# Patient Record
Sex: FEMALE | Race: WHITE | Employment: FULL TIME | URBAN - NONMETROPOLITAN AREA
[De-identification: names, ages, dates, MRNs, and addresses within clinical notes are randomized per-mention and may not be internally consistent; named-entity substitution may affect disease eponyms.]

---

## 2017-01-08 ENCOUNTER — OFFICE VISIT (OUTPATIENT)
Dept: PRIMARY CARE CLINIC | Age: 52
End: 2017-01-08

## 2017-01-08 VITALS
RESPIRATION RATE: 16 BRPM | HEIGHT: 67 IN | DIASTOLIC BLOOD PRESSURE: 88 MMHG | TEMPERATURE: 98.7 F | OXYGEN SATURATION: 95 % | BODY MASS INDEX: 28.41 KG/M2 | SYSTOLIC BLOOD PRESSURE: 120 MMHG | WEIGHT: 181 LBS | HEART RATE: 86 BPM

## 2017-01-08 DIAGNOSIS — J01.40 ACUTE NON-RECURRENT PANSINUSITIS: Primary | ICD-10-CM

## 2017-01-08 PROCEDURE — 99202 OFFICE O/P NEW SF 15 MIN: CPT | Performed by: FAMILY MEDICINE

## 2017-01-08 RX ORDER — DOXYCYCLINE HYCLATE 100 MG/1
100 CAPSULE ORAL 2 TIMES DAILY
Qty: 20 CAPSULE | Refills: 0 | Status: SHIPPED | OUTPATIENT
Start: 2017-01-08 | End: 2017-01-18

## 2017-01-08 ASSESSMENT — ENCOUNTER SYMPTOMS
RHINORRHEA: 0
SINUS COMPLAINT: 1
DIARRHEA: 0
VOMITING: 0
SORE THROAT: 1
SHORTNESS OF BREATH: 0
NAUSEA: 0
SINUS PRESSURE: 1
WHEEZING: 0
COUGH: 1

## 2017-08-27 ENCOUNTER — OFFICE VISIT (OUTPATIENT)
Dept: PRIMARY CARE CLINIC | Age: 52
End: 2017-08-27
Payer: COMMERCIAL

## 2017-08-27 VITALS
TEMPERATURE: 98 F | OXYGEN SATURATION: 97 % | BODY MASS INDEX: 29.19 KG/M2 | HEIGHT: 67 IN | HEART RATE: 95 BPM | DIASTOLIC BLOOD PRESSURE: 80 MMHG | SYSTOLIC BLOOD PRESSURE: 130 MMHG | WEIGHT: 186 LBS

## 2017-08-27 DIAGNOSIS — J32.9 SINUSITIS, UNSPECIFIED CHRONICITY, UNSPECIFIED LOCATION: Primary | ICD-10-CM

## 2017-08-27 PROCEDURE — 99202 OFFICE O/P NEW SF 15 MIN: CPT | Performed by: NURSE PRACTITIONER

## 2017-08-27 RX ORDER — METFORMIN HYDROCHLORIDE 500 MG/1
TABLET, EXTENDED RELEASE ORAL
COMMUNITY
Start: 2017-08-12 | End: 2017-08-27 | Stop reason: DRUGHIGH

## 2017-08-27 RX ORDER — ESCITALOPRAM OXALATE 20 MG/1
TABLET ORAL
COMMUNITY
Start: 2017-07-31 | End: 2017-08-27 | Stop reason: SDUPTHER

## 2017-08-27 RX ORDER — SAXAGLIPTIN 5 MG/1
TABLET, FILM COATED ORAL
COMMUNITY
Start: 2017-08-23 | End: 2022-01-11

## 2017-08-27 RX ORDER — ZOLPIDEM TARTRATE 10 MG/1
TABLET ORAL
COMMUNITY
Start: 2017-08-12 | End: 2020-10-13

## 2017-08-27 RX ORDER — DOXYCYCLINE HYCLATE 100 MG
100 TABLET ORAL 2 TIMES DAILY
Qty: 20 TABLET | Refills: 0 | Status: SHIPPED | OUTPATIENT
Start: 2017-08-27 | End: 2017-09-06

## 2017-08-27 RX ORDER — TRAMADOL HYDROCHLORIDE 50 MG/1
TABLET ORAL
COMMUNITY
Start: 2017-08-08 | End: 2020-10-13

## 2017-08-27 ASSESSMENT — ENCOUNTER SYMPTOMS
SORE THROAT: 1
COUGH: 1
SINUS PRESSURE: 1

## 2019-07-15 ENCOUNTER — OFFICE VISIT (OUTPATIENT)
Dept: NEUROLOGY | Age: 54
End: 2019-07-15
Payer: COMMERCIAL

## 2019-07-15 VITALS
BODY MASS INDEX: 31.24 KG/M2 | HEIGHT: 66 IN | HEART RATE: 101 BPM | SYSTOLIC BLOOD PRESSURE: 125 MMHG | WEIGHT: 194.4 LBS | DIASTOLIC BLOOD PRESSURE: 88 MMHG

## 2019-07-15 DIAGNOSIS — R25.1 TREMOR: Primary | ICD-10-CM

## 2019-07-15 PROCEDURE — G8427 DOCREV CUR MEDS BY ELIG CLIN: HCPCS | Performed by: PSYCHIATRY & NEUROLOGY

## 2019-07-15 PROCEDURE — 3017F COLORECTAL CA SCREEN DOC REV: CPT | Performed by: PSYCHIATRY & NEUROLOGY

## 2019-07-15 PROCEDURE — 1036F TOBACCO NON-USER: CPT | Performed by: PSYCHIATRY & NEUROLOGY

## 2019-07-15 PROCEDURE — 99204 OFFICE O/P NEW MOD 45 MIN: CPT | Performed by: PSYCHIATRY & NEUROLOGY

## 2019-07-15 PROCEDURE — G8417 CALC BMI ABV UP PARAM F/U: HCPCS | Performed by: PSYCHIATRY & NEUROLOGY

## 2019-07-15 RX ORDER — ALENDRONATE SODIUM 70 MG/1
70 TABLET ORAL
COMMUNITY
End: 2019-09-03 | Stop reason: ALTCHOICE

## 2019-07-15 RX ORDER — EZETIMIBE 10 MG/1
10 TABLET ORAL DAILY
COMMUNITY
End: 2019-09-03 | Stop reason: ALTCHOICE

## 2019-07-15 RX ORDER — LISINOPRIL 10 MG/1
10 TABLET ORAL DAILY
COMMUNITY
End: 2019-09-03 | Stop reason: ALTCHOICE

## 2019-07-15 RX ORDER — PROPRANOLOL HYDROCHLORIDE 20 MG/1
20 TABLET ORAL 2 TIMES DAILY
Qty: 90 TABLET | Refills: 3 | Status: SHIPPED | OUTPATIENT
Start: 2019-07-15 | End: 2020-01-29

## 2019-07-15 RX ORDER — FEXOFENADINE HCL 180 MG/1
180 TABLET ORAL DAILY
COMMUNITY

## 2019-07-22 ENCOUNTER — OFFICE VISIT (OUTPATIENT)
Dept: PRIMARY CARE CLINIC | Age: 54
End: 2019-07-22
Payer: COMMERCIAL

## 2019-07-22 VITALS
OXYGEN SATURATION: 97 % | TEMPERATURE: 96.6 F | HEART RATE: 91 BPM | SYSTOLIC BLOOD PRESSURE: 132 MMHG | WEIGHT: 186.4 LBS | BODY MASS INDEX: 29.96 KG/M2 | HEIGHT: 66 IN | DIASTOLIC BLOOD PRESSURE: 84 MMHG | RESPIRATION RATE: 18 BRPM

## 2019-07-22 DIAGNOSIS — J01.40 ACUTE NON-RECURRENT PANSINUSITIS: Primary | ICD-10-CM

## 2019-07-22 PROCEDURE — 3017F COLORECTAL CA SCREEN DOC REV: CPT | Performed by: NURSE PRACTITIONER

## 2019-07-22 PROCEDURE — 1036F TOBACCO NON-USER: CPT | Performed by: NURSE PRACTITIONER

## 2019-07-22 PROCEDURE — G8427 DOCREV CUR MEDS BY ELIG CLIN: HCPCS | Performed by: NURSE PRACTITIONER

## 2019-07-22 PROCEDURE — G8417 CALC BMI ABV UP PARAM F/U: HCPCS | Performed by: NURSE PRACTITIONER

## 2019-07-22 PROCEDURE — 99213 OFFICE O/P EST LOW 20 MIN: CPT | Performed by: NURSE PRACTITIONER

## 2019-07-22 RX ORDER — CLARITHROMYCIN 500 MG/1
500 TABLET, COATED ORAL 2 TIMES DAILY
Qty: 28 TABLET | Refills: 0 | Status: SHIPPED | OUTPATIENT
Start: 2019-07-22 | End: 2019-08-05

## 2019-07-22 ASSESSMENT — ENCOUNTER SYMPTOMS
SHORTNESS OF BREATH: 0
SORE THROAT: 1
WHEEZING: 0
RHINORRHEA: 0
SINUS PRESSURE: 1
COUGH: 1

## 2019-07-22 NOTE — PROGRESS NOTES
is alert and oriented to person, place, and time. Skin: Skin is warm and dry. Psychiatric: She has a normal mood and affect. Her behavior is normal. Thought content normal.   Nursing note and vitals reviewed. Assessment and Plan:    No results found for this visit on 07/22/19. Diagnosis Orders   1. Acute non-recurrent pansinusitis  clarithromycin (BIAXIN) 500 MG tablet     Patient verbalized that doxycycline is not effective for her. I reviewed her chart and Dr. Jimmie Fuller previously ordered Biaxin. Complete full course of antibiotic. I also recommended Flonase and an antihistamine for sinus symptoms. she was also encouraged to use tylenol for pain/fever. Increase water intake. Use cool mist humidifier at bedtime. Use nasal saline flush as needed. Good hand hygiene. she was instructed to return if there is no improvement or symptoms worsen. Answered all questions. No orders of the defined types were placed in this encounter.         Electronically signed by AQUILES Stroud CNP on 7/22/19 at 4:22 PM

## 2019-08-07 ENCOUNTER — TELEPHONE (OUTPATIENT)
Dept: NEUROLOGY | Age: 54
End: 2019-08-07

## 2019-09-03 ENCOUNTER — OFFICE VISIT (OUTPATIENT)
Dept: NEUROLOGY | Age: 54
End: 2019-09-03
Payer: COMMERCIAL

## 2019-09-03 VITALS
DIASTOLIC BLOOD PRESSURE: 78 MMHG | WEIGHT: 194.6 LBS | HEART RATE: 72 BPM | BODY MASS INDEX: 31.27 KG/M2 | SYSTOLIC BLOOD PRESSURE: 120 MMHG | HEIGHT: 66 IN

## 2019-09-03 DIAGNOSIS — G25.0 ESSENTIAL TREMOR: Primary | ICD-10-CM

## 2019-09-03 PROCEDURE — 1036F TOBACCO NON-USER: CPT | Performed by: PSYCHIATRY & NEUROLOGY

## 2019-09-03 PROCEDURE — G8427 DOCREV CUR MEDS BY ELIG CLIN: HCPCS | Performed by: PSYCHIATRY & NEUROLOGY

## 2019-09-03 PROCEDURE — 99214 OFFICE O/P EST MOD 30 MIN: CPT | Performed by: PSYCHIATRY & NEUROLOGY

## 2019-09-03 PROCEDURE — G8417 CALC BMI ABV UP PARAM F/U: HCPCS | Performed by: PSYCHIATRY & NEUROLOGY

## 2019-09-03 PROCEDURE — 3017F COLORECTAL CA SCREEN DOC REV: CPT | Performed by: PSYCHIATRY & NEUROLOGY

## 2019-09-03 NOTE — PROGRESS NOTES
[address]  [address], [address] [place] [address]  Ph: [phone] or [phone]  FAX: [phone]            The patient comes in today as a follow-up visit. She has a past medical history of benign essential tremors. On the last visit, the patient was started on Inderal 20 mg twice daily. The patient reports that there is a significant improvement in her symptoms that she is 95% improved. She reports being compliant with medications and does not report of any side effect. She does not report any dizziness, no tightness, no fatigue. Previously she has been seen by an outside facility neurologist.  There was a question of patient having Parkinson's disease and she was given a trial of carbidopa levodopa without improvement of the symptoms. MRI of the brain in the past has been normal.  Reports are reviewed with the patient.                             REVIEW OF SYSTEMS    Constitutional Weight: absent, Appetite: absent, Fatigue: absent   HEENT Visual disturbance: absent, Ears: normal   Reespiratory Shortness of breath: absent, Cough: absent   Cardivascular Chest pain: absent, Leg swelling :absent   GI Constipation: absent, Diarrhea: absent, Swallowing change: absent    Urinary frequency: absent, Urinary urgency: absent,    Musculoskeletal Neck pain: absent, Back pain: present, Stiffness: present, Muscle pain: present, Joint pain: present   Dermatological Hair loss: absent, Skin changes: absent   Neurological Memory loss: absent, Confusion: absent, Seizures: absent, Trouble walking or imbalance: absent, Dizziness: absent, Weakness: absent, Numbness: absent Tremor: present, Spasm: present, Speech difficulty: absent, Headache: absent, Light sensitivity: absent   Psychiatric Anxiety: absent, Hallucination: absent, Depression, absent   Hematologic Abnormal bleeding/Bruising: absent, Anemia: absent                                  Past Medical History:   Diagnosis Date

## 2020-01-29 RX ORDER — PROPRANOLOL HYDROCHLORIDE 20 MG/1
TABLET ORAL
Qty: 60 TABLET | Refills: 3 | Status: SHIPPED | OUTPATIENT
Start: 2020-01-29 | End: 2020-05-26

## 2020-05-26 RX ORDER — PROPRANOLOL HYDROCHLORIDE 20 MG/1
TABLET ORAL
Qty: 60 TABLET | Refills: 3 | Status: SHIPPED | OUTPATIENT
Start: 2020-05-26 | End: 2020-09-23 | Stop reason: SDUPTHER

## 2020-09-23 RX ORDER — PROPRANOLOL HYDROCHLORIDE 20 MG/1
TABLET ORAL
Qty: 60 TABLET | Refills: 0 | Status: SHIPPED | OUTPATIENT
Start: 2020-09-23 | End: 2020-11-03

## 2020-09-23 RX ORDER — PROPRANOLOL HYDROCHLORIDE 20 MG/1
TABLET ORAL
Qty: 60 TABLET | Refills: 3 | OUTPATIENT
Start: 2020-09-23

## 2020-10-13 ENCOUNTER — OFFICE VISIT (OUTPATIENT)
Dept: NEUROLOGY | Age: 55
End: 2020-10-13
Payer: COMMERCIAL

## 2020-10-13 VITALS
HEIGHT: 66 IN | HEART RATE: 101 BPM | BODY MASS INDEX: 28.93 KG/M2 | WEIGHT: 180 LBS | SYSTOLIC BLOOD PRESSURE: 121 MMHG | TEMPERATURE: 97.5 F | DIASTOLIC BLOOD PRESSURE: 83 MMHG

## 2020-10-13 PROBLEM — R25.1 TREMOR: Status: ACTIVE | Noted: 2020-10-13

## 2020-10-13 PROCEDURE — G8427 DOCREV CUR MEDS BY ELIG CLIN: HCPCS | Performed by: NURSE PRACTITIONER

## 2020-10-13 PROCEDURE — 99214 OFFICE O/P EST MOD 30 MIN: CPT | Performed by: NURSE PRACTITIONER

## 2020-10-13 PROCEDURE — 3017F COLORECTAL CA SCREEN DOC REV: CPT | Performed by: NURSE PRACTITIONER

## 2020-10-13 PROCEDURE — G8419 CALC BMI OUT NRM PARAM NOF/U: HCPCS | Performed by: NURSE PRACTITIONER

## 2020-10-13 PROCEDURE — G8484 FLU IMMUNIZE NO ADMIN: HCPCS | Performed by: NURSE PRACTITIONER

## 2020-10-13 PROCEDURE — 1036F TOBACCO NON-USER: CPT | Performed by: NURSE PRACTITIONER

## 2020-10-13 RX ORDER — PRIMIDONE 50 MG/1
50 TABLET ORAL 2 TIMES DAILY
Qty: 60 TABLET | Refills: 5 | Status: SHIPPED | OUTPATIENT
Start: 2020-10-13 | End: 2021-04-05 | Stop reason: SINTOL

## 2020-10-13 RX ORDER — TIZANIDINE 4 MG/1
4 TABLET ORAL EVERY 6 HOURS PRN
COMMUNITY
End: 2022-02-15 | Stop reason: SDUPTHER

## 2020-10-13 RX ORDER — AMITRIPTYLINE HYDROCHLORIDE 25 MG/1
25 TABLET, FILM COATED ORAL NIGHTLY
COMMUNITY
End: 2022-05-18 | Stop reason: SDUPTHER

## 2020-10-13 NOTE — PROGRESS NOTES
[facility]            [address]          [place], [address] [facility]          Dept: [phone]          Dept Fax: [phone]        MD Jesús Russo MD Ahmed B. Armandina Pears, MD Lon Roes, MD Elda Point, MD Mancil Sorrel, KAREN            10/13/2020      HISTORY OF PRESENT ILLNESS:       I had the pleasure of seeing Eduard Alcazar, who returns for continuing neurologic care. Patient is a 48year old female who was last seen on September 3, 2019 by Dr. Eileen Chan. She has a past medical history of benign essential tremors. Dr. Mayte Low had previously started the patient on Inderal 20 mg twice daily. The patient reported that there is a significant improvement in her symptoms that she is 95% improved. She reported being compliant with medications and did not report any side effects. She does not report any dizziness, no tightness, no fatigue. Previously she has been seen by an outside facility neurologist.  There was a question of patient having Parkinson's disease and she was given a trial of carbidopa levodopa without improvement of the symptoms. MRI of the brain in the past has been normal.  Reports are reviewed with the patient. At her last visit, Dr. Eileen Chan increased Inderal to 20 mg in the morning and 40 mg at night. She was unable to tolerate that dosage, so she was lowered again to 20 mg twice daily due to hypotension. Today, she reports she still has a tremor. She reported that she had a lumbar ablation by pain management. Patient reports that her tremor is just in legs. Patient reports that tremor has been worsening, and the burning of the nerves has not been working. She reports trauma to back, followed by the tremor in leg. She reports that as the day goes on the tremor worsens. Patient reports that the Inderal makes her blood pressure drop.     Prior testing on file       CURRENT MEDICATIONS:     Current Outpatient Medications   Medication Sig Dispense Refill    tiZANidine (ZANAFLEX) 4 MG tablet Take 4 mg by mouth every 6 hours as needed      amitriptyline (ELAVIL) 25 MG tablet Take 25 mg by mouth nightly      primidone (MYSOLINE) 50 MG tablet Take 1 tablet by mouth 2 times daily 60 tablet 5    propranolol (INDERAL) 20 MG tablet take 1 tablet by mouth twice a day 60 tablet 0    fexofenadine (ALLEGRA) 180 MG tablet Take 180 mg by mouth daily      ONGLYZA 5 MG TABS tablet       metFORMIN (GLUCOPHAGE) 500 MG tablet Take 1,000 mg by mouth daily       escitalopram (LEXAPRO) 10 MG tablet Take 10 mg by mouth daily.  furosemide (LASIX) 20 MG tablet Take 20 mg by mouth daily.  montelukast (SINGULAIR) 10 MG tablet Take 10 mg by mouth nightly. No current facility-administered medications for this visit. ALLERGIES:     Allergies   Allergen Reactions    Codeine     E-Mycin [Erythromycin]     Gabapentin     Iodine     Nsaids     Pcn [Penicillins]     Prednisone Other (See Comments)     Trouble swallowing    Sulfa Antibiotics     Zithromax [Azithromycin]                                  REVIEW OF SYSTEMS        All items selected indicate a positive finding. Those items not selected are negative.   Constitutional [] Weight loss/gain   [] Fatigue  [] Fever/Chills   HEENT [] Hearing Loss  [] Visual Disturbance  [] Tinnitus  [] Eye pain   Respiratory [] Shortness of Breath  [] Cough  [] Snoring   Cardiovascular [] Chest Pain  [] Palpitations  [] Lightheaded   GI [] Constipation  [] Diarrhea  [] Swallowing change  [] Nausea/vomiting    [] Urinary Frequency  [] Urinary Urgency   Musculoskeletal [] Neck pain  [x] Back pain  [] Muscle pain  [] Restless legs   Dermatologic [] Skin changes   Neurologic [] Memory loss/confusion  [] Seizures  [] Trouble walking or imbalance  [] Dizziness  [] Sleep disturbance  [] Weakness  [] Numbness  [x] Tremors  [] Speech Difficulty  [] Headaches  [] Light Sensitivity  [] Sound Sensitivity   Endocrinology []Excessive thirst  []Excessive hunger   Psychiatric [] Anxiety/Depression  [] Hallucination   Allergy/immunology []Hives/environmental allergies   Hematologic/lymph [] Abnormal bleeding  [] Abnormal bruising         PHYSICAL EXAMINATION:       Vitals:    10/13/20 1517   BP: 121/83   Pulse: 101   Temp: 97.5 °F (36.4 °C)                                              .                                                                                                     General Appearance:  Alert, cooperative, no signs of distress, appears stated age   Head:  Normocephalic, no signs of trauma   Eyes:  Conjunctiva/corneas clear;  eyelids intact   Ears:  Normal external ear and canals   Nose: Nares normal, mucosa normal, no drainage    Throat: Lips and tongue normal; teeth normal;  gums normal   Neck: Supple, intact flexion, extension and rotation;   trachea midline;  no adenopathy;   thyroid: not enlarged;   no carotid pulse abnormality   Back:   Symmetric, no curvature, ROM adequate   Lungs:   Respirations unlabored   Heart:  Regular rate and rhythm           Extremities: Extremities normal, no cyanosis, no edema   Pulses: Symmetric over head and neck   Skin: Skin color, texture normal, no rashes, no lesions                                     NEUROLOGIC EXAMINATION    Neurologic Exam  Mental status    Alert and oriented x 3; intact memory with no confusion, speech or language problems; no hallucinations or delusions  Fund of information appropriate for level of education    Cranial nerves    II - visual fields intact to confrontation bilaterally  III, IV, VI - extra-ocular muscles full: no pupillary defect; no KOBY, no nystagmus, no ptosis   V - normal facial sensation                                                               VII - normal facial symmetry                                                             VIII - intact presence of Kanchan Gonzalez CNP.

## 2020-11-03 RX ORDER — PROPRANOLOL HYDROCHLORIDE 20 MG/1
TABLET ORAL
Qty: 60 TABLET | Refills: 0 | Status: SHIPPED | OUTPATIENT
Start: 2020-11-03 | End: 2020-12-07

## 2020-12-07 RX ORDER — PROPRANOLOL HYDROCHLORIDE 20 MG/1
TABLET ORAL
Qty: 60 TABLET | Refills: 0 | Status: SHIPPED | OUTPATIENT
Start: 2020-12-07 | End: 2021-01-07

## 2020-12-07 NOTE — TELEPHONE ENCOUNTER
Pharmacy requesting refill of propranolol 20 mg BID.       Medication active on med list yes      Date of last Rx: 11/3/2020  with 0 refills verified on 12/7/2020   verified by GODFREY PEREZ      Date of last appointment 10/13/2020    Next Visit Date:  12/15/2020

## 2020-12-15 ENCOUNTER — TELEMEDICINE (OUTPATIENT)
Dept: NEUROLOGY | Age: 55
End: 2020-12-15
Payer: COMMERCIAL

## 2020-12-15 PROCEDURE — 3017F COLORECTAL CA SCREEN DOC REV: CPT | Performed by: NURSE PRACTITIONER

## 2020-12-15 PROCEDURE — G8427 DOCREV CUR MEDS BY ELIG CLIN: HCPCS | Performed by: NURSE PRACTITIONER

## 2020-12-15 PROCEDURE — 99214 OFFICE O/P EST MOD 30 MIN: CPT | Performed by: NURSE PRACTITIONER

## 2020-12-15 NOTE — PROGRESS NOTES
[facility]  Offices: [address], [place], [address] [facility]  [address] [facility], [address], [place], [address] [facility]  [address] [facility] [address], [address]  Phone: [phone]  Fax: [phone]    MD Zoe Candelario, MD Amanda Cha, MD Tata Roberts, MD Maya Holguin, MD Ros Denis, CNP    TELEHEALTH VISIT        12/15/2020      HISTORY OF PRESENT ILLNESS:       I had the pleasure of seeing Emma Gregorio, who is here for evaluation of benign essential tremors. The patient is a 48year old female who was last seen on October 13, 2020. She was previously seen by Dr. Robin Cha who started her on Inderal 20 mg twice daily.  The patient reported that there is a significant improvement in her symptoms that she is 95% improved.  She reported being compliant with medications and did not report any side effects. She does not report any dizziness, no tightness, no fatigue. Previously she has been seen by an outside facility neurologist. Whitley Patel was a question of patient having Parkinson's disease and she was given a trial of carbidopa levodopa without improvement of the symptoms. MRI of the brain in the past has been normal.  Reports are reviewed with the patient. At her last visit, Dr. Robin Cha increased Inderal to 20 mg in the morning and 40 mg at night. She was unable to tolerate that dosage, so she was lowered again to 20 mg twice daily due to hypotension.     At last visit she reported that she was still having a tremor. She reported that she had a lumbar ablation by pain management but she does not believe it was effective. Her tremor now is only in her legs and was worsening prior to last visit. She reports trauma to back, followed by the tremor in leg. She reports that as the day goes on the tremor worsens. Patient reports that the Inderal makes her blood pressure drop.  At this visit she was started on primidone and is here today reporting that the primidone has been causing side effects. She reports that she has had problems with anti epileptic medications in the past. She tried taking it for two weeks but she reported that it left her in a sort of brain fog. The primidone was also not very effective in relieving her tremor. She has been compliant with inderal and reports that it has been helping manage her tremor. She denies any problems with her blood pressure.            Prior testing reviewed:  MRI Brain WO Contrast 2019 Unremarkable      PAST MEDICAL HISTORY:         Diagnosis Date    Allergic rhinitis     Arthritis     Depression     Fibromyalgia     Hyperlipidemia     Neuropathy     Peripheral vascular disease (HCC)     Type II or unspecified type diabetes mellitus without mention of complication, not stated as uncontrolled         PAST SURGICAL HISTORY:         Procedure Laterality Date     SECTION      CHOLECYSTECTOMY      OTHER SURGICAL HISTORY      darrach procedure    OTHER SURGICAL HISTORY      darrach procedure revision        SOCIAL HISTORY:     Social History     Socioeconomic History    Marital status:      Spouse name: Not on file    Number of children: Not on file    Years of education: Not on file    Highest education level: Not on file   Occupational History    Not on file   Social Needs    Financial resource strain: Not on file    Food insecurity     Worry: Not on file     Inability: Not on file    Transportation needs     Medical: Not on file     Non-medical: Not on file   Tobacco Use    Smoking status: Former Smoker    Smokeless tobacco: Never Used   Substance and Sexual Activity    Alcohol use: No    Drug use: No    Sexual activity: Not on file   Lifestyle    Physical activity     Days per week: Not on file     Minutes per session: Not on file    Stress: Not on file   Relationships    Social connections     Talks on phone: Not on file     Gets together: Not on file Attends Sikh service: Not on file     Active member of club or organization: Not on file     Attends meetings of clubs or organizations: Not on file     Relationship status: Not on file    Intimate partner violence     Fear of current or ex partner: Not on file     Emotionally abused: Not on file     Physically abused: Not on file     Forced sexual activity: Not on file   Other Topics Concern    Not on file   Social History Narrative    Not on file       CURRENT MEDICATIONS:     Current Outpatient Medications   Medication Sig Dispense Refill    propranolol (INDERAL) 20 MG tablet take 1 tablet by mouth twice a day 60 tablet 0    tiZANidine (ZANAFLEX) 4 MG tablet Take 4 mg by mouth every 6 hours as needed      amitriptyline (ELAVIL) 25 MG tablet Take 25 mg by mouth nightly      fexofenadine (ALLEGRA) 180 MG tablet Take 180 mg by mouth daily      metFORMIN (GLUCOPHAGE) 500 MG tablet Take 1,000 mg by mouth daily       escitalopram (LEXAPRO) 10 MG tablet Take 10 mg by mouth daily.  furosemide (LASIX) 20 MG tablet Take 20 mg by mouth daily.  montelukast (SINGULAIR) 10 MG tablet Take 10 mg by mouth nightly.  primidone (MYSOLINE) 50 MG tablet Take 1 tablet by mouth 2 times daily (Patient not taking: Reported on 12/11/2020) 60 tablet 5    ONGLYZA 5 MG TABS tablet        No current facility-administered medications for this visit. ALLERGIES:     Allergies   Allergen Reactions    Codeine     E-Mycin [Erythromycin]     Gabapentin     Iodine     Nsaids     Pcn [Penicillins]     Prednisone Other (See Comments)     Trouble swallowing    Sulfa Antibiotics     Zithromax [Azithromycin]                              REVIEW OF SYSTEMS                   All items selected indicate a positive finding. Those items not selected are negative.   Constitutional [] Weight loss/gain   [] Fatigue  [] Fever/Chills   HEENT [] Hearing Loss  [] Visual Disturbance  [] Tinnitus  [] Eye pain   Respiratory [] Shortness of Breath  [] Cough  [] Snoring   Cardiovascular [] Chest Pain  [] Palpitations  [] Lightheaded   GI [] Constipation  [] Diarrhea  [] Swallowing change  [] Nausea/vomiting    [] Urinary Frequency  [] Urinary Urgency   Musculoskeletal [] Neck pain  [x] Back pain  [] Muscle pain  [] Restless legs   Dermatologic [] Skin changes   Neurologic [] Memory loss/confusion  [] Seizures  [] Trouble walking or imbalance  [] Dizziness  [] Sleep disturbance  [] Weakness  [] Numbness  [x] Tremors  [] Speech Difficulty  [] Headaches  [] Light Sensitivity  [] Sound Sensitivity   Endocrinology []Excessive thirst  []Excessive hunger   Psychiatric [] Anxiety/Depression  [] Hallucination   Allergy/immunology []Hives/environmental allergies   Hematologic/lymph [] Abnormal bleeding  [] Abnormal bruising          PHYSICAL EXAMINATION:                                         .                                                                                                    General Appearance:  Alert, cooperative, no signs of distress, appears stated age   Head:  Normocephalic, no signs of trauma   Eyes:  Conjunctiva/corneas clear;  eyelids intact   Ears:  Normal external ear and canals   Nose: Nares normal, no drainage    Throat: Lips and tongue normal; teeth normal;  gums normal   Extremities: Extremities normal, no cyanosis, no edema   Skin: Skin color, texture normal, no rashes, no lesions                                     NEUROLOGIC EXAMINATION      Mental status    Alert and oriented x 3; able to follow commands, speech and language intact; no hallucinations or delusions  Fund of information appropriate for level of education    Cranial nerves    II - grossly intact  III, IV, VI  extra-ocular muscles full: no nystagmus, no ptosis   V - normal facial sensation                                                               VII - normal facial symmetry                                                             VIII - intact hearing                                                                             IX, X - symmetrical palate                                                                  XI - symmetrical shoulder shrug                                                       XII - tongue midline without atrophy      Motor function  Normal muscle bulk. Strength at least 5/5 on all 4 extremities, no pronator drift      Sensory function Unable to test      Cerebellar Intact fine motor movement. No involuntary movements or tremors. No ataxia or dysmetria on finger to nose or heel to shin testing      Reflex function Unable to test      Gait                   normal base and arm swing              ASSESSMENT AND PLAN:     This is a telehealth visit that was performed with the originating site at Patient Location: home and Provider Location of [place], [place]. Verbal consent to participate in video visit was obtained. Pursuant to the emergency declaration under the [facility] [address] [facility], [facility] waiver authority and the [employer] Act, this Virtual Visit was conducted, with patient's consent, to reduce the patient's risk of exposure to COVID-19 and provide continuity of care for an established/new patient. Services were provided through a video synchronous discussion virtually to substitute for in-person clinic visit. I discussed with the patient the nature of our telehealth visits via interactive/real-time audio/video that:  - I would evaluate the patient and recommend diagnostics and treatments based on my assessment  - Our sessions are not being recorded and that personal health information is protected  - Our team would provide follow up care in person if/when the patient needs it. In summary, your patient, Gm Keene exhibits the following, with associated plan:    1. Benign tremor  1.  Continue Inderal 20 mg every morning, 20 mg every night, 2. Patient to continue following with pain management for management of back pain, she will see neurosurgery after COVID pandemic  5. Patient to return for follow up visit in 3-4 months      Signed: STEPHON Escalera    [facility]    Please note that this chart was generated using voice recognition Dragon dictation software. Although every effort was made to ensure the accuracy of this automated transcription, some errors in transcription may have occurred. Provider Attestation:    *The documentation recorded by the scribe accurately reflects the service I personally performed and the decisions made by myself. Portions of this exam were transcribed by a scribe. I personally performed the history, physical exam, and the medical decision-making and confirm the accuracy of the information in the transcribed note. *      Scribe Attestation:     By signing my name below, I, Jade Cohen, attest that this documentation has been prepared under the direction and in the presence of Raman Shaver CNP.

## 2021-01-07 NOTE — TELEPHONE ENCOUNTER
Pharmacy requesting refill of propranolol 20 mg BID.       Medication active on med list yes      Date of last Rx: 12/7/2020  with 0 refills verified on 1/7/21   verified by GODFREY PEREZ      Date of last appointment 12/15/2020    Next Visit Date:  4/12/2021

## 2021-01-08 RX ORDER — PROPRANOLOL HYDROCHLORIDE 20 MG/1
TABLET ORAL
Qty: 60 TABLET | Refills: 0 | Status: SHIPPED | OUTPATIENT
Start: 2021-01-08 | End: 2021-02-18

## 2021-02-18 NOTE — TELEPHONE ENCOUNTER
Pharmacy requesting refill of propranolol 20 mg.      Medication active on med list yes      Date of last Rx: 1/8/21  with 0 refills verified on 2/18/21   verified by GODFREY PEREZ      Date of last appointment 12/15/2020    Next Visit Date:  4/5/2021

## 2021-02-19 RX ORDER — PROPRANOLOL HYDROCHLORIDE 20 MG/1
TABLET ORAL
Qty: 60 TABLET | Refills: 1 | Status: SHIPPED | OUTPATIENT
Start: 2021-02-19 | End: 2021-04-05 | Stop reason: SDUPTHER

## 2021-04-05 ENCOUNTER — TELEMEDICINE (OUTPATIENT)
Dept: NEUROLOGY | Age: 56
End: 2021-04-05
Payer: COMMERCIAL

## 2021-04-05 DIAGNOSIS — M51.9 LUMBAR DISC DISEASE: ICD-10-CM

## 2021-04-05 PROCEDURE — 3017F COLORECTAL CA SCREEN DOC REV: CPT | Performed by: NURSE PRACTITIONER

## 2021-04-05 PROCEDURE — G8427 DOCREV CUR MEDS BY ELIG CLIN: HCPCS | Performed by: NURSE PRACTITIONER

## 2021-04-05 PROCEDURE — 99214 OFFICE O/P EST MOD 30 MIN: CPT | Performed by: NURSE PRACTITIONER

## 2021-04-05 RX ORDER — PROPRANOLOL HYDROCHLORIDE 20 MG/1
TABLET ORAL
Qty: 60 TABLET | Refills: 11 | Status: SHIPPED | OUTPATIENT
Start: 2021-04-05 | End: 2022-07-01 | Stop reason: SDUPTHER

## 2021-04-05 NOTE — PROGRESS NOTES
Occupational History    Not on file   Social Needs    Financial resource strain: Not on file    Food insecurity     Worry: Not on file     Inability: Not on file    Transportation needs     Medical: Not on file     Non-medical: Not on file   Tobacco Use    Smoking status: Former Smoker    Smokeless tobacco: Never Used   Substance and Sexual Activity    Alcohol use: No    Drug use: No    Sexual activity: Not on file   Lifestyle    Physical activity     Days per week: Not on file     Minutes per session: Not on file    Stress: Not on file   Relationships    Social connections     Talks on phone: Not on file     Gets together: Not on file     Attends Gnosticism service: Not on file     Active member of club or organization: Not on file     Attends meetings of clubs or organizations: Not on file     Relationship status: Not on file    Intimate partner violence     Fear of current or ex partner: Not on file     Emotionally abused: Not on file     Physically abused: Not on file     Forced sexual activity: Not on file   Other Topics Concern    Not on file   Social History Narrative    Not on file       CURRENT MEDICATIONS:     Current Outpatient Medications   Medication Sig Dispense Refill    propranolol (INDERAL) 20 MG tablet take 1 tablet by mouth twice a day 60 tablet 1    tiZANidine (ZANAFLEX) 4 MG tablet Take 4 mg by mouth every 6 hours as needed      amitriptyline (ELAVIL) 25 MG tablet Take 25 mg by mouth nightly      fexofenadine (ALLEGRA) 180 MG tablet Take 180 mg by mouth daily      ONGLYZA 5 MG TABS tablet       metFORMIN (GLUCOPHAGE) 500 MG tablet Take 1,000 mg by mouth daily       escitalopram (LEXAPRO) 10 MG tablet Take 10 mg by mouth daily.  furosemide (LASIX) 20 MG tablet Take 20 mg by mouth daily.  montelukast (SINGULAIR) 10 MG tablet Take 10 mg by mouth nightly. No current facility-administered medications for this visit.          ALLERGIES:     Allergies   Allergen Reactions    Codeine     E-Mycin [Erythromycin]     Gabapentin     Iodine     Nsaids     Pcn [Penicillins]     Prednisone Other (See Comments)     Trouble swallowing    Sulfa Antibiotics     Zithromax [Azithromycin]                              REVIEW OF SYSTEMS                   All items selected indicate a positive finding. Those items not selected are negative.   Constitutional [] Weight loss/gain   [] Fatigue  [] Fever/Chills   HEENT [] Hearing Loss  [] Visual Disturbance  [] Tinnitus  [] Eye pain   Respiratory [] Shortness of Breath  [] Cough  [] Snoring   Cardiovascular [] Chest Pain  [] Palpitations  [] Lightheaded   GI [] Constipation  [] Diarrhea  [] Swallowing change  [] Nausea/vomiting    [] Urinary Frequency  [] Urinary Urgency   Musculoskeletal [] Neck pain  [] Back pain  [] Muscle pain  [] Restless legs   Dermatologic [] Skin changes   Neurologic [] Memory loss/confusion  [] Seizures  [] Trouble walking or imbalance  [] Dizziness  [] Sleep disturbance  [] Weakness  [] Numbness  [x] Tremors  [] Speech Difficulty  [] Headaches  [] Light Sensitivity  [] Sound Sensitivity   Endocrinology []Excessive thirst  []Excessive hunger   Psychiatric [] Anxiety/Depression  [] Hallucination   Allergy/immunology []Hives/environmental allergies   Hematologic/lymph [] Abnormal bleeding  [] Abnormal bruising          PHYSICAL EXAMINATION:                                         .                                                                                                    General Appearance:  Alert, cooperative, no signs of distress, appears stated age   Head:  Normocephalic, no signs of trauma   Eyes:  Conjunctiva/corneas clear;  eyelids intact   Ears:  Normal external ear and canals   Nose: Nares normal, no drainage    Throat: Lips and tongue normal; teeth normal;  gums normal   Extremities: Extremities normal, no cyanosis, no edema   Skin: Skin color, texture normal, no rashes, no lesions NEUROLOGIC EXAMINATION      Mental status    Alert and oriented x 3; able to follow commands, speech and language intact; no hallucinations or delusions  Fund of information appropriate for level of education    Cranial nerves    II - grossly intact  III, IV, VI - extra-ocular muscles full: no nystagmus, no ptosis   V - normal facial sensation                                                               VII - normal facial symmetry                                                             VIII - intact hearing                                                                             IX, X - symmetrical palate                                                                  XI - symmetrical shoulder shrug                                                       XII - tongue midline without atrophy      Motor function  Normal muscle bulk. Strength at least 5/5 on all 4 extremities, no pronator drift      Sensory function Unable to test      Cerebellar Intact fine motor movement. No involuntary movements or tremors. No ataxia or dysmetria on finger to nose or heel to shin testing      Reflex function Unable to test      Gait                   normal base and arm swing              ASSESSMENT AND PLAN:     This is a telehealth visit that was performed with the originating site at Patient Location: home and Provider Location of [place], [place]. Verbal consent to participate in video visit was obtained. Pursuant to the emergency declaration under the [facility], [facility] waiver authority and the [employer] Act, this Virtual Visit was conducted, with patient's consent, to reduce the patient's risk of exposure to COVID-19 and provide continuity of care for an established/new patient. Services were provided through a video synchronous discussion virtually to substitute for in-person clinic visit.  I discussed

## 2021-04-20 ENCOUNTER — HOSPITAL ENCOUNTER (OUTPATIENT)
Dept: CT IMAGING | Age: 56
Discharge: HOME | End: 2021-04-22
Payer: COMMERCIAL

## 2021-04-20 DIAGNOSIS — M54.17 LUMBOSACRAL RADICULOPATHY: ICD-10-CM

## 2021-04-20 PROCEDURE — 72131 CT LUMBAR SPINE W/O DYE: CPT

## 2021-05-05 ENCOUNTER — OFFICE VISIT (OUTPATIENT)
Dept: NEUROLOGY | Age: 56
End: 2021-05-05
Payer: COMMERCIAL

## 2021-05-05 VITALS
TEMPERATURE: 97.2 F | DIASTOLIC BLOOD PRESSURE: 79 MMHG | HEIGHT: 66 IN | BODY MASS INDEX: 29.09 KG/M2 | HEART RATE: 72 BPM | SYSTOLIC BLOOD PRESSURE: 132 MMHG | WEIGHT: 181 LBS

## 2021-05-05 DIAGNOSIS — G20 PARKINSON'S DISEASE (HCC): Primary | ICD-10-CM

## 2021-05-05 DIAGNOSIS — R27.0 ATAXIA: ICD-10-CM

## 2021-05-05 PROCEDURE — G8427 DOCREV CUR MEDS BY ELIG CLIN: HCPCS | Performed by: PSYCHIATRY & NEUROLOGY

## 2021-05-05 PROCEDURE — 3017F COLORECTAL CA SCREEN DOC REV: CPT | Performed by: PSYCHIATRY & NEUROLOGY

## 2021-05-05 PROCEDURE — G8419 CALC BMI OUT NRM PARAM NOF/U: HCPCS | Performed by: PSYCHIATRY & NEUROLOGY

## 2021-05-05 PROCEDURE — 1036F TOBACCO NON-USER: CPT | Performed by: PSYCHIATRY & NEUROLOGY

## 2021-05-05 PROCEDURE — 99214 OFFICE O/P EST MOD 30 MIN: CPT | Performed by: PSYCHIATRY & NEUROLOGY

## 2021-05-05 RX ORDER — PRIMIDONE 50 MG/1
25 TABLET ORAL 2 TIMES DAILY
Qty: 30 TABLET | Refills: 2 | Status: SHIPPED | OUTPATIENT
Start: 2021-05-05 | End: 2022-02-08 | Stop reason: ALTCHOICE

## 2021-05-05 NOTE — PROGRESS NOTES
[facility], [address], [address] [place] [address]  Ph: [phone] or [phone]  FAX: [phone]            The patient comes in today as a follow-up visit. She has a past medical history of benign essential tremors. Patient was started on Inderal 20 mg twice daily. The patient reports that there is a significant improvement in her symptoms that she is 95% improved. She reports being compliant with medications and does not report of any side effect. She does not report any dizziness, no tightness, no fatigue. Today, the patient admits to medication compliance. Tremor in the lower extremities is intermittent. Patient has a family history of Parkinson's with two aunts being diagnosed. Tremor has progressed to mild in the hands. She admits that tremors make walking more difficult. Patient has mild neck pain that is not bothersome. She denies bowel or bladder incontinence. Previously she has been seen by an outside facility neurologist.  There was a question of patient having Parkinson's disease and she was given a trial of carbidopa levodopa without improvement of the symptoms. MRI of the brain in the past has been normal.  Reports are reviewed with the patient.                             REVIEW OF SYSTEMS    Constitutional Weight: absent, Appetite: absent, Fatigue: absent   HEENT Visual disturbance: absent, Ears: normal   Reespiratory Shortness of breath: absent, Cough: absent   Cardivascular Chest pain: absent, Leg swelling :absent   GI Constipation: absent, Diarrhea: absent, Swallowing change: absent    Urinary frequency: absent, Urinary urgency: absent,    Musculoskeletal Neck pain: absent, Back pain: present, Stiffness: present, Muscle pain: present, Joint pain: present   Dermatological Hair loss: absent, Skin changes: absent   Neurological Memory loss: absent, Confusion: absent, Seizures: absent, Trouble walking or imbalance: absent, Dizziness: absent, Weakness: absent, Numbness: absent Tremor: present, Spasm: present, Speech difficulty: absent, Headache: absent, Light sensitivity: absent   Psychiatric Anxiety: absent, Hallucination: absent, Depression, absent   Hematologic Abnormal bleeding/Bruising: absent, Anemia: absent                                  Past Medical History:   Diagnosis Date    Allergic rhinitis     Arthritis     Depression     Fibromyalgia     Hyperlipidemia     Neuropathy     Peripheral vascular disease (HCC)     Type II or unspecified type diabetes mellitus without mention of complication, not stated as uncontrolled      Past Surgical History:   Procedure Laterality Date     SECTION      CHOLECYSTECTOMY      OTHER SURGICAL HISTORY      darrach procedure    OTHER SURGICAL HISTORY      darrach procedure revision     Social History     Socioeconomic History    Marital status:      Spouse name: Not on file    Number of children: Not on file    Years of education: Not on file    Highest education level: Not on file   Occupational History    Not on file   Social Needs    Financial resource strain: Not on file    Food insecurity     Worry: Not on file     Inability: Not on file    Transportation needs     Medical: Not on file     Non-medical: Not on file   Tobacco Use    Smoking status: Former Smoker    Smokeless tobacco: Never Used   Substance and Sexual Activity    Alcohol use: No    Drug use: No    Sexual activity: Not on file   Lifestyle    Physical activity     Days per week: Not on file     Minutes per session: Not on file    Stress: Not on file   Relationships    Social connections     Talks on phone: Not on file     Gets together: Not on file     Attends Anabaptism service: Not on file     Active member of club or organization: Not on file     Attends meetings of clubs or organizations: Not on file     Relationship status: Not on file    Intimate partner violence     Fear of current or ex partner: Not on file     Emotionally abused: Not on file     Physically abused: Not on file     Forced sexual activity: Not on file   Other Topics Concern    Not on file   Social History Narrative    Not on file     Family History   Problem Relation Age of Onset    Diabetes Mother     Hypertension Mother    [facility] Allergies Mother     Hypertension Father     Arthritis Father     Heart Disease Neg Hx      Allergies   Allergen Reactions    Codeine     E-Mycin [Erythromycin]     Gabapentin     Iodine     Nsaids     Pcn [Penicillins]     Prednisone Other (See Comments)     Trouble swallowing    Sulfa Antibiotics     Zithromax [Azithromycin]      /79   Pulse 72   Temp 97.2 °F (36.2 °C) (Temporal)   Ht 5' 6\" (1.676 m)   Wt 181 lb (82.1 kg)   BMI 29.21 kg/m²     General examination:    Head: Normocephalic, atraumatic  Eyes: Extraocular movements intact  Lungs: Respirations unlabored, chest wall no deformity  ENT: Normal external ear canals, no sinus tenderness  Heart: Regular rate rhythm  Abdomen: No masses, tenderness  Extremities: No cyanosis or edema, 2+ pulses  Skin: Intact, normal skin color    Neurological examination:    Mental status   Alert and oriented; intact memory with no confusion, speech or language problems; no hallucinations or delusions     Cranial nerves   II - visual fields intact to confrontation                                                III, IV, VI - extra-ocular muscles full: no pupillary defect; no KOBY, no nystagmus, no ptosis   V - normal facial sensation                                                               VII - normal facial symmetry                                                             VIII - intact hearing                                                                             IX, X - symmetrical palate                                                                  XI - symmetrical shoulder shrug XII - midline tongue without atrophy or fasciculation     Motor function  Normal muscle bulk and tone; normal power 5/5, including fine motor movements     Sensory function Intact to touch, pin, vibration, proprioception     Cerebellar  subtle fast frequency positional tremors in both lower extremities, improved from last visit     Reflex function Intact 2+ DTR and symmetric. Negative Babinski     Gait                   antalgic gait with negative retropulsion test       Assessment Recommendations:  Benign essential tremors    The patient has seen improvement in symptoms with Inderal. The 20 + 40 mg dosage lowered the patient's blood pressure, so she returned to 20 mg twice daily. I initiated the patient on primidone 25 mg twice daily for further control of tremor. Primidone can be further increased based on patient response. Medication side effects were discussed and questions were answered. I will also obtain DaTscan. I recommend the patient obtain MRI of the Cervical Spine W WO Contrast for surveillance of neck pain. Follow up in 6 weeks or sooner if symptoms worsen. Scribe Attestation:   By signing my name below, Alfredo Serna, attest that this documentation has been prepared under the direction and in the presence of Brian Velásquez MD.    Electronically Signed: Sli Schrader. 5/5/2021 8:45 AM    Physician Attestation:  Arcelia Buchanan MD, personally performed the services described in this documentation. All medical record entries made by the scribe were at my direction and in my presence. I have reviewed the chart and discharge instructions (if applicable) and agree that the record reflects my personal performance and is accurate and complete.     Electronically Signed: Jet Barrera 5/5/2021 8:45 AM    Diplomate, American Board of Psychiatry and Neurology  Diplomate, American Board of Clinical Neurophysiology  Diplomate, American Board of Epilepsy

## 2021-05-19 ENCOUNTER — HOSPITAL ENCOUNTER (OUTPATIENT)
Dept: NUCLEAR MEDICINE | Age: 56
Discharge: HOME | End: 2021-05-21
Payer: COMMERCIAL

## 2021-05-19 DIAGNOSIS — G20 PARKINSON'S DISEASE (HCC): ICD-10-CM

## 2021-05-19 PROCEDURE — A9584 IODINE I-123 IOFLUPANE: HCPCS | Performed by: PSYCHIATRY & NEUROLOGY

## 2021-05-19 PROCEDURE — 3430000000 HC RX DIAGNOSTIC RADIOPHARMACEUTICAL: Performed by: PSYCHIATRY & NEUROLOGY

## 2021-05-19 PROCEDURE — 78803 RP LOCLZJ TUM SPECT 1 AREA: CPT

## 2021-05-19 RX ADMIN — IOFLUPANE I-123 5.8 MILLICURIE: 2 INJECTION, SOLUTION INTRAVENOUS at 08:37

## 2021-06-17 ENCOUNTER — OFFICE VISIT (OUTPATIENT)
Dept: NEUROLOGY | Age: 56
End: 2021-06-17
Payer: COMMERCIAL

## 2021-06-17 VITALS
WEIGHT: 180 LBS | HEART RATE: 84 BPM | DIASTOLIC BLOOD PRESSURE: 80 MMHG | SYSTOLIC BLOOD PRESSURE: 113 MMHG | BODY MASS INDEX: 28.93 KG/M2 | HEIGHT: 66 IN | TEMPERATURE: 97.2 F

## 2021-06-17 DIAGNOSIS — R25.1 TREMOR: Primary | ICD-10-CM

## 2021-06-17 PROCEDURE — 99214 OFFICE O/P EST MOD 30 MIN: CPT | Performed by: PSYCHIATRY & NEUROLOGY

## 2021-06-17 PROCEDURE — G8427 DOCREV CUR MEDS BY ELIG CLIN: HCPCS | Performed by: PSYCHIATRY & NEUROLOGY

## 2021-06-17 PROCEDURE — G8419 CALC BMI OUT NRM PARAM NOF/U: HCPCS | Performed by: PSYCHIATRY & NEUROLOGY

## 2021-06-17 PROCEDURE — 1036F TOBACCO NON-USER: CPT | Performed by: PSYCHIATRY & NEUROLOGY

## 2021-06-17 PROCEDURE — 3017F COLORECTAL CA SCREEN DOC REV: CPT | Performed by: PSYCHIATRY & NEUROLOGY

## 2021-06-17 RX ORDER — PROPRANOLOL HCL 60 MG
60 CAPSULE, EXTENDED RELEASE 24HR ORAL DAILY
Qty: 30 CAPSULE | Refills: 3 | Status: SHIPPED | OUTPATIENT
Start: 2021-06-17 | End: 2022-02-08 | Stop reason: ALTCHOICE

## 2021-06-17 NOTE — PROGRESS NOTES
[facility], [address], [address] [place] [address]  Ph: [phone] or [phone]  FAX: [phone]    The patient comes in today as a follow-up visit. She has a past medical history of benign essential tremors. Patient was started on Inderal 20 mg twice daily. The patient reports that there is a significant improvement in her symptoms that she is 95% improved. She reports being compliant with medications and does not report of any side effect. She does not report any dizziness, no tightness, no fatigue. Tremor in the lower extremities is intermittent. Patient has a family history of Parkinson's with two aunts being diagnosed. She admits that tremors make walking more difficult. Patient has mild neck pain that is not bothersome. Today, the patient reports being unable to complete MRI Cervical Spine since last visit. She reports being unable to tolerate primidone due to nausea and inability to function. Patient reports that the tremors have remained the same. She struggles to type and use the mouse of the computer due to numbness. She reports it is difficult to open doors and cans due to the tremors. Patient plans to see a nephrology for kidney function. Previously she has been seen by an outside facility neurologist.  There was a question of patient having Parkinson's disease and she was given a trial of carbidopa levodopa without improvement of the symptoms. MRI of the brain in the past has been normal.  Reports are reviewed with the patient.                             REVIEW OF SYSTEMS    Constitutional Weight: absent, Appetite: absent, Fatigue: absent   HEENT Visual disturbance: absent, Ears: normal   Reespiratory Shortness of breath: absent, Cough: absent   Cardivascular Chest pain: absent, Leg swelling :absent   GI Constipation: absent, Diarrhea: absent, Swallowing change: absent    Urinary frequency: absent, Urinary urgency: absent,    Musculoskeletal Neck pain: absent, Back pain: present, Stiffness: present, Muscle pain: present, Joint pain: present   Dermatological Hair loss: absent, Skin changes: absent   Neurological Memory loss: absent, Confusion: absent, Seizures: absent, Trouble walking or imbalance: absent, Dizziness: absent, Weakness: absent, Numbness: absent Tremor: present, Spasm: present, Speech difficulty: absent, Headache: absent, Light sensitivity: absent   Psychiatric Anxiety: absent, Hallucination: absent, Depression, absent   Hematologic Abnormal bleeding/Bruising: absent, Anemia: absent                                  Past Medical History:   Diagnosis Date    Allergic rhinitis     Arthritis     Depression     Fibromyalgia     Hyperlipidemia     Neuropathy     Peripheral vascular disease (HCC)     Type II or unspecified type diabetes mellitus without mention of complication, not stated as uncontrolled      Past Surgical History:   Procedure Laterality Date     SECTION      CHOLECYSTECTOMY      OTHER SURGICAL HISTORY      darrach procedure    OTHER SURGICAL HISTORY      darrach procedure revision     Social History     Socioeconomic History    Marital status:      Spouse name: Not on file    Number of children: Not on file    Years of education: Not on file    Highest education level: Not on file   Occupational History    Not on file   Tobacco Use    Smoking status: Former Smoker    Smokeless tobacco: Never Used   Vaping Use    Vaping Use: Never used   Substance and Sexual Activity    Alcohol use: No    Drug use: No    Sexual activity: Not on file   Other Topics Concern    Not on file   Social History Narrative    Not on file     Social Determinants of Health     Financial Resource Strain:     Difficulty of Paying Living Expenses:    Food Insecurity:     Worried About Running Out of Food in the Last Year:     [address] in the Last Year:    Transportation Needs:     Lack of Transportation

## 2021-10-16 ENCOUNTER — OFFICE VISIT (OUTPATIENT)
Dept: PRIMARY CARE CLINIC | Age: 56
End: 2021-10-16
Payer: COMMERCIAL

## 2021-10-16 VITALS
OXYGEN SATURATION: 98 % | BODY MASS INDEX: 29.73 KG/M2 | TEMPERATURE: 98.4 F | HEIGHT: 66 IN | SYSTOLIC BLOOD PRESSURE: 126 MMHG | DIASTOLIC BLOOD PRESSURE: 84 MMHG | WEIGHT: 185 LBS | HEART RATE: 83 BPM

## 2021-10-16 DIAGNOSIS — J01.40 ACUTE NON-RECURRENT PANSINUSITIS: Primary | ICD-10-CM

## 2021-10-16 PROCEDURE — G8427 DOCREV CUR MEDS BY ELIG CLIN: HCPCS | Performed by: NURSE PRACTITIONER

## 2021-10-16 PROCEDURE — G8484 FLU IMMUNIZE NO ADMIN: HCPCS | Performed by: NURSE PRACTITIONER

## 2021-10-16 PROCEDURE — 1036F TOBACCO NON-USER: CPT | Performed by: NURSE PRACTITIONER

## 2021-10-16 PROCEDURE — 99213 OFFICE O/P EST LOW 20 MIN: CPT | Performed by: NURSE PRACTITIONER

## 2021-10-16 PROCEDURE — 3017F COLORECTAL CA SCREEN DOC REV: CPT | Performed by: NURSE PRACTITIONER

## 2021-10-16 PROCEDURE — G8419 CALC BMI OUT NRM PARAM NOF/U: HCPCS | Performed by: NURSE PRACTITIONER

## 2021-10-16 RX ORDER — DOXYCYCLINE HYCLATE 100 MG
100 TABLET ORAL 2 TIMES DAILY
Qty: 20 TABLET | Refills: 0 | Status: SHIPPED | OUTPATIENT
Start: 2021-10-16 | End: 2022-01-14 | Stop reason: SDUPTHER

## 2021-10-16 ASSESSMENT — ENCOUNTER SYMPTOMS
WHEEZING: 0
CHEST TIGHTNESS: 0
SWOLLEN GLANDS: 0
RHINORRHEA: 0
SHORTNESS OF BREATH: 0
GASTROINTESTINAL NEGATIVE: 1
SINUS COMPLAINT: 1
COUGH: 1
SORE THROAT: 1
SINUS PRESSURE: 1

## 2021-10-16 NOTE — PATIENT INSTRUCTIONS
Patient Education        Sinusitis: Care Instructions  Your Care Instructions     Sinusitis is an infection of the lining of the sinus cavities in your head. Sinusitis often follows a cold. It causes pain and pressure in your head and face. In most cases, sinusitis gets better on its own in 1 to 2 weeks. But some mild symptoms may last for several weeks. Sometimes antibiotics are needed. Follow-up care is a key part of your treatment and safety. Be sure to make and go to all appointments, and call your doctor if you are having problems. It's also a good idea to know your test results and keep a list of the medicines you take. How can you care for yourself at home? · Take an over-the-counter pain medicine, such as acetaminophen (Tylenol), ibuprofen (Advil, Motrin), or naproxen (Aleve). Read and follow all instructions on the label. · If the doctor prescribed antibiotics, take them as directed. Do not stop taking them just because you feel better. You need to take the full course of antibiotics. · Be careful when taking over-the-counter cold or flu medicines and Tylenol at the same time. Many of these medicines have acetaminophen, which is Tylenol. Read the labels to make sure that you are not taking more than the recommended dose. Too much acetaminophen (Tylenol) can be harmful. · Breathe warm, moist air from a steamy shower, a hot bath, or a sink filled with hot water. Avoid cold, dry air. Using a humidifier in your home may help. Follow the directions for cleaning the machine. · Use saline (saltwater) nasal washes. This can help keep your nasal passages open and wash out mucus and bacteria. You can buy saline nose drops at a grocery store or drugstore. Or you can make your own at home by adding 1 teaspoon of salt and 1 teaspoon of baking soda to 2 cups of distilled water. If you make your own, fill a bulb syringe with the solution, insert the tip into your nostril, and squeeze gently.  Christa tolentino nose.  · Put a hot, wet towel or a warm gel pack on your face 3 or 4 times a day for 5 to 10 minutes each time. · Try a decongestant nasal spray like oxymetazoline (Afrin). Do not use it for more than 3 days in a row. Using it for more than 3 days can make your congestion worse. When should you call for help? Call your doctor now or seek immediate medical care if:    · You have new or worse swelling or redness in your face or around your eyes.     · You have a new or higher fever. Watch closely for changes in your health, and be sure to contact your doctor if:    · You have new or worse facial pain.     · The mucus from your nose becomes thicker (like pus) or has new blood in it.     · You are not getting better as expected. Where can you learn more? Go to https://[employer]. org and sign in to your [employer] account. Enter C199 in the [employer] box to learn more about \"Sinusitis: Care Instructions. \"     If you do not have an account, please click on the \"Sign Up Now\" link. Current as of: December 2, 2020               Content Version: 13.0  © 3738-3937 Healthwise, [facility]. Care instructions adapted under license by [facility] ([facility]). If you have questions about a medical condition or this instruction, always ask your healthcare professional. [address] any warranty or liability for your use of this information.

## 2021-10-16 NOTE — PROGRESS NOTES
[facility]             [address] [facility], [address]                        Telephone [phone]             Fax [phone]     Jus Stoner  1965  KOP:[id number]   Date of visit:  10/16/2021    Subjective:    Jus Stoner is a 55 y.o.  female who presents to [facility] today (10/16/2021) for evaluation of:    Chief Complaint   Patient presents with    Cough     and congestion, headache, sinus pressure. Cough onset 2 wees ago. covid negative. sx        Sinus Problem  This is a new problem. The current episode started 1 to 4 weeks ago (X 2 weeks). The problem has been gradually worsening since onset. Maximum temperature: low grade yesterday. The fever has been present for less than 1 day. Her pain is at a severity of 9/10. Associated symptoms include congestion, coughing (moist), headaches (frontal, mild, intermittent), sinus pressure and a sore throat. Pertinent negatives include no chills, ear pain, shortness of breath or swollen glands. Past treatments include acetaminophen (flonase). The treatment provided mild relief. History of asthma. She denies need to use albuterol inhaler. Negative home Covid-19 test on 10/14/21.     She has the following problem list:  Patient Active Problem List   Diagnosis    Tremor    Lumbar disc disease        Current medications are:  Current Outpatient Medications   Medication Sig Dispense Refill    propranolol (INDERAL LA) 60 MG extended release capsule Take 1 capsule by mouth daily 30 capsule 3    propranolol (INDERAL) 20 MG tablet take 1 tablet by mouth twice a day 60 tablet 11    tiZANidine (ZANAFLEX) 4 MG tablet Take 4 mg by mouth every 6 hours as needed      amitriptyline (ELAVIL) 25 MG tablet Take 25 mg by mouth nightly      fexofenadine (ALLEGRA) 180 MG tablet Take 180 mg by mouth daily      metFORMIN (GLUCOPHAGE) 500 MG tablet Take 1,000 mg by mouth daily       escitalopram (LEXAPRO) 10 MG tablet Take 10 mg by mouth daily.  furosemide (LASIX) 20 MG tablet Take 20 mg by mouth daily.  montelukast (SINGULAIR) 10 MG tablet Take 10 mg by mouth nightly.  primidone (MYSOLINE) 50 MG tablet Take 0.5 tablets by mouth 2 times daily (Patient not taking: Reported on 6/17/2021) 30 tablet 2    ONGLYZA 5 MG TABS tablet        No current facility-administered medications for this visit. She is allergic to codeine, e-mycin [erythromycin], gabapentin, iodine, nsaids, pcn [penicillins], prednisone, sulfa antibiotics, and zithromax [azithromycin]. .    She  reports that she has quit smoking. She has never used smokeless tobacco.      Objective:    Vitals:    10/16/21 0943   BP: 126/84   Site: Right Upper Arm   Position: Sitting   Pulse: 83   Temp: 98.4 °F (36.9 °C)   TempSrc: Tympanic   SpO2: 98%   Weight: 185 lb (83.9 kg)   Height: 5' 6\" (1.676 m)     Body mass index is 29.86 kg/m². Review of Systems   Constitutional: Negative. Negative for appetite change, chills, fatigue and fever. HENT: Positive for congestion, postnasal drip, sinus pressure and sore throat. Negative for ear pain and rhinorrhea. Respiratory: Positive for cough (moist). Negative for chest tightness, shortness of breath and wheezing. Cardiovascular: Negative. Gastrointestinal: Negative. Neurological: Positive for headaches (frontal, mild, intermittent). Physical Exam  Vitals and nursing note reviewed. Constitutional:       Appearance: She is well-developed. HENT:      Head: Normocephalic. Jaw: There is normal jaw occlusion. Right Ear: Ear canal and external ear normal. A middle ear effusion is present. Left Ear: Ear canal and external ear normal. A middle ear effusion is present. Nose: Congestion present. Right Turbinates: Swollen. Left Turbinates: Swollen.       Right Sinus: Maxillary sinus tenderness and frontal sinus tenderness present. Left Sinus: Maxillary sinus tenderness and frontal sinus tenderness present. Mouth/Throat:      Lips: Pink. Mouth: Mucous membranes are moist.      Pharynx: Oropharynx is clear. Uvula midline. Eyes:      Pupils: Pupils are equal, round, and reactive to light. Cardiovascular:      Rate and Rhythm: Normal rate and regular rhythm. Heart sounds: Normal heart sounds. Pulmonary:      Effort: Pulmonary effort is normal.      Breath sounds: Normal breath sounds and air entry. Comments: Moist cough noted  Musculoskeletal:      Cervical back: Normal range of motion and neck supple. Lymphadenopathy:      Cervical: No cervical adenopathy. Skin:     General: Skin is warm and dry. Neurological:      General: No focal deficit present. Mental Status: She is alert and oriented to person, place, and time. Psychiatric:         Behavior: Behavior normal.         Thought Content: Thought content normal.       Assessment and Plan:    No results found for this visit on 10/16/21. Diagnosis Orders   1. Acute non-recurrent pansinusitis       Please complete full course of antibiotic. Take Coricidin HBP as needed for congestion and cough. I also recommended Flonase and an antihistamine for sinus symptoms. she was also encouraged to use tylenol or ibuprofen for pain/fever. Increase water intake. Use cool mist humidifier at bedtime. Use nasal saline flush as needed. Good hand hygiene. she was instructed to return if there is no improvement or symptoms worsen. The use, risks, benefits, and side effects of prescribed or recommended medications were discussed. All questions were answered and the patient/caregiver voiced understanding. No orders of the defined types were placed in this encounter.         Electronically signed by AQUILES Shaikh CNP on 10/16/21 at 9:49 AM EDT

## 2022-01-11 ENCOUNTER — OFFICE VISIT (OUTPATIENT)
Dept: PRIMARY CARE CLINIC | Age: 57
End: 2022-01-11
Payer: COMMERCIAL

## 2022-01-11 ENCOUNTER — HOSPITAL ENCOUNTER (OUTPATIENT)
Age: 57
Setting detail: SPECIMEN
Discharge: HOME | End: 2022-01-11
Payer: COMMERCIAL

## 2022-01-11 VITALS
SYSTOLIC BLOOD PRESSURE: 124 MMHG | TEMPERATURE: 98.2 F | BODY MASS INDEX: 30.37 KG/M2 | HEIGHT: 66 IN | DIASTOLIC BLOOD PRESSURE: 68 MMHG | HEART RATE: 72 BPM | RESPIRATION RATE: 18 BRPM | OXYGEN SATURATION: 96 % | WEIGHT: 189 LBS

## 2022-01-11 DIAGNOSIS — R05.9 COUGH: ICD-10-CM

## 2022-01-11 DIAGNOSIS — J06.9 VIRAL URI: ICD-10-CM

## 2022-01-11 DIAGNOSIS — J06.9 VIRAL URI: Primary | ICD-10-CM

## 2022-01-11 LAB
INFLUENZA A ANTIBODY: NORMAL
INFLUENZA B ANTIBODY: NORMAL

## 2022-01-11 PROCEDURE — 87804 INFLUENZA ASSAY W/OPTIC: CPT | Performed by: FAMILY MEDICINE

## 2022-01-11 PROCEDURE — U0003 INFECTIOUS AGENT DETECTION BY NUCLEIC ACID (DNA OR RNA); SEVERE ACUTE RESPIRATORY SYNDROME CORONAVIRUS 2 (SARS-COV-2) (CORONAVIRUS DISEASE [COVID-19]), AMPLIFIED PROBE TECHNIQUE, MAKING USE OF HIGH THROUGHPUT TECHNOLOGIES AS DESCRIBED BY CMS-2020-01-R: HCPCS

## 2022-01-11 PROCEDURE — 99204 OFFICE O/P NEW MOD 45 MIN: CPT | Performed by: FAMILY MEDICINE

## 2022-01-11 PROCEDURE — U0005 INFEC AGEN DETEC AMPLI PROBE: HCPCS

## 2022-01-11 PROCEDURE — G8427 DOCREV CUR MEDS BY ELIG CLIN: HCPCS | Performed by: FAMILY MEDICINE

## 2022-01-11 PROCEDURE — 1036F TOBACCO NON-USER: CPT | Performed by: FAMILY MEDICINE

## 2022-01-11 PROCEDURE — 99203 OFFICE O/P NEW LOW 30 MIN: CPT | Performed by: FAMILY MEDICINE

## 2022-01-11 PROCEDURE — G8417 CALC BMI ABV UP PARAM F/U: HCPCS | Performed by: FAMILY MEDICINE

## 2022-01-11 PROCEDURE — G8484 FLU IMMUNIZE NO ADMIN: HCPCS | Performed by: FAMILY MEDICINE

## 2022-01-11 PROCEDURE — 3017F COLORECTAL CA SCREEN DOC REV: CPT | Performed by: FAMILY MEDICINE

## 2022-01-11 RX ORDER — BUTALBITAL, ACETAMINOPHEN AND CAFFEINE 50; 325; 40 MG/1; MG/1; MG/1
1 TABLET ORAL EVERY 4 HOURS PRN
Qty: 15 TABLET | Refills: 0 | Status: SHIPPED | OUTPATIENT
Start: 2022-01-11 | End: 2022-02-08

## 2022-01-11 RX ORDER — ONDANSETRON 4 MG/1
4 TABLET, ORALLY DISINTEGRATING ORAL EVERY 8 HOURS PRN
Qty: 30 TABLET | Refills: 0 | Status: SHIPPED | OUTPATIENT
Start: 2022-01-11 | End: 2022-02-08 | Stop reason: ALTCHOICE

## 2022-01-11 ASSESSMENT — ENCOUNTER SYMPTOMS
SINUS PAIN: 1
NAUSEA: 1
CONSTIPATION: 0
EYE DISCHARGE: 0
SINUS PRESSURE: 1
VOMITING: 1
SORE THROAT: 1
DIARRHEA: 0
ABDOMINAL PAIN: 0
SHORTNESS OF BREATH: 0
RHINORRHEA: 1
COUGH: 1
EYE REDNESS: 0
WHEEZING: 0
TROUBLE SWALLOWING: 0

## 2022-01-11 ASSESSMENT — PATIENT HEALTH QUESTIONNAIRE - PHQ9
SUM OF ALL RESPONSES TO PHQ QUESTIONS 1-9: 0
SUM OF ALL RESPONSES TO PHQ QUESTIONS 1-9: 0
2. FEELING DOWN, DEPRESSED OR HOPELESS: 0
SUM OF ALL RESPONSES TO PHQ9 QUESTIONS 1 & 2: 0
1. LITTLE INTEREST OR PLEASURE IN DOING THINGS: 0
SUM OF ALL RESPONSES TO PHQ QUESTIONS 1-9: 0
SUM OF ALL RESPONSES TO PHQ QUESTIONS 1-9: 0

## 2022-01-12 NOTE — PROGRESS NOTES
2022     Cele Camarena (:  1965) is a 55 y.o. female, here for evaluation of the following medical concerns:    Fever   This is a new problem. The current episode started in the past 7 days (started on  with symptoms). The problem occurs constantly. The problem has been gradually worsening. The maximum temperature noted was 103 to 103.9 F. Associated symptoms include congestion, coughing (non production), ear pain (itchy), headaches (migraine on the left side), muscle aches, nausea, sleepiness, a sore throat and vomiting. Pertinent negatives include no abdominal pain, chest pain, diarrhea, rash, urinary pain or wheezing. Associated symptoms comments: Has had exposure to covid. . She has tried acetaminophen for the symptoms. The treatment provided no relief. Did review patient's med list, allergies, social history,pmhx and pshx today as noted in the record. Review of Systems   Constitutional: Positive for chills, fatigue and fever. HENT: Positive for congestion, ear pain (itchy), postnasal drip, rhinorrhea, sinus pressure, sinus pain and sore throat. Negative for trouble swallowing. Eyes: Negative for discharge and redness. Respiratory: Positive for cough (non production). Negative for shortness of breath and wheezing. Cardiovascular: Negative for chest pain. Gastrointestinal: Positive for nausea and vomiting. Negative for abdominal pain, constipation and diarrhea. Genitourinary: Negative for dysuria, flank pain, frequency and urgency. Musculoskeletal: Negative for arthralgias, myalgias and neck pain. Skin: Negative for rash and wound. Allergic/Immunologic: Negative for environmental allergies. Neurological: Positive for headaches (migraine on the left side). Negative for dizziness, weakness and light-headedness. Hematological: Negative for adenopathy. Psychiatric/Behavioral: Negative. Prior to Visit Medications    Medication Sig Taking?  Authorizing Provider   propranolol (INDERAL LA) 60 MG extended release capsule Take 1 capsule by mouth daily Yes Nubia Hernandez MD   propranolol (INDERAL) 20 MG tablet take 1 tablet by mouth twice a day Yes AQUILES Haney CNP   tiZANidine (ZANAFLEX) 4 MG tablet Take 4 mg by mouth every 6 hours as needed Yes Historical Provider, MD   amitriptyline (ELAVIL) 25 MG tablet Take 25 mg by mouth nightly Yes Historical Provider, MD   fexofenadine (ALLEGRA) 180 MG tablet Take 180 mg by mouth daily Yes Historical Provider, MD   metFORMIN (GLUCOPHAGE) 500 MG tablet Take 1,000 mg by mouth daily  Yes Historical Provider, MD   escitalopram (LEXAPRO) 10 MG tablet Take 10 mg by mouth daily. Yes Historical Provider, MD   furosemide (LASIX) 20 MG tablet Take 20 mg by mouth daily. Yes Historical Provider, MD   montelukast (SINGULAIR) 10 MG tablet Take 10 mg by mouth nightly. Yes Historical Provider, MD   primidone (MYSOLINE) 50 MG tablet Take 0.5 tablets by mouth 2 times daily  Patient not taking: Reported on 6/17/2021  Nubia Hernandez MD   ONGLYZA 5 MG TABS tablet   Historical Provider, MD        Social History     Tobacco Use    Smoking status: Former Smoker    Smokeless tobacco: Never Used   Substance Use Topics    Alcohol use: No        Vitals:    01/11/22 1833   BP: 124/68   Pulse: 72   Resp: 18   Temp: 98.2 °F (36.8 °C)   TempSrc: Tympanic   SpO2: 96%   Weight: 189 lb (85.7 kg)   Height: 5' 6\" (1.676 m)     Estimated body mass index is 30.51 kg/m² as calculated from the following:    Height as of this encounter: 5' 6\" (1.676 m). Weight as of this encounter: 189 lb (85.7 kg). Physical Exam  Vitals and nursing note reviewed. Constitutional:       General: She is not in acute distress. Appearance: Normal appearance. She is well-developed. She is not diaphoretic. HENT:      Head: Normocephalic and atraumatic.       Right Ear: External ear normal.      Left Ear: External ear normal.      Ears:      Comments: TMs dull with fluid behind the TM     Nose: Congestion and rhinorrhea present. Mouth/Throat:      Pharynx: No posterior oropharyngeal erythema. Comments: Post nasal drainage noted  Eyes:      General: No scleral icterus. Right eye: No discharge. Left eye: No discharge. Conjunctiva/sclera: Conjunctivae normal.      Pupils: Pupils are equal, round, and reactive to light. Neck:      Thyroid: No thyromegaly. Cardiovascular:      Rate and Rhythm: Normal rate and regular rhythm. Heart sounds: Normal heart sounds. Pulmonary:      Effort: Pulmonary effort is normal. No respiratory distress. Breath sounds: Normal breath sounds. No wheezing. Musculoskeletal:      Cervical back: Normal range of motion and neck supple. Lymphadenopathy:      Cervical: Cervical adenopathy present. Skin:     General: Skin is warm and dry. Findings: No rash. Neurological:      Mental Status: She is alert and oriented to person, place, and time. Psychiatric:         Behavior: Behavior normal.         Thought Content: Thought content normal.         Judgment: Judgment normal.         ASSESSMENT/PLAN:  Encounter Diagnoses   Name Primary?  Viral URI Yes    Cough      Orders Placed This Encounter   Medications    ondansetron (ZOFRAN ODT) 4 MG disintegrating tablet     Sig: Take 1 tablet by mouth every 8 hours as needed for Nausea or Vomiting     Dispense:  30 tablet     Refill:  0    butalbital-acetaminophen-caffeine (FIORICET, ESGIC) -40 MG per tablet     Sig: Take 1 tablet by mouth every 4 hours as needed for Headaches     Dispense:  15 tablet     Refill:  0       Orders Placed This Encounter   Procedures    COVID-19     Standing Status:   Future     Standing Expiration Date:   1/11/2023     Scheduling Instructions:      1) Due to current limited availability of the COVID-19 test, tests will be prioritized based on responses to questions above. Testing may be delayed due to volume. 2) Print and instruct patient to adhere to CDC home isolation program. (Link Above)              3) Set up or refer patient for a monitoring program.              4) Have patient sign up for and leverage MyChart (if not previously done). Order Specific Question:   Is this test for diagnosis or screening? Answer:   Diagnosis of ill patient     Order Specific Question:   Symptomatic for COVID-19 as defined by CDC? Answer:   Yes     Order Specific Question:   Date of Symptom Onset     Answer:   1/9/2022     Order Specific Question:   Hospitalized for COVID-19? Answer:   No     Order Specific Question:   Admitted to ICU for COVID-19? Answer:   No     Order Specific Question:   Employed in healthcare setting? Answer:   No     Order Specific Question:   Resident in a congregate (group) care setting? Answer:   No     Order Specific Question:   Pregnant? Answer:   No     Order Specific Question:   Previously tested for COVID-19? Answer: Yes    POCT Influenza A/B   Influenza A/B negative/negative    Will order covid testing. In interim patient is to quarantine until testing reports are available    Increase fluids and rest    Tylenol 1-2 tabs po q4h prn    Can use mucinex or mucinex DM or comparable for congestion or cough. Return  if no improvement in symptoms or if any further symptoms arise. No follow-ups on file. An electronic signature was used to authenticate this note.     --Oleksandr Courtney DO on 1/11/2022 at 7:11 PM

## 2022-01-14 ENCOUNTER — PATIENT MESSAGE (OUTPATIENT)
Dept: PRIMARY CARE CLINIC | Age: 57
End: 2022-01-14

## 2022-01-14 DIAGNOSIS — J01.40 ACUTE NON-RECURRENT PANSINUSITIS: ICD-10-CM

## 2022-01-14 RX ORDER — DOXYCYCLINE HYCLATE 100 MG
100 TABLET ORAL 2 TIMES DAILY
Qty: 20 TABLET | Refills: 0 | Status: SHIPPED | OUTPATIENT
Start: 2022-01-14 | End: 2022-01-24

## 2022-01-14 NOTE — TELEPHONE ENCOUNTER
From: Ifeoma Fuentes  To: Dr. Mortimer Debar: 1/14/2022 10:15 AM EST  Subject: Antibiotic     Was seen on the 11th at urgent care. Nausea medicine is not working. Covid test still isnt back and still running up to 103 fever. Is there anyway to start a antibiotic as Im sure my sinuses are infected and its keeping the fever up. Please advise.      [employer]

## 2022-01-16 LAB
SARS-COV-2: NORMAL
SARS-COV-2: NOT DETECTED
SOURCE: NORMAL

## 2022-02-08 ENCOUNTER — OFFICE VISIT (OUTPATIENT)
Dept: PRIMARY CARE CLINIC | Age: 57
End: 2022-02-08
Payer: COMMERCIAL

## 2022-02-08 VITALS
TEMPERATURE: 99.2 F | OXYGEN SATURATION: 99 % | SYSTOLIC BLOOD PRESSURE: 130 MMHG | HEIGHT: 66 IN | DIASTOLIC BLOOD PRESSURE: 80 MMHG | RESPIRATION RATE: 20 BRPM | WEIGHT: 188 LBS | BODY MASS INDEX: 30.22 KG/M2 | HEART RATE: 106 BPM

## 2022-02-08 DIAGNOSIS — J01.40 ACUTE NON-RECURRENT PANSINUSITIS: Primary | ICD-10-CM

## 2022-02-08 PROCEDURE — 3017F COLORECTAL CA SCREEN DOC REV: CPT | Performed by: NURSE PRACTITIONER

## 2022-02-08 PROCEDURE — 99212 OFFICE O/P EST SF 10 MIN: CPT | Performed by: NURSE PRACTITIONER

## 2022-02-08 PROCEDURE — 1036F TOBACCO NON-USER: CPT | Performed by: NURSE PRACTITIONER

## 2022-02-08 PROCEDURE — G8484 FLU IMMUNIZE NO ADMIN: HCPCS | Performed by: NURSE PRACTITIONER

## 2022-02-08 PROCEDURE — G8427 DOCREV CUR MEDS BY ELIG CLIN: HCPCS | Performed by: NURSE PRACTITIONER

## 2022-02-08 PROCEDURE — G8417 CALC BMI ABV UP PARAM F/U: HCPCS | Performed by: NURSE PRACTITIONER

## 2022-02-08 PROCEDURE — 99213 OFFICE O/P EST LOW 20 MIN: CPT | Performed by: NURSE PRACTITIONER

## 2022-02-08 RX ORDER — CLARITHROMYCIN 500 MG/1
500 TABLET, COATED ORAL 2 TIMES DAILY
Qty: 20 TABLET | Refills: 0 | Status: SHIPPED | OUTPATIENT
Start: 2022-02-08 | End: 2022-02-18

## 2022-02-08 SDOH — ECONOMIC STABILITY: FOOD INSECURITY: WITHIN THE PAST 12 MONTHS, YOU WORRIED THAT YOUR FOOD WOULD RUN OUT BEFORE YOU GOT MONEY TO BUY MORE.: NEVER TRUE

## 2022-02-08 SDOH — ECONOMIC STABILITY: FOOD INSECURITY: WITHIN THE PAST 12 MONTHS, THE FOOD YOU BOUGHT JUST DIDN'T LAST AND YOU DIDN'T HAVE MONEY TO GET MORE.: NEVER TRUE

## 2022-02-08 ASSESSMENT — ENCOUNTER SYMPTOMS
SHORTNESS OF BREATH: 0
SINUS PRESSURE: 1
COUGH: 0
SINUS COMPLAINT: 1
RESPIRATORY NEGATIVE: 1
RHINORRHEA: 0
SORE THROAT: 0

## 2022-02-08 ASSESSMENT — SOCIAL DETERMINANTS OF HEALTH (SDOH): HOW HARD IS IT FOR YOU TO PAY FOR THE VERY BASICS LIKE FOOD, HOUSING, MEDICAL CARE, AND HEATING?: NOT HARD AT ALL

## 2022-02-08 NOTE — PROGRESS NOTES
[facility]             [address] [facility], [address]                        Telephone [phone]             Fax [phone]     Dolly Vargas  1965  [facility]:[id number]   Date of visit:  2/8/2022    Subjective:    Dolly Vargas is a 55 y.o.  female who presents to [facility] today (2/8/2022) for evaluation of:    Chief Complaint   Patient presents with    Sinus Problem     sinus congestion       Sinus Problem  This is a new problem. The current episode started more than 1 month ago (beginning of January 2022). The problem has been gradually worsening since onset. Maximum temperature: low grade. The fever has been present for 1 to 2 days. Her pain is at a severity of 8/10. Associated symptoms include congestion and sinus pressure. Pertinent negatives include no chills, coughing, headaches, shortness of breath, sneezing or sore throat. Past treatments include acetaminophen and saline sprays (mucinex). The treatment provided mild relief. Negative Covid-19 test on 01/11/22. Treated with doxycycline on 01/14/22 for sinusitis. Sinus symptoms began to improve after completion of doxycycline then 2 days later became worse.     She has the following problem list:  Patient Active Problem List   Diagnosis    Tremor    Lumbar disc disease        Current medications are:  Current Outpatient Medications   Medication Sig Dispense Refill    clarithromycin (BIAXIN) 500 MG tablet Take 1 tablet by mouth 2 times daily for 10 days 20 tablet 0    propranolol (INDERAL) 20 MG tablet take 1 tablet by mouth twice a day 60 tablet 11    amitriptyline (ELAVIL) 25 MG tablet Take 25 mg by mouth nightly      fexofenadine (ALLEGRA) 180 MG tablet Take 180 mg by mouth daily      metFORMIN (GLUCOPHAGE) 500 MG tablet Take 1,000 mg by mouth daily       tiZANidine (ZANAFLEX) 4 MG tablet Take 4 mg by mouth every 6 hours as needed (Patient not taking: Reported on 2/8/2022)      montelukast (SINGULAIR) 10 MG tablet Take 10 mg by mouth nightly. (Patient not taking: Reported on 2/8/2022)       No current facility-administered medications for this visit. She is allergic to codeine, e-mycin [erythromycin], gabapentin, iodine, nsaids, pcn [penicillins], prednisone, sulfa antibiotics, and zithromax [azithromycin]. .    She  reports that she has quit smoking. She has never used smokeless tobacco.      Objective:    Vitals:    02/08/22 1236   BP: 130/80   Pulse: 106   Resp: 20   Temp: 99.2 °F (37.3 °C)   SpO2: 99%   Weight: 188 lb (85.3 kg)   Height: 5' 6\" (1.676 m)     Body mass index is 30.34 kg/m². Review of Systems   Constitutional: Positive for fever (low grade fever). Negative for appetite change, chills and fatigue. HENT: Positive for congestion, postnasal drip and sinus pressure. Negative for rhinorrhea, sneezing and sore throat. Respiratory: Negative. Negative for cough and shortness of breath. Cardiovascular: Negative. Neurological: Negative for headaches. Physical Exam  Vitals and nursing note reviewed. Constitutional:       Appearance: She is well-developed. HENT:      Head: Normocephalic. Jaw: There is normal jaw occlusion. Right Ear: Tympanic membrane, ear canal and external ear normal.      Left Ear: Tympanic membrane, ear canal and external ear normal.      Nose: Congestion present. Right Turbinates: Swollen. Left Turbinates: Swollen. Right Sinus: Maxillary sinus tenderness and frontal sinus tenderness present. Left Sinus: Maxillary sinus tenderness and frontal sinus tenderness present. Mouth/Throat:      Lips: Pink. Mouth: Mucous membranes are moist.      Pharynx: Oropharynx is clear. Uvula midline. Eyes:      Pupils: Pupils are equal, round, and reactive to light. Cardiovascular:      Rate and Rhythm: Normal rate and regular rhythm.       Heart sounds: Normal heart sounds. Pulmonary:      Effort: Pulmonary effort is normal.      Breath sounds: Normal breath sounds and air entry. Musculoskeletal:      Cervical back: Normal range of motion and neck supple. Lymphadenopathy:      Cervical: No cervical adenopathy. Skin:     General: Skin is warm and dry. Neurological:      General: No focal deficit present. Mental Status: She is alert and oriented to person, place, and time. Psychiatric:         Behavior: Behavior normal.         Thought Content: Thought content normal.       Assessment and Plan:    No results found for this visit on 02/08/22. Diagnosis Orders   1. Acute non-recurrent pansinusitis  clarithromycin (BIAXIN) 500 MG tablet     Complete full course of antibiotic. She was also encouraged to use tylenol for pain/fever. Increase water intake. Use cool mist humidifier at bedtime. Use nasal saline flush as needed. Good hand hygiene. she was instructed to return if there is no improvement or symptoms worsen. The use, risks, benefits, and side effects of prescribed or recommended medications were discussed. All questions were answered and the patient/caregiver voiced understanding. No orders of the defined types were placed in this encounter.         Electronically signed by AQUILES Murcia CNP on 2/8/22 at 12:48 PM EST

## 2022-02-08 NOTE — PATIENT INSTRUCTIONS
Patient Education        Sinusitis: Care Instructions  Your Care Instructions     Sinusitis is an infection of the lining of the sinus cavities in your head. Sinusitis often follows a cold. It causes pain and pressure in your head and face. In most cases, sinusitis gets better on its own in 1 to 2 weeks. But some mild symptoms may last for several weeks. Sometimes antibiotics are needed. Follow-up care is a key part of your treatment and safety. Be sure to make and go to all appointments, and call your doctor if you are having problems. It's also a good idea to know your test results and keep a list of the medicines you take. How can you care for yourself at home? · Take an over-the-counter pain medicine, such as acetaminophen (Tylenol), ibuprofen (Advil, Motrin), or naproxen (Aleve). Read and follow all instructions on the label. · If the doctor prescribed antibiotics, take them as directed. Do not stop taking them just because you feel better. You need to take the full course of antibiotics. · Be careful when taking over-the-counter cold or flu medicines and Tylenol at the same time. Many of these medicines have acetaminophen, which is Tylenol. Read the labels to make sure that you are not taking more than the recommended dose. Too much acetaminophen (Tylenol) can be harmful. · Breathe warm, moist air from a steamy shower, a hot bath, or a sink filled with hot water. Avoid cold, dry air. Using a humidifier in your home may help. Follow the directions for cleaning the machine. · Use saline (saltwater) nasal washes. This can help keep your nasal passages open and wash out mucus and bacteria. You can buy saline nose drops at a grocery store or drugstore. Or you can make your own at home by adding 1 teaspoon of salt and 1 teaspoon of baking soda to 2 cups of distilled water. If you make your own, fill a bulb syringe with the solution, insert the tip into your nostril, and squeeze gently.  Sanya tolentino nose.  · Put a hot, wet towel or a warm gel pack on your face 3 or 4 times a day for 5 to 10 minutes each time. · Try a decongestant nasal spray like oxymetazoline (Afrin). Do not use it for more than 3 days in a row. Using it for more than 3 days can make your congestion worse. When should you call for help? Call your doctor now or seek immediate medical care if:    · You have new or worse swelling or redness in your face or around your eyes.     · You have a new or higher fever. Watch closely for changes in your health, and be sure to contact your doctor if:    · You have new or worse facial pain.     · The mucus from your nose becomes thicker (like pus) or has new blood in it.     · You are not getting better as expected. Where can you learn more? Go to https://[employer]pepic[employer]Market.[employer] org and sign in to your [employer] account. Enter J573 in the [employer] box to learn more about \"Sinusitis: Care Instructions. \"     If you do not have an account, please click on the \"Sign Up Now\" link. Current as of: September 8, 2021               Content Version: 13.1  © 8689-7184 [employer]wise, Incorporated. Care instructions adapted under license by [facility] ([facility]). If you have questions about a medical condition or this instruction, always ask your healthcare professional. Marium Juarez any warranty or liability for your use of this information.

## 2022-02-15 ENCOUNTER — OFFICE VISIT (OUTPATIENT)
Dept: FAMILY MEDICINE CLINIC | Age: 57
End: 2022-02-15
Payer: COMMERCIAL

## 2022-02-15 VITALS
BODY MASS INDEX: 30.05 KG/M2 | DIASTOLIC BLOOD PRESSURE: 68 MMHG | SYSTOLIC BLOOD PRESSURE: 112 MMHG | HEIGHT: 66 IN | OXYGEN SATURATION: 98 % | WEIGHT: 187 LBS | HEART RATE: 91 BPM | RESPIRATION RATE: 18 BRPM

## 2022-02-15 DIAGNOSIS — N18.30 CONTROLLED TYPE 2 DIABETES MELLITUS WITH STAGE 3 CHRONIC KIDNEY DISEASE, WITHOUT LONG-TERM CURRENT USE OF INSULIN (HCC): ICD-10-CM

## 2022-02-15 DIAGNOSIS — Z23 FLU VACCINE NEED: ICD-10-CM

## 2022-02-15 DIAGNOSIS — M53.3 SACROILIAC JOINT DYSFUNCTION: ICD-10-CM

## 2022-02-15 DIAGNOSIS — E11.22 CONTROLLED TYPE 2 DIABETES MELLITUS WITH STAGE 3 CHRONIC KIDNEY DISEASE, WITHOUT LONG-TERM CURRENT USE OF INSULIN (HCC): ICD-10-CM

## 2022-02-15 DIAGNOSIS — E53.8 LOW VITAMIN B12 LEVEL: ICD-10-CM

## 2022-02-15 DIAGNOSIS — E78.5 HYPERLIPIDEMIA, UNSPECIFIED HYPERLIPIDEMIA TYPE: Primary | ICD-10-CM

## 2022-02-15 DIAGNOSIS — J30.9 ALLERGIC RHINITIS, UNSPECIFIED SEASONALITY, UNSPECIFIED TRIGGER: ICD-10-CM

## 2022-02-15 DIAGNOSIS — M79.7 FIBROMYALGIA: ICD-10-CM

## 2022-02-15 PROBLEM — G62.9 NEUROPATHY: Status: ACTIVE | Noted: 2022-02-15

## 2022-02-15 PROBLEM — F32.A DEPRESSION: Status: ACTIVE | Noted: 2022-02-15

## 2022-02-15 PROCEDURE — 1036F TOBACCO NON-USER: CPT | Performed by: NURSE PRACTITIONER

## 2022-02-15 PROCEDURE — G8427 DOCREV CUR MEDS BY ELIG CLIN: HCPCS | Performed by: NURSE PRACTITIONER

## 2022-02-15 PROCEDURE — 99214 OFFICE O/P EST MOD 30 MIN: CPT | Performed by: NURSE PRACTITIONER

## 2022-02-15 PROCEDURE — 90471 IMMUNIZATION ADMIN: CPT | Performed by: NURSE PRACTITIONER

## 2022-02-15 PROCEDURE — 3046F HEMOGLOBIN A1C LEVEL >9.0%: CPT | Performed by: NURSE PRACTITIONER

## 2022-02-15 PROCEDURE — G8482 FLU IMMUNIZE ORDER/ADMIN: HCPCS | Performed by: NURSE PRACTITIONER

## 2022-02-15 PROCEDURE — 2022F DILAT RTA XM EVC RTNOPTHY: CPT | Performed by: NURSE PRACTITIONER

## 2022-02-15 PROCEDURE — 90686 IIV4 VACC NO PRSV 0.5 ML IM: CPT | Performed by: NURSE PRACTITIONER

## 2022-02-15 PROCEDURE — G8417 CALC BMI ABV UP PARAM F/U: HCPCS | Performed by: NURSE PRACTITIONER

## 2022-02-15 PROCEDURE — 3017F COLORECTAL CA SCREEN DOC REV: CPT | Performed by: NURSE PRACTITIONER

## 2022-02-15 RX ORDER — TRAMADOL HYDROCHLORIDE 50 MG/1
TABLET ORAL
COMMUNITY
Start: 2021-02-24 | End: 2022-02-15

## 2022-02-15 RX ORDER — MELOXICAM 15 MG/1
15 TABLET ORAL DAILY
COMMUNITY
Start: 2021-02-23 | End: 2022-05-25

## 2022-02-15 RX ORDER — CYANOCOBALAMIN 1000 UG/ML
INJECTION INTRAMUSCULAR; SUBCUTANEOUS
COMMUNITY
Start: 2021-02-23 | End: 2022-02-15

## 2022-02-15 RX ORDER — TIZANIDINE 4 MG/1
4 TABLET ORAL EVERY 6 HOURS PRN
Qty: 30 TABLET | Refills: 3 | Status: SHIPPED | OUTPATIENT
Start: 2022-02-15 | End: 2022-04-28 | Stop reason: SDUPTHER

## 2022-02-15 RX ORDER — DULOXETIN HYDROCHLORIDE 20 MG/1
CAPSULE, DELAYED RELEASE ORAL
COMMUNITY
Start: 2021-12-20 | End: 2022-02-15 | Stop reason: SDUPTHER

## 2022-02-15 RX ORDER — DULOXETIN HYDROCHLORIDE 20 MG/1
CAPSULE, DELAYED RELEASE ORAL
Qty: 30 CAPSULE | Refills: 3 | Status: SHIPPED | OUTPATIENT
Start: 2022-02-15 | End: 2022-07-18 | Stop reason: SDUPTHER

## 2022-02-15 RX ORDER — MONTELUKAST SODIUM 10 MG/1
10 TABLET ORAL NIGHTLY
Qty: 90 TABLET | Refills: 1 | Status: SHIPPED | OUTPATIENT
Start: 2022-02-15 | End: 2022-08-17 | Stop reason: SDUPTHER

## 2022-02-15 RX ORDER — LANOLIN ALCOHOL/MO/W.PET/CERES
1000 CREAM (GRAM) TOPICAL DAILY
COMMUNITY
End: 2022-02-23 | Stop reason: SDUPTHER

## 2022-02-15 ASSESSMENT — PATIENT HEALTH QUESTIONNAIRE - PHQ9
SUM OF ALL RESPONSES TO PHQ QUESTIONS 1-9: 0
SUM OF ALL RESPONSES TO PHQ QUESTIONS 1-9: 0
1. LITTLE INTEREST OR PLEASURE IN DOING THINGS: 0
SUM OF ALL RESPONSES TO PHQ9 QUESTIONS 1 & 2: 0
SUM OF ALL RESPONSES TO PHQ QUESTIONS 1-9: 0
2. FEELING DOWN, DEPRESSED OR HOPELESS: 0
SUM OF ALL RESPONSES TO PHQ QUESTIONS 1-9: 0

## 2022-02-15 NOTE — PROGRESS NOTES
[address] [place] [address]. [address] [place], [place] [id number]  [phone]      HPI:     Diabetes  She presents for her follow-up diabetic visit. She has type 2 diabetes mellitus. Disease course: due for labs. Hypoglycemia symptoms include tremors (lower extremities). There are no diabetic associated symptoms. Pertinent negatives for diabetes include no chest pain and no fatigue. There are no hypoglycemic complications. Diabetic complications include peripheral neuropathy. Risk factors for coronary artery disease include diabetes mellitus, dyslipidemia and post-menopausal. Current diabetic treatment includes oral agent (monotherapy). She is compliant with treatment most of the time. She is following a generally healthy diet. Meal planning includes avoidance of concentrated sweets. She has not had a previous visit with a dietitian. She rarely participates in exercise. There is no change in her home blood glucose trend. An ACE inhibitor/angiotensin II receptor blocker is being taken. She does not see a podiatrist.Eye exam is not current. Hyperlipidemia  This is a chronic problem. The current episode started more than 1 year ago. Condition status: due for labs. Exacerbating diseases include diabetes. Factors aggravating her hyperlipidemia include fatty foods and beta blockers. Pertinent negatives include no chest pain, leg pain, myalgias or shortness of breath. Current antihyperlipidemic treatment includes statins. Compliance problems include adherence to diet and adherence to exercise. Risk factors for coronary artery disease include diabetes mellitus, dyslipidemia and post-menopausal.     Pt presents to the clinic for a new to establish visit. She has a history of low vitamin B12 level. She takes vitamin B12 daily. She is due for labs. She has a history of allergic rhinitis. She is on singulair which controls her symptoms well. She has a history of fibromyalgia. She take cymbalta which helps. shortness of breath and wheezing. Cardiovascular: Negative for chest pain and palpitations. Musculoskeletal: Positive for back pain. Negative for myalgias. Skin: Negative. Neurological: Positive for tremors (lower extremities). Psychiatric/Behavioral: Negative. Objective:      Physical Exam  Vitals and nursing note reviewed. Constitutional:       Appearance: Normal appearance. HENT:      Head: Normocephalic and atraumatic. Cardiovascular:      Rate and Rhythm: Normal rate and regular rhythm. Heart sounds: Normal heart sounds. Pulmonary:      Effort: Pulmonary effort is normal.      Breath sounds: Normal breath sounds. Musculoskeletal:      Cervical back: Neck supple. Skin:     Capillary Refill: Capillary refill takes less than 2 seconds. Neurological:      General: No focal deficit present. Mental Status: She is alert and oriented to person, place, and time. Psychiatric:         Mood and Affect: Mood normal.         Behavior: Behavior normal.         Thought Content: Thought content normal.          Assessment:       Diagnosis Orders   1. Hyperlipidemia, unspecified hyperlipidemia type  Lipid Panel   2. Rhetta Mcardle, MD, Rheumatology, [address]   3. Low vitamin B12 level     4. Controlled type 2 diabetes mellitus with stage 3 chronic kidney disease, without long-term current use of insulin ([facility])  Comprehensive Metabolic Panel    Hemoglobin A1C    Yessica Cavazos MD, Nephrology, [place]   5. Flu vaccine need  INFLUENZA, QUADV, 3 YRS AND OLDER, IM PF, PREFILL SYR OR SDV, 0.5ML (AFLURIA QUADV, PF)   6. Allergic rhinitis, unspecified seasonality, unspecified trigger     7. Sacroiliac joint dysfunction         Plan:      1. Hyperlipidemia- due for labs. Continue current medication   2. Fibromyalgia-continues medication continue following with specialists  3. Low vitamin B12 -due for labs  4. Type 2 diabetes-due for labs  Continue current medication   5. Flu vaccine given in office  6. Allergic rhinitis-stable continue current medications  7. Sacroiliac joint dysfunction-continue follow up with specialists  Pt is asking for a new referral to rheumatology  She also has chronic kidney disease stage 3b will place a new referral to nephrology due to insurance changes  Pt to return in 6 months for follow up  Pt to return PRN   Return in about 6 months (around 8/15/2022), or if symptoms worsen or fail to improve.   Orders Placed This Encounter   Procedures    INFLUENZA, QUADV, 3 YRS AND OLDER, IM PF, PREFILL SYR OR SDV, 0.5ML (AFLURIA QUADV, PF)    Comprehensive Metabolic Panel     Standing Status:   Future     Standing Expiration Date:   2/15/2023    CBC With Auto Differential     Standing Status:   Future     Standing Expiration Date:   2/15/2023    Lipid Panel     Standing Status:   Future     Standing Expiration Date:   2/15/2023     Order Specific Question:   Is Patient Fasting?/# of Hours     Answer:   12    Hemoglobin A1C     Standing Status:   Future     Standing Expiration Date:   2/15/2023   Manda Moreau MD, Rheumatology, [place]     Referral Priority:   Routine     Referral Type:   Eval and Treat     Referral Reason:   Specialty Services Required     Referred to Provider:   Renee Agustin MD     Requested Specialty:   Rheumatology     Number of Visits Requested:   Drake Nelson MD, Nephrology, [place]     Referral Priority:   Routine     Referral Type:   Eval and Treat     Referral Reason:   Specialty Services Required     Referred to Provider:   Hayden Willingham MD     Requested Specialty:   Nephrology     Number of Visits Requested:   1     Orders Placed This Encounter   Medications    DULoxetine (CYMBALTA) 20 MG extended release capsule     Sig: take 1 capsule by mouth once daily     Dispense:  30 capsule     Refill:  3    montelukast (SINGULAIR) 10 MG tablet     Sig: Take 1 tablet by mouth nightly     Dispense:  90 tablet     Refill:  1  tiZANidine (ZANAFLEX) 4 MG tablet     Sig: Take 1 tablet by mouth every 6 hours as needed (muscle spasms)     Dispense:  30 tablet     Refill:  3       Patient given educational materials - see patient instructions. All patient questionsanswered. Pt voiced understanding. Reviewed health maintenance.      Electronically signed by AQUILES Kent CNP, CNP on 2/20/2022 at 11:52 PM

## 2022-02-20 ASSESSMENT — ENCOUNTER SYMPTOMS
SHORTNESS OF BREATH: 0
COUGH: 0
BACK PAIN: 1
WHEEZING: 0

## 2022-02-23 ENCOUNTER — PATIENT MESSAGE (OUTPATIENT)
Dept: FAMILY MEDICINE CLINIC | Age: 57
End: 2022-02-23

## 2022-02-23 NOTE — TELEPHONE ENCOUNTER
From: Juan Proctor  To: Fatoumata Stanford  Sent: 2/23/2022 12:08 PM EST  Subject: Vitamin B 12    I need my Vitamin B-12 filled at [address] in [place].    vitamin B-12 1000 MCG tablet  Commonly known as: CYANOCOBALAMIN

## 2022-02-23 NOTE — TELEPHONE ENCOUNTER
Cele called requesting a refill of the below medication which has been pended for you:     Requested Prescriptions     Pending Prescriptions Disp Refills    vitamin B-12 (CYANOCOBALAMIN) 1000 MCG tablet 30 tablet 1     Sig: Take 1 tablet by mouth daily       Last Appointment Date: 2/15/2022  Next Appointment Date: 8/15/2022    Allergies   Allergen Reactions    Cefuroxime Axetil Diarrhea, Hives and Nausea And Vomiting    Ezetimibe Other (See Comments)     Other reaction(s): Muscle Pain    Codeine     E-Mycin [Erythromycin]     Gabapentin     Iodine     Nsaids     Pcn [Penicillins]     Prednisone Other (See Comments)     Trouble swallowing    Sulfa Antibiotics     Zithromax [Azithromycin]

## 2022-02-24 RX ORDER — LANOLIN ALCOHOL/MO/W.PET/CERES
1000 CREAM (GRAM) TOPICAL DAILY
Qty: 30 TABLET | Refills: 1 | Status: SHIPPED | OUTPATIENT
Start: 2022-02-24 | End: 2022-04-28 | Stop reason: SDUPTHER

## 2022-02-26 ENCOUNTER — HOSPITAL ENCOUNTER (EMERGENCY)
Age: 57
Discharge: HOME | End: 2022-02-26
Attending: EMERGENCY MEDICINE
Payer: COMMERCIAL

## 2022-02-26 ENCOUNTER — APPOINTMENT (OUTPATIENT)
Dept: GENERAL RADIOLOGY | Age: 57
End: 2022-02-26
Payer: COMMERCIAL

## 2022-02-26 VITALS
TEMPERATURE: 97.4 F | HEART RATE: 84 BPM | RESPIRATION RATE: 16 BRPM | HEIGHT: 66 IN | SYSTOLIC BLOOD PRESSURE: 138 MMHG | DIASTOLIC BLOOD PRESSURE: 78 MMHG | BODY MASS INDEX: 29.89 KG/M2 | OXYGEN SATURATION: 99 % | WEIGHT: 186 LBS

## 2022-02-26 DIAGNOSIS — S76.211A INGUINAL STRAIN, RIGHT, INITIAL ENCOUNTER: Primary | ICD-10-CM

## 2022-02-26 PROCEDURE — 72170 X-RAY EXAM OF PELVIS: CPT

## 2022-02-26 PROCEDURE — 99285 EMERGENCY DEPT VISIT HI MDM: CPT

## 2022-02-26 ASSESSMENT — PAIN SCALES - GENERAL
PAINLEVEL_OUTOF10: 10
PAINLEVEL_OUTOF10: 10
PAINLEVEL_OUTOF10: 9

## 2022-02-26 ASSESSMENT — PAIN DESCRIPTION - ORIENTATION
ORIENTATION: RIGHT

## 2022-02-26 ASSESSMENT — PAIN DESCRIPTION - DESCRIPTORS
DESCRIPTORS: DISCOMFORT;CONSTANT
DESCRIPTORS: CONSTANT;CRAMPING

## 2022-02-26 ASSESSMENT — PAIN DESCRIPTION - LOCATION
LOCATION: HIP;GROIN
LOCATION: HIP;PELVIS
LOCATION: HIP;PELVIS

## 2022-02-27 NOTE — ED NOTES
Patient assisted with getting dressed, ice pack refreshed. Patient stable discharge instructions provided. Patient educated on medications, rest, icing, signs and symptoms, and follow up. Patient verbalized understanding, questions answered, appreciation verbalized. Patient taken to private car assisted into the car.       Paty Linda RN  02/26/22 [id number]

## 2022-02-27 NOTE — ED NOTES
Patient back from xray, positioned for comfort. Ice pack reapplied to right hip.      Mercedes Reid RN  02/26/22 8739

## 2022-02-27 NOTE — ED PROVIDER NOTES
eMERGENCY dEPARTMENT eNCOUnter      Pt Name: Ngozi Peoples  MRN: [id number]  [place] 1965  Date of evaluation: 2022      CHIEF COMPLAINT       Chief Complaint   Patient presents with    Fall    Hip Injury    Head Injury     hit head on wooden step         HISTORY OF PRESENT ILLNESS    Cele Vazquez is a 55 y.o. female who presents with right hip pain. Patient states about 330 she had slipped on ice falling landing on her right side doing a partial splits with her legs and hit her head no loss of consciousness no neck or back injury she denies any numbness tingling or weakness has not taken anything other than Tylenol for discomfort        REVIEW OF SYSTEMS       Review of systems are all reviewed and negative except stated above in HPI    PAST MEDICAL HISTORY    has a past medical history of Allergic rhinitis, Arthritis, Depression, Fibromyalgia, Hyperlipidemia, Neuropathy, Peripheral vascular disease ([facility] [address] Sacroiliac joint dysfunction, Tremor, and Type II or unspecified type diabetes mellitus without mention of complication, not stated as uncontrolled. SURGICAL HISTORY      has a past surgical history that includes other surgical history (); Cholecystectomy;  section; other surgical history (); knee surgery; and back surgery.     CURRENT MEDICATIONS       Previous Medications    AMITRIPTYLINE (ELAVIL) 25 MG TABLET    Take 25 mg by mouth nightly    DULOXETINE (CYMBALTA) 20 MG EXTENDED RELEASE CAPSULE    take 1 capsule by mouth once daily    FEXOFENADINE (ALLEGRA) 180 MG TABLET    Take 180 mg by mouth daily    MAGNESIUM OXIDE (MAG-OX) 400 (241.3 MG) MG TABS TABLET    take 1 tablet by mouth once daily    MELOXICAM (MOBIC) 15 MG TABLET    Take 15 mg by mouth daily     METFORMIN (GLUCOPHAGE) 500 MG TABLET    Take 1,000 mg by mouth daily     MONTELUKAST (SINGULAIR) 10 MG TABLET    Take 1 tablet by mouth nightly    PROPRANOLOL (INDERAL) 20 MG TABLET    take 1 tablet by mouth twice a day    TIZANIDINE (ZANAFLEX) 4 MG TABLET    Take 1 tablet by mouth every 6 hours as needed (muscle spasms)    VITAMIN B-12 (CYANOCOBALAMIN) 1000 MCG TABLET    Take 1 tablet by mouth daily    VITAMIN D (CHOLECALCIFEROL) 77805 UNIT CAPS    take 1 capsule by mouth every month       ALLERGIES     is allergic to cefuroxime axetil, ezetimibe, codeine, e-mycin [erythromycin], gabapentin, iodine, nsaids, pcn [penicillins], prednisone, sulfa antibiotics, and zithromax [azithromycin]. FAMILY HISTORY     She indicated that the status of her mother is unknown. She indicated that the status of her father is unknown. She indicated that the status of her neg hx is unknown.     family history includes Allergies in her mother; Arthritis in her father; Diabetes in her mother; Hypertension in her father and mother. SOCIAL HISTORY      reports that she has quit smoking. She has never used smokeless tobacco. She reports that she does not drink alcohol and does not use drugs. PHYSICAL EXAM     INITIAL VITALS:  height is 5' 6\" (1.676 m) and weight is 186 lb (84.4 kg). Her tympanic temperature is 97.4 °F (36.3 °C). Her blood pressure is 146/84 (abnormal) and her pulse is 90.       General: Patient alert nontoxic-appearing female in no apparent distress  HEENT: Head is atraumatic conjunctiva are clear  Neck: Supple  Respiratory: Lung sounds are clear bilateral  Cardiac: Heart is regular rate and rhythm  Extremity: Examination lower extremities reveal no gross deformity swelling or ecchymosis she has pain with range of motion pain and tenderness over the inguinal area neurovascular intact distally    DIFFERENTIAL DIAGNOSIS/ MDM:     Obtain x-ray    DIAGNOSTIC RESULTS     EKG: All EKG's are interpreted by the Emergency Department Physician who either signs or Co-signs this chart in the absence of a cardiologist.        RADIOLOGY:   I directly visualized the following  images and reviewed the radiologist interpretations:  XR PELVIS (1-2 VIEWS)   Final Result   No acute osseous abnormality. LABS:  Labs Reviewed - No data to display      EMERGENCY DEPARTMENT COURSE:   Vitals:    Vitals:    02/26/22 2016 02/26/22 2107   BP: (!) 146/84 (!) 146/84   Pulse: 99 90   Temp: 97.4 °F (36.3 °C)    TempSrc: Tympanic    Weight: 186 lb (84.4 kg)    Height: 5' 6\" (1.676 m)      -------------------------  BP: (!) 146/84, Temp: 97.4 °F (36.3 °C), Pulse: 90,      No orders of the defined types were placed in this encounter. Re-evaluation Notes    X-ray per radiologist shows nothing acute at this time findings are most consistent with soft tissue injury/inguinal strain patient was suggested over-the-counter medications follow-up as directed return if worse    CRITICAL CARE:   None      CONSULTS:      PROCEDURES:  None    FINAL IMPRESSION      1. Inguinal strain, right, initial encounter          DISPOSITION/PLAN   DISPOSITION Decision To Discharge 02/26/2022 09:20:07 PM      Condition on Disposition    Stable    PATIENT REFERRED TO:  AQUILES Molina Mai - CNP  [address] [address]  [phone]    In 2 days        DISCHARGE MEDICATIONS:  New Prescriptions    No medications on file       (Please note that portions of this note were completed with a voice recognition program.  Efforts were made to edit the dictations but occasionally words are mis-transcribed.)    Chetna Delvalle MD,, MD, F.A.C.E.P.   Attending Emergency Physician        Chetna Delvalle MD  02/26/22 5827

## 2022-02-27 NOTE — ED TRIAGE NOTES
Patient related, \"Stepped on a rubber mat and it slipped. I fell landed on the right side. Hit my head right side on wooden step. I landed on my right hip. It hurts so bad on the right hip into the groin. I took one of my muscle relaxer 1700 and tylenol at 1500. Patient taken to the room in W/C.

## 2022-04-11 ENCOUNTER — PATIENT MESSAGE (OUTPATIENT)
Dept: FAMILY MEDICINE CLINIC | Age: 57
End: 2022-04-11

## 2022-04-11 ENCOUNTER — TELEMEDICINE (OUTPATIENT)
Dept: FAMILY MEDICINE CLINIC | Age: 57
End: 2022-04-11
Payer: COMMERCIAL

## 2022-04-11 DIAGNOSIS — J01.90 ACUTE BACTERIAL SINUSITIS: Primary | ICD-10-CM

## 2022-04-11 DIAGNOSIS — B96.89 ACUTE BACTERIAL SINUSITIS: Primary | ICD-10-CM

## 2022-04-11 PROCEDURE — 99213 OFFICE O/P EST LOW 20 MIN: CPT | Performed by: NURSE PRACTITIONER

## 2022-04-11 PROCEDURE — G8427 DOCREV CUR MEDS BY ELIG CLIN: HCPCS | Performed by: NURSE PRACTITIONER

## 2022-04-11 PROCEDURE — 3017F COLORECTAL CA SCREEN DOC REV: CPT | Performed by: NURSE PRACTITIONER

## 2022-04-11 RX ORDER — CLARITHROMYCIN 500 MG/1
500 TABLET, COATED ORAL 2 TIMES DAILY
Qty: 20 TABLET | Refills: 0 | Status: SHIPPED | OUTPATIENT
Start: 2022-04-11 | End: 2022-08-15 | Stop reason: SDUPTHER

## 2022-04-11 RX ORDER — AMITRIPTYLINE HYDROCHLORIDE 50 MG/1
TABLET, FILM COATED ORAL
COMMUNITY
Start: 2022-02-21 | End: 2022-04-11

## 2022-04-11 RX ORDER — LANOLIN ALCOHOL/MO/W.PET/CERES
CREAM (GRAM) TOPICAL
COMMUNITY
Start: 2022-03-23 | End: 2022-08-02 | Stop reason: SDUPTHER

## 2022-04-11 RX ORDER — DEXTROMETHORPHAN HYDROBROMIDE AND PROMETHAZINE HYDROCHLORIDE 15; 6.25 MG/5ML; MG/5ML
5 SYRUP ORAL 4 TIMES DAILY PRN
Qty: 180 ML | Refills: 0 | Status: SHIPPED | OUTPATIENT
Start: 2022-04-11 | End: 2022-04-18

## 2022-04-11 ASSESSMENT — ENCOUNTER SYMPTOMS
COUGH: 0
SHORTNESS OF BREATH: 0
SORE THROAT: 1
SINUS COMPLAINT: 1
SINUS PRESSURE: 1

## 2022-04-11 ASSESSMENT — PATIENT HEALTH QUESTIONNAIRE - PHQ9
SUM OF ALL RESPONSES TO PHQ QUESTIONS 1-9: 0
1. LITTLE INTEREST OR PLEASURE IN DOING THINGS: 0
SUM OF ALL RESPONSES TO PHQ QUESTIONS 1-9: 0
2. FEELING DOWN, DEPRESSED OR HOPELESS: 0
SUM OF ALL RESPONSES TO PHQ QUESTIONS 1-9: 0
SUM OF ALL RESPONSES TO PHQ QUESTIONS 1-9: 0
SUM OF ALL RESPONSES TO PHQ9 QUESTIONS 1 & 2: 0

## 2022-04-11 NOTE — PROGRESS NOTES
Subjective:      Patient ID: Angela Francois is a 55 y.o. female coming in for   Chief Complaint   Patient presents with    Sinus Problem     patient states that she has had sinus issues for about two weeks. gotten worse over the last couple days. cough, sore throat, drainage. low grade fever. Clee Camarena, was evaluated through a synchronous (real-time) audio-video encounter. The patient (or guardian if applicable) is aware that this is a billable service, which includes applicable co-pays. This Virtual Visit was conducted with patient's (and/or legal guardian's) consent. The visit was conducted pursuant to the emergency declaration under the [address] [place] [address], [address] [place] [address] authority and the [employer] Act. Patient identification was verified, and a caregiver was present when appropriate. The patient was located in a state where the provider was licensed to provide care. Sinus Problem  This is a new problem. The current episode started 1 to 4 weeks ago (ongoing two weeks). The maximum temperature recorded prior to her arrival was 100.4 - 100.9 F. The fever has been present for 1 to 2 days. Associated symptoms include congestion, headaches, sinus pressure and a sore throat. Pertinent negatives include no coughing or shortness of breath. Treatments tried: mucinex, allergy medications. The treatment provided mild relief. Review of Systems   HENT: Positive for congestion, sinus pressure and sore throat. Respiratory: Negative for cough and shortness of breath. Neurological: Positive for headaches. Objective:   Physical Exam  Vitals and nursing note reviewed. Constitutional:       General: She is not in acute distress. Appearance: Normal appearance. HENT:      Nose: Congestion present. Pulmonary:      Effort: Pulmonary effort is normal.   Neurological:      General: No focal deficit present. Mental Status: She is alert and oriented to person, place, and time. Assessment:      1. Acute bacterial sinusitis             Plan:   -pt allergic to a lot of typical sinusitis atb, has tolerated clarithromycin in the past, will treat with that. Continue with mucinex as needed  -cough  Medication prescribed as well  -call office if symptoms persist        No orders of the defined types were placed in this encounter.      Outpatient Encounter Medications as of 4/11/2022   Medication Sig Dispense Refill    magnesium oxide (MAG-OX) 400 (240 Mg) MG tablet take 1 tablet by mouth once daily      metFORMIN (GLUCOPHAGE) 1000 MG tablet take 1 tablet by mouth once daily WITH BREAKFAST      clarithromycin (BIAXIN) 500 MG tablet Take 1 tablet by mouth 2 times daily for 10 days 20 tablet 0    promethazine-dextromethorphan (PROMETHAZINE-DM) 6.25-15 MG/5ML syrup Take 5 mLs by mouth 4 times daily as needed for Cough 180 mL 0    vitamin B-12 (CYANOCOBALAMIN) 1000 MCG tablet Take 1 tablet by mouth daily 30 tablet 1    vitamin D (CHOLECALCIFEROL) 65484 UNIT CAPS take 1 capsule by mouth every month      meloxicam (MOBIC) 15 MG tablet Take 15 mg by mouth daily       DULoxetine (CYMBALTA) 20 MG extended release capsule take 1 capsule by mouth once daily 30 capsule 3    montelukast (SINGULAIR) 10 MG tablet Take 1 tablet by mouth nightly 90 tablet 1    tiZANidine (ZANAFLEX) 4 MG tablet Take 1 tablet by mouth every 6 hours as needed (muscle spasms) 30 tablet 3    propranolol (INDERAL) 20 MG tablet take 1 tablet by mouth twice a day 60 tablet 11    amitriptyline (ELAVIL) 25 MG tablet Take 25 mg by mouth nightly      fexofenadine (ALLEGRA) 180 MG tablet Take 180 mg by mouth daily      [DISCONTINUED] amitriptyline (ELAVIL) 50 MG tablet take 1 tablet by mouth AT 8PM NIGHTLY      [DISCONTINUED] magnesium oxide (MAG-OX) 400 (241.3 Mg) MG TABS tablet take 1 tablet by mouth once daily (Patient not taking: Reported on 4/11/2022)      [DISCONTINUED] metFORMIN (GLUCOPHAGE) 500 MG tablet Take 1,000 mg by mouth daily  (Patient not taking: Reported on 4/11/2022)       No facility-administered encounter medications on file as of 4/11/2022.             Gerhardt Ruck, APRN - CNP

## 2022-04-28 ENCOUNTER — PATIENT MESSAGE (OUTPATIENT)
Dept: FAMILY MEDICINE CLINIC | Age: 57
End: 2022-04-28

## 2022-04-28 DIAGNOSIS — E53.8 LOW VITAMIN B12 LEVEL: Primary | ICD-10-CM

## 2022-04-28 DIAGNOSIS — M79.7 FIBROMYALGIA: ICD-10-CM

## 2022-04-28 RX ORDER — TIZANIDINE 4 MG/1
4 TABLET ORAL EVERY 6 HOURS PRN
Qty: 30 TABLET | Refills: 3 | Status: SHIPPED | OUTPATIENT
Start: 2022-04-28 | End: 2022-08-02 | Stop reason: SDUPTHER

## 2022-04-28 RX ORDER — LANOLIN ALCOHOL/MO/W.PET/CERES
1000 CREAM (GRAM) TOPICAL DAILY
Qty: 30 TABLET | Refills: 3 | Status: SHIPPED | OUTPATIENT
Start: 2022-04-28

## 2022-04-28 NOTE — TELEPHONE ENCOUNTER
Cele called requesting a refill of the below medication which has been pended for you:     Requested Prescriptions     Pending Prescriptions Disp Refills    vitamin B-12 (CYANOCOBALAMIN) 1000 MCG tablet 30 tablet 1     Sig: Take 1 tablet by mouth daily    tiZANidine (ZANAFLEX) 4 MG tablet 30 tablet 3     Sig: Take 1 tablet by mouth every 6 hours as needed (muscle spasms)       Last Appointment Date: 2/15/2022  Next Appointment Date: 8/15/2022    Allergies   Allergen Reactions    Cefuroxime Axetil Diarrhea, Hives and Nausea And Vomiting    Ezetimibe Other (See Comments)     Other reaction(s): Muscle Pain    Codeine     E-Mycin [Erythromycin]     Gabapentin     Iodine     Nsaids     Pcn [Penicillins]     Prednisone Other (See Comments)     Trouble swallowing    Sulfa Antibiotics     Zithromax [Azithromycin]

## 2022-04-28 NOTE — TELEPHONE ENCOUNTER
From: Kandy Molina  To: Sreekanth Hoover  Sent: 4/28/2022 9:04 AM EDT  Subject: Kim Bruins I need refills on my B12 and Tiazadine. I will be getting my labs on Monday. Please call into [facility].      Thank you  Isabela Arora

## 2022-05-10 ENCOUNTER — OFFICE VISIT (OUTPATIENT)
Dept: NEPHROLOGY | Age: 57
End: 2022-05-10
Payer: COMMERCIAL

## 2022-05-10 VITALS
SYSTOLIC BLOOD PRESSURE: 136 MMHG | BODY MASS INDEX: 30.7 KG/M2 | HEART RATE: 68 BPM | WEIGHT: 191 LBS | HEIGHT: 66 IN | DIASTOLIC BLOOD PRESSURE: 78 MMHG

## 2022-05-10 DIAGNOSIS — N18.32 STAGE 3B CHRONIC KIDNEY DISEASE (HCC): Primary | ICD-10-CM

## 2022-05-10 DIAGNOSIS — G25.2 RESTING TREMOR: ICD-10-CM

## 2022-05-10 PROCEDURE — 1036F TOBACCO NON-USER: CPT | Performed by: INTERNAL MEDICINE

## 2022-05-10 PROCEDURE — 3017F COLORECTAL CA SCREEN DOC REV: CPT | Performed by: INTERNAL MEDICINE

## 2022-05-10 PROCEDURE — G8427 DOCREV CUR MEDS BY ELIG CLIN: HCPCS | Performed by: INTERNAL MEDICINE

## 2022-05-10 PROCEDURE — G8417 CALC BMI ABV UP PARAM F/U: HCPCS | Performed by: INTERNAL MEDICINE

## 2022-05-10 PROCEDURE — 99204 OFFICE O/P NEW MOD 45 MIN: CPT | Performed by: INTERNAL MEDICINE

## 2022-05-10 NOTE — PROGRESS NOTES
Nephrology Consult Note    Reason for Consult: Elevated creatinine  Requesting Physician:AQUILES Del Angel - KAREN    Chief Complaint: Elevated creatinine  History Obtained From: Patient  History of Present Illness: This is a 55 y.o. female who has a past medical history significant for diabetes. She was diagnosed with diabetes 6 to 7 years ago. Her blood sugars are under good control with oral hypoglycemics agent. Patient also has a history of SARS infection in 2019. She states that several people were working with her got sick. One of the person was hospitalized. She was sick for a month with respiratory symptoms and cough. She states that 1 day she coughed so hard that she felt that somebody put a ice pick in her back. Subsequently x-ray shows vertebral fracture. She underwent kyphoplasty and burning of nerve to control her pain. She also states that after that viral infection that he developed severe intentional tremors. Patient also had a history of chronic low back pain. She was diagnosed with sacroiliac pain. She was seen by her orthopedic surgeon in [place]. There was no follow-up because she is changing her insurance. Patient also has a history of chronic kidney disease. She was last seen at [address] [place] [address]. Her BMP shows creatinine of 1.5 with estimated GFR of 34. And further work-up is revealed normal free light chain ratio, rheumatoid factor was elevated and 20, cryoglobulin was negative, anti-HCV was negative, hepatitis B surface antigen was negative, myeloperoxidase and W[place]-3 both were negative. Patient also has negative protein electrophoresis. Her renal ultrasound was unremarkable without any obstruction. Her spot urine for protein to creatinine shows a proteinuria of close to 90 mg/g of creatinine and there was no hematuria around urine to dialysis. Patient denies any voiding difficulties.   There is no history of nausea vomiting diarrhea hematemesis or melena. Patient denies any consumption of nonsteroidal anti-inflammatory compound. Although meloxicam is in her medication list.  She does not take because she is allergic to NSAIDs. There is no history of hematuria or kidney stones  Patient also denies any consumption of natural or herbal medications. Her blood sugars are very well controlled there is no history of diabetic retinopathy neuropathy or gastroparesis. There is no history of tea or cola colored urine.       Past Medical History:        Diagnosis Date    Allergic rhinitis     Arthritis     Depression     Fibromyalgia     Hyperlipidemia     Neuropathy     Peripheral vascular disease ([address]     Sacroiliac joint dysfunction     Tremor     Type II or unspecified type diabetes mellitus without mention of complication, not stated as uncontrolled        Past Surgical History:        Procedure Laterality Date    BACK SURGERY       SECTION      CHOLECYSTECTOMY      KNEE SURGERY      right    OTHER SURGICAL HISTORY      darrach procedure    OTHER SURGICAL HISTORY      darrach procedure revision       Current Medications:    Current Outpatient Medications   Medication Sig Dispense Refill    vitamin B-12 (CYANOCOBALAMIN) 1000 MCG tablet Take 1 tablet by mouth daily 30 tablet 3    tiZANidine (ZANAFLEX) 4 MG tablet Take 1 tablet by mouth every 6 hours as needed (muscle spasms) 30 tablet 3    magnesium oxide (MAG-OX) 400 (240 Mg) MG tablet take 1 tablet by mouth once daily      metFORMIN (GLUCOPHAGE) 1000 MG tablet take 1 tablet by mouth once daily WITH BREAKFAST      vitamin D (CHOLECALCIFEROL) 63304 UNIT CAPS take 1 capsule by mouth every month      meloxicam (MOBIC) 15 MG tablet Take 15 mg by mouth daily       DULoxetine (CYMBALTA) 20 MG extended release capsule take 1 capsule by mouth once daily 30 capsule 3    montelukast (SINGULAIR) 10 MG tablet Take 1 tablet by mouth nightly 90 tablet 1    propranolol (INDERAL) 20 MG tablet take 1 tablet by mouth twice a day 60 tablet 11    amitriptyline (ELAVIL) 25 MG tablet Take 25 mg by mouth nightly      fexofenadine (ALLEGRA) 180 MG tablet Take 180 mg by mouth daily       No current facility-administered medications for this visit. Allergies:  Cefuroxime axetil, Ezetimibe, Codeine, E-mycin [erythromycin], Gabapentin, Iodine, Nsaids, Pcn [penicillins], Prednisone, Sulfa antibiotics, and Zithromax [azithromycin]    Social History:   Social History     Socioeconomic History    Marital status:      Spouse name: Not on file    Number of children: Not on file    Years of education: Not on file    Highest education level: Not on file   Occupational History    Not on file   Tobacco Use    Smoking status: Former Smoker    Smokeless tobacco: Never Used   Vaping Use    Vaping Use: Never used   Substance and Sexual Activity    Alcohol use: No    Drug use: No    Sexual activity: Not on file   Other Topics Concern    Not on file   Social History Narrative    Not on file     Social Determinants of Health     Financial Resource Strain: Low Risk     Difficulty of Paying Living Expenses: Not hard at all   Food Insecurity: No Food Insecurity    Worried About [facility] in the Last Year: Never true    [address] in the Last Year: Never true   Transportation Needs:     Lack of Transportation (Medical): Not on file    Lack of Transportation (Non-Medical):  Not on file   Physical Activity:     Days of Exercise per Week: Not on file    Minutes of Exercise per Session: Not on file   Stress:     Feeling of Stress : Not on file   Social Connections:     Frequency of Communication with Friends and Family: Not on file    Frequency of Social Gatherings with Friends and Family: Not on file    Attends [employer] Services: Not on file    Active Member of Clubs or Organizations: Not on file    Attends Club or Organization Meetings: Not on file    Marital Status: Not on file   Intimate Partner Violence:     Fear of Current or Ex-Partner: Not on file    Emotionally Abused: Not on file    Physically Abused: Not on file    Sexually Abused: Not on file   Housing Stability:     Unable to Pay for Housing in the Last Year: Not on file    Number of [place] in the Last Year: Not on file    Unstable Housing in the Last Year: Not on file       Family History:   Family History   Problem Relation Age of Onset    Diabetes Mother     Hypertension Mother     Allergies Mother     Hypertension Father     Arthritis Father     Heart Disease Neg Hx        Review of Systems:    Constitutional: No fever or chills. HEENT:  No headache or itchy eyes  Cardiac:  No chest pain orthopnea  Chest:              No cough, phlegm or wheezing. Abdomen:  No abdominal pain, nausea or vomiting. Neuro:  No focal weakness, abnormal movements orseizure like activity. Skin:   No rashes, no itching. :   No hematuria, no pyuria, no dysuria, no flank pain. Extremities:  No swelling or joint pain      Objective:  Vitals 5/10/2022 2/26/2022 8/37/5705   SYSTOLIC 233 227 019   DIASTOLIC 78 78 84   Site      Position      Cuff Size      Pulse 68 84 90   Temp      Resp  16    SpO2  99    Weight 191 lb     Height 5' 6\"     Body mass index 30.82 kg/m2       Physical Exam:  General appearance: Awake and alert x3  Skin: To touch   eyes: Sclera was nonicteric  ENT: :no thrush no pharyngeal congestion    Neck: No lymphadenopathy   pulmonary: Bilateral air entry and clear  Cardiovascular: S1 and S2 audible no S3   abdomen: Soft and nontender bowel sounds are positive no ascites   extremities: Trace edema  Labs:  As described  Radiology:    Stage III B chronic kidney disease. Grayscale real-time and color Doppler imaging were utilized. The right kidney measures sagittally 9.9 cm on the left 10 cm. I see no indication of a solid mass or hydronephrosis bilaterally.      There is a 1 cm anechoic structure within the left kidney just above its midpole consistent with a cyst.     Within limits of ultrasound gallbladder pathology is suggested. IMPRESSION:     The kidneys are normal in size and position without hydronephrosis. Incidental 1 cm left renal cyst.     Within limits of ultrasound gallbladder pathology is seen. Assessment:  1. Chronic kidney disease stage IIIb most likely due to nephrosclerosis. Plan creatinine is 1.5 mg/dL and work-up is essentially negative. 2.  Type 2 diabetes close to 5 to 7 years duration without diabetic retinopathy or neuropathy  3. Sting tremor patient is on Inderal  4. History of SARS pneumonia  5. History of compression fracture of spine status post kyphoplasty. Plan:  1. Will Check Renal Ultrasound to r/o element of obstruction and to assess the kidney size/echotexture. 2.  We will repeat BMP  3. We will check a spot urine for protein and creatinine  4. We will follow with you    Thank you for the consultation. Please do not hesitate to call with questions.     This note is created with the assistance of a speech-recognition program. While intending to generate a document that actually reflects the content of the visit, no guarantees can be provided that every mistake has been identified and corrected by editing  Electronically signed by Yady Phelan MD on 5/10/2022 at 9:23 AM

## 2022-05-18 ENCOUNTER — PATIENT MESSAGE (OUTPATIENT)
Dept: FAMILY MEDICINE CLINIC | Age: 57
End: 2022-05-18

## 2022-05-18 DIAGNOSIS — M79.7 FIBROMYALGIA: Primary | ICD-10-CM

## 2022-05-18 RX ORDER — AMITRIPTYLINE HYDROCHLORIDE 25 MG/1
25 TABLET, FILM COATED ORAL NIGHTLY
Qty: 30 TABLET | Refills: 3 | Status: SHIPPED | OUTPATIENT
Start: 2022-05-18 | End: 2022-09-08

## 2022-05-18 NOTE — TELEPHONE ENCOUNTER
Cele called requesting a refill of the below medication which has been pended for you:     Requested Prescriptions     Pending Prescriptions Disp Refills    amitriptyline (ELAVIL) 25 MG tablet 30 tablet 2     Sig: Take 1 tablet by mouth nightly       Last Appointment Date: 2/15/2022  Next Appointment Date: 8/15/2022    Allergies   Allergen Reactions    Cefuroxime Axetil Diarrhea, Hives and Nausea And Vomiting    Ezetimibe Other (See Comments)     Other reaction(s): Muscle Pain    Codeine     E-Mycin [Erythromycin]     Gabapentin     Iodine     Nsaids     Pcn [Penicillins]     Prednisone Other (See Comments)     Trouble swallowing    Sulfa Antibiotics     Zithromax [Azithromycin]

## 2022-05-18 NOTE — TELEPHONE ENCOUNTER
From: Gm Keene  To: Taniya Pappas  Sent: 5/18/2022 9:21 AM EDT  Subject: Amitriptyline 25mg    I need Amitriptyline 25mg called into [address] [place] [address]. Please note my labs will be done on 5/24/2022 along with the kidney specialist labs. I also have a kidney ultrasound the same day.      Thank you  Jeannie Berger

## 2022-05-24 ENCOUNTER — HOSPITAL ENCOUNTER (OUTPATIENT)
Dept: LAB | Age: 57
Discharge: HOME | End: 2022-05-24
Payer: COMMERCIAL

## 2022-05-24 ENCOUNTER — HOSPITAL ENCOUNTER (OUTPATIENT)
Dept: ULTRASOUND IMAGING | Age: 57
Discharge: HOME | End: 2022-05-26
Payer: COMMERCIAL

## 2022-05-24 DIAGNOSIS — N18.32 STAGE 3B CHRONIC KIDNEY DISEASE (HCC): ICD-10-CM

## 2022-05-24 DIAGNOSIS — G25.2 RESTING TREMOR: ICD-10-CM

## 2022-05-24 DIAGNOSIS — N18.30 CONTROLLED TYPE 2 DIABETES MELLITUS WITH STAGE 3 CHRONIC KIDNEY DISEASE, WITHOUT LONG-TERM CURRENT USE OF INSULIN (HCC): ICD-10-CM

## 2022-05-24 DIAGNOSIS — E11.22 CONTROLLED TYPE 2 DIABETES MELLITUS WITH STAGE 3 CHRONIC KIDNEY DISEASE, WITHOUT LONG-TERM CURRENT USE OF INSULIN (HCC): ICD-10-CM

## 2022-05-24 DIAGNOSIS — E78.5 HYPERLIPIDEMIA, UNSPECIFIED HYPERLIPIDEMIA TYPE: ICD-10-CM

## 2022-05-24 LAB
ABSOLUTE EOS #: 0.41 K/UL (ref 0–0.44)
ABSOLUTE IMMATURE GRANULOCYTE: 0.06 K/UL (ref 0–0.3)
ABSOLUTE LYMPH #: 2.99 K/UL (ref 1.1–3.7)
ABSOLUTE MONO #: 0.54 K/UL (ref 0.1–1.2)
ALBUMIN SERPL-MCNC: 4.5 G/DL (ref 3.5–5.2)
ALBUMIN/GLOBULIN RATIO: 1.4 (ref 1–2.5)
ALP BLD-CCNC: 124 U/L (ref 35–104)
ALT SERPL-CCNC: 22 U/L (ref 5–33)
ANION GAP SERPL CALCULATED.3IONS-SCNC: 13 MMOL/L (ref 9–17)
ANION GAP SERPL CALCULATED.3IONS-SCNC: 13 MMOL/L (ref 9–17)
AST SERPL-CCNC: 18 U/L
BASOPHILS # BLD: 1 % (ref 0–2)
BASOPHILS ABSOLUTE: 0.08 K/UL (ref 0–0.2)
BILIRUB SERPL-MCNC: 0.41 MG/DL (ref 0.3–1.2)
BUN BLDV-MCNC: 22 MG/DL (ref 6–20)
BUN BLDV-MCNC: 22 MG/DL (ref 6–20)
BUN/CREAT BLD: 17 (ref 9–20)
BUN/CREAT BLD: 17 (ref 9–20)
CALCIUM IONIZED: 1.34 MMOL/L (ref 1.13–1.33)
CALCIUM SERPL-MCNC: 10.1 MG/DL (ref 8.6–10.4)
CALCIUM SERPL-MCNC: 10.1 MG/DL (ref 8.6–10.4)
CHLORIDE BLD-SCNC: 101 MMOL/L (ref 98–107)
CHLORIDE BLD-SCNC: 101 MMOL/L (ref 98–107)
CHOLESTEROL/HDL RATIO: 6.7
CHOLESTEROL: 256 MG/DL
CO2: 25 MMOL/L (ref 20–31)
CO2: 25 MMOL/L (ref 20–31)
CREAT SERPL-MCNC: 1.31 MG/DL (ref 0.5–0.9)
CREAT SERPL-MCNC: 1.31 MG/DL (ref 0.5–0.9)
EOSINOPHILS RELATIVE PERCENT: 3 % (ref 1–4)
ESTIMATED AVERAGE GLUCOSE: 171 MG/DL
GFR AFRICAN AMERICAN: 51 ML/MIN
GFR AFRICAN AMERICAN: 51 ML/MIN
GFR NON-AFRICAN AMERICAN: 42 ML/MIN
GFR NON-AFRICAN AMERICAN: 42 ML/MIN
GFR SERPL CREATININE-BSD FRML MDRD: ABNORMAL ML/MIN/{1.73_M2}
GFR SERPL CREATININE-BSD FRML MDRD: ABNORMAL ML/MIN/{1.73_M2}
GLUCOSE BLD-MCNC: 181 MG/DL (ref 70–99)
GLUCOSE BLD-MCNC: 181 MG/DL (ref 70–99)
HBA1C MFR BLD: 7.6 % (ref 4–6)
HCT VFR BLD CALC: 46.5 % (ref 36.3–47.1)
HCT VFR BLD CALC: 46.5 % (ref 36.3–47.1)
HDLC SERPL-MCNC: 38 MG/DL
HEMOGLOBIN: 15.9 G/DL (ref 11.9–15.1)
HEMOGLOBIN: 15.9 G/DL (ref 11.9–15.1)
IMMATURE GRANULOCYTES: 0 %
LDL CHOLESTEROL: 144 MG/DL (ref 0–130)
LYMPHOCYTES # BLD: 21 % (ref 24–43)
MCH RBC QN AUTO: 31.3 PG (ref 25.2–33.5)
MCH RBC QN AUTO: 31.3 PG (ref 25.2–33.5)
MCHC RBC AUTO-ENTMCNC: 34.2 G/DL (ref 25.2–33.5)
MCHC RBC AUTO-ENTMCNC: 34.2 G/DL (ref 25.2–33.5)
MCV RBC AUTO: 91.5 FL (ref 82.6–102.9)
MCV RBC AUTO: 91.5 FL (ref 82.6–102.9)
MONOCYTES # BLD: 4 % (ref 3–12)
NRBC AUTOMATED: 0 PER 100 WBC
NRBC AUTOMATED: 0 PER 100 WBC
PDW BLD-RTO: 13.1 % (ref 11.8–14.4)
PDW BLD-RTO: 13.1 % (ref 11.8–14.4)
PLATELET # BLD: 256 K/UL (ref 138–453)
PLATELET # BLD: 256 K/UL (ref 138–453)
PMV BLD AUTO: 10.1 FL (ref 8.1–13.5)
PMV BLD AUTO: 10.1 FL (ref 8.1–13.5)
POTASSIUM SERPL-SCNC: 4.7 MMOL/L (ref 3.7–5.3)
POTASSIUM SERPL-SCNC: 4.7 MMOL/L (ref 3.7–5.3)
PTH INTACT: 54.41 PG/ML (ref 15–65)
RBC # BLD: 5.08 M/UL (ref 3.95–5.11)
RBC # BLD: 5.08 M/UL (ref 3.95–5.11)
SEG NEUTROPHILS: 71 % (ref 36–65)
SEGMENTED NEUTROPHILS ABSOLUTE COUNT: 9.9 K/UL (ref 1.5–8.1)
SODIUM BLD-SCNC: 139 MMOL/L (ref 135–144)
SODIUM BLD-SCNC: 139 MMOL/L (ref 135–144)
TOTAL PROTEIN: 7.7 G/DL (ref 6.4–8.3)
TRIGL SERPL-MCNC: 369 MG/DL
WBC # BLD: 14 K/UL (ref 3.5–11.3)
WBC # BLD: 14 K/UL (ref 3.5–11.3)

## 2022-05-24 PROCEDURE — 83970 ASSAY OF PARATHORMONE: CPT

## 2022-05-24 PROCEDURE — 80048 BASIC METABOLIC PNL TOTAL CA: CPT

## 2022-05-24 PROCEDURE — 80053 COMPREHEN METABOLIC PANEL: CPT

## 2022-05-24 PROCEDURE — 36415 COLL VENOUS BLD VENIPUNCTURE: CPT

## 2022-05-24 PROCEDURE — 82330 ASSAY OF CALCIUM: CPT

## 2022-05-24 PROCEDURE — 85025 COMPLETE CBC W/AUTO DIFF WBC: CPT

## 2022-05-24 PROCEDURE — 76770 US EXAM ABDO BACK WALL COMP: CPT

## 2022-05-24 PROCEDURE — 83036 HEMOGLOBIN GLYCOSYLATED A1C: CPT

## 2022-05-24 PROCEDURE — 80061 LIPID PANEL: CPT

## 2022-05-24 PROCEDURE — 85027 COMPLETE CBC AUTOMATED: CPT

## 2022-05-25 ENCOUNTER — OFFICE VISIT (OUTPATIENT)
Dept: PRIMARY CARE CLINIC | Age: 57
End: 2022-05-25
Payer: COMMERCIAL

## 2022-05-25 VITALS
HEIGHT: 66 IN | BODY MASS INDEX: 30.7 KG/M2 | OXYGEN SATURATION: 93 % | HEART RATE: 110 BPM | DIASTOLIC BLOOD PRESSURE: 88 MMHG | TEMPERATURE: 96.6 F | WEIGHT: 191 LBS | SYSTOLIC BLOOD PRESSURE: 132 MMHG

## 2022-05-25 DIAGNOSIS — E11.42 TYPE 2 DIABETES MELLITUS WITH DIABETIC POLYNEUROPATHY, WITHOUT LONG-TERM CURRENT USE OF INSULIN (HCC): ICD-10-CM

## 2022-05-25 DIAGNOSIS — J20.9 ACUTE BRONCHITIS, UNSPECIFIED ORGANISM: Primary | ICD-10-CM

## 2022-05-25 LAB
CHP ED QC CHECK: ABNORMAL
GLUCOSE BLD-MCNC: 165 MG/DL

## 2022-05-25 PROCEDURE — 3017F COLORECTAL CA SCREEN DOC REV: CPT | Performed by: FAMILY MEDICINE

## 2022-05-25 PROCEDURE — 3051F HG A1C>EQUAL 7.0%<8.0%: CPT | Performed by: FAMILY MEDICINE

## 2022-05-25 PROCEDURE — 1036F TOBACCO NON-USER: CPT | Performed by: FAMILY MEDICINE

## 2022-05-25 PROCEDURE — 2022F DILAT RTA XM EVC RTNOPTHY: CPT | Performed by: FAMILY MEDICINE

## 2022-05-25 PROCEDURE — G8417 CALC BMI ABV UP PARAM F/U: HCPCS | Performed by: FAMILY MEDICINE

## 2022-05-25 PROCEDURE — G8427 DOCREV CUR MEDS BY ELIG CLIN: HCPCS | Performed by: FAMILY MEDICINE

## 2022-05-25 PROCEDURE — 99214 OFFICE O/P EST MOD 30 MIN: CPT | Performed by: FAMILY MEDICINE

## 2022-05-25 PROCEDURE — 82962 GLUCOSE BLOOD TEST: CPT | Performed by: FAMILY MEDICINE

## 2022-05-25 RX ORDER — GLIMEPIRIDE 1 MG/1
1 TABLET ORAL
Qty: 90 TABLET | Refills: 1 | Status: SHIPPED | OUTPATIENT
Start: 2022-05-25

## 2022-05-25 RX ORDER — ALBUTEROL SULFATE 90 UG/1
2 AEROSOL, METERED RESPIRATORY (INHALATION) 4 TIMES DAILY PRN
Qty: 18 G | Refills: 5 | Status: SHIPPED | OUTPATIENT
Start: 2022-05-25

## 2022-05-25 RX ORDER — LEVOFLOXACIN 500 MG/1
500 TABLET, FILM COATED ORAL DAILY
Qty: 10 TABLET | Refills: 0 | Status: SHIPPED | OUTPATIENT
Start: 2022-05-25 | End: 2022-06-04

## 2022-05-25 ASSESSMENT — ENCOUNTER SYMPTOMS
ABDOMINAL PAIN: 0
SORE THROAT: 0
SINUS PRESSURE: 1
WHEEZING: 0
SINUS PAIN: 1
RHINORRHEA: 1
DIARRHEA: 0
SHORTNESS OF BREATH: 1
EYE REDNESS: 0
EYE DISCHARGE: 0
NAUSEA: 0
VOMITING: 0
COUGH: 1
TROUBLE SWALLOWING: 0
CONSTIPATION: 0

## 2022-05-25 NOTE — PROGRESS NOTES
2022     Cele Camarena (:  1965) is a 55 y.o. female, here for evaluation of the following medical concerns:    Sinusitis  This is a new problem. The current episode started in the past 7 days. The problem has been gradually worsening since onset. Maximum temperature: low grade off and on. Associated symptoms include congestion, coughing, ear pain (itching), headaches, shortness of breath and sinus pressure. Pertinent negatives include no chills, neck pain or sore throat. Treatments tried: allegra, singulair, benadryl. Mucinex. The treatment provided no relief. Patient does note that her blood sugars have been running high up into the 200s range. She did get lab work done for her primary care provider but as her PCP is not in the office currently wanted to discuss further intervention for her diabetes. We did talk about adding glimepiride to her regimen and really watching her diet following a low-carb/low sugar diet and increasing exercise for optimal blood sugar control. She has been on glimepiride in the past and it did provide her with improvement in her blood sugar control. She states it did drop her blood sugars quite a bit so we will just start her at a low dose. Did review patient's med list, allergies, social history,pmhx and pshx today as noted in the record. Review of Systems   Constitutional: Positive for fatigue. Negative for chills and fever. HENT: Positive for congestion, ear pain (itching), postnasal drip, rhinorrhea, sinus pressure and sinus pain. Negative for sore throat and trouble swallowing. Eyes: Negative for discharge and redness. Respiratory: Positive for cough and shortness of breath. Negative for wheezing. Cardiovascular: Negative for chest pain. Gastrointestinal: Negative for abdominal pain, constipation, diarrhea, nausea and vomiting. Genitourinary: Negative for dysuria, flank pain, frequency and urgency.    Musculoskeletal: Negative for arthralgias, myalgias and neck pain. Skin: Negative for rash and wound. Allergic/Immunologic: Negative for environmental allergies. Neurological: Positive for headaches. Negative for dizziness, weakness and light-headedness. Hematological: Negative for adenopathy. Psychiatric/Behavioral: Negative. Prior to Visit Medications    Medication Sig Taking?  Authorizing Provider   glimepiride (AMARYL) 1 MG tablet Take 1 tablet by mouth every morning (before breakfast) Yes Isaías Frederick DO   amitriptyline (ELAVIL) 25 MG tablet Take 1 tablet by mouth nightly Yes Moe Mcmillan MD   vitamin B-12 (CYANOCOBALAMIN) 1000 MCG tablet Take 1 tablet by mouth daily Yes Margo Feliciano MD   tiZANidine (ZANAFLEX) 4 MG tablet Take 1 tablet by mouth every 6 hours as needed (muscle spasms) Yes Margo Feliciano MD   magnesium oxide (MAG-OX) 400 (240 Mg) MG tablet take 1 tablet by mouth once daily Yes Historical Provider, MD   metFORMIN (GLUCOPHAGE) 1000 MG tablet take 1 tablet by mouth once daily WITH BREAKFAST Yes Historical Provider, MD   vitamin D (CHOLECALCIFEROL) 47659 UNIT CAPS take 1 capsule by mouth every month Yes Historical Provider, MD   DULoxetine (CYMBALTA) 20 MG extended release capsule take 1 capsule by mouth once daily Yes AQUILES Crenshaw CNP   montelukast (SINGULAIR) 10 MG tablet Take 1 tablet by mouth nightly Yes AQUILES Crenshaw CNP   propranolol (INDERAL) 20 MG tablet take 1 tablet by mouth twice a day Yes AQUILES Hough CNP   fexofenadine (ALLEGRA) 180 MG tablet Take 180 mg by mouth daily Yes Historical Provider, MD   meloxicam (MOBIC) 15 MG tablet Take 15 mg by mouth daily   Patient not taking: Reported on 2022  Historical Provider, MD        Social History     Tobacco Use    Smoking status: Former Smoker     Quit date: 2000     Years since quittin.0    Smokeless tobacco: Never Used   Substance Use Topics    Alcohol use: No        Vitals: 05/25/22 0949   BP: 132/88   Site: Left Upper Arm   Position: Sitting   Cuff Size: Medium Adult   Pulse: (!) 110   Temp: (!) 96.6 °F (35.9 °C)   TempSrc: Tympanic   SpO2: 93%  Comment: room air   Weight: 191 lb (86.6 kg)   Height: 5' 6\" (1.676 m)     Estimated body mass index is 30.83 kg/m² as calculated from the following:    Height as of this encounter: 5' 6\" (1.676 m). Weight as of this encounter: 191 lb (86.6 kg). Physical Exam  Vitals and nursing note reviewed. Constitutional:       General: She is not in acute distress. Appearance: Normal appearance. She is well-developed. She is not diaphoretic. HENT:      Head: Normocephalic and atraumatic. Right Ear: External ear normal.      Left Ear: External ear normal.      Ears:      Comments: TMs dull with fluid behind the TM     Nose: Congestion and rhinorrhea present. Comments: Maxillary and frontal sinus tenderness with palpation bilaterally       Mouth/Throat:      Pharynx: No posterior oropharyngeal erythema. Comments: Post nasal drainage noted  Eyes:      General: No scleral icterus. Right eye: No discharge. Left eye: No discharge. Conjunctiva/sclera: Conjunctivae normal.      Pupils: Pupils are equal, round, and reactive to light. Neck:      Thyroid: No thyromegaly. Cardiovascular:      Rate and Rhythm: Normal rate and regular rhythm. Heart sounds: Normal heart sounds. Pulmonary:      Effort: Pulmonary effort is normal. No respiratory distress. Breath sounds: Normal breath sounds. No wheezing. Musculoskeletal:      Cervical back: Normal range of motion and neck supple. Lymphadenopathy:      Cervical: Cervical adenopathy present. Skin:     General: Skin is warm and dry. Findings: No rash. Neurological:      Mental Status: She is alert and oriented to person, place, and time. Psychiatric:         Behavior: Behavior normal.         Thought Content:  Thought content normal. Judgment: Judgment normal.         ASSESSMENT/PLAN:    Encounter Diagnoses   Name Primary?  Acute bronchitis, unspecified organism Yes    Type 2 diabetes mellitus with diabetic polyneuropathy, without long-term current use of insulin ([facility])      Orders Placed This Encounter   Medications    glimepiride (AMARYL) 1 MG tablet     Sig: Take 1 tablet by mouth every morning (before breakfast)     Dispense:  90 tablet     Refill:  1    albuterol sulfate HFA (VENTOLIN HFA) 108 (90 Base) MCG/ACT inhaler     Sig: Inhale 2 puffs into the lungs 4 times daily as needed for Wheezing     Dispense:  18 g     Refill:  5    levoFLOXacin (LEVAQUIN) 500 MG tablet     Sig: Take 1 tablet by mouth daily for 10 days     Dispense:  10 tablet     Refill:  0     Will add glimepiride to her daily regimen. Did discuss low carb/low sugar diet and increase exercise for optimal blood sugar control. Levaquin given for her acute infection today. Increase fluids and rest    Can use mucinex or mucinex DM or comparable for congestion or cough. Return  if no improvement in symptoms or if any further symptoms arise. No follow-ups on file. An electronic signature was used to authenticate this note.     --Joselito Kaplan DO on 5/25/2022 at 9:59 AM

## 2022-07-01 ENCOUNTER — PATIENT MESSAGE (OUTPATIENT)
Dept: FAMILY MEDICINE CLINIC | Age: 57
End: 2022-07-01

## 2022-07-01 RX ORDER — PROPRANOLOL HYDROCHLORIDE 20 MG/1
TABLET ORAL
Qty: 180 TABLET | Refills: 0 | Status: SHIPPED | OUTPATIENT
Start: 2022-07-01 | End: 2022-09-19

## 2022-07-12 ENCOUNTER — OFFICE VISIT (OUTPATIENT)
Dept: NEPHROLOGY | Age: 57
End: 2022-07-12
Payer: COMMERCIAL

## 2022-07-12 VITALS
WEIGHT: 192 LBS | BODY MASS INDEX: 30.86 KG/M2 | DIASTOLIC BLOOD PRESSURE: 82 MMHG | SYSTOLIC BLOOD PRESSURE: 130 MMHG | HEART RATE: 76 BPM | HEIGHT: 66 IN

## 2022-07-12 DIAGNOSIS — I10 PRIMARY HYPERTENSION: ICD-10-CM

## 2022-07-12 DIAGNOSIS — N18.32 STAGE 3B CHRONIC KIDNEY DISEASE (HCC): Primary | ICD-10-CM

## 2022-07-12 PROCEDURE — 1036F TOBACCO NON-USER: CPT | Performed by: INTERNAL MEDICINE

## 2022-07-12 PROCEDURE — 3017F COLORECTAL CA SCREEN DOC REV: CPT | Performed by: INTERNAL MEDICINE

## 2022-07-12 PROCEDURE — G8427 DOCREV CUR MEDS BY ELIG CLIN: HCPCS | Performed by: INTERNAL MEDICINE

## 2022-07-12 PROCEDURE — G8417 CALC BMI ABV UP PARAM F/U: HCPCS | Performed by: INTERNAL MEDICINE

## 2022-07-12 PROCEDURE — 99214 OFFICE O/P EST MOD 30 MIN: CPT | Performed by: INTERNAL MEDICINE

## 2022-07-12 NOTE — PROGRESS NOTES
Nephrology Progress Note    Cele Guillen, APRN - CNP      SUBJECTIVE      Chief Complaint   Patient presents with    Chronic Kidney Disease     2mth follow up labs and ultrasound     Patient was in my clinic today for an ongoing nephrological evaluation. Patient states that she is doing fairly well. She denies any recent changes in her medication. Labs that were ordered on initial evaluation were available. Her most recent creatinine is 1.3 mg/dL with estimated GFR of 42. Renal ultrasound shows right kidney 10.3 cm and left kidney 9.7 cm. There was no increase in cortical echogenicity. Denies any nausea vomiting or diarrhea  No chest pain PND orthopnea.       Patient Active Problem List    Diagnosis Date Noted    Allergic rhinitis     Hyperlipidemia 02/15/2022    Depression 02/15/2022    Fibromyalgia 02/15/2022    Sacroiliac joint dysfunction 02/15/2022    Neuropathy 02/15/2022    Lumbar disc disease 04/05/2021    Tremor 10/13/2020         CURRENT MEDICATIONS      Current Outpatient Medications   Medication Sig Dispense Refill    propranolol (INDERAL) 20 MG tablet take 1 tablet by mouth twice a day 180 tablet 0    glimepiride (AMARYL) 1 MG tablet Take 1 tablet by mouth every morning (before breakfast) 90 tablet 1    albuterol sulfate HFA (VENTOLIN HFA) 108 (90 Base) MCG/ACT inhaler Inhale 2 puffs into the lungs 4 times daily as needed for Wheezing 18 g 5    amitriptyline (ELAVIL) 25 MG tablet Take 1 tablet by mouth nightly 30 tablet 3    vitamin B-12 (CYANOCOBALAMIN) 1000 MCG tablet Take 1 tablet by mouth daily 30 tablet 3    tiZANidine (ZANAFLEX) 4 MG tablet Take 1 tablet by mouth every 6 hours as needed (muscle spasms) 30 tablet 3    magnesium oxide (MAG-OX) 400 (240 Mg) MG tablet take 1 tablet by mouth once daily      metFORMIN (GLUCOPHAGE) 1000 MG tablet take 1 tablet by mouth once daily WITH BREAKFAST      vitamin D (CHOLECALCIFEROL) 72696 UNIT CAPS take 1 capsule by mouth every month      DULoxetine (CYMBALTA) 20 MG extended release capsule take 1 capsule by mouth once daily 30 capsule 3    montelukast (SINGULAIR) 10 MG tablet Take 1 tablet by mouth nightly 90 tablet 1    fexofenadine (ALLEGRA) 180 MG tablet Take 180 mg by mouth daily       No current facility-administered medications for this visit. REVIEW OF SYSTEMS     Constitutional: No fever or chills   HEENT:  No headache, blurring of vision or nasal discharge  Cardiac:  No chest pain or dyspnea  Chest:              No cough or wheezing  Abdomen:  No abdominal pain, nausea or vomiting. Neuro:  No focal weakness, abnormal movements or seizure like activity. Skin:   No rashes, no itching. :   No hematuria, no pyuria, no dysuria, no flank pain. Extremities:  No swelling. ROS was otherwise negative except as mentioned in the [address] OBJECTIVE       Vitals 7/12/2022 5/25/2022 7/82/3682   SYSTOLIC 857 286 037   DIASTOLIC 82 88 78   Site  Left Upper Arm    Position  Sitting    Cuff Size  Medium Adult    Pulse 76 110 68   Temp  96.6    Resp      SpO2  93    Weight 192 lb 191 lb 191 lb   Height 5' 6\" 5' 6\" 5' 6\"   Body mass index 30.99 kg/m2 30.82 kg/m2 30.82 kg/m2     PHYSICAL EXAM      GENERAL APPEARANCE: Awake and alert x3  SKIN: Warm to touch and no erythema  EYES: Conjunctiva was pink  ENT: no thrush no pharyngeal congestion   NECK:  No jvd  No carotid bruit   PULMONARY: Clear to auscultation  CADRDIOVASCULAR: S1 and S2 audible no S3   ABDOMEN: soft nontender, bowel sounds, present, no organomegaly,  no ascites   EXTREMITIES: Trace edema  NEURO: alert and oriented, no deficits    LABS    Results for Mary Ramos (MRN [id number]) as of 7/12/2022 12:00   Ref.  Range 5/24/2022 08:05   Sodium Latest Ref Range: 135 - 144 mmol/L 139   Potassium Latest Ref Range: 3.7 - 5.3 mmol/L 4.7   Chloride Latest Ref Range: 98 - 107 mmol/L 101   CO2 Latest Ref Range: 20 - 31 mmol/L 25   BUN,BUNPL Latest Ref Range: 6 - 20 mg/dL 22 (H)   Creatinine Latest Ref Range: 0.50 - 0.90 mg/dL 1.31 (H)   Bun/Cre Ratio Latest Ref Range: 9 - 20  17   Anion Gap Latest Ref Range: 9 - 17 mmol/L 13   GFR Non- Latest Ref Range: >60 mL/min 42 (L)   GFR  Latest Ref Range: >60 mL/min 51 (L)   GLUCOSE, FASTING,GF Latest Ref Range: 70 - 99 mg/dL 181 (H)   CALCIUM, SERUM, [id number] Latest Ref Range: 8.6 - 10.4 mg/dL 10.1   Results for Helen Moreau (MRN [id number]) as of 7/12/2022 12:00   Ref. Range 5/24/2022 08:05   WBC Latest Ref Range: 3.5 - 11.3 k/uL 14.0 (H)   RBC Latest Ref Range: 3.95 - 5.11 m/uL 5.08   Hemoglobin Quant Latest Ref Range: 11.9 - 15.1 g/dL 15.9 (H)   Hematocrit Latest Ref Range: 36.3 - 47.1 % 46.5   MCV Latest Ref Range: 82.6 - 102.9 fL 91.5   MCH Latest Ref Range: 25.2 - 33.5 pg 31.3   MCHC Latest Ref Range: 25.2 - 33.5 g/dL 34.2 (H)   MPV Latest Ref Range: 8.1 - 13.5 fL 10.1   RDW Latest Ref Range: 11.8 - 14.4 % 13.1   Platelet Count Latest Ref Range: 138 - 453 k/uL 256     ALBUMIN:   Lab Results   Component Value Date/Time    LABALBU 4.5 05/24/2022 08:05 AM     PTH:   Lab Results   Component Value Date/Time    IPTH 54.41 05/24/2022 08:05 AM   SPEP:   Lab Results   Component Value Date/Time    PROT 7.7 05/24/2022 08:05 AM                    URINALYSIS/URINE CHEMISTRIES       RADIOLOGY    No results found for this or any previous visit. ASSESSMENT     1. Chronic Kidney Disease : Chronic kidney disease stage IIIb Baseline creatinine is 1.5 to 1.6 mg/dL. Work-up is essentially negative. 2. Hypertension :   BP Readings from Last 3 Encounters:   07/12/22 130/82   05/25/22 132/88   05/10/22 136/78   Blood pressure is under good control  3. Resting tremor patient is on Inderal  4  PLAN     1. No changes in medications  2. Labs as ordered  3. Return to clinic in 6-month    Patient was asked to avoid NSAIDS. Please do not hesitate to call with questions.     This note is created with the assistance of a speech-recognition program. While intending to generate a document that actually reflects the content of the visit, no guarantees can be provided that every mistake has been identified and corrected by editing    Electronically signed by Trina Belcher MD on 7/12/2022 at 12:00 PM  [facility]

## 2022-07-18 ENCOUNTER — PATIENT MESSAGE (OUTPATIENT)
Dept: FAMILY MEDICINE CLINIC | Age: 57
End: 2022-07-18

## 2022-07-18 RX ORDER — DULOXETIN HYDROCHLORIDE 20 MG/1
CAPSULE, DELAYED RELEASE ORAL
Qty: 30 CAPSULE | Refills: 0 | Status: SHIPPED | OUTPATIENT
Start: 2022-07-18 | End: 2022-08-10

## 2022-07-18 NOTE — TELEPHONE ENCOUNTER
Cele called requesting a refill of the below medication which has been pended for you:     Requested Prescriptions     Pending Prescriptions Disp Refills    DULoxetine (CYMBALTA) 20 MG extended release capsule 30 capsule 3     Sig: take 1 capsule by mouth once daily       Last Appointment Date: 2/15/2022  Next Appointment Date: 8/15/2022    Allergies   Allergen Reactions    Iodine Rash and Swelling     Were in ER being treated when it happened spent time in hosp.     Cefuroxime Axetil Diarrhea, Hives and Nausea And Vomiting    Ezetimibe Other (See Comments)     Other reaction(s): Muscle Pain    Nsaids Diarrhea, Hives and Nausea And Vomiting    Codeine     E-Mycin [Erythromycin]     Gabapentin     Pcn [Penicillins]     Prednisone Other (See Comments)     Trouble swallowing    Statins      myalgias    Sulfa Antibiotics Hives    Zithromax [Azithromycin]

## 2022-08-01 ENCOUNTER — PATIENT MESSAGE (OUTPATIENT)
Dept: FAMILY MEDICINE CLINIC | Age: 57
End: 2022-08-01

## 2022-08-01 DIAGNOSIS — M79.7 FIBROMYALGIA: ICD-10-CM

## 2022-08-01 NOTE — TELEPHONE ENCOUNTER
Cele called requesting a refill of the below medication which has been pended for you:     Requested Prescriptions     Pending Prescriptions Disp Refills    magnesium oxide (MAG-OX) 400 (240 Mg) MG tablet 30 tablet 2     Sig: take 1 tablet by mouth once daily    tiZANidine (ZANAFLEX) 4 MG tablet 30 tablet 3     Sig: Take 1 tablet by mouth every 6 hours as needed (muscle spasms)       Last Appointment Date: 2/15/2022  Next Appointment Date: 8/15/2022    Allergies   Allergen Reactions    Iodine Rash and Swelling     Were in ER being treated when it happened spent time in hosp.     Cefuroxime Axetil Diarrhea, Hives and Nausea And Vomiting    Ezetimibe Other (See Comments)     Other reaction(s): Muscle Pain    Nsaids Diarrhea, Hives and Nausea And Vomiting    Codeine     E-Mycin [Erythromycin]     Gabapentin     Pcn [Penicillins]     Prednisone Other (See Comments)     Trouble swallowing    Statins      myalgias    Sulfa Antibiotics Hives    Zithromax [Azithromycin]

## 2022-08-01 NOTE — TELEPHONE ENCOUNTER
From: Riley Myles  To:  Casi Saleh  Sent: 8/1/2022 7:51 AM EDT  Subject: Magnesium and Tizanidine    Need refills sent to [facility] [place] for Magnesium and Tizanidine    Thank you    [employer] [facility]

## 2022-08-02 RX ORDER — TIZANIDINE 4 MG/1
4 TABLET ORAL EVERY 6 HOURS PRN
Qty: 30 TABLET | Refills: 3 | Status: SHIPPED | OUTPATIENT
Start: 2022-08-02 | End: 2022-11-03

## 2022-08-02 RX ORDER — LANOLIN ALCOHOL/MO/W.PET/CERES
CREAM (GRAM) TOPICAL
Qty: 30 TABLET | Refills: 2 | Status: SHIPPED | OUTPATIENT
Start: 2022-08-02 | End: 2022-10-31

## 2022-08-10 DIAGNOSIS — F32.A DEPRESSION, UNSPECIFIED DEPRESSION TYPE: Primary | ICD-10-CM

## 2022-08-10 RX ORDER — DULOXETIN HYDROCHLORIDE 20 MG/1
CAPSULE, DELAYED RELEASE ORAL
Qty: 30 CAPSULE | Refills: 2 | Status: SHIPPED | OUTPATIENT
Start: 2022-08-10

## 2022-08-10 NOTE — TELEPHONE ENCOUNTER
Cele called requesting a refill of the below medication which has been pended for you:     Requested Prescriptions     Pending Prescriptions Disp Refills    DULoxetine (CYMBALTA) 20 MG extended release capsule [Pharmacy Med Name: DULOXETINE HCL DR 20 MG CAP] 30 capsule 0     Sig: take 1 capsule by mouth once daily       Last Appointment Date: 5/25/2022  Next Appointment Date: Visit date not found    Allergies   Allergen Reactions    Iodine Rash and Swelling     Were in ER being treated when it happened spent time in hosp.     Cefuroxime Axetil Diarrhea, Hives and Nausea And Vomiting    Ezetimibe Other (See Comments)     Other reaction(s): Muscle Pain    Nsaids Diarrhea, Hives and Nausea And Vomiting    Codeine     E-Mycin [Erythromycin]     Gabapentin     Pcn [Penicillins]     Prednisone Other (See Comments)     Trouble swallowing    Statins      myalgias    Sulfa Antibiotics Hives    Zithromax [Azithromycin]

## 2022-08-15 ENCOUNTER — OFFICE VISIT (OUTPATIENT)
Dept: FAMILY MEDICINE CLINIC | Age: 57
End: 2022-08-15
Payer: COMMERCIAL

## 2022-08-15 VITALS
SYSTOLIC BLOOD PRESSURE: 114 MMHG | HEIGHT: 66 IN | DIASTOLIC BLOOD PRESSURE: 78 MMHG | HEART RATE: 92 BPM | BODY MASS INDEX: 30.5 KG/M2 | OXYGEN SATURATION: 97 % | WEIGHT: 189.8 LBS | RESPIRATION RATE: 16 BRPM

## 2022-08-15 DIAGNOSIS — R25.1 TREMOR: ICD-10-CM

## 2022-08-15 DIAGNOSIS — E78.5 HYPERLIPIDEMIA, UNSPECIFIED HYPERLIPIDEMIA TYPE: ICD-10-CM

## 2022-08-15 DIAGNOSIS — E11.22 CONTROLLED TYPE 2 DIABETES MELLITUS WITH STAGE 3 CHRONIC KIDNEY DISEASE, WITHOUT LONG-TERM CURRENT USE OF INSULIN (HCC): Primary | ICD-10-CM

## 2022-08-15 DIAGNOSIS — F32.A DEPRESSION, UNSPECIFIED DEPRESSION TYPE: ICD-10-CM

## 2022-08-15 DIAGNOSIS — N18.30 CONTROLLED TYPE 2 DIABETES MELLITUS WITH STAGE 3 CHRONIC KIDNEY DISEASE, WITHOUT LONG-TERM CURRENT USE OF INSULIN (HCC): Primary | ICD-10-CM

## 2022-08-15 DIAGNOSIS — J01.90 ACUTE BACTERIAL SINUSITIS: ICD-10-CM

## 2022-08-15 DIAGNOSIS — B96.89 ACUTE BACTERIAL SINUSITIS: ICD-10-CM

## 2022-08-15 PROCEDURE — 3017F COLORECTAL CA SCREEN DOC REV: CPT | Performed by: NURSE PRACTITIONER

## 2022-08-15 PROCEDURE — G8427 DOCREV CUR MEDS BY ELIG CLIN: HCPCS | Performed by: NURSE PRACTITIONER

## 2022-08-15 PROCEDURE — 99214 OFFICE O/P EST MOD 30 MIN: CPT | Performed by: NURSE PRACTITIONER

## 2022-08-15 PROCEDURE — 3051F HG A1C>EQUAL 7.0%<8.0%: CPT | Performed by: NURSE PRACTITIONER

## 2022-08-15 PROCEDURE — 2022F DILAT RTA XM EVC RTNOPTHY: CPT | Performed by: NURSE PRACTITIONER

## 2022-08-15 PROCEDURE — G8417 CALC BMI ABV UP PARAM F/U: HCPCS | Performed by: NURSE PRACTITIONER

## 2022-08-15 PROCEDURE — 1036F TOBACCO NON-USER: CPT | Performed by: NURSE PRACTITIONER

## 2022-08-15 RX ORDER — CLARITHROMYCIN 500 MG/1
500 TABLET, COATED ORAL 2 TIMES DAILY
Qty: 20 TABLET | Refills: 0 | Status: SHIPPED | OUTPATIENT
Start: 2022-08-15 | End: 2022-08-25

## 2022-08-15 ASSESSMENT — ENCOUNTER SYMPTOMS: SINUS COMPLAINT: 1

## 2022-08-15 NOTE — PROGRESS NOTES
[facility]  [address] [facility], [id number]  [phone]      HPI:   Pt presents to the clinic for a 6 month follow up of chronic medical conditions. She is due for labs. She has a history of depression. She is stable on medication. She denies thoughts of suicide or homicide. she feels that the medication is working well at this time. She has a history of a benign tremor. She is on elavil and zanaflex which is working well. She has no further concerns today. Diabetes  She presents for her follow-up diabetic visit. She has type 2 diabetes mellitus. Her disease course has been worsening. Hypoglycemia symptoms include headaches and tremors. Pertinent negatives for hypoglycemia include no dizziness. Pertinent negatives for diabetes include no blurred vision, no chest pain, no fatigue, no foot paresthesias, no polydipsia, no polyuria, no visual change and no weakness. There are no hypoglycemic complications. Symptoms are worsening. There are no diabetic complications. Risk factors for coronary artery disease include diabetes mellitus. She is following a generally healthy diet. Meal planning includes avoidance of concentrated sweets. She has not had a previous visit with a dietitian. She participates in exercise intermittently. An ACE inhibitor/angiotensin II receptor blocker is not being taken. She does not see a podiatrist.Eye exam is not current. Sinus Problem  This is a new problem. The current episode started 1 to 4 weeks ago. The problem is unchanged. There has been no fever. Associated symptoms include congestion, headaches and sinus pressure. Pertinent negatives include no coughing, ear pain, shortness of breath or sore throat. Past treatments include saline sprays. The treatment provided mild relief. Hyperlipidemia  This is a chronic problem. The current episode started more than 1 year ago. The problem is uncontrolled. Recent lipid tests were reviewed and are high. Exacerbating diseases include diabetes and obesity. Factors aggravating her hyperlipidemia include fatty foods. Pertinent negatives include no chest pain, leg pain, myalgias or shortness of breath. She is currently on no antihyperlipidemic treatment. The current treatment provides no improvement of lipids. Compliance problems include adherence to diet and adherence to exercise. Risk factors for coronary artery disease include diabetes mellitus, dyslipidemia, obesity, post-menopausal and a sedentary lifestyle. Current Outpatient Medications   Medication Sig Dispense Refill    montelukast (SINGULAIR) 10 MG tablet Take 1 tablet by mouth nightly 90 tablet 1    metFORMIN (GLUCOPHAGE) 1000 MG tablet Take 1 tablet by mouth in the morning and 1 tablet in the evening. Take with meals. 180 tablet 1    clarithromycin (BIAXIN) 500 MG tablet Take 1 tablet by mouth in the morning and 1 tablet before bedtime. Do all this for 10 days.  20 tablet 0    DULoxetine (CYMBALTA) 20 MG extended release capsule take 1 capsule by mouth once daily 30 capsule 2    magnesium oxide (MAG-OX) 400 (240 Mg) MG tablet take 1 tablet by mouth once daily 30 tablet 2    tiZANidine (ZANAFLEX) 4 MG tablet Take 1 tablet by mouth every 6 hours as needed (muscle spasms) 30 tablet 3    propranolol (INDERAL) 20 MG tablet take 1 tablet by mouth twice a day 180 tablet 0    glimepiride (AMARYL) 1 MG tablet Take 1 tablet by mouth every morning (before breakfast) 90 tablet 1    albuterol sulfate HFA (VENTOLIN HFA) 108 (90 Base) MCG/ACT inhaler Inhale 2 puffs into the lungs 4 times daily as needed for Wheezing 18 g 5    amitriptyline (ELAVIL) 25 MG tablet Take 1 tablet by mouth nightly 30 tablet 3    vitamin B-12 (CYANOCOBALAMIN) 1000 MCG tablet Take 1 tablet by mouth daily 30 tablet 3    metFORMIN (GLUCOPHAGE) 1000 MG tablet take 1 tablet by mouth once daily WITH BREAKFAST      vitamin D (CHOLECALCIFEROL) 99727 UNIT CAPS take 1 capsule by mouth every month fexofenadine (ALLEGRA) 180 MG tablet Take 180 mg by mouth daily       No current facility-administered medications for this visit. Allergies   Allergen Reactions    Iodine Rash and Swelling     Were in ER being treated when it happened spent time in hosp. Cefuroxime Axetil Diarrhea, Hives and Nausea And Vomiting    Ezetimibe Other (See Comments)     Other reaction(s): Muscle Pain    Nsaids Diarrhea, Hives and Nausea And Vomiting    Codeine     E-Mycin [Erythromycin]     Gabapentin     Pcn [Penicillins]     Prednisone Other (See Comments)     Trouble swallowing    Statins      myalgias    Sulfa Antibiotics Hives    Zithromax [Azithromycin]        All patients pastmedical, surgical, social and family history has been reviewed. Subjective:      Review of Systems   Constitutional:  Negative for activity change, appetite change, fatigue and fever. HENT:  Positive for congestion, postnasal drip and sinus pressure. Negative for ear pain and sore throat. Eyes:  Negative for blurred vision. Respiratory:  Negative for cough, shortness of breath and wheezing. Cardiovascular:  Negative for chest pain and palpitations. Endocrine: Negative for polydipsia and polyuria. Musculoskeletal:  Negative for myalgias. Neurological:  Positive for tremors and headaches. Negative for dizziness and weakness. Psychiatric/Behavioral:          Depression       Objective:      Physical Exam  Vitals and nursing note reviewed. Constitutional:       Appearance: Normal appearance. HENT:      Head: Normocephalic and atraumatic. Right Ear: Tympanic membrane, ear canal and external ear normal.      Left Ear: Tympanic membrane, ear canal and external ear normal.      Nose: Congestion present. Mouth/Throat:      Lips: Pink. Mouth: Mucous membranes are moist.      Pharynx: Oropharynx is clear. Uvula midline. Cardiovascular:      Rate and Rhythm: Normal rate and regular rhythm.       Heart sounds: Normal heart sounds. Pulmonary:      Effort: Pulmonary effort is normal.      Breath sounds: Normal breath sounds. Skin:     Capillary Refill: Capillary refill takes less than 2 seconds. Neurological:      General: No focal deficit present. Mental Status: She is alert and oriented to person, place, and time. Cranial Nerves: Cranial nerves are intact. Motor: Tremor (intermittent tremor in lower extremities) present. Diabetic foot check: Normal strength and range of motion of toes, feet, and ankles bilaterally. No joint deformity. No cyanosis or clubbing. 100% sensation at all 22 test points with the 10 gram filament. Dorsalis pedis pulses intact bilaterally. Capillary refill at the toes was less than 2 seconds. Light touch sensation intact bilaterally. . No skin breakdown, erythema, rub spots, blisters, scaling, or ulcers. No calluses or corns. Assessment:       Diagnosis Orders   1. Controlled type 2 diabetes mellitus with stage 3 chronic kidney disease, without long-term current use of insulin ([facility])   DIABETES FOOT EXAM    CBC with Auto Differential    Comprehensive Metabolic Panel    Hemoglobin A1C    Microalbumin, Ur      2. Hyperlipidemia, unspecified hyperlipidemia type  Lipid Panel      3. Acute bacterial sinusitis  clarithromycin (BIAXIN) 500 MG tablet      4. Tremor        5. Depression, unspecified depression type            Plan:      Type 2 diabetes-due for labs, continue current medication   Hyperlipidemia-due for labs unable to tolerate statins. Encouraged low fat diet and weight loss  Acute bacterial sinusitis-biaxin 500mg 1 tablet BID for 10 days, supportive care  Tremor-stable continue current medication   Depression-stable continue current medication   Encouraged healthy diet and daily exercise as tolerated  Pt to return in 3 months for follow up  Pt to return PRN   Return in about 3 months (around 11/15/2022), or if symptoms worsen or fail to improve.   Orders Placed This Encounter   Procedures    CBC with Auto Differential     Standing Status:   Future     Standing Expiration Date:   8/15/2023    Comprehensive Metabolic Panel     Standing Status:   Future     Standing Expiration Date:   8/15/2023    Lipid Panel     Standing Status:   Future     Standing Expiration Date:   8/15/2023     Order Specific Question:   Is Patient Fasting?/# of Hours     Answer:   12    Hemoglobin A1C     Standing Status:   Future     Standing Expiration Date:   8/15/2023    Microalbumin, Ur     Standing Status:   Future     Standing Expiration Date:   8/15/2023     DIABETES FOOT EXAM     Orders Placed This Encounter   Medications    metFORMIN (GLUCOPHAGE) 1000 MG tablet     Sig: Take 1 tablet by mouth in the morning and 1 tablet in the evening. Take with meals. Dispense:  180 tablet     Refill:  1    clarithromycin (BIAXIN) 500 MG tablet     Sig: Take 1 tablet by mouth in the morning and 1 tablet before bedtime. Do all this for 10 days. Dispense:  20 tablet     Refill:  0       Patient given educational materials - see patient instructions. All patient questionsanswered. Pt voiced understanding. Reviewed health maintenance.      Electronically signed by AQUILES Aranda CNP, CNP on 8/19/2022 at 11:10 AM

## 2022-08-16 ASSESSMENT — ENCOUNTER SYMPTOMS
BLURRED VISION: 0
VISUAL CHANGE: 0

## 2022-08-17 ENCOUNTER — PATIENT MESSAGE (OUTPATIENT)
Dept: FAMILY MEDICINE CLINIC | Age: 57
End: 2022-08-17

## 2022-08-17 DIAGNOSIS — J30.9 ALLERGIC RHINITIS, UNSPECIFIED SEASONALITY, UNSPECIFIED TRIGGER: Primary | ICD-10-CM

## 2022-08-17 RX ORDER — MONTELUKAST SODIUM 10 MG/1
10 TABLET ORAL NIGHTLY
Qty: 90 TABLET | Refills: 1 | Status: SHIPPED | OUTPATIENT
Start: 2022-08-17

## 2022-08-17 NOTE — TELEPHONE ENCOUNTER
From: Levi Bermudez  To: Kaya Ramos  Sent: 8/17/2022 7:24 AM EDT  Subject: Duloxetine and Montelukast    Ildefonso Booing you asked if I needed refills on any medications while I was there Monday. I wasn't sure at the time. Looks like I need refills on Duloxetine and Montelukast. Please call in to [facility] in [place]. [phone].     Thank You,     [employer]

## 2022-08-19 ASSESSMENT — ENCOUNTER SYMPTOMS
COUGH: 0
SINUS PRESSURE: 1
SORE THROAT: 0
WHEEZING: 0
SHORTNESS OF BREATH: 0

## 2022-09-08 DIAGNOSIS — M79.7 FIBROMYALGIA: ICD-10-CM

## 2022-09-08 RX ORDER — AMITRIPTYLINE HYDROCHLORIDE 25 MG/1
25 TABLET, FILM COATED ORAL NIGHTLY
Qty: 90 TABLET | Refills: 1 | Status: SHIPPED | OUTPATIENT
Start: 2022-09-08

## 2022-09-08 NOTE — TELEPHONE ENCOUNTER
Cele called requesting a refill of the below medication which has been pended for you:     Requested Prescriptions     Pending Prescriptions Disp Refills    amitriptyline (ELAVIL) 25 MG tablet [Pharmacy Med Name: AMITRIPTYLINE HCL 25 MG TAB] 30 tablet 3     Sig: take 1 tablet by mouth nightly       Last Appointment Date: 08/15/2022  Next Appointment Date: 11/01/2022    Allergies   Allergen Reactions    Iodine Rash and Swelling     Were in ER being treated when it happened spent time in hosp.     Cefuroxime Axetil Diarrhea, Hives and Nausea And Vomiting    Ezetimibe Other (See Comments)     Other reaction(s): Muscle Pain    Nsaids Diarrhea, Hives and Nausea And Vomiting    Codeine     E-Mycin [Erythromycin]     Gabapentin     Pcn [Penicillins]     Prednisone Other (See Comments)     Trouble swallowing    Statins      myalgias    Sulfa Antibiotics Hives    Zithromax [Azithromycin]

## 2022-09-19 RX ORDER — PROPRANOLOL HYDROCHLORIDE 20 MG/1
TABLET ORAL
Qty: 60 TABLET | Refills: 0 | Status: SHIPPED | OUTPATIENT
Start: 2022-09-19 | End: 2022-10-24

## 2022-09-19 NOTE — TELEPHONE ENCOUNTER
Cele called requesting a refill of the below medication which has been pended for you:     Requested Prescriptions     Pending Prescriptions Disp Refills    propranolol (INDERAL) 20 MG tablet [Pharmacy Med Name: PROPRANOLOL 20 MG TABLET] 60 tablet 0     Sig: take 1 tablet by mouth twice a day       Last Appointment Date: 4/11/2022  Next Appointment Date: Visit date not found    Allergies   Allergen Reactions    Iodine Rash and Swelling     Were in ER being treated when it happened spent time in hosp.     Cefuroxime Axetil Diarrhea, Hives and Nausea And Vomiting    Ezetimibe Other (See Comments)     Other reaction(s): Muscle Pain    Nsaids Diarrhea, Hives and Nausea And Vomiting    Codeine     E-Mycin [Erythromycin]     Gabapentin     Pcn [Penicillins]     Prednisone Other (See Comments)     Trouble swallowing    Statins      myalgias    Sulfa Antibiotics Hives    Zithromax [Azithromycin]

## 2022-10-15 ENCOUNTER — OFFICE VISIT (OUTPATIENT)
Dept: PRIMARY CARE CLINIC | Age: 57
End: 2022-10-15
Payer: COMMERCIAL

## 2022-10-15 VITALS
TEMPERATURE: 98.1 F | HEIGHT: 67 IN | SYSTOLIC BLOOD PRESSURE: 118 MMHG | OXYGEN SATURATION: 95 % | HEART RATE: 90 BPM | DIASTOLIC BLOOD PRESSURE: 78 MMHG | BODY MASS INDEX: 29.98 KG/M2 | WEIGHT: 191 LBS

## 2022-10-15 DIAGNOSIS — J01.40 ACUTE PANSINUSITIS, RECURRENCE NOT SPECIFIED: Primary | ICD-10-CM

## 2022-10-15 DIAGNOSIS — J30.9 ALLERGIC RHINITIS, UNSPECIFIED SEASONALITY, UNSPECIFIED TRIGGER: ICD-10-CM

## 2022-10-15 PROCEDURE — G8484 FLU IMMUNIZE NO ADMIN: HCPCS | Performed by: FAMILY MEDICINE

## 2022-10-15 PROCEDURE — 99213 OFFICE O/P EST LOW 20 MIN: CPT | Performed by: FAMILY MEDICINE

## 2022-10-15 PROCEDURE — 3017F COLORECTAL CA SCREEN DOC REV: CPT | Performed by: FAMILY MEDICINE

## 2022-10-15 PROCEDURE — 1036F TOBACCO NON-USER: CPT | Performed by: FAMILY MEDICINE

## 2022-10-15 PROCEDURE — G8427 DOCREV CUR MEDS BY ELIG CLIN: HCPCS | Performed by: FAMILY MEDICINE

## 2022-10-15 PROCEDURE — G8417 CALC BMI ABV UP PARAM F/U: HCPCS | Performed by: FAMILY MEDICINE

## 2022-10-15 RX ORDER — DOXYCYCLINE HYCLATE 100 MG
100 TABLET ORAL 2 TIMES DAILY
Qty: 20 TABLET | Refills: 0 | Status: SHIPPED | OUTPATIENT
Start: 2022-10-15 | End: 2022-10-25

## 2022-10-15 ASSESSMENT — ENCOUNTER SYMPTOMS
SINUS PAIN: 1
ALLERGIC/IMMUNOLOGIC NEGATIVE: 1
WHEEZING: 0
COUGH: 0
RESPIRATORY NEGATIVE: 1
GASTROINTESTINAL NEGATIVE: 1
SINUS PRESSURE: 1
RHINORRHEA: 1
EYES NEGATIVE: 1
SORE THROAT: 0

## 2022-10-15 NOTE — PROGRESS NOTES
Subjective:      Patient ID: Edgardo Dewey is a 65 y.o. female. HPI  acute urgent care visit for sinus symptoms and pnd the last 3 weeks. She suspects allergies, soy allergy, field dust at present. Beans. Using flonase and allergy meds singulair, allegra. No fever beyond low grade. Negative covid testing x 2. No perceived wheezing or cough. Mucous clear at the start, now greenish color. Past Medical History:   Diagnosis Date    Allergic rhinitis     Arthritis     Depression     Fibromyalgia     Hyperlipidemia     Neuropathy     Peripheral vascular disease (HCC)     Sacroiliac joint dysfunction     Tremor     Type II or unspecified type diabetes mellitus without mention of complication, not stated as uncontrolled      Past Surgical History:   Procedure Laterality Date    BACK SURGERY       SECTION      CHOLECYSTECTOMY      KNEE SURGERY      right    OTHER SURGICAL HISTORY      darrach procedure    OTHER SURGICAL HISTORY      darrach procedure revision     Current Outpatient Medications   Medication Sig Dispense Refill    propranolol (INDERAL) 20 MG tablet take 1 tablet by mouth twice a day 60 tablet 0    amitriptyline (ELAVIL) 25 MG tablet take 1 tablet by mouth nightly 90 tablet 1    montelukast (SINGULAIR) 10 MG tablet Take 1 tablet by mouth nightly 90 tablet 1    metFORMIN (GLUCOPHAGE) 1000 MG tablet Take 1 tablet by mouth in the morning and 1 tablet in the evening. Take with meals.  180 tablet 1    DULoxetine (CYMBALTA) 20 MG extended release capsule take 1 capsule by mouth once daily 30 capsule 2    magnesium oxide (MAG-OX) 400 (240 Mg) MG tablet take 1 tablet by mouth once daily 30 tablet 2    tiZANidine (ZANAFLEX) 4 MG tablet Take 1 tablet by mouth every 6 hours as needed (muscle spasms) 30 tablet 3    glimepiride (AMARYL) 1 MG tablet Take 1 tablet by mouth every morning (before breakfast) 90 tablet 1    albuterol sulfate HFA (VENTOLIN HFA) 108 (90 Base) MCG/ACT inhaler Inhale 2 puffs into the lungs 4 times daily as needed for Wheezing 18 g 5    vitamin B-12 (CYANOCOBALAMIN) 1000 MCG tablet Take 1 tablet by mouth daily 30 tablet 3    metFORMIN (GLUCOPHAGE) 1000 MG tablet take 1 tablet by mouth once daily WITH BREAKFAST      vitamin D (CHOLECALCIFEROL) 86977 UNIT CAPS take 1 capsule by mouth every month      fexofenadine (ALLEGRA) 180 MG tablet Take 180 mg by mouth daily       No current facility-administered medications for this visit. Allergies   Allergen Reactions    Iodine Rash and Swelling     Were in ER being treated when it happened spent time in hosp. Cefuroxime Axetil Diarrhea, Hives and Nausea And Vomiting    Ezetimibe Other (See Comments)     Other reaction(s): Muscle Pain    Nsaids Diarrhea, Hives and Nausea And Vomiting    Codeine     E-Mycin [Erythromycin]     Gabapentin     Pcn [Penicillins]     Prednisone Other (See Comments)     Trouble swallowing    Statins      myalgias    Sulfa Antibiotics Hives    Zithromax [Azithromycin]          Review of Systems   Constitutional: Negative. HENT:  Positive for congestion, postnasal drip, rhinorrhea, sinus pressure and sinus pain. Negative for sore throat. Eyes: Negative. Respiratory: Negative. Negative for cough and wheezing. Cardiovascular: Negative. Gastrointestinal: Negative. Endocrine: Negative. Genitourinary: Negative. Musculoskeletal: Negative. Skin: Negative. Allergic/Immunologic: Negative. Neurological:  Positive for headaches (right frontal). Hematological: Negative. Psychiatric/Behavioral: Negative. Objective:   Physical Exam  Constitutional:       General: She is not in acute distress. Appearance: She is well-developed. HENT:      Head: Normocephalic and atraumatic. Right Ear: External ear normal.      Left Ear: External ear normal.      Mouth/Throat:      Pharynx: No oropharyngeal exudate. Eyes:      General: No scleral icterus.      Conjunctiva/sclera:

## 2022-10-19 RX ORDER — LEVOFLOXACIN 500 MG/1
500 TABLET, FILM COATED ORAL DAILY
Qty: 10 TABLET | Refills: 0 | Status: SHIPPED | OUTPATIENT
Start: 2022-10-19 | End: 2022-10-29

## 2022-10-24 RX ORDER — PROPRANOLOL HYDROCHLORIDE 20 MG/1
TABLET ORAL
Qty: 60 TABLET | Refills: 0 | Status: SHIPPED | OUTPATIENT
Start: 2022-10-24 | End: 2022-11-28

## 2022-10-24 NOTE — TELEPHONE ENCOUNTER
Cele called requesting a refill of the below medication which has been pended for you:     Requested Prescriptions     Pending Prescriptions Disp Refills    propranolol (INDERAL) 20 MG tablet [Pharmacy Med Name: PROPRANOLOL 20 MG TABLET] 60 tablet 0     Sig: take 1 tablet by mouth twice a day       Last Appointment Date: 8/15/2022  Next Appointment Date: 11/17/2022    Allergies   Allergen Reactions    Iodine Rash and Swelling     Were in ER being treated when it happened spent time in hosp.     Cefuroxime Axetil Diarrhea, Hives and Nausea And Vomiting    Ezetimibe Other (See Comments)     Other reaction(s): Muscle Pain    Nsaids Diarrhea, Hives and Nausea And Vomiting    Codeine     E-Mycin [Erythromycin]     Gabapentin     Pcn [Penicillins]     Prednisone Other (See Comments)     Trouble swallowing    Statins      myalgias    Sulfa Antibiotics Hives    Zithromax [Azithromycin]

## 2022-10-30 DIAGNOSIS — M79.7 FIBROMYALGIA: ICD-10-CM

## 2022-10-31 RX ORDER — LANOLIN ALCOHOL/MO/W.PET/CERES
CREAM (GRAM) TOPICAL
Qty: 30 TABLET | Refills: 5 | Status: SHIPPED | OUTPATIENT
Start: 2022-10-31

## 2022-10-31 NOTE — TELEPHONE ENCOUNTER
Cele called requesting a refill of the below medication which has been pended for you:     Requested Prescriptions     Pending Prescriptions Disp Refills    magnesium oxide (MAG-OX) 400 (240 Mg) MG tablet [Pharmacy Med Name: MAGNESIUM OXIDE 400 MG TABLET] 30 tablet 2     Sig: take 1 tablet by mouth once daily       Last Appointment Date: 8/15/2022  Next Appointment Date: 11/17/2022    Allergies   Allergen Reactions    Iodine Rash and Swelling     Were in ER being treated when it happened spent time in hosp.     Cefuroxime Axetil Diarrhea, Hives and Nausea And Vomiting    Ezetimibe Other (See Comments)     Other reaction(s): Muscle Pain    Nsaids Diarrhea, Hives and Nausea And Vomiting    Codeine     E-Mycin [Erythromycin]     Gabapentin     Pcn [Penicillins]     Prednisone Other (See Comments)     Trouble swallowing    Statins      myalgias    Sulfa Antibiotics Hives    Zithromax [Azithromycin]

## 2022-11-03 DIAGNOSIS — M79.7 FIBROMYALGIA: ICD-10-CM

## 2022-11-03 RX ORDER — TIZANIDINE 4 MG/1
TABLET ORAL
Qty: 30 TABLET | Refills: 3 | Status: SHIPPED | OUTPATIENT
Start: 2022-11-03

## 2022-11-03 NOTE — TELEPHONE ENCOUNTER
Cele called requesting a refill of the below medication which has been pended for you:     Requested Prescriptions     Pending Prescriptions Disp Refills    tiZANidine (ZANAFLEX) 4 MG tablet [Pharmacy Med Name: TIZANIDINE HCL 4 MG TABLET] 30 tablet 3     Sig: take 1 tablet by mouth every 6 hours if needed for muscle spasm       Last Appointment Date: 8/15/2022  Next Appointment Date: 11/17/2022    Allergies   Allergen Reactions    Iodine Rash and Swelling     Were in ER being treated when it happened spent time in hosp.     Cefuroxime Axetil Diarrhea, Hives and Nausea And Vomiting    Ezetimibe Other (See Comments)     Other reaction(s): Muscle Pain    Nsaids Diarrhea, Hives and Nausea And Vomiting    Codeine     E-Mycin [Erythromycin]     Gabapentin     Pcn [Penicillins]     Prednisone Other (See Comments)     Trouble swallowing    Statins      myalgias    Sulfa Antibiotics Hives    Zithromax [Azithromycin]

## 2022-11-10 DIAGNOSIS — F32.A DEPRESSION, UNSPECIFIED DEPRESSION TYPE: ICD-10-CM

## 2022-11-10 RX ORDER — DULOXETIN HYDROCHLORIDE 20 MG/1
CAPSULE, DELAYED RELEASE ORAL
Qty: 30 CAPSULE | Refills: 5 | Status: SHIPPED | OUTPATIENT
Start: 2022-11-10

## 2022-11-11 ENCOUNTER — HOSPITAL ENCOUNTER (OUTPATIENT)
Dept: LAB | Age: 57
Discharge: HOME | End: 2022-11-11
Payer: COMMERCIAL

## 2022-11-11 DIAGNOSIS — E11.22 CONTROLLED TYPE 2 DIABETES MELLITUS WITH STAGE 3 CHRONIC KIDNEY DISEASE, WITHOUT LONG-TERM CURRENT USE OF INSULIN (HCC): ICD-10-CM

## 2022-11-11 DIAGNOSIS — E78.5 HYPERLIPIDEMIA, UNSPECIFIED HYPERLIPIDEMIA TYPE: ICD-10-CM

## 2022-11-11 DIAGNOSIS — N18.30 CONTROLLED TYPE 2 DIABETES MELLITUS WITH STAGE 3 CHRONIC KIDNEY DISEASE, WITHOUT LONG-TERM CURRENT USE OF INSULIN (HCC): ICD-10-CM

## 2022-11-11 LAB
ABSOLUTE EOS #: 0.6 K/UL (ref 0–0.44)
ABSOLUTE IMMATURE GRANULOCYTE: 0.08 K/UL (ref 0–0.3)
ABSOLUTE LYMPH #: 3.6 K/UL (ref 1.1–3.7)
ABSOLUTE MONO #: 0.65 K/UL (ref 0.1–1.2)
ALBUMIN SERPL-MCNC: 4.3 G/DL (ref 3.5–5.2)
ALBUMIN/GLOBULIN RATIO: 1.5 (ref 1–2.5)
ALP BLD-CCNC: 132 U/L (ref 35–104)
ALT SERPL-CCNC: 17 U/L (ref 5–33)
ANION GAP SERPL CALCULATED.3IONS-SCNC: 16 MMOL/L (ref 9–17)
AST SERPL-CCNC: 19 U/L
BASOPHILS # BLD: 1 % (ref 0–2)
BASOPHILS ABSOLUTE: 0.08 K/UL (ref 0–0.2)
BILIRUB SERPL-MCNC: 0.4 MG/DL (ref 0.3–1.2)
BUN BLDV-MCNC: 18 MG/DL (ref 6–20)
BUN/CREAT BLD: 14 (ref 9–20)
CALCIUM SERPL-MCNC: 9.9 MG/DL (ref 8.6–10.4)
CHLORIDE BLD-SCNC: 100 MMOL/L (ref 98–107)
CHOLESTEROL/HDL RATIO: 5.8
CHOLESTEROL: 203 MG/DL
CO2: 25 MMOL/L (ref 20–31)
CREAT SERPL-MCNC: 1.31 MG/DL (ref 0.5–0.9)
EOSINOPHILS RELATIVE PERCENT: 4 % (ref 1–4)
ESTIMATED AVERAGE GLUCOSE: 169 MG/DL
GFR SERPL CREATININE-BSD FRML MDRD: 48 ML/MIN/1.73M2
GLUCOSE BLD-MCNC: 156 MG/DL (ref 70–99)
HBA1C MFR BLD: 7.5 % (ref 4–6)
HCT VFR BLD CALC: 46.9 % (ref 36.3–47.1)
HDLC SERPL-MCNC: 35 MG/DL
HEMOGLOBIN: 15.7 G/DL (ref 11.9–15.1)
IMMATURE GRANULOCYTES: 1 %
LDL CHOLESTEROL: 121 MG/DL (ref 0–130)
LYMPHOCYTES # BLD: 22 % (ref 24–43)
MCH RBC QN AUTO: 30 PG (ref 25.2–33.5)
MCHC RBC AUTO-ENTMCNC: 33.5 G/DL (ref 25.2–33.5)
MCV RBC AUTO: 89.5 FL (ref 82.6–102.9)
MONOCYTES # BLD: 4 % (ref 3–12)
NRBC AUTOMATED: 0 PER 100 WBC
PDW BLD-RTO: 13.1 % (ref 11.8–14.4)
PLATELET # BLD: 281 K/UL (ref 138–453)
PMV BLD AUTO: 9.7 FL (ref 8.1–13.5)
POTASSIUM SERPL-SCNC: 4.2 MMOL/L (ref 3.7–5.3)
RBC # BLD: 5.24 M/UL (ref 3.95–5.11)
SEG NEUTROPHILS: 68 % (ref 36–65)
SEGMENTED NEUTROPHILS ABSOLUTE COUNT: 11.33 K/UL (ref 1.5–8.1)
SODIUM BLD-SCNC: 141 MMOL/L (ref 135–144)
TOTAL PROTEIN: 7.1 G/DL (ref 6.4–8.3)
TRIGL SERPL-MCNC: 237 MG/DL
WBC # BLD: 16.3 K/UL (ref 3.5–11.3)

## 2022-11-11 PROCEDURE — 36415 COLL VENOUS BLD VENIPUNCTURE: CPT

## 2022-11-11 PROCEDURE — 85025 COMPLETE CBC W/AUTO DIFF WBC: CPT

## 2022-11-11 PROCEDURE — 80053 COMPREHEN METABOLIC PANEL: CPT

## 2022-11-11 PROCEDURE — 83036 HEMOGLOBIN GLYCOSYLATED A1C: CPT

## 2022-11-11 PROCEDURE — 80061 LIPID PANEL: CPT

## 2022-11-17 ENCOUNTER — OFFICE VISIT (OUTPATIENT)
Dept: FAMILY MEDICINE CLINIC | Age: 57
End: 2022-11-17
Payer: COMMERCIAL

## 2022-11-17 VITALS
HEART RATE: 82 BPM | BODY MASS INDEX: 31.18 KG/M2 | HEIGHT: 66 IN | WEIGHT: 194 LBS | SYSTOLIC BLOOD PRESSURE: 138 MMHG | DIASTOLIC BLOOD PRESSURE: 86 MMHG | OXYGEN SATURATION: 98 %

## 2022-11-17 DIAGNOSIS — N18.30 CONTROLLED TYPE 2 DIABETES MELLITUS WITH STAGE 3 CHRONIC KIDNEY DISEASE, WITHOUT LONG-TERM CURRENT USE OF INSULIN (HCC): ICD-10-CM

## 2022-11-17 DIAGNOSIS — M53.3 SACROILIAC JOINT DYSFUNCTION: ICD-10-CM

## 2022-11-17 DIAGNOSIS — E11.22 CONTROLLED TYPE 2 DIABETES MELLITUS WITH STAGE 3 CHRONIC KIDNEY DISEASE, WITHOUT LONG-TERM CURRENT USE OF INSULIN (HCC): ICD-10-CM

## 2022-11-17 DIAGNOSIS — M25.50 MULTIPLE JOINT PAIN: ICD-10-CM

## 2022-11-17 DIAGNOSIS — M79.674 GREAT TOE PAIN, RIGHT: ICD-10-CM

## 2022-11-17 DIAGNOSIS — E78.5 HYPERLIPIDEMIA, UNSPECIFIED HYPERLIPIDEMIA TYPE: Primary | ICD-10-CM

## 2022-11-17 DIAGNOSIS — M79.7 FIBROMYALGIA: ICD-10-CM

## 2022-11-17 DIAGNOSIS — D72.829 LEUKOCYTOSIS, UNSPECIFIED TYPE: ICD-10-CM

## 2022-11-17 PROCEDURE — G8417 CALC BMI ABV UP PARAM F/U: HCPCS | Performed by: NURSE PRACTITIONER

## 2022-11-17 PROCEDURE — G8427 DOCREV CUR MEDS BY ELIG CLIN: HCPCS | Performed by: NURSE PRACTITIONER

## 2022-11-17 PROCEDURE — G8484 FLU IMMUNIZE NO ADMIN: HCPCS | Performed by: NURSE PRACTITIONER

## 2022-11-17 PROCEDURE — 1036F TOBACCO NON-USER: CPT | Performed by: NURSE PRACTITIONER

## 2022-11-17 PROCEDURE — 99214 OFFICE O/P EST MOD 30 MIN: CPT | Performed by: NURSE PRACTITIONER

## 2022-11-17 PROCEDURE — 3017F COLORECTAL CA SCREEN DOC REV: CPT | Performed by: NURSE PRACTITIONER

## 2022-11-17 PROCEDURE — 2022F DILAT RTA XM EVC RTNOPTHY: CPT | Performed by: NURSE PRACTITIONER

## 2022-11-17 PROCEDURE — 3051F HG A1C>EQUAL 7.0%<8.0%: CPT | Performed by: NURSE PRACTITIONER

## 2022-11-17 ASSESSMENT — PATIENT HEALTH QUESTIONNAIRE - PHQ9
SUM OF ALL RESPONSES TO PHQ QUESTIONS 1-9: 0
8. MOVING OR SPEAKING SO SLOWLY THAT OTHER PEOPLE COULD HAVE NOTICED. OR THE OPPOSITE, BEING SO FIGETY OR RESTLESS THAT YOU HAVE BEEN MOVING AROUND A LOT MORE THAN USUAL: 0
9. THOUGHTS THAT YOU WOULD BE BETTER OFF DEAD, OR OF HURTING YOURSELF: 0
SUM OF ALL RESPONSES TO PHQ QUESTIONS 1-9: 0
3. TROUBLE FALLING OR STAYING ASLEEP: 0
2. FEELING DOWN, DEPRESSED OR HOPELESS: 0
6. FEELING BAD ABOUT YOURSELF - OR THAT YOU ARE A FAILURE OR HAVE LET YOURSELF OR YOUR FAMILY DOWN: 0
4. FEELING TIRED OR HAVING LITTLE ENERGY: 0
SUM OF ALL RESPONSES TO PHQ QUESTIONS 1-9: 0
10. IF YOU CHECKED OFF ANY PROBLEMS, HOW DIFFICULT HAVE THESE PROBLEMS MADE IT FOR YOU TO DO YOUR WORK, TAKE CARE OF THINGS AT HOME, OR GET ALONG WITH OTHER PEOPLE: 0
SUM OF ALL RESPONSES TO PHQ QUESTIONS 1-9: 0
5. POOR APPETITE OR OVEREATING: 0
7. TROUBLE CONCENTRATING ON THINGS, SUCH AS READING THE NEWSPAPER OR WATCHING TELEVISION: 0

## 2022-11-17 ASSESSMENT — ANXIETY QUESTIONNAIRES
GAD7 TOTAL SCORE: 0
5. BEING SO RESTLESS THAT IT IS HARD TO SIT STILL: 0
4. TROUBLE RELAXING: 0
IF YOU CHECKED OFF ANY PROBLEMS ON THIS QUESTIONNAIRE, HOW DIFFICULT HAVE THESE PROBLEMS MADE IT FOR YOU TO DO YOUR WORK, TAKE CARE OF THINGS AT HOME, OR GET ALONG WITH OTHER PEOPLE: NOT DIFFICULT AT ALL
3. WORRYING TOO MUCH ABOUT DIFFERENT THINGS: 0
2. NOT BEING ABLE TO STOP OR CONTROL WORRYING: 0
6. BECOMING EASILY ANNOYED OR IRRITABLE: 0
7. FEELING AFRAID AS IF SOMETHING AWFUL MIGHT HAPPEN: 0
1. FEELING NERVOUS, ANXIOUS, OR ON EDGE: 0

## 2022-11-17 NOTE — PROGRESS NOTES
[address] [facility]  [address] [facility], [id number]  [phone]      HPI:   Pt presents to the clinic for a 6 month follow up of chronic medical conditions. She has been having intermittent left great toe pain. She is wondering if when the toe pain is happening if she could get a uric acid level to rule out gout. She had labs prior to the appt. Her WBC was elevated at 16. 3. she has not been ill lately. She does have a history of fibromyalgia. She is complaining of multiple joint pain. She follows with pain management. She did have inflammatory labs about 1 year ago that showed an elevated RF. She did not follow up with rheumatology. Her insurance was not taken at the [facility] office and she did not follow up with Marguerite Aj. She would like to see them at this time as the joint pain is worsening. Diabetes  She presents for her follow-up diabetic visit. She has type 2 diabetes mellitus. Her disease course has been stable. There are no hypoglycemic associated symptoms. Pertinent negatives for diabetes include no blurred vision, no chest pain, no fatigue, no polydipsia, no polyuria and no visual change. There are no hypoglycemic complications. Symptoms are stable. Diabetic complications include nephropathy (seeing nephrology). Risk factors for coronary artery disease include diabetes mellitus, dyslipidemia, obesity, post-menopausal and sedentary lifestyle. Current diabetic treatment includes oral agent (monotherapy). She is compliant with treatment most of the time. Her weight is stable. She is following a generally unhealthy diet. Meal planning includes avoidance of concentrated sweets. She has not had a previous visit with a dietitian. She rarely participates in exercise. Home blood sugar record trend: does not check blood sugars. An ACE inhibitor/angiotensin II receptor blocker is not being taken. She does not see a podiatrist.Eye exam is not current.    Foot Pain   The pain is present in the right toes. This is a new problem. The current episode started more than 1 month ago. There has been no history of extremity trauma. The problem occurs intermittently. The problem has been waxing and waning. The quality of the pain is described as aching and sharp. The pain is at a severity of 6/10. The pain is moderate. Associated symptoms include joint swelling and a limited range of motion. Pertinent negatives include no fever, numbness, stiffness or tingling. The symptoms are aggravated by activity and standing. She has tried rest for the symptoms. The treatment provided moderate relief. Her past medical history is significant for diabetes. Hyperlipidemia  This is a chronic problem. The current episode started more than 1 year ago. The problem is uncontrolled. Recent lipid tests were reviewed and are high. Exacerbating diseases include diabetes and obesity. Factors aggravating her hyperlipidemia include fatty foods. Pertinent negatives include no chest pain or shortness of breath. Current antihyperlipidemic treatment includes statins. Compliance problems include adherence to diet and adherence to exercise. Risk factors for coronary artery disease include diabetes mellitus, post-menopausal, dyslipidemia and obesity.      Current Outpatient Medications   Medication Sig Dispense Refill    DULoxetine (CYMBALTA) 20 MG extended release capsule take 1 capsule by mouth once daily 30 capsule 5    tiZANidine (ZANAFLEX) 4 MG tablet take 1 tablet by mouth every 6 hours if needed for muscle spasm 30 tablet 3    magnesium oxide (MAG-OX) 400 (240 Mg) MG tablet take 1 tablet by mouth once daily 30 tablet 5    propranolol (INDERAL) 20 MG tablet take 1 tablet by mouth twice a day 60 tablet 0    amitriptyline (ELAVIL) 25 MG tablet take 1 tablet by mouth nightly 90 tablet 1    montelukast (SINGULAIR) 10 MG tablet Take 1 tablet by mouth nightly 90 tablet 1    metFORMIN (GLUCOPHAGE) 1000 MG tablet Take 1 tablet by mouth in the morning and 1 tablet in the evening. Take with meals. 180 tablet 1    glimepiride (AMARYL) 1 MG tablet Take 1 tablet by mouth every morning (before breakfast) 90 tablet 1    albuterol sulfate HFA (VENTOLIN HFA) 108 (90 Base) MCG/ACT inhaler Inhale 2 puffs into the lungs 4 times daily as needed for Wheezing 18 g 5    vitamin B-12 (CYANOCOBALAMIN) 1000 MCG tablet Take 1 tablet by mouth daily 30 tablet 3    metFORMIN (GLUCOPHAGE) 1000 MG tablet take 1 tablet by mouth once daily WITH BREAKFAST      vitamin D (CHOLECALCIFEROL) 47296 UNIT CAPS take 1 capsule by mouth every month      fexofenadine (ALLEGRA) 180 MG tablet Take 180 mg by mouth daily       No current facility-administered medications for this visit. Allergies   Allergen Reactions    Iodine Rash and Swelling     Were in ER being treated when it happened spent time in hosp. Cefuroxime Axetil Diarrhea, Hives and Nausea And Vomiting    Ezetimibe Other (See Comments)     Other reaction(s): Muscle Pain    Nsaids Diarrhea, Hives and Nausea And Vomiting    Codeine     E-Mycin [Erythromycin]     Gabapentin     Pcn [Penicillins]     Prednisone Other (See Comments)     Trouble swallowing    Statins      myalgias    Sulfa Antibiotics Hives    Zithromax [Azithromycin]        All patients pastmedical, surgical, social and family history has been reviewed. Subjective:      Review of Systems   Constitutional:  Negative for activity change, appetite change, fatigue and fever. Eyes:  Negative for blurred vision. Respiratory:  Negative for cough, shortness of breath and wheezing. Cardiovascular:  Negative for chest pain and palpitations. Endocrine: Negative for polydipsia and polyuria. Musculoskeletal:  Positive for arthralgias. Negative for stiffness. Right great toe pain   Skin: Negative. Neurological:  Negative for tingling and numbness. Objective:      Physical Exam  Vitals and nursing note reviewed.    Constitutional:       Appearance: Normal appearance. She is obese. HENT:      Head: Normocephalic and atraumatic. Cardiovascular:      Rate and Rhythm: Normal rate and regular rhythm. Heart sounds: Normal heart sounds. Pulmonary:      Effort: Pulmonary effort is normal.      Breath sounds: Normal breath sounds. Feet:      Comments: No swelling or redness noted at this time at the right great toe  Skin:     General: Skin is warm. Capillary Refill: Capillary refill takes less than 2 seconds. Neurological:      General: No focal deficit present. Mental Status: She is alert and oriented to person, place, and time. Psychiatric:         Mood and Affect: Mood normal.         Behavior: Behavior normal.         Thought Content: Thought content normal.         Judgment: Judgment normal.        Hospital Outpatient Visit on 11/11/2022   Component Date Value Ref Range Status    Hemoglobin A1C 11/11/2022 7.5 (A)  4.0 - 6.0 % Final    Estimated Avg Glucose 11/11/2022 169  mg/dL Final    Comment: The ADA and AACC recommend providing the estimated average glucose result to permit better   patient understanding of their HBA1c result. Cholesterol 11/11/2022 203 (A)  <200 mg/dL Final    Comment:    Cholesterol Guidelines:      <200  Desirable   200-240  Borderline      >240  Undesirable         HDL 11/11/2022 35 (A)  >40 mg/dL Final    Comment:    HDL Guidelines:    <40     Undesirable   40-59    Borderline    >59     Desirable         LDL Cholesterol 11/11/2022 121  0 - 130 mg/dL Final    Comment:    LDL Guidelines:     <100    Desirable   100-129   Near to/above Desirable   130-159   Borderline      >159   Undesirable     Direct (measured) LDL and calculated LDL are not interchangeable tests.       Chol/HDL Ratio 11/11/2022 5.8 (A)  <5 Final            Triglycerides 11/11/2022 237 (A)  <150 mg/dL Final    Comment:    Triglyceride Guidelines:     <150   Desirable   150-199  Borderline   200-499  High     >499   Very high   Based on AHA Guidelines for fasting triglyceride, October 2012. Glucose 11/11/2022 156 (A)  70 - 99 mg/dL Final    BUN 11/11/2022 18  6 - 20 mg/dL Final    Creatinine 11/11/2022 1.31 (A)  0.50 - 0.90 mg/dL Final    Est, Glom Filt Rate 11/11/2022 48 (A)  >60 mL/min/1.73m2 Final    Comment:       Effective Oct 3, 2022        These results are not intended for use in patients <25years of age. eGFR results are calculated without a race factor using the 2021 CKD-EPI equation. Careful clinical correlation is recommended, particularly when comparing to results   calculated using previous equations. The CKD-EPI equation is less accurate in patients with extremes of muscle mass, extra-renal   metabolism of creatine, excessive creatine ingestion, or following therapy that affects   renal tubular secretion.       Bun/Cre Ratio 11/11/2022 14  9 - 20 Final    Calcium 11/11/2022 9.9  8.6 - 10.4 mg/dL Final    Sodium 11/11/2022 141  135 - 144 mmol/L Final    Potassium 11/11/2022 4.2  3.7 - 5.3 mmol/L Final    Chloride 11/11/2022 100  98 - 107 mmol/L Final    CO2 11/11/2022 25  20 - 31 mmol/L Final    Anion Gap 11/11/2022 16  9 - 17 mmol/L Final    Alkaline Phosphatase 11/11/2022 132 (A)  35 - 104 U/L Final    ALT 11/11/2022 17  5 - 33 U/L Final    AST 11/11/2022 19  <32 U/L Final    Total Bilirubin 11/11/2022 0.4  0.3 - 1.2 mg/dL Final    Total Protein 11/11/2022 7.1  6.4 - 8.3 g/dL Final    Albumin 11/11/2022 4.3  3.5 - 5.2 g/dL Final    Albumin/Globulin Ratio 11/11/2022 1.5  1.0 - 2.5 Final    WBC 11/11/2022 16.3 (A)  3.5 - 11.3 k/uL Final    RBC 11/11/2022 5.24 (A)  3.95 - 5.11 m/uL Final    Hemoglobin 11/11/2022 15.7 (A)  11.9 - 15.1 g/dL Final    Hematocrit 11/11/2022 46.9  36.3 - 47.1 % Final    MCV 11/11/2022 89.5  82.6 - 102.9 fL Final    MCH 11/11/2022 30.0  25.2 - 33.5 pg Final    MCHC 11/11/2022 33.5  25.2 - 33.5 g/dL Final    RDW 11/11/2022 13.1  11.8 - 14.4 % Final    Platelets 65/41/0716 281  138 - 453 k/uL Final MPV 11/11/2022 9.7  8.1 - 13.5 fL Final    NRBC Automated 11/11/2022 0.0  0.0 per 100 WBC Final    Seg Neutrophils 11/11/2022 68 (A)  36 - 65 % Final    Lymphocytes 11/11/2022 22 (A)  24 - 43 % Final    Monocytes 11/11/2022 4  3 - 12 % Final    Eosinophils % 11/11/2022 4  1 - 4 % Final    Basophils 11/11/2022 1  0 - 2 % Final    Immature Granulocytes 11/11/2022 1 (A)  0 % Final    Segs Absolute 11/11/2022 11.33 (A)  1.50 - 8.10 k/uL Final    Absolute Lymph # 11/11/2022 3.60  1.10 - 3.70 k/uL Final    Absolute Mono # 11/11/2022 0.65  0.10 - 1.20 k/uL Final    Absolute Eos # 11/11/2022 0.60 (A)  0.00 - 0.44 k/uL Final    Basophils Absolute 11/11/2022 0.08  0.00 - 0.20 k/uL Final    Absolute Immature Granulocyte 11/11/2022 0.08  0.00 - 0.30 k/uL Final       Assessment:       Diagnosis Orders   1. Hyperlipidemia, unspecified hyperlipidemia type  Lipid Panel      2. Controlled type 2 diabetes mellitus with stage 3 chronic kidney disease, without long-term current use of insulin (HCC)  Hemoglobin A1C    Comprehensive Metabolic Panel      3. Leukocytosis, unspecified type  CBC with Auto Differential    CBC with Auto Differential      4. Great toe pain, right  Uric Acid      5. Fibromyalgia  External Referral To Rheumatology      6. Sacroiliac joint dysfunction  External Referral To Rheumatology      7. Multiple joint pain  External Referral To Rheumatology          Plan:      Hyperlipidemia-uncontrolled but improved. Continue current dose of statin. Encouraged low fat diet   Type 2 diabetes-stable continue to work on diet and daily exercise. Continue to follow with nephrology.    Leukocytosis-will repeat lab, order is in, if no improvement will consider a referral to hematology  Great toe pain right-uric acid lab ordered to use as needed  Fibromyalgia-will refer to rheumatology  Sacroiliac joint dysfunction-referral to rheumatology  Multiple joint pain-referral to rheumatology  Pt to return in 6 months for follow up  Pt to return PRN  No follow-ups on file. Orders Placed This Encounter   Procedures    CBC with Auto Differential     Standing Status:   Future     Standing Expiration Date:   11/17/2023    Lipid Panel     Standing Status:   Future     Standing Expiration Date:   11/17/2023     Order Specific Question:   Is Patient Fasting?/# of Hours     Answer:   12    Hemoglobin A1C     Standing Status:   Future     Standing Expiration Date:   11/17/2023    Comprehensive Metabolic Panel     Please fast for 12 - 14 hours prior to blood draw. May take medication with a sip of water. Standing Status:   Future     Standing Expiration Date:   11/17/2023    Uric Acid     Standing Status:   Future     Standing Expiration Date:   11/17/2023    CBC with Auto Differential     Standing Status:   Future     Standing Expiration Date:   11/18/2023    External Referral To Rheumatology     Referral Priority:   Routine     Referral Type:   Eval and Treat     Referral Reason:   Specialty Services Required     Requested Specialty:   Rheumatology     Number of Visits Requested:   1     No orders of the defined types were placed in this encounter. Patient given educational materials - see patient instructions. All patient questionsanswered. Pt voiced understanding. Reviewed health maintenance.      Electronically signed by AQUILES Jacinto CNP, CNP on 11/21/2022 at 12:46 PM

## 2022-11-21 ASSESSMENT — ENCOUNTER SYMPTOMS
BLURRED VISION: 0
SHORTNESS OF BREATH: 0
WHEEZING: 0
COUGH: 0
VISUAL CHANGE: 0

## 2022-11-28 RX ORDER — PROPRANOLOL HYDROCHLORIDE 20 MG/1
TABLET ORAL
Qty: 60 TABLET | Refills: 0 | Status: SHIPPED | OUTPATIENT
Start: 2022-11-28

## 2022-11-28 NOTE — TELEPHONE ENCOUNTER
Cele called requesting a refill of the below medication which has been pended for you:     Requested Prescriptions     Pending Prescriptions Disp Refills    propranolol (INDERAL) 20 MG tablet [Pharmacy Med Name: PROPRANOLOL 20 MG TABLET] 60 tablet 0     Sig: take 1 tablet by mouth twice a day       Last Appointment Date: 11/17/2022  Next Appointment Date: 5/18/2023    Allergies   Allergen Reactions    Iodine Rash and Swelling     Were in ER being treated when it happened spent time in hosp.     Cefuroxime Axetil Diarrhea, Hives and Nausea And Vomiting    Ezetimibe Other (See Comments)     Other reaction(s): Muscle Pain    Nsaids Diarrhea, Hives and Nausea And Vomiting    Codeine     E-Mycin [Erythromycin]     Gabapentin     Pcn [Penicillins]     Prednisone Other (See Comments)     Trouble swallowing    Statins      myalgias    Sulfa Antibiotics Hives    Zithromax [Azithromycin]

## 2022-12-28 DIAGNOSIS — M25.50 MULTIPLE JOINT PAIN: ICD-10-CM

## 2022-12-28 DIAGNOSIS — M79.7 FIBROMYALGIA: Primary | ICD-10-CM

## 2022-12-28 RX ORDER — PROPRANOLOL HYDROCHLORIDE 20 MG/1
20 TABLET ORAL 2 TIMES DAILY
Qty: 60 TABLET | Refills: 0 | Status: SHIPPED | OUTPATIENT
Start: 2022-12-28

## 2022-12-28 RX ORDER — TIZANIDINE 4 MG/1
4 TABLET ORAL EVERY 8 HOURS PRN
Qty: 30 TABLET | Refills: 2 | Status: SHIPPED | OUTPATIENT
Start: 2022-12-28

## 2022-12-28 NOTE — TELEPHONE ENCOUNTER
Per the pharmacy she is out of refills and she is taking this medication for Fibromyalgia and Lumbar disc disease.

## 2022-12-28 NOTE — TELEPHONE ENCOUNTER
Cele called requesting a refill of the below medication which has been pended for you:     Requested Prescriptions     Pending Prescriptions Disp Refills    tiZANidine (ZANAFLEX) 4 MG tablet [Pharmacy Med Name: TIZANIDINE HCL 4 MG TABLET] 30 tablet 3     Sig: take 1 tablet by mouth every 6 hours if needed for muscle spasm       Last Appointment Date: 11/17/2022  Next Appointment Date: 5/18/2023    Allergies   Allergen Reactions    Iodine Rash and Swelling     Were in ER being treated when it happened spent time in hosp.     Cefuroxime Axetil Diarrhea, Hives and Nausea And Vomiting    Ezetimibe Other (See Comments)     Other reaction(s): Muscle Pain    Nsaids Diarrhea, Hives and Nausea And Vomiting    Codeine     E-Mycin [Erythromycin]     Gabapentin     Pcn [Penicillins]     Prednisone Other (See Comments)     Trouble swallowing    Statins      myalgias    Sulfa Antibiotics Hives    Zithromax [Azithromycin]

## 2023-01-22 DIAGNOSIS — G62.9 NEUROPATHY: Primary | ICD-10-CM

## 2023-01-22 DIAGNOSIS — M53.3 SACROILIAC JOINT DYSFUNCTION: ICD-10-CM

## 2023-01-23 RX ORDER — PROPRANOLOL HYDROCHLORIDE 20 MG/1
TABLET ORAL
Qty: 60 TABLET | Refills: 4 | Status: SHIPPED | OUTPATIENT
Start: 2023-01-23

## 2023-01-23 NOTE — TELEPHONE ENCOUNTER
Cele called requesting a refill of the below medication which has been pended for you:     Requested Prescriptions     Pending Prescriptions Disp Refills    propranolol (INDERAL) 20 MG tablet [Pharmacy Med Name: PROPRANOLOL 20 MG TABLET] 60 tablet 0     Sig: take 1 tablet by mouth twice a day       Last Appointment Date: 11/17/2022  Next Appointment Date: 05/18/2023    Allergies   Allergen Reactions    Iodine Rash and Swelling     Were in ER being treated when it happened spent time in hosp.     Cefuroxime Axetil Diarrhea, Hives and Nausea And Vomiting    Ezetimibe Other (See Comments)     Other reaction(s): Muscle Pain    Nsaids Diarrhea, Hives and Nausea And Vomiting    Codeine     E-Mycin [Erythromycin]     Gabapentin     Pcn [Penicillins]     Prednisone Other (See Comments)     Trouble swallowing    Statins      myalgias    Sulfa Antibiotics Hives    Zithromax [Azithromycin]

## 2023-02-05 DIAGNOSIS — J30.9 ALLERGIC RHINITIS, UNSPECIFIED SEASONALITY, UNSPECIFIED TRIGGER: ICD-10-CM

## 2023-02-06 RX ORDER — MONTELUKAST SODIUM 10 MG/1
10 TABLET ORAL NIGHTLY
Qty: 90 TABLET | Refills: 1 | Status: SHIPPED | OUTPATIENT
Start: 2023-02-06

## 2023-02-06 NOTE — TELEPHONE ENCOUNTER
Cele called requesting a refill of the below medication which has been pended for you:     Requested Prescriptions     Pending Prescriptions Disp Refills    montelukast (SINGULAIR) 10 MG tablet [Pharmacy Med Name: MONTELUKAST SOD 10 MG TABLET] 90 tablet 1     Sig: take 1 tablet by mouth nightly       Last Appointment Date: 11/17/2022  Next Appointment Date: 5/18/2023    Allergies   Allergen Reactions    Iodine Rash and Swelling     Were in ER being treated when it happened spent time in hosp.     Cefuroxime Axetil Diarrhea, Hives and Nausea And Vomiting    Ezetimibe Other (See Comments)     Other reaction(s): Muscle Pain    Nsaids Diarrhea, Hives and Nausea And Vomiting    Codeine     E-Mycin [Erythromycin]     Gabapentin     Pcn [Penicillins]     Prednisone Other (See Comments)     Trouble swallowing    Statins      myalgias    Sulfa Antibiotics Hives    Zithromax [Azithromycin]

## 2023-02-22 DIAGNOSIS — E11.22 CONTROLLED TYPE 2 DIABETES MELLITUS WITH STAGE 3 CHRONIC KIDNEY DISEASE, WITHOUT LONG-TERM CURRENT USE OF INSULIN (HCC): Primary | ICD-10-CM

## 2023-02-22 DIAGNOSIS — N18.30 CONTROLLED TYPE 2 DIABETES MELLITUS WITH STAGE 3 CHRONIC KIDNEY DISEASE, WITHOUT LONG-TERM CURRENT USE OF INSULIN (HCC): Primary | ICD-10-CM

## 2023-02-22 NOTE — TELEPHONE ENCOUNTER
Cele called requesting a refill of the below medication which has been pended for you:     Requested Prescriptions     Pending Prescriptions Disp Refills    metFORMIN (GLUCOPHAGE) 1000 MG tablet [Pharmacy Med Name: METFORMIN HCL 1,000 MG TABLET] 180 tablet 1     Sig: take 1 tablet by mouth every morning and evening with meals       Last Appointment Date: 11/17/2022  Next Appointment Date: 5/18/2023    Allergies   Allergen Reactions    Iodine Rash and Swelling     Were in ER being treated when it happened spent time in hosp.     Cefuroxime Axetil Diarrhea, Hives and Nausea And Vomiting    Ezetimibe Other (See Comments)     Other reaction(s): Muscle Pain    Nsaids Diarrhea, Hives and Nausea And Vomiting    Codeine     E-Mycin [Erythromycin]     Gabapentin     Pcn [Penicillins]     Prednisone Other (See Comments)     Trouble swallowing    Statins      myalgias    Sulfa Antibiotics Hives    Zithromax [Azithromycin]

## 2023-03-30 DIAGNOSIS — M25.50 MULTIPLE JOINT PAIN: ICD-10-CM

## 2023-03-30 DIAGNOSIS — M79.7 FIBROMYALGIA: ICD-10-CM

## 2023-03-31 RX ORDER — TIZANIDINE 4 MG/1
4 TABLET ORAL EVERY 8 HOURS PRN
Qty: 30 TABLET | Refills: 2 | Status: SHIPPED | OUTPATIENT
Start: 2023-03-31

## 2023-03-31 NOTE — TELEPHONE ENCOUNTER
Cele called requesting a refill of the below medication which has been pended for you:     Requested Prescriptions     Pending Prescriptions Disp Refills    tiZANidine (ZANAFLEX) 4 MG tablet [Pharmacy Med Name: TIZANIDINE HCL 4 MG TABLET] 30 tablet 2     Sig: Take 1 tablet by mouth every 8 hours as needed (Muscle Spasms)       Last Appointment Date:  11/17/2022  Next Appointment Date:  05/18/2023    Allergies   Allergen Reactions    Iodine Rash and Swelling     Were in ER being treated when it happened spent time in hosp.     Cefuroxime Axetil Diarrhea, Hives and Nausea And Vomiting    Ezetimibe Other (See Comments)     Other reaction(s): Muscle Pain    Nsaids Diarrhea, Hives and Nausea And Vomiting    Codeine     E-Mycin [Erythromycin]     Gabapentin     Pcn [Penicillins]     Prednisone Other (See Comments)     Trouble swallowing    Statins      myalgias    Sulfa Antibiotics Hives    Zithromax [Azithromycin]

## 2023-04-06 ENCOUNTER — HOSPITAL ENCOUNTER (OUTPATIENT)
Age: 58
Discharge: HOME | End: 2023-04-06
Payer: COMMERCIAL

## 2023-04-06 DIAGNOSIS — I10 PRIMARY HYPERTENSION: ICD-10-CM

## 2023-04-06 DIAGNOSIS — N18.32 STAGE 3B CHRONIC KIDNEY DISEASE (HCC): ICD-10-CM

## 2023-04-06 LAB
ANION GAP SERPL CALCULATED.3IONS-SCNC: 12 MMOL/L (ref 9–17)
BUN SERPL-MCNC: 22 MG/DL (ref 6–20)
BUN/CREAT BLD: 16 (ref 9–20)
CALCIUM SERPL-MCNC: 10.1 MG/DL (ref 8.6–10.4)
CHLORIDE SERPL-SCNC: 99 MMOL/L (ref 98–107)
CO2 SERPL-SCNC: 27 MMOL/L (ref 20–31)
CREAT SERPL-MCNC: 1.39 MG/DL (ref 0.5–0.9)
CREATININE URINE: 93.6 MG/DL (ref 28–217)
GFR SERPL CREATININE-BSD FRML MDRD: 45 ML/MIN/1.73M2
GLUCOSE SERPL-MCNC: 190 MG/DL (ref 70–99)
HCT VFR BLD AUTO: 44.7 % (ref 36.3–47.1)
HGB BLD-MCNC: 15 G/DL (ref 11.9–15.1)
MAGNESIUM SERPL-MCNC: 1.6 MG/DL (ref 1.6–2.6)
MCH RBC QN AUTO: 29.6 PG (ref 25.2–33.5)
MCHC RBC AUTO-ENTMCNC: 33.6 G/DL (ref 25.2–33.5)
MCV RBC AUTO: 88.3 FL (ref 82.6–102.9)
NRBC AUTOMATED: 0 PER 100 WBC
PDW BLD-RTO: 14 % (ref 11.8–14.4)
PLATELET # BLD AUTO: 283 K/UL (ref 138–453)
PMV BLD AUTO: 9.8 FL (ref 8.1–13.5)
POTASSIUM SERPL-SCNC: 4.9 MMOL/L (ref 3.7–5.3)
RBC # BLD: 5.06 M/UL (ref 3.95–5.11)
SODIUM SERPL-SCNC: 138 MMOL/L (ref 135–144)
TOTAL PROTEIN, URINE: 14 MG/DL
URINE TOTAL PROTEIN CREATININE RATIO: 0.15 (ref 0–0.2)
WBC # BLD AUTO: 16.3 K/UL (ref 3.5–11.3)

## 2023-04-06 PROCEDURE — 84156 ASSAY OF PROTEIN URINE: CPT

## 2023-04-06 PROCEDURE — 80048 BASIC METABOLIC PNL TOTAL CA: CPT

## 2023-04-06 PROCEDURE — 83735 ASSAY OF MAGNESIUM: CPT

## 2023-04-06 PROCEDURE — 36415 COLL VENOUS BLD VENIPUNCTURE: CPT

## 2023-04-06 PROCEDURE — 85027 COMPLETE CBC AUTOMATED: CPT

## 2023-04-06 PROCEDURE — 82570 ASSAY OF URINE CREATININE: CPT

## 2023-04-24 ENCOUNTER — HOSPITAL ENCOUNTER (OUTPATIENT)
Age: 58
Discharge: HOME | End: 2023-04-24
Payer: COMMERCIAL

## 2023-04-24 ENCOUNTER — OFFICE VISIT (OUTPATIENT)
Dept: ONCOLOGY | Age: 58
End: 2023-04-24
Payer: COMMERCIAL

## 2023-04-24 VITALS
HEIGHT: 66 IN | DIASTOLIC BLOOD PRESSURE: 80 MMHG | SYSTOLIC BLOOD PRESSURE: 138 MMHG | TEMPERATURE: 98.3 F | WEIGHT: 191.2 LBS | HEART RATE: 108 BPM | OXYGEN SATURATION: 97 % | RESPIRATION RATE: 16 BRPM | BODY MASS INDEX: 30.73 KG/M2

## 2023-04-24 DIAGNOSIS — D72.829 LEUKOCYTOSIS, UNSPECIFIED TYPE: Primary | ICD-10-CM

## 2023-04-24 DIAGNOSIS — F32.A DEPRESSION, UNSPECIFIED DEPRESSION TYPE: ICD-10-CM

## 2023-04-24 DIAGNOSIS — D72.829 LEUKOCYTOSIS, UNSPECIFIED TYPE: ICD-10-CM

## 2023-04-24 LAB
ABSOLUTE EOS #: 0.51 K/UL (ref 0–0.44)
ABSOLUTE IMMATURE GRANULOCYTE: 0.05 K/UL (ref 0–0.3)
ABSOLUTE LYMPH #: 3.98 K/UL (ref 1.1–3.7)
ABSOLUTE MONO #: 0.54 K/UL (ref 0.1–1.2)
ABSOLUTE RETIC #: 0.13 M/UL (ref 0.03–0.08)
BASOPHILS # BLD: 1 % (ref 0–2)
BASOPHILS ABSOLUTE: 0.08 K/UL (ref 0–0.2)
EOSINOPHILS RELATIVE PERCENT: 4 % (ref 1–4)
HCT VFR BLD AUTO: 44 % (ref 36.3–47.1)
HGB BLD-MCNC: 14.8 G/DL (ref 11.9–15.1)
IMMATURE GRANULOCYTES: 0 %
IMMATURE RETIC FRACT: 16.2 % (ref 2.7–18.3)
LYMPHOCYTES # BLD: 30 % (ref 24–43)
MCH RBC QN AUTO: 29.9 PG (ref 25.2–33.5)
MCHC RBC AUTO-ENTMCNC: 33.6 G/DL (ref 28.4–34.8)
MCV RBC AUTO: 88.9 FL (ref 82.6–102.9)
MONOCYTES # BLD: 4 % (ref 3–12)
NRBC AUTOMATED: 0 PER 100 WBC
PDW BLD-RTO: 14.3 % (ref 11.8–14.4)
PLATELET # BLD AUTO: 277 K/UL (ref 138–453)
PMV BLD AUTO: 10.5 FL (ref 8.1–13.5)
RBC # BLD: 4.95 M/UL (ref 3.95–5.11)
RETIC HEMOGLOBIN: 34 PG (ref 28.2–35.7)
RETICS/RBC NFR AUTO: 2.7 % (ref 0.5–1.9)
RHEUMATOID FACTOR: 22.5 IU/ML
SEG NEUTROPHILS: 61 % (ref 36–65)
SEGMENTED NEUTROPHILS ABSOLUTE COUNT: 8.03 K/UL (ref 1.5–8.1)
WBC # BLD AUTO: 13.2 K/UL (ref 3.5–11.3)

## 2023-04-24 PROCEDURE — 99204 OFFICE O/P NEW MOD 45 MIN: CPT | Performed by: INTERNAL MEDICINE

## 2023-04-24 PROCEDURE — 85045 AUTOMATED RETICULOCYTE COUNT: CPT

## 2023-04-24 PROCEDURE — 86431 RHEUMATOID FACTOR QUANT: CPT

## 2023-04-24 PROCEDURE — 88185 FLOWCYTOMETRY/TC ADD-ON: CPT

## 2023-04-24 PROCEDURE — 85025 COMPLETE CBC W/AUTO DIFF WBC: CPT

## 2023-04-24 PROCEDURE — 88237 TISSUE CULTURE BONE MARROW: CPT

## 2023-04-24 PROCEDURE — G8417 CALC BMI ABV UP PARAM F/U: HCPCS | Performed by: INTERNAL MEDICINE

## 2023-04-24 PROCEDURE — 88271 CYTOGENETICS DNA PROBE: CPT

## 2023-04-24 PROCEDURE — 36415 COLL VENOUS BLD VENIPUNCTURE: CPT

## 2023-04-24 PROCEDURE — 88184 FLOWCYTOMETRY/ TC 1 MARKER: CPT

## 2023-04-24 PROCEDURE — 86225 DNA ANTIBODY NATIVE: CPT

## 2023-04-24 PROCEDURE — 86038 ANTINUCLEAR ANTIBODIES: CPT

## 2023-04-24 PROCEDURE — 81270 JAK2 GENE: CPT

## 2023-04-24 PROCEDURE — G8427 DOCREV CUR MEDS BY ELIG CLIN: HCPCS | Performed by: INTERNAL MEDICINE

## 2023-04-24 PROCEDURE — 88275 CYTOGENETICS 100-300: CPT

## 2023-04-24 PROCEDURE — 1036F TOBACCO NON-USER: CPT | Performed by: INTERNAL MEDICINE

## 2023-04-24 PROCEDURE — 3017F COLORECTAL CA SCREEN DOC REV: CPT | Performed by: INTERNAL MEDICINE

## 2023-04-24 RX ORDER — DULOXETIN HYDROCHLORIDE 20 MG/1
CAPSULE, DELAYED RELEASE ORAL
Qty: 30 CAPSULE | Refills: 2 | Status: SHIPPED | OUTPATIENT
Start: 2023-04-24

## 2023-04-24 NOTE — TELEPHONE ENCOUNTER
Cele called requesting a refill of the below medication which has been pended for you:     Requested Prescriptions     Pending Prescriptions Disp Refills    DULoxetine (CYMBALTA) 20 MG extended release capsule [Pharmacy Med Name: DULOXETINE HCL DR 20 MG CAP] 30 capsule 5     Sig: take 1 capsule by mouth once daily       Last Appointment Date: 11/17/2022  Next Appointment Date: 5/18/2023    Allergies   Allergen Reactions    Iodine Rash and Swelling     Were in ER being treated when it happened spent time in hosp.     Cefuroxime Axetil Diarrhea, Hives and Nausea And Vomiting    Ezetimibe Other (See Comments)     Other reaction(s): Muscle Pain    Nsaids Diarrhea, Hives and Nausea And Vomiting    Codeine     E-Mycin [Erythromycin]     Gabapentin     Pcn [Penicillins]     Prednisone Other (See Comments)     Trouble swallowing    Statins      myalgias    Sulfa Antibiotics Hives    Zithromax [Azithromycin]

## 2023-04-25 LAB
ANA SER QL IA: NEGATIVE
DSDNA IGG SER QL IA: <0.5 IU/ML
NUCLEAR IGG SER IA-RTO: 0.1 U/ML
SURGICAL PATHOLOGY REPORT: NORMAL

## 2023-04-26 LAB
FLOW CYTOMETRY BL: NORMAL
MOLECULAR CYTOGENIC FISH: NORMAL

## 2023-05-02 LAB
SPECIMEN SOURCE: NORMAL
V617 MUTATION, PERCENT: 0 %
V617F MUTATION, QNT: NOT DETECTED

## 2023-05-16 ENCOUNTER — HOSPITAL ENCOUNTER (OUTPATIENT)
Age: 58
Discharge: HOME | End: 2023-05-16
Payer: COMMERCIAL

## 2023-05-16 DIAGNOSIS — E78.5 HYPERLIPIDEMIA, UNSPECIFIED HYPERLIPIDEMIA TYPE: ICD-10-CM

## 2023-05-16 DIAGNOSIS — N18.30 CONTROLLED TYPE 2 DIABETES MELLITUS WITH STAGE 3 CHRONIC KIDNEY DISEASE, WITHOUT LONG-TERM CURRENT USE OF INSULIN (HCC): ICD-10-CM

## 2023-05-16 DIAGNOSIS — E11.22 CONTROLLED TYPE 2 DIABETES MELLITUS WITH STAGE 3 CHRONIC KIDNEY DISEASE, WITHOUT LONG-TERM CURRENT USE OF INSULIN (HCC): ICD-10-CM

## 2023-05-16 DIAGNOSIS — M79.674 GREAT TOE PAIN, RIGHT: ICD-10-CM

## 2023-05-16 DIAGNOSIS — D72.829 LEUKOCYTOSIS, UNSPECIFIED TYPE: ICD-10-CM

## 2023-05-16 LAB
ALBUMIN SERPL-MCNC: 4 G/DL (ref 3.5–5.2)
ALBUMIN/GLOB SERPL: 1.3 {RATIO} (ref 1–2.5)
ALP SERPL-CCNC: 120 U/L (ref 35–104)
ALT SERPL-CCNC: 24 U/L (ref 5–33)
ANION GAP SERPL CALCULATED.3IONS-SCNC: 13 MMOL/L (ref 9–17)
AST SERPL-CCNC: 23 U/L
BASOPHILS # BLD: 0.07 K/UL (ref 0–0.2)
BASOPHILS # BLD: 1 % (ref 0–2)
BILIRUB SERPL-MCNC: 0.3 MG/DL (ref 0.3–1.2)
BUN SERPL-MCNC: 18 MG/DL (ref 6–20)
BUN/CREAT BLD: 13 (ref 9–20)
CALCIUM SERPL-MCNC: 9.6 MG/DL (ref 8.6–10.4)
CHLORIDE SERPL-SCNC: 101 MMOL/L (ref 98–107)
CHOLEST SERPL-MCNC: 185 MG/DL
CHOLESTEROL/HDL RATIO: 5.1
CO2 SERPL-SCNC: 26 MMOL/L (ref 20–31)
CREAT SERPL-MCNC: 1.41 MG/DL (ref 0.5–0.9)
EOSINOPHIL # BLD: 0.45 K/UL (ref 0–0.44)
EOSINOPHILS RELATIVE PERCENT: 3 % (ref 1–4)
ERYTHROCYTE [DISTWIDTH] IN BLOOD BY AUTOMATED COUNT: 13.3 % (ref 11.8–14.4)
GFR SERPL CREATININE-BSD FRML MDRD: 44 ML/MIN/1.73M2
GLUCOSE SERPL-MCNC: 158 MG/DL (ref 70–99)
HCT VFR BLD AUTO: 43.5 % (ref 36.3–47.1)
HDLC SERPL-MCNC: 36 MG/DL
HGB BLD-MCNC: 14.5 G/DL (ref 11.9–15.1)
IMM GRANULOCYTES # BLD AUTO: 0.09 K/UL (ref 0–0.3)
IMM GRANULOCYTES NFR BLD: 1 %
LDLC SERPL CALC-MCNC: 101 MG/DL (ref 0–130)
LYMPHOCYTES # BLD: 19 % (ref 24–43)
LYMPHOCYTES NFR BLD: 2.96 K/UL (ref 1.1–3.7)
MCH RBC QN AUTO: 29.8 PG (ref 25.2–33.5)
MCHC RBC AUTO-ENTMCNC: 33.3 G/DL (ref 25.2–33.5)
MCV RBC AUTO: 89.3 FL (ref 82.6–102.9)
MONOCYTES NFR BLD: 0.62 K/UL (ref 0.1–1.2)
MONOCYTES NFR BLD: 4 % (ref 3–12)
NEUTROPHILS NFR BLD: 72 % (ref 36–65)
NEUTS SEG NFR BLD: 11.12 K/UL (ref 1.5–8.1)
NRBC AUTOMATED: 0 PER 100 WBC
PLATELET # BLD AUTO: 278 K/UL (ref 138–453)
PMV BLD AUTO: 9.4 FL (ref 8.1–13.5)
POTASSIUM SERPL-SCNC: 4.5 MMOL/L (ref 3.7–5.3)
PROT SERPL-MCNC: 7.1 G/DL (ref 6.4–8.3)
RBC # BLD AUTO: 4.87 M/UL (ref 3.95–5.11)
SODIUM SERPL-SCNC: 140 MMOL/L (ref 135–144)
TRIGL SERPL-MCNC: 241 MG/DL
URATE SERPL-MCNC: 8.9 MG/DL (ref 2.4–5.7)
WBC OTHER # BLD: 15.3 K/UL (ref 3.5–11.3)

## 2023-05-16 PROCEDURE — 85025 COMPLETE CBC W/AUTO DIFF WBC: CPT

## 2023-05-16 PROCEDURE — 83036 HEMOGLOBIN GLYCOSYLATED A1C: CPT

## 2023-05-16 PROCEDURE — 36415 COLL VENOUS BLD VENIPUNCTURE: CPT

## 2023-05-16 PROCEDURE — 84550 ASSAY OF BLOOD/URIC ACID: CPT

## 2023-05-16 PROCEDURE — 80053 COMPREHEN METABOLIC PANEL: CPT

## 2023-05-16 PROCEDURE — 80061 LIPID PANEL: CPT

## 2023-05-17 LAB
EST. AVERAGE GLUCOSE BLD GHB EST-MCNC: 180 MG/DL
HBA1C MFR BLD: 7.9 % (ref 4–6)

## 2023-05-18 ENCOUNTER — OFFICE VISIT (OUTPATIENT)
Dept: FAMILY MEDICINE CLINIC | Age: 58
End: 2023-05-18
Payer: COMMERCIAL

## 2023-05-18 VITALS
TEMPERATURE: 98.3 F | SYSTOLIC BLOOD PRESSURE: 138 MMHG | HEART RATE: 86 BPM | HEIGHT: 66 IN | OXYGEN SATURATION: 97 % | WEIGHT: 191 LBS | DIASTOLIC BLOOD PRESSURE: 88 MMHG | BODY MASS INDEX: 30.7 KG/M2

## 2023-05-18 DIAGNOSIS — J01.90 ACUTE BACTERIAL SINUSITIS: ICD-10-CM

## 2023-05-18 DIAGNOSIS — M79.7 FIBROMYALGIA: ICD-10-CM

## 2023-05-18 DIAGNOSIS — M25.50 MULTIPLE JOINT PAIN: ICD-10-CM

## 2023-05-18 DIAGNOSIS — E11.22 CONTROLLED TYPE 2 DIABETES MELLITUS WITH STAGE 3 CHRONIC KIDNEY DISEASE, WITHOUT LONG-TERM CURRENT USE OF INSULIN (HCC): Primary | ICD-10-CM

## 2023-05-18 DIAGNOSIS — B96.89 ACUTE BACTERIAL SINUSITIS: ICD-10-CM

## 2023-05-18 DIAGNOSIS — G62.9 NEUROPATHY: ICD-10-CM

## 2023-05-18 DIAGNOSIS — N18.30 CONTROLLED TYPE 2 DIABETES MELLITUS WITH STAGE 3 CHRONIC KIDNEY DISEASE, WITHOUT LONG-TERM CURRENT USE OF INSULIN (HCC): Primary | ICD-10-CM

## 2023-05-18 DIAGNOSIS — E78.5 HYPERLIPIDEMIA, UNSPECIFIED HYPERLIPIDEMIA TYPE: ICD-10-CM

## 2023-05-18 PROCEDURE — G8417 CALC BMI ABV UP PARAM F/U: HCPCS | Performed by: NURSE PRACTITIONER

## 2023-05-18 PROCEDURE — G8427 DOCREV CUR MEDS BY ELIG CLIN: HCPCS | Performed by: NURSE PRACTITIONER

## 2023-05-18 PROCEDURE — 3017F COLORECTAL CA SCREEN DOC REV: CPT | Performed by: NURSE PRACTITIONER

## 2023-05-18 PROCEDURE — 3051F HG A1C>EQUAL 7.0%<8.0%: CPT | Performed by: NURSE PRACTITIONER

## 2023-05-18 PROCEDURE — 1036F TOBACCO NON-USER: CPT | Performed by: NURSE PRACTITIONER

## 2023-05-18 PROCEDURE — 2022F DILAT RTA XM EVC RTNOPTHY: CPT | Performed by: NURSE PRACTITIONER

## 2023-05-18 PROCEDURE — 99214 OFFICE O/P EST MOD 30 MIN: CPT | Performed by: NURSE PRACTITIONER

## 2023-05-18 RX ORDER — TIZANIDINE 4 MG/1
4 TABLET ORAL EVERY 8 HOURS PRN
Qty: 90 TABLET | Refills: 1 | Status: SHIPPED | OUTPATIENT
Start: 2023-05-18

## 2023-05-18 RX ORDER — CLARITHROMYCIN 500 MG/1
500 TABLET, COATED ORAL 2 TIMES DAILY
Qty: 20 TABLET | Refills: 0 | Status: SHIPPED | OUTPATIENT
Start: 2023-05-18 | End: 2023-05-28

## 2023-05-18 RX ORDER — PROPRANOLOL HYDROCHLORIDE 20 MG/1
20 TABLET ORAL 2 TIMES DAILY
Qty: 180 TABLET | Refills: 1 | Status: SHIPPED | OUTPATIENT
Start: 2023-05-18

## 2023-05-18 RX ORDER — AMITRIPTYLINE HYDROCHLORIDE 25 MG/1
50 TABLET, FILM COATED ORAL NIGHTLY
Qty: 180 TABLET | Refills: 2 | Status: SHIPPED | OUTPATIENT
Start: 2023-05-18 | End: 2023-08-16

## 2023-05-18 RX ORDER — LANOLIN ALCOHOL/MO/W.PET/CERES
400 CREAM (GRAM) TOPICAL DAILY
Qty: 90 TABLET | Refills: 1 | Status: SHIPPED | OUTPATIENT
Start: 2023-05-18

## 2023-05-18 SDOH — ECONOMIC STABILITY: INCOME INSECURITY: HOW HARD IS IT FOR YOU TO PAY FOR THE VERY BASICS LIKE FOOD, HOUSING, MEDICAL CARE, AND HEATING?: NOT HARD AT ALL

## 2023-05-18 SDOH — ECONOMIC STABILITY: FOOD INSECURITY: WITHIN THE PAST 12 MONTHS, YOU WORRIED THAT YOUR FOOD WOULD RUN OUT BEFORE YOU GOT MONEY TO BUY MORE.: NEVER TRUE

## 2023-05-18 SDOH — ECONOMIC STABILITY: HOUSING INSECURITY
IN THE LAST 12 MONTHS, WAS THERE A TIME WHEN YOU DID NOT HAVE A STEADY PLACE TO SLEEP OR SLEPT IN A SHELTER (INCLUDING NOW)?: NO

## 2023-05-18 SDOH — ECONOMIC STABILITY: FOOD INSECURITY: WITHIN THE PAST 12 MONTHS, THE FOOD YOU BOUGHT JUST DIDN'T LAST AND YOU DIDN'T HAVE MONEY TO GET MORE.: NEVER TRUE

## 2023-05-18 ASSESSMENT — PATIENT HEALTH QUESTIONNAIRE - PHQ9
SUM OF ALL RESPONSES TO PHQ9 QUESTIONS 1 & 2: 2
4. FEELING TIRED OR HAVING LITTLE ENERGY: 1
1. LITTLE INTEREST OR PLEASURE IN DOING THINGS: 1
3. TROUBLE FALLING OR STAYING ASLEEP: 1
9. THOUGHTS THAT YOU WOULD BE BETTER OFF DEAD, OR OF HURTING YOURSELF: 0
8. MOVING OR SPEAKING SO SLOWLY THAT OTHER PEOPLE COULD HAVE NOTICED. OR THE OPPOSITE, BEING SO FIGETY OR RESTLESS THAT YOU HAVE BEEN MOVING AROUND A LOT MORE THAN USUAL: 1
2. FEELING DOWN, DEPRESSED OR HOPELESS: 1
SUM OF ALL RESPONSES TO PHQ QUESTIONS 1-9: 7
7. TROUBLE CONCENTRATING ON THINGS, SUCH AS READING THE NEWSPAPER OR WATCHING TELEVISION: 1
5. POOR APPETITE OR OVEREATING: 0
SUM OF ALL RESPONSES TO PHQ QUESTIONS 1-9: 7
SUM OF ALL RESPONSES TO PHQ QUESTIONS 1-9: 7
6. FEELING BAD ABOUT YOURSELF - OR THAT YOU ARE A FAILURE OR HAVE LET YOURSELF OR YOUR FAMILY DOWN: 1
SUM OF ALL RESPONSES TO PHQ QUESTIONS 1-9: 7
10. IF YOU CHECKED OFF ANY PROBLEMS, HOW DIFFICULT HAVE THESE PROBLEMS MADE IT FOR YOU TO DO YOUR WORK, TAKE CARE OF THINGS AT HOME, OR GET ALONG WITH OTHER PEOPLE: 1

## 2023-05-18 ASSESSMENT — ANXIETY QUESTIONNAIRES
5. BEING SO RESTLESS THAT IT IS HARD TO SIT STILL: 1
1. FEELING NERVOUS, ANXIOUS, OR ON EDGE: 1
3. WORRYING TOO MUCH ABOUT DIFFERENT THINGS: 1
2. NOT BEING ABLE TO STOP OR CONTROL WORRYING: 1
4. TROUBLE RELAXING: 1
IF YOU CHECKED OFF ANY PROBLEMS ON THIS QUESTIONNAIRE, HOW DIFFICULT HAVE THESE PROBLEMS MADE IT FOR YOU TO DO YOUR WORK, TAKE CARE OF THINGS AT HOME, OR GET ALONG WITH OTHER PEOPLE: SOMEWHAT DIFFICULT
GAD7 TOTAL SCORE: 7
6. BECOMING EASILY ANNOYED OR IRRITABLE: 1
7. FEELING AFRAID AS IF SOMETHING AWFUL MIGHT HAPPEN: 1

## 2023-05-18 ASSESSMENT — ENCOUNTER SYMPTOMS
SORE THROAT: 0
COUGH: 1
SINUS PRESSURE: 1

## 2023-05-27 ASSESSMENT — ENCOUNTER SYMPTOMS
SHORTNESS OF BREATH: 0
RHINORRHEA: 1
WHEEZING: 0

## 2023-06-19 ENCOUNTER — OFFICE VISIT (OUTPATIENT)
Dept: ONCOLOGY | Age: 58
End: 2023-06-19
Payer: COMMERCIAL

## 2023-06-19 VITALS
WEIGHT: 189.2 LBS | DIASTOLIC BLOOD PRESSURE: 64 MMHG | BODY MASS INDEX: 30.41 KG/M2 | SYSTOLIC BLOOD PRESSURE: 110 MMHG | HEART RATE: 100 BPM | OXYGEN SATURATION: 95 % | HEIGHT: 66 IN | TEMPERATURE: 97.2 F

## 2023-06-19 DIAGNOSIS — D72.829 LEUKOCYTOSIS, UNSPECIFIED TYPE: Primary | ICD-10-CM

## 2023-06-19 PROCEDURE — G8427 DOCREV CUR MEDS BY ELIG CLIN: HCPCS | Performed by: INTERNAL MEDICINE

## 2023-06-19 PROCEDURE — 1036F TOBACCO NON-USER: CPT | Performed by: INTERNAL MEDICINE

## 2023-06-19 PROCEDURE — 3017F COLORECTAL CA SCREEN DOC REV: CPT | Performed by: INTERNAL MEDICINE

## 2023-06-19 PROCEDURE — G8417 CALC BMI ABV UP PARAM F/U: HCPCS | Performed by: INTERNAL MEDICINE

## 2023-06-19 PROCEDURE — 99214 OFFICE O/P EST MOD 30 MIN: CPT | Performed by: INTERNAL MEDICINE

## 2023-06-19 NOTE — PROGRESS NOTES
05/16/2023    MONOPCT 4 05/16/2023    BASOPCT 1 05/16/2023    NEUTROABS 11.12 (H) 05/16/2023    LYMPHSABS 2.96 05/16/2023    MONOSABS 0.62 05/16/2023    EOSABS 0.45 (H) 05/16/2023    BASOSABS 0.07 05/16/2023         Chemistry        Component Value Date/Time     05/16/2023 0829    K 4.5 05/16/2023 0829     05/16/2023 0829    CO2 26 05/16/2023 0829    BUN 18 05/16/2023 0829    CREATININE 1.41 (H) 05/16/2023 0829        Component Value Date/Time    CALCIUM 9.6 05/16/2023 0829    ALKPHOS 120 (H) 05/16/2023 0829    AST 23 05/16/2023 0829    ALT 24 05/16/2023 0829    BILITOT 0.3 05/16/2023 0829            PATHOLOGY DATA:   Reviewed  IMAGING DATA:      US RETROPERITONEAL COMPLETE  Narrative: EXAMINATION:  RETROPERITONEAL ULTRASOUND OF THE KIDNEYS AND URINARY BLADDER    5/24/2022    COMPARISON:  None    HISTORY:  ORDERING SYSTEM PROVIDED HISTORY: Stage 3b chronic kidney disease ([facility] [address]  TECHNOLOGIST PROVIDED HISTORY:  Renal size  Reason for Exam: CKD    FINDINGS:    Kidneys: The right kidney measures 10.3 cm in length and the left kidney measures 9.7  cm in length. Kidneys demonstrate normal cortical echogenicity. No evidence of  hydronephrosis or intrarenal stones. Bladder:    Urinary bladder not well-distended 44 mL prevoid volume. Right ureteral jet  not demonstrated. Impression: Normal sound kidneys. Normal appearance urinary bladder although not well-distended 44 mL prevoid  volume. ASSESSMENT:    Laura Han is a 65 y.o. female was seen during initial consultation for mild leukocytosis. I reviewed the laboratory data, prior records, imaging studies, discussed possible diagnosis and treatment recommendations. She has mild leukocytosis  Her FISH, flow cytometry and JAK2 mutation has been negative  7 months. WBC ranging between 13-16 K. She does not have anemia or thrombocytopenia. he does not report systemic b symptoms. Her mild leukocytosis is present.   Given the chest

## 2023-07-11 ENCOUNTER — OFFICE VISIT (OUTPATIENT)
Dept: NEPHROLOGY | Age: 58
End: 2023-07-11
Payer: COMMERCIAL

## 2023-07-11 VITALS
BODY MASS INDEX: 30.7 KG/M2 | DIASTOLIC BLOOD PRESSURE: 76 MMHG | SYSTOLIC BLOOD PRESSURE: 126 MMHG | HEART RATE: 72 BPM | WEIGHT: 191 LBS | HEIGHT: 66 IN

## 2023-07-11 DIAGNOSIS — N18.32 STAGE 3B CHRONIC KIDNEY DISEASE (HCC): Primary | ICD-10-CM

## 2023-07-11 DIAGNOSIS — I10 PRIMARY HYPERTENSION: ICD-10-CM

## 2023-07-11 PROCEDURE — 3074F SYST BP LT 130 MM HG: CPT | Performed by: INTERNAL MEDICINE

## 2023-07-11 PROCEDURE — 99214 OFFICE O/P EST MOD 30 MIN: CPT | Performed by: INTERNAL MEDICINE

## 2023-07-11 PROCEDURE — G8417 CALC BMI ABV UP PARAM F/U: HCPCS | Performed by: INTERNAL MEDICINE

## 2023-07-11 PROCEDURE — 3017F COLORECTAL CA SCREEN DOC REV: CPT | Performed by: INTERNAL MEDICINE

## 2023-07-11 PROCEDURE — G8427 DOCREV CUR MEDS BY ELIG CLIN: HCPCS | Performed by: INTERNAL MEDICINE

## 2023-07-11 PROCEDURE — 1036F TOBACCO NON-USER: CPT | Performed by: INTERNAL MEDICINE

## 2023-07-11 PROCEDURE — 3078F DIAST BP <80 MM HG: CPT | Performed by: INTERNAL MEDICINE

## 2023-07-11 RX ORDER — MAGNESIUM OXIDE 400 MG/1
1 TABLET ORAL DAILY
COMMUNITY
Start: 2023-05-18 | End: 2023-07-11

## 2023-07-11 RX ORDER — ALLOPURINOL 100 MG/1
100 TABLET ORAL EVERY MORNING
COMMUNITY
Start: 2023-07-06

## 2023-07-11 RX ORDER — COLCHICINE 0.6 MG/1
0.6 TABLET ORAL DAILY
COMMUNITY
Start: 2023-07-06

## 2023-07-11 NOTE — PROGRESS NOTES
Nephrology Progress Note    Cele Roberts, APRN - CNP      SUBJECTIVE      Chief Complaint   Patient presents with    Chronic Kidney Disease     3mth follow up      Patient was in my clinic today for an ongoing nephrological evaluation. Patient states that she is doing fairly well. She denies any recent changes in her medication. Labs that were ordered on initial evaluation were available. Her most recent creatinine is 1.3 mg/dL with estimated GFR of 42. Renal ultrasound shows right kidney 10.3 cm and left kidney 9.7 cm. There was no increase in cortical echogenicity. Denies any nausea vomiting or diarrhea  No chest pain PND orthopnea. 4/11/2023  Patient was in my clinic today for an ongoing nephrological evaluation. 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 She is taking Inderal with some help. Patient denies any voiding difficulties i.e. urinary frequency urgency or hesitancy. There is no history of consumption of over-the-counter herbal or natural medication. Her blood sugars remain under good control and she takes metformin 1 g tablet with glimepiride. Patient denies consumption of over-the-counter herbal or natural medication. There is no history of nausea vomiting diarrhea hematemesis or melena. 7/11/23  Patient states that she was started on colchicine and allopurinol. She is tolerating well. She continues to have inflammation in her joints as well as in her knees. She was seen by hematologist and she was told that increase in WBC count is due to inflammation. Her blood pressure is under good control. She takes propanolol for her tremor as well as for her blood pressure. Patient states that her blood sugars are under reasonable control. She does not take any Motrin Aleve or ibuprofen.   She denies any consumption of over-the-counter herbal or natural medication  Patient denies any

## 2023-07-13 DIAGNOSIS — M79.7 FIBROMYALGIA: ICD-10-CM

## 2023-07-13 DIAGNOSIS — M25.50 MULTIPLE JOINT PAIN: ICD-10-CM

## 2023-07-13 RX ORDER — TIZANIDINE 4 MG/1
TABLET ORAL
Qty: 90 TABLET | Refills: 1 | Status: SHIPPED | OUTPATIENT
Start: 2023-07-13

## 2023-07-13 NOTE — TELEPHONE ENCOUNTER
Cele called requesting a refill of the below medication which has been pended for you:     Requested Prescriptions     Pending Prescriptions Disp Refills    tiZANidine (ZANAFLEX) 4 MG tablet [Pharmacy Med Name: TIZANIDINE HCL 4 MG TABLET] 90 tablet 1     Sig: take 1 tablet by mouth every 8 hours if needed for muscle spasm       Last Appointment Date: 5/18/2023  Next Appointment Date: 12/5/2023    Allergies   Allergen Reactions    Iodine Rash and Swelling     Were in ER being treated when it happened spent time in hosp.     Cefuroxime Axetil Diarrhea, Hives and Nausea And Vomiting    Ezetimibe Other (See Comments)     Other reaction(s): Muscle Pain    Nsaids Diarrhea, Hives and Nausea And Vomiting    Codeine     E-Mycin [Erythromycin]     Gabapentin     Pcn [Penicillins]     Prednisone Other (See Comments)     Trouble swallowing    Statins      myalgias    Sulfa Antibiotics Hives    Zithromax [Azithromycin]

## 2023-07-20 DIAGNOSIS — J30.9 ALLERGIC RHINITIS, UNSPECIFIED SEASONALITY, UNSPECIFIED TRIGGER: ICD-10-CM

## 2023-07-20 DIAGNOSIS — F32.A DEPRESSION, UNSPECIFIED DEPRESSION TYPE: ICD-10-CM

## 2023-07-20 RX ORDER — DULOXETIN HYDROCHLORIDE 20 MG/1
CAPSULE, DELAYED RELEASE ORAL
Qty: 30 CAPSULE | Refills: 1 | Status: SHIPPED | OUTPATIENT
Start: 2023-07-20 | End: 2023-09-18

## 2023-07-20 RX ORDER — MONTELUKAST SODIUM 10 MG/1
10 TABLET ORAL NIGHTLY
Qty: 90 TABLET | Refills: 1 | OUTPATIENT
Start: 2023-07-20

## 2023-07-20 NOTE — TELEPHONE ENCOUNTER
RR notes, LSS pt      Cele called requesting a refill of the below medication which has been pended for you:     Requested Prescriptions     Pending Prescriptions Disp Refills    DULoxetine (CYMBALTA) 20 MG extended release capsule [Pharmacy Med Name: DULOXETINE HCL DR 20 MG CAP] 30 capsule 0     Sig: take 1 capsule by mouth once daily     Refused Prescriptions Disp Refills    montelukast (SINGULAIR) 10 MG tablet [Pharmacy Med Name: MONTELUKAST SOD 10 MG TABLET] 90 tablet 1     Sig: take 1 tablet by mouth nightly     Refused By: Pearl Urbina     Reason for Refusal: Patient has requested refill too soon       Last Appointment Date: 5/18/2023  Next Appointment Date: 12/5/2023    Allergies   Allergen Reactions    Iodine Rash and Swelling     Were in ER being treated when it happened spent time in hosp.     Cefuroxime Axetil Diarrhea, Hives and Nausea And Vomiting    Ezetimibe Other (See Comments)     Other reaction(s): Muscle Pain    Nsaids Diarrhea, Hives and Nausea And Vomiting    Codeine     E-Mycin [Erythromycin]     Gabapentin     Pcn [Penicillins]     Prednisone Other (See Comments)     Trouble swallowing    Statins      myalgias    Sulfa Antibiotics Hives    Zithromax [Azithromycin]

## 2023-08-07 ENCOUNTER — PATIENT MESSAGE (OUTPATIENT)
Dept: FAMILY MEDICINE CLINIC | Age: 58
End: 2023-08-07

## 2023-08-07 DIAGNOSIS — M79.7 FIBROMYALGIA: Primary | ICD-10-CM

## 2023-08-07 DIAGNOSIS — G62.9 NEUROPATHY: ICD-10-CM

## 2023-08-07 DIAGNOSIS — M25.50 MULTIPLE JOINT PAIN: ICD-10-CM

## 2023-08-07 NOTE — TELEPHONE ENCOUNTER
From: Monse Adkins  To: Jonyjhonatanel Claude  Sent: 8/7/2023 9:17 AM EDT  Subject: Pain    Camila Sandhoff,   The last time I was seen by you we talked about pain management. Well since our last appointment I was diagnosed with Gout and Inflammatory Arthritis. Unfortunately, I cannot take NSAIDS , Cortisone, Steroids or anti-inflammatory medicine. We are working on getting the Gout under control first. Was put on a medication that was a inflammatory and had to go off of it due to side effects. The pain factor the last few days have been off the charts. I have been eating Tylenol and it is not helping. I was wondering if you could call in some Tramadol. Also if you could make a referral to pain management as I am having a hard time functioning with the pain factor lately and am not sleeping due to the pain.      Thank You,     Marta Agarwal

## 2023-08-15 DIAGNOSIS — J30.9 ALLERGIC RHINITIS, UNSPECIFIED SEASONALITY, UNSPECIFIED TRIGGER: ICD-10-CM

## 2023-08-15 DIAGNOSIS — E11.42 TYPE 2 DIABETES MELLITUS WITH DIABETIC POLYNEUROPATHY, WITHOUT LONG-TERM CURRENT USE OF INSULIN (HCC): ICD-10-CM

## 2023-08-15 RX ORDER — MONTELUKAST SODIUM 10 MG/1
10 TABLET ORAL NIGHTLY
Qty: 30 TABLET | OUTPATIENT
Start: 2023-08-15

## 2023-08-15 RX ORDER — GLIMEPIRIDE 1 MG/1
TABLET ORAL
Qty: 90 TABLET | Refills: 1 | OUTPATIENT
Start: 2023-08-15

## 2023-08-15 NOTE — TELEPHONE ENCOUNTER
LSS pt, KB on notes  Cele called requesting a refill of the below medication which has been pended for you:     Requested Prescriptions     Pending Prescriptions Disp Refills    montelukast (SINGULAIR) 10 MG tablet 90 tablet 0     Sig: Take 1 tablet by mouth nightly     Refused Prescriptions Disp Refills    glimepiride (AMARYL) 1 MG tablet [Pharmacy Med Name: GLIMEPIRIDE 1 MG TABLET] 90 tablet 1     Sig: take 1 tablet by mouth every morning before breakfast       Last Appointment Date: 5/25/2022  Next Appointment Date: 12/5/23    Allergies   Allergen Reactions    Iodine Rash and Swelling     Were in ER being treated when it happened spent time in hosp.     Cefuroxime Axetil Diarrhea, Hives and Nausea And Vomiting    Ezetimibe Other (See Comments)     Other reaction(s): Muscle Pain    Nsaids Diarrhea, Hives and Nausea And Vomiting    Codeine     E-Mycin [Erythromycin]     Gabapentin     Pcn [Penicillins]     Prednisone Other (See Comments)     Trouble swallowing    Statins      myalgias    Sulfa Antibiotics Hives    Zithromax [Azithromycin]

## 2023-08-17 RX ORDER — MONTELUKAST SODIUM 10 MG/1
10 TABLET ORAL NIGHTLY
Qty: 90 TABLET | Refills: 0 | Status: SHIPPED | OUTPATIENT
Start: 2023-08-17

## 2023-08-28 ENCOUNTER — OFFICE VISIT (OUTPATIENT)
Dept: PAIN MANAGEMENT | Age: 58
End: 2023-08-28
Payer: COMMERCIAL

## 2023-08-28 VITALS
DIASTOLIC BLOOD PRESSURE: 90 MMHG | OXYGEN SATURATION: 97 % | HEIGHT: 66 IN | BODY MASS INDEX: 30.86 KG/M2 | HEART RATE: 111 BPM | WEIGHT: 192 LBS | SYSTOLIC BLOOD PRESSURE: 138 MMHG

## 2023-08-28 DIAGNOSIS — G25.0 BENIGN ESSENTIAL TREMOR: ICD-10-CM

## 2023-08-28 DIAGNOSIS — G89.29 CHRONIC PAIN OF RIGHT KNEE: Primary | ICD-10-CM

## 2023-08-28 DIAGNOSIS — M47.816 LUMBAR FACET JOINT SYNDROME: ICD-10-CM

## 2023-08-28 DIAGNOSIS — M25.561 CHRONIC PAIN OF RIGHT KNEE: Primary | ICD-10-CM

## 2023-08-28 PROCEDURE — 3017F COLORECTAL CA SCREEN DOC REV: CPT | Performed by: PHYSICAL MEDICINE & REHABILITATION

## 2023-08-28 PROCEDURE — G8427 DOCREV CUR MEDS BY ELIG CLIN: HCPCS | Performed by: PHYSICAL MEDICINE & REHABILITATION

## 2023-08-28 PROCEDURE — 99204 OFFICE O/P NEW MOD 45 MIN: CPT | Performed by: PHYSICAL MEDICINE & REHABILITATION

## 2023-08-28 PROCEDURE — 1036F TOBACCO NON-USER: CPT | Performed by: PHYSICAL MEDICINE & REHABILITATION

## 2023-08-28 PROCEDURE — G8417 CALC BMI ABV UP PARAM F/U: HCPCS | Performed by: PHYSICAL MEDICINE & REHABILITATION

## 2023-08-28 RX ORDER — ACETAMINOPHEN AND CODEINE PHOSPHATE 300; 30 MG/1; MG/1
1 TABLET ORAL EVERY 6 HOURS PRN
Qty: 28 TABLET | Refills: 0 | Status: SHIPPED | OUTPATIENT
Start: 2023-08-28 | End: 2023-09-04

## 2023-08-28 ASSESSMENT — ENCOUNTER SYMPTOMS
RESPIRATORY NEGATIVE: 1
BACK PAIN: 1
DIARRHEA: 0
ALLERGIC/IMMUNOLOGIC NEGATIVE: 1
CONSTIPATION: 0
EYES NEGATIVE: 1

## 2023-08-28 NOTE — PROGRESS NOTES
CREATININE 1.41 (H) 05/16/2023    BUN 18 05/16/2023    CO2 26 05/16/2023    LABA1C 7.9 (H) 05/16/2023       Assessment: This is a 63 y.o. female with the following diagnosis:     Pain Diagnoses:  1. Chronic pain of right knee    2. Lumbar facet joint syndrome    3. Benign essential tremor        Medical/ Psychological Comorbidities:  As listed in the past medical and surgical history    Functional Limitations secondary to the above problems:  Chronic painlimits function and quality of life    Plan:     ConsultOrtho   Tylenol #3  1 po QID prn  try to take  QHS      Meds:   New Prescriptions    ACETAMINOPHEN-CODEINE (TYLENOL/CODEINE #3) 300-30 MG PER TABLET    Take 1 tablet by mouth every 6 hours as needed for Pain for up to 7 days. Intended supply: 3 days. Take lowest dose possible to manage pain Max Daily Amount: 4 tablets      Orders Placed This Encounter   Medications    acetaminophen-codeine (TYLENOL/CODEINE #3) 300-30 MG per tablet     Sig: Take 1 tablet by mouth every 6 hours as needed for Pain for up to 7 days. Intended supply: 3 days. Take lowest dose possible to manage pain Max Daily Amount: 4 tablets     Dispense:  28 tablet     Refill:  0     Reduce doses taken as pain becomes manageable       Controlled Substances Monitoring:    [place]AR[place] report was reviewed for [place], [place]. Pt educated about the risks of taking opiates, including increasedsedation, constipation, slowed breathing, tolerance, dependence, and addiction. Orders Placed This Encounter   Procedures    Angelia Rodriguez MD, Orthopedic Surgery, [place]     Referral Priority:   Routine     Referral Type:   Eval and Treat     Referral Reason:   Specialty Services Required     Referred to Provider:   Stalin Trinh MD     Requested Specialty:   Orthopedic Surgery     Number of Visits Requested:   1     No follow-ups on file. The patient expressed understanding of the above assessment and plan.     Totaltime spent face to

## 2023-09-01 DIAGNOSIS — M25.561 CHRONIC PAIN OF RIGHT KNEE: Primary | ICD-10-CM

## 2023-09-01 DIAGNOSIS — G89.29 CHRONIC PAIN OF RIGHT KNEE: Primary | ICD-10-CM

## 2023-09-12 ENCOUNTER — OFFICE VISIT (OUTPATIENT)
Dept: PRIMARY CARE CLINIC | Age: 58
End: 2023-09-12
Payer: COMMERCIAL

## 2023-09-12 ENCOUNTER — HOSPITAL ENCOUNTER (OUTPATIENT)
Age: 58
Discharge: HOME | End: 2023-09-12
Attending: ORTHOPAEDIC SURGERY
Payer: COMMERCIAL

## 2023-09-12 ENCOUNTER — OFFICE VISIT (OUTPATIENT)
Dept: ORTHOPEDIC SURGERY | Age: 58
End: 2023-09-12
Attending: ORTHOPAEDIC SURGERY
Payer: COMMERCIAL

## 2023-09-12 ENCOUNTER — HOSPITAL ENCOUNTER (OUTPATIENT)
Dept: GENERAL RADIOLOGY | Age: 58
Discharge: HOME | End: 2023-09-14
Attending: ORTHOPAEDIC SURGERY
Payer: COMMERCIAL

## 2023-09-12 VITALS
HEART RATE: 88 BPM | SYSTOLIC BLOOD PRESSURE: 122 MMHG | OXYGEN SATURATION: 97 % | RESPIRATION RATE: 20 BRPM | DIASTOLIC BLOOD PRESSURE: 78 MMHG | BODY MASS INDEX: 30.7 KG/M2 | TEMPERATURE: 98.6 F | WEIGHT: 191 LBS | HEIGHT: 66 IN

## 2023-09-12 VITALS
WEIGHT: 192 LBS | DIASTOLIC BLOOD PRESSURE: 84 MMHG | OXYGEN SATURATION: 98 % | BODY MASS INDEX: 30.86 KG/M2 | RESPIRATION RATE: 18 BRPM | HEIGHT: 66 IN | HEART RATE: 82 BPM | SYSTOLIC BLOOD PRESSURE: 138 MMHG

## 2023-09-12 DIAGNOSIS — G89.29 CHRONIC PAIN OF RIGHT KNEE: ICD-10-CM

## 2023-09-12 DIAGNOSIS — M25.561 CHRONIC PAIN OF RIGHT KNEE: ICD-10-CM

## 2023-09-12 DIAGNOSIS — J01.40 ACUTE NON-RECURRENT PANSINUSITIS: Primary | ICD-10-CM

## 2023-09-12 DIAGNOSIS — T84.84XA PAINFUL ORTHOPAEDIC HARDWARE (HCC): ICD-10-CM

## 2023-09-12 DIAGNOSIS — M17.11 PRIMARY OSTEOARTHRITIS OF RIGHT KNEE: Primary | ICD-10-CM

## 2023-09-12 LAB — URATE SERPL-MCNC: 5.1 MG/DL (ref 2.4–5.7)

## 2023-09-12 PROCEDURE — 1036F TOBACCO NON-USER: CPT | Performed by: NURSE PRACTITIONER

## 2023-09-12 PROCEDURE — 73562 X-RAY EXAM OF KNEE 3: CPT

## 2023-09-12 PROCEDURE — G8417 CALC BMI ABV UP PARAM F/U: HCPCS | Performed by: NURSE PRACTITIONER

## 2023-09-12 PROCEDURE — 1036F TOBACCO NON-USER: CPT

## 2023-09-12 PROCEDURE — 99203 OFFICE O/P NEW LOW 30 MIN: CPT | Performed by: NURSE PRACTITIONER

## 2023-09-12 PROCEDURE — 36415 COLL VENOUS BLD VENIPUNCTURE: CPT

## 2023-09-12 PROCEDURE — 84550 ASSAY OF BLOOD/URIC ACID: CPT

## 2023-09-12 PROCEDURE — 99213 OFFICE O/P EST LOW 20 MIN: CPT

## 2023-09-12 PROCEDURE — G8417 CALC BMI ABV UP PARAM F/U: HCPCS

## 2023-09-12 PROCEDURE — G8427 DOCREV CUR MEDS BY ELIG CLIN: HCPCS | Performed by: NURSE PRACTITIONER

## 2023-09-12 PROCEDURE — G8427 DOCREV CUR MEDS BY ELIG CLIN: HCPCS

## 2023-09-12 PROCEDURE — 3017F COLORECTAL CA SCREEN DOC REV: CPT | Performed by: NURSE PRACTITIONER

## 2023-09-12 PROCEDURE — 3017F COLORECTAL CA SCREEN DOC REV: CPT

## 2023-09-12 RX ORDER — CLARITHROMYCIN 500 MG/1
500 TABLET, COATED ORAL 2 TIMES DAILY
Qty: 20 TABLET | Refills: 0 | Status: SHIPPED | OUTPATIENT
Start: 2023-09-12 | End: 2023-09-22

## 2023-09-12 ASSESSMENT — PATIENT HEALTH QUESTIONNAIRE - PHQ9
2. FEELING DOWN, DEPRESSED OR HOPELESS: 0
SUM OF ALL RESPONSES TO PHQ9 QUESTIONS 1 & 2: 0
1. LITTLE INTEREST OR PLEASURE IN DOING THINGS: 0
SUM OF ALL RESPONSES TO PHQ QUESTIONS 1-9: 0

## 2023-09-12 ASSESSMENT — ENCOUNTER SYMPTOMS
SORE THROAT: 1
RHINORRHEA: 0
SINUS COMPLAINT: 1
SHORTNESS OF BREATH: 0
SINUS PRESSURE: 1
SINUS PAIN: 1
COUGH: 0

## 2023-09-12 ASSESSMENT — VISUAL ACUITY: OU: 1

## 2023-09-12 NOTE — PATIENT INSTRUCTIONS
Biaxin x 10 days  Ensure you are increasing your water intake to ensure hydration and thin secretions. Recommended over the counter medications/treatments: The use of an antihistamine (Claritin or Zyrtec) and a nasal steroid spray (Flonase or Nasacort) may help with sinus congestion and drainage. Mucinex will also help thin secretions and improve congestion. Works best if drinking plenty of water. Honey with or without Lemon may also help with coughing. Probiotics daily may help boost immune system. Alternating hot liquids with cold liquids helps to sooth sore throat  Use Acetaminophen (Tylenol) to help relieve fever, chills or body aches. If allowed, you may alternate using ibuprofen (Motrin) and Tylenol. Do not exceed 3,000mg of Tylenol in a 24 hour time period. Make sure to stay well hydrated by drinking plenty of water. Follow up with primary care provider in 1 to 2 days or as needed if no improvement or worsening of your symptoms.

## 2023-09-12 NOTE — PROGRESS NOTES
[address] [facility] In department of [address]  Phone: [phone]  Fax: [phone]      John Reed is a 63 y.o. female who presents to the [facility] today for her medical conditions/complaints as noted below. Cele Camarena is c/o of Facial Pain (Pt reports frontal HA with throat and ear pain x 4 weeks. )          HPI:     Sinus Problem  This is a new problem. The current episode started 1 to 4 weeks ago (x3-4 weeks). The problem is unchanged. There has been no fever. Her pain is at a severity of 8/10 (in the sinus/headache). The pain is moderate. Associated symptoms include congestion, ear pain, headaches, sinus pressure and a sore throat. Pertinent negatives include no coughing, neck pain or shortness of breath. Treatments tried: mucinex, flonase. The treatment provided moderate relief. Past Medical History:   Diagnosis Date    Allergic rhinitis     Arthritis     Depression     Fibromyalgia     Hyperlipidemia     Neuropathy     Peripheral vascular disease (HCC)     Sacroiliac joint dysfunction     Tremor     Type II or unspecified type diabetes mellitus without mention of complication, not stated as uncontrolled         Allergies   Allergen Reactions    Iodine Rash and Swelling     Were in ER being treated when it happened spent time in hosp.     Shellfish Allergy     Cefuroxime Axetil Diarrhea, Hives and Nausea And Vomiting    Ezetimibe Other (See Comments)     Other reaction(s): Muscle Pain    Nsaids Diarrhea, Hives and Nausea And Vomiting    Adhesive Tape     E-Mycin [Erythromycin]     Gabapentin     Morphine Hives     Itching, burning     Pcn [Penicillins]     Percocet [Oxycodone-Acetaminophen] Other (See Comments)     Severe drowsiness     Prednisone Other (See Comments)     Trouble swallowing    Statins      myalgias    Sulfa Antibiotics Hives    Zithromax [Azithromycin]        Wt Readings from Last 3

## 2023-09-12 NOTE — H&P
2003    [facility] procedure revision       Medications Prior to Admission:   Prior to Admission medications    Medication Sig Start Date End Date Taking? Authorizing Provider   montelukast (SINGULAIR) 10 MG tablet Take 1 tablet by mouth nightly 8/17/23  Yes Liseth Jose DO   DULoxetine (CYMBALTA) 20 MG extended release capsule take 1 capsule by mouth once daily 7/20/23  Yes Tequila Venegas MD   tiZANidine (ZANAFLEX) 4 MG tablet take 1 tablet by mouth every 8 hours if needed for muscle spasm 7/13/23  Yes Katelynn Estrada DO   allopurinol (ZYLOPRIM) 100 MG tablet Take 1 tablet by mouth every morning 7/6/23  Yes Historical Provider, MD   propranolol (INDERAL) 20 MG tablet Take 1 tablet by mouth 2 times daily 5/18/23  Yes AQUILES Perez CNP   magnesium oxide (MAG-OX) 400 (240 Mg) MG tablet Take 1 tablet by mouth daily 5/18/23  Yes AQUILES Perez CNP   metFORMIN (GLUCOPHAGE) 1000 MG tablet take 1 tablet by mouth every morning and evening with meals 2/22/23  Yes AQUILES Perez CNP   albuterol sulfate HFA (VENTOLIN HFA) 108 (90 Base) MCG/ACT inhaler Inhale 2 puffs into the lungs 4 times daily as needed for Wheezing 5/25/22  Yes Katelynn Estrada DO   vitamin B-12 (CYANOCOBALAMIN) 1000 MCG tablet Take 1 tablet by mouth daily 4/28/22  Yes Ariella Smith MD   vitamin D (CHOLECALCIFEROL) 27337 UNIT CAPS daily Unsure of amount 2/23/21  Yes Historical Provider, MD   fexofenadine (ALLEGRA) 180 MG tablet Take 1 tablet by mouth daily   Yes Historical Provider, MD   clarithromycin (BIAXIN) 500 MG tablet Take 1 tablet by mouth 2 times daily for 10 days 9/12/23 9/22/23  AQUILES Holguin CNP   amitriptyline (ELAVIL) 25 MG tablet Take 2 tablets by mouth nightly 5/18/23 9/12/23  AQUILES Perez CNP    Scheduled Meds:  Continuous Infusions:  PRN Meds:.     Allergies:  Iodine, Shellfish allergy, Cefuroxime axetil, Ezetimibe, Nsaids, Adhesive tape, E-mycin

## 2023-09-18 DIAGNOSIS — N18.30 CONTROLLED TYPE 2 DIABETES MELLITUS WITH STAGE 3 CHRONIC KIDNEY DISEASE, WITHOUT LONG-TERM CURRENT USE OF INSULIN (HCC): ICD-10-CM

## 2023-09-18 DIAGNOSIS — E11.22 CONTROLLED TYPE 2 DIABETES MELLITUS WITH STAGE 3 CHRONIC KIDNEY DISEASE, WITHOUT LONG-TERM CURRENT USE OF INSULIN (HCC): ICD-10-CM

## 2023-09-18 DIAGNOSIS — F32.A DEPRESSION, UNSPECIFIED DEPRESSION TYPE: ICD-10-CM

## 2023-09-18 RX ORDER — DULOXETIN HYDROCHLORIDE 20 MG/1
CAPSULE, DELAYED RELEASE ORAL
Qty: 30 CAPSULE | Refills: 0 | Status: SHIPPED | OUTPATIENT
Start: 2023-09-18

## 2023-09-18 NOTE — TELEPHONE ENCOUNTER
Last Appt:  5/18/2023  Next Appt:   10/12/2023  Med verified in [address] [place] [address] 15

## 2023-09-19 ENCOUNTER — PATIENT MESSAGE (OUTPATIENT)
Dept: PAIN MANAGEMENT | Age: 58
End: 2023-09-19

## 2023-09-19 ENCOUNTER — HOSPITAL ENCOUNTER (OUTPATIENT)
Dept: BONE DENSITY | Age: 58
Discharge: HOME | End: 2023-09-21
Payer: COMMERCIAL

## 2023-09-19 DIAGNOSIS — M81.0 OSTEOPOROSIS, UNSPECIFIED OSTEOPOROSIS TYPE, UNSPECIFIED PATHOLOGICAL FRACTURE PRESENCE: ICD-10-CM

## 2023-09-19 DIAGNOSIS — M25.561 CHRONIC PAIN OF RIGHT KNEE: ICD-10-CM

## 2023-09-19 DIAGNOSIS — G89.29 CHRONIC PAIN OF RIGHT KNEE: ICD-10-CM

## 2023-09-19 DIAGNOSIS — E11.42 TYPE 2 DIABETES MELLITUS WITH DIABETIC POLYNEUROPATHY, WITHOUT LONG-TERM CURRENT USE OF INSULIN (HCC): ICD-10-CM

## 2023-09-19 PROCEDURE — 77080 DXA BONE DENSITY AXIAL: CPT

## 2023-09-19 RX ORDER — ACETAMINOPHEN AND CODEINE PHOSPHATE 300; 30 MG/1; MG/1
1 TABLET ORAL EVERY 6 HOURS PRN
Qty: 28 TABLET | Refills: 0 | Status: SHIPPED | OUTPATIENT
Start: 2023-09-19 | End: 2023-09-26

## 2023-09-19 RX ORDER — GLIMEPIRIDE 1 MG/1
1 TABLET ORAL
Qty: 90 TABLET | Refills: 1 | OUTPATIENT
Start: 2023-09-19

## 2023-09-19 NOTE — TELEPHONE ENCOUNTER
From: Rosalie Elizabeth  To: Dr. Amna Garay: 9/19/2023 8:56 AM EDT  Subject: Prescription Refill Tylenol with Codiene     I have exactly 6 days left of this Med. On your office window it said to request refill 7 days before needing refill. Also would liked to tell you that I was seen in Dr. Mark Erickson office and I will be having a full knee replacement in the middle of November.      Thank You,     Gavin Credit

## 2023-09-19 NOTE — TELEPHONE ENCOUNTER
Patient reports she does not take this medication any longer because even when she took half a pill, her blood sugars would drop too low. Does not need refill at this time.

## 2023-09-19 NOTE — TELEPHONE ENCOUNTER
Cele called requesting a refill of the below medication which has been pended for you:     Requested Prescriptions     Pending Prescriptions Disp Refills    acetaminophen-codeine (TYLENOL/CODEINE #3) 300-30 MG per tablet 28 tablet 0     Sig: Take 1 tablet by mouth every 6 hours as needed for Pain for up to 7 days. Intended supply: 3 days. Take lowest dose possible to manage pain Max Daily Amount: 4 tablets       Last Appointment Date: 8/28/2023  Next Appointment Date: 10/27/2023    Allergies   Allergen Reactions    Iodine Rash and Swelling     Were in ER being treated when it happened spent time in hosp. Shellfish Allergy     Cefuroxime Axetil Diarrhea, Hives and Nausea And Vomiting    Ezetimibe Other (See Comments)     Other reaction(s): Muscle Pain    Nsaids Diarrhea, Hives and Nausea And Vomiting    Adhesive Tape     E-Mycin [Erythromycin]     Gabapentin     Morphine Hives     Itching, burning     Pcn [Penicillins]     Percocet [Oxycodone-Acetaminophen] Other (See Comments)     Severe drowsiness     Prednisone Other (See Comments)     Trouble swallowing    Statins      myalgias    Sulfa Antibiotics Hives    Zithromax [Azithromycin]        [facility]  [facility]    [facility] Report checked for [place], [place], and [place]: 08/28/23 #28. Due NOW.

## 2023-09-23 ENCOUNTER — OFFICE VISIT (OUTPATIENT)
Dept: PRIMARY CARE CLINIC | Age: 58
End: 2023-09-23

## 2023-09-23 ENCOUNTER — HOSPITAL ENCOUNTER (OUTPATIENT)
Age: 58
Setting detail: SPECIMEN
Discharge: HOME | End: 2023-09-23

## 2023-09-23 VITALS
WEIGHT: 190 LBS | HEART RATE: 92 BPM | SYSTOLIC BLOOD PRESSURE: 120 MMHG | DIASTOLIC BLOOD PRESSURE: 76 MMHG | BODY MASS INDEX: 30.53 KG/M2 | HEIGHT: 66 IN | TEMPERATURE: 97.6 F

## 2023-09-23 DIAGNOSIS — R73.9 HIGH BLOOD SUGAR: ICD-10-CM

## 2023-09-23 DIAGNOSIS — Z01.818 PREOPERATIVE CLEARANCE: ICD-10-CM

## 2023-09-23 DIAGNOSIS — E78.2 MIXED HYPERLIPIDEMIA: ICD-10-CM

## 2023-09-23 DIAGNOSIS — E11.42 TYPE 2 DIABETES MELLITUS WITH DIABETIC POLYNEUROPATHY, WITHOUT LONG-TERM CURRENT USE OF INSULIN (HCC): Primary | ICD-10-CM

## 2023-09-23 LAB
CHP ED QC CHECK: ABNORMAL
GLUCOSE BLD-MCNC: 180 MG/DL

## 2023-09-23 RX ORDER — PIOGLITAZONEHYDROCHLORIDE 15 MG/1
15 TABLET ORAL DAILY
Qty: 30 TABLET | Refills: 3 | Status: SHIPPED | OUTPATIENT
Start: 2023-09-23

## 2023-09-23 ASSESSMENT — ENCOUNTER SYMPTOMS
SHORTNESS OF BREATH: 0
COUGH: 0
DIARRHEA: 1
CHEST TIGHTNESS: 0
WHEEZING: 0

## 2023-10-05 ENCOUNTER — HOSPITAL ENCOUNTER (OUTPATIENT)
Age: 58
Discharge: HOME | End: 2023-10-05
Payer: COMMERCIAL

## 2023-10-05 DIAGNOSIS — E78.2 MIXED HYPERLIPIDEMIA: ICD-10-CM

## 2023-10-05 DIAGNOSIS — N18.32 STAGE 3B CHRONIC KIDNEY DISEASE (HCC): ICD-10-CM

## 2023-10-05 DIAGNOSIS — E11.42 TYPE 2 DIABETES MELLITUS WITH DIABETIC POLYNEUROPATHY, WITHOUT LONG-TERM CURRENT USE OF INSULIN (HCC): ICD-10-CM

## 2023-10-05 LAB
ANION GAP SERPL CALCULATED.3IONS-SCNC: 13 MMOL/L (ref 9–17)
ANION GAP SERPL CALCULATED.3IONS-SCNC: 13 MMOL/L (ref 9–17)
BASOPHILS # BLD: 0.08 K/UL (ref 0–0.2)
BASOPHILS NFR BLD: 1 % (ref 0–2)
BUN SERPL-MCNC: 14 MG/DL (ref 6–20)
BUN SERPL-MCNC: 14 MG/DL (ref 6–20)
BUN/CREAT SERPL: 12 (ref 9–20)
BUN/CREAT SERPL: 12 (ref 9–20)
CALCIUM SERPL-MCNC: 9.4 MG/DL (ref 8.6–10.4)
CALCIUM SERPL-MCNC: 9.4 MG/DL (ref 8.6–10.4)
CHLORIDE SERPL-SCNC: 101 MMOL/L (ref 98–107)
CHLORIDE SERPL-SCNC: 101 MMOL/L (ref 98–107)
CHOLEST SERPL-MCNC: 174 MG/DL
CHOLESTEROL/HDL RATIO: 5.1
CO2 SERPL-SCNC: 24 MMOL/L (ref 20–31)
CO2 SERPL-SCNC: 24 MMOL/L (ref 20–31)
CREAT SERPL-MCNC: 1.2 MG/DL (ref 0.5–0.9)
CREAT SERPL-MCNC: 1.2 MG/DL (ref 0.5–0.9)
EOSINOPHIL # BLD: 0.5 K/UL (ref 0–0.44)
EOSINOPHILS RELATIVE PERCENT: 4 % (ref 1–4)
ERYTHROCYTE [DISTWIDTH] IN BLOOD BY AUTOMATED COUNT: 13.7 % (ref 11.8–14.4)
ERYTHROCYTE [DISTWIDTH] IN BLOOD BY AUTOMATED COUNT: 13.7 % (ref 11.8–14.4)
GFR SERPL CREATININE-BSD FRML MDRD: 53 ML/MIN/1.73M2
GFR SERPL CREATININE-BSD FRML MDRD: 53 ML/MIN/1.73M2
GLUCOSE SERPL-MCNC: 110 MG/DL (ref 70–99)
GLUCOSE SERPL-MCNC: 110 MG/DL (ref 70–99)
HCT VFR BLD AUTO: 43 % (ref 36.3–47.1)
HCT VFR BLD AUTO: 43 % (ref 36.3–47.1)
HDLC SERPL-MCNC: 34 MG/DL
HGB BLD-MCNC: 14.6 G/DL (ref 11.9–15.1)
HGB BLD-MCNC: 14.6 G/DL (ref 11.9–15.1)
IMM GRANULOCYTES # BLD AUTO: 0.04 K/UL (ref 0–0.3)
IMM GRANULOCYTES NFR BLD: 0 %
LDLC SERPL CALC-MCNC: 75 MG/DL (ref 0–130)
LYMPHOCYTES NFR BLD: 4.43 K/UL (ref 1.1–3.7)
LYMPHOCYTES RELATIVE PERCENT: 37 % (ref 24–43)
MAGNESIUM SERPL-MCNC: 1.8 MG/DL (ref 1.6–2.6)
MCH RBC QN AUTO: 30 PG (ref 25.2–33.5)
MCH RBC QN AUTO: 30 PG (ref 25.2–33.5)
MCHC RBC AUTO-ENTMCNC: 34 G/DL (ref 25.2–33.5)
MCHC RBC AUTO-ENTMCNC: 34 G/DL (ref 25.2–33.5)
MCV RBC AUTO: 88.5 FL (ref 82.6–102.9)
MCV RBC AUTO: 88.5 FL (ref 82.6–102.9)
MONOCYTES NFR BLD: 0.46 K/UL (ref 0.1–1.2)
MONOCYTES NFR BLD: 4 % (ref 3–12)
NEUTROPHILS NFR BLD: 54 % (ref 36–65)
NEUTS SEG NFR BLD: 6.63 K/UL (ref 1.5–8.1)
NRBC BLD-RTO: 0 PER 100 WBC
NRBC BLD-RTO: 0 PER 100 WBC
PLATELET # BLD AUTO: 297 K/UL (ref 138–453)
PLATELET # BLD AUTO: 297 K/UL (ref 138–453)
PMV BLD AUTO: 9.9 FL (ref 8.1–13.5)
PMV BLD AUTO: 9.9 FL (ref 8.1–13.5)
POTASSIUM SERPL-SCNC: 4.3 MMOL/L (ref 3.7–5.3)
POTASSIUM SERPL-SCNC: 4.3 MMOL/L (ref 3.7–5.3)
PTH-INTACT SERPL-MCNC: 65 PG/ML (ref 15–65)
RBC # BLD AUTO: 4.86 M/UL (ref 3.95–5.11)
RBC # BLD AUTO: 4.86 M/UL (ref 3.95–5.11)
SODIUM SERPL-SCNC: 138 MMOL/L (ref 135–144)
SODIUM SERPL-SCNC: 138 MMOL/L (ref 135–144)
TRIGL SERPL-MCNC: 324 MG/DL
WBC OTHER # BLD: 12.1 K/UL (ref 3.5–11.3)
WBC OTHER # BLD: 12.1 K/UL (ref 3.5–11.3)

## 2023-10-05 PROCEDURE — 36415 COLL VENOUS BLD VENIPUNCTURE: CPT

## 2023-10-05 PROCEDURE — 80061 LIPID PANEL: CPT

## 2023-10-05 PROCEDURE — 83970 ASSAY OF PARATHORMONE: CPT

## 2023-10-05 PROCEDURE — 83735 ASSAY OF MAGNESIUM: CPT

## 2023-10-05 PROCEDURE — 85027 COMPLETE CBC AUTOMATED: CPT

## 2023-10-05 PROCEDURE — 80048 BASIC METABOLIC PNL TOTAL CA: CPT

## 2023-10-05 PROCEDURE — 85025 COMPLETE CBC W/AUTO DIFF WBC: CPT

## 2023-10-05 PROCEDURE — 83036 HEMOGLOBIN GLYCOSYLATED A1C: CPT

## 2023-10-06 LAB
EST. AVERAGE GLUCOSE BLD GHB EST-MCNC: 197 MG/DL
HBA1C MFR BLD: 8.5 % (ref 4–6)

## 2023-10-09 DIAGNOSIS — Z01.818 PREOP TESTING: Primary | ICD-10-CM

## 2023-10-09 DIAGNOSIS — M17.11 PRIMARY OSTEOARTHRITIS OF RIGHT KNEE: ICD-10-CM

## 2023-10-09 DIAGNOSIS — T84.84XA PAINFUL ORTHOPAEDIC HARDWARE (HCC): ICD-10-CM

## 2023-10-10 ENCOUNTER — OFFICE VISIT (OUTPATIENT)
Dept: NEPHROLOGY | Age: 58
End: 2023-10-10
Payer: COMMERCIAL

## 2023-10-10 VITALS
HEART RATE: 91 BPM | WEIGHT: 188.8 LBS | RESPIRATION RATE: 18 BRPM | OXYGEN SATURATION: 97 % | SYSTOLIC BLOOD PRESSURE: 124 MMHG | DIASTOLIC BLOOD PRESSURE: 74 MMHG | BODY MASS INDEX: 30.34 KG/M2 | HEIGHT: 66 IN

## 2023-10-10 DIAGNOSIS — N18.31 STAGE 3A CHRONIC KIDNEY DISEASE (HCC): Primary | ICD-10-CM

## 2023-10-10 PROCEDURE — 3017F COLORECTAL CA SCREEN DOC REV: CPT | Performed by: INTERNAL MEDICINE

## 2023-10-10 PROCEDURE — G8427 DOCREV CUR MEDS BY ELIG CLIN: HCPCS | Performed by: INTERNAL MEDICINE

## 2023-10-10 PROCEDURE — G8417 CALC BMI ABV UP PARAM F/U: HCPCS | Performed by: INTERNAL MEDICINE

## 2023-10-10 PROCEDURE — 99214 OFFICE O/P EST MOD 30 MIN: CPT | Performed by: INTERNAL MEDICINE

## 2023-10-10 PROCEDURE — G8484 FLU IMMUNIZE NO ADMIN: HCPCS | Performed by: INTERNAL MEDICINE

## 2023-10-10 PROCEDURE — 1036F TOBACCO NON-USER: CPT | Performed by: INTERNAL MEDICINE

## 2023-10-10 RX ORDER — ACETAMINOPHEN AND CODEINE PHOSPHATE 300; 30 MG/1; MG/1
1 TABLET ORAL EVERY 6 HOURS PRN
COMMUNITY
Start: 2023-08-28

## 2023-10-10 NOTE — PROGRESS NOTES
Nephrology Progress Note    Cele Mcclellan, Aaron Inch, APRN - CNP      SUBJECTIVE      Chief Complaint   Patient presents with    Chronic Kidney Disease     Needs pre- op clearance for Right total knee. Needs a spot urine protein. Had BMP, magnesium, PTH intact     Patient was in my clinic today for an ongoing nephrological evaluation. Patient states that she is doing fairly well. She denies any recent changes in her medication. Labs that were ordered on initial evaluation were available. Her most recent creatinine is 1.3 mg/dL with estimated GFR of 42. Renal ultrasound shows right kidney 10.3 cm and left kidney 9.7 cm. There was no increase in cortical echogenicity. Denies any nausea vomiting or diarrhea  No chest pain PND orthopnea. 4/11/2023  Patient was in my clinic today for an ongoing nephrological evaluation. 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 She is taking Inderal with some help. Patient denies any voiding difficulties i.e. urinary frequency urgency or hesitancy. There is no history of consumption of over-the-counter herbal or natural medication. Her blood sugars remain under good control and she takes metformin 1 g tablet with glimepiride. Patient denies consumption of over-the-counter herbal or natural medication. There is no history of nausea vomiting diarrhea hematemesis or melena. 7/11/23  Patient states that she was started on colchicine and allopurinol. She is tolerating well. She continues to have inflammation in her joints as well as in her knees. She was seen by hematologist and she was told that increase in WBC count is due to inflammation. Her blood pressure is under good control. She takes propanolol for her tremor as well as for her blood pressure. Patient states that her blood sugars are under reasonable control. She does not take any Motrin Aleve or ibuprofen.   She

## 2023-10-12 ENCOUNTER — OFFICE VISIT (OUTPATIENT)
Dept: FAMILY MEDICINE CLINIC | Age: 58
End: 2023-10-12
Payer: COMMERCIAL

## 2023-10-12 VITALS
OXYGEN SATURATION: 97 % | DIASTOLIC BLOOD PRESSURE: 76 MMHG | SYSTOLIC BLOOD PRESSURE: 124 MMHG | HEIGHT: 66 IN | WEIGHT: 189 LBS | BODY MASS INDEX: 30.37 KG/M2 | HEART RATE: 76 BPM

## 2023-10-12 DIAGNOSIS — E78.5 HYPERLIPIDEMIA, UNSPECIFIED HYPERLIPIDEMIA TYPE: ICD-10-CM

## 2023-10-12 DIAGNOSIS — M25.561 CHRONIC PAIN OF RIGHT KNEE: Primary | ICD-10-CM

## 2023-10-12 DIAGNOSIS — E11.9 TYPE 2 DIABETES MELLITUS WITHOUT COMPLICATION, WITHOUT LONG-TERM CURRENT USE OF INSULIN (HCC): ICD-10-CM

## 2023-10-12 DIAGNOSIS — R94.31 ABNORMAL EKG: ICD-10-CM

## 2023-10-12 DIAGNOSIS — G89.29 CHRONIC PAIN OF RIGHT KNEE: Primary | ICD-10-CM

## 2023-10-12 DIAGNOSIS — Z01.818 PRE-OP EXAM: ICD-10-CM

## 2023-10-12 PROCEDURE — G8427 DOCREV CUR MEDS BY ELIG CLIN: HCPCS | Performed by: STUDENT IN AN ORGANIZED HEALTH CARE EDUCATION/TRAINING PROGRAM

## 2023-10-12 PROCEDURE — G8484 FLU IMMUNIZE NO ADMIN: HCPCS | Performed by: STUDENT IN AN ORGANIZED HEALTH CARE EDUCATION/TRAINING PROGRAM

## 2023-10-12 PROCEDURE — 1036F TOBACCO NON-USER: CPT | Performed by: STUDENT IN AN ORGANIZED HEALTH CARE EDUCATION/TRAINING PROGRAM

## 2023-10-12 PROCEDURE — 2022F DILAT RTA XM EVC RTNOPTHY: CPT | Performed by: STUDENT IN AN ORGANIZED HEALTH CARE EDUCATION/TRAINING PROGRAM

## 2023-10-12 PROCEDURE — G8417 CALC BMI ABV UP PARAM F/U: HCPCS | Performed by: STUDENT IN AN ORGANIZED HEALTH CARE EDUCATION/TRAINING PROGRAM

## 2023-10-12 PROCEDURE — 93000 ELECTROCARDIOGRAM COMPLETE: CPT | Performed by: STUDENT IN AN ORGANIZED HEALTH CARE EDUCATION/TRAINING PROGRAM

## 2023-10-12 PROCEDURE — 99214 OFFICE O/P EST MOD 30 MIN: CPT | Performed by: STUDENT IN AN ORGANIZED HEALTH CARE EDUCATION/TRAINING PROGRAM

## 2023-10-12 PROCEDURE — 3052F HG A1C>EQUAL 8.0%<EQUAL 9.0%: CPT | Performed by: STUDENT IN AN ORGANIZED HEALTH CARE EDUCATION/TRAINING PROGRAM

## 2023-10-12 PROCEDURE — 3017F COLORECTAL CA SCREEN DOC REV: CPT | Performed by: STUDENT IN AN ORGANIZED HEALTH CARE EDUCATION/TRAINING PROGRAM

## 2023-10-12 NOTE — PROGRESS NOTES
[address]  [address] [address]  Dept: [phone]  Dept Fax: [phone]  Loc: [phone]      Shmuel Londono is a 63 y.o. female who presents today for:  Chief Complaint   Patient presents with    Pre-op Exam     Right knee replacement         Goals    None         HPI:     HPI  Patient is a 67-year-old female presents to the office today for evaluation of preop examination. Patient has a history of chronic right knee pain. Is planning on undergoing hardware removal from prior partial right knee replacement and total knee replacement at this time. She states that she otherwise is feeling well and denies any other major concerns at this time. Recently had blood work completed that showed elevated hemoglobin A1c at 8.5. She was started on Actos last month while seen in the urgent care. She otherwise denies any chest pain or shortness of breath with exertion. She had an EKG completed for the first time today in our system that showed some inverted T waves in some nonspecific ST changes. Has been cleared by her nephrologist for her surgery.     Past Medical History:   Diagnosis Date    Allergic rhinitis     Arthritis     Depression     Fibromyalgia     Hyperlipidemia     Neuropathy     Peripheral vascular disease (HCC)     Sacroiliac joint dysfunction     Tremor     Type II or unspecified type diabetes mellitus without mention of complication, not stated as uncontrolled       Past Surgical History:   Procedure Laterality Date    BACK SURGERY       SECTION      CHOLECYSTECTOMY      KNEE SURGERY      right    OTHER SURGICAL HISTORY      darrach procedure    OTHER SURGICAL HISTORY      darrach procedure revision     Family History   Problem Relation Age of Onset    Diabetes Mother     Hypertension Mother     Allergies Mother     Hypertension Father     Arthritis Father     Heart Disease
questions answered. Pt voiced understanding. Reviewed health maintenance. Instructed to continue current medications, diet and exercise. Patient agreed with treatment plan. Follow up as directed.      Electronically signed by Rachel Londono DO on 10/12/2023 at 9:37 AM

## 2023-10-13 ASSESSMENT — ENCOUNTER SYMPTOMS
SHORTNESS OF BREATH: 0
NAUSEA: 0
EYE PAIN: 0
DIARRHEA: 0
COUGH: 0
ABDOMINAL PAIN: 0
BLOOD IN STOOL: 0
VOMITING: 0
TROUBLE SWALLOWING: 0
CONSTIPATION: 0

## 2023-10-15 DIAGNOSIS — F32.A DEPRESSION, UNSPECIFIED DEPRESSION TYPE: ICD-10-CM

## 2023-10-16 NOTE — TELEPHONE ENCOUNTER
Cele called requesting a refill of the below medication which has been pended for you:     Requested Prescriptions     Pending Prescriptions Disp Refills    DULoxetine (CYMBALTA) 20 MG extended release capsule [Pharmacy Med Name: DULOXETINE HCL DR 20 MG CAP] 30 capsule 0     Sig: take 1 capsule by mouth once daily       Last Appointment Date: 10/12/2023  Next Appointment Date: 12/05/2023    Allergies   Allergen Reactions    Iodine Rash and Swelling     Were in ER being treated when it happened spent time in hosp.     Shellfish Allergy     Cefuroxime Axetil Diarrhea, Hives and Nausea And Vomiting    Ezetimibe Other (See Comments)     Other reaction(s): Muscle Pain    Nsaids Diarrhea, Hives and Nausea And Vomiting    Adhesive Tape     E-Mycin [Erythromycin]     Gabapentin     Morphine Hives     Itching, burning     Pcn [Penicillins]     Percocet [Oxycodone-Acetaminophen] Other (See Comments)     Severe drowsiness     Prednisone Other (See Comments)     Trouble swallowing    Statins      myalgias    Sulfa Antibiotics Hives    Zithromax [Azithromycin]

## 2023-10-17 ENCOUNTER — OFFICE VISIT (OUTPATIENT)
Dept: CARDIOLOGY | Age: 58
End: 2023-10-17
Payer: COMMERCIAL

## 2023-10-17 VITALS
OXYGEN SATURATION: 99 % | WEIGHT: 187 LBS | BODY MASS INDEX: 30.18 KG/M2 | DIASTOLIC BLOOD PRESSURE: 68 MMHG | SYSTOLIC BLOOD PRESSURE: 118 MMHG | HEART RATE: 84 BPM

## 2023-10-17 DIAGNOSIS — E11.9 CONTROLLED TYPE 2 DIABETES MELLITUS WITHOUT COMPLICATION, UNSPECIFIED WHETHER LONG TERM INSULIN USE (HCC): ICD-10-CM

## 2023-10-17 DIAGNOSIS — E78.5 HYPERLIPIDEMIA, UNSPECIFIED HYPERLIPIDEMIA TYPE: ICD-10-CM

## 2023-10-17 DIAGNOSIS — M79.7 FIBROMYALGIA: Primary | ICD-10-CM

## 2023-10-17 DIAGNOSIS — Z01.810 PRE-OPERATIVE CARDIOVASCULAR EXAMINATION: ICD-10-CM

## 2023-10-17 DIAGNOSIS — E78.00 PURE HYPERCHOLESTEROLEMIA: ICD-10-CM

## 2023-10-17 DIAGNOSIS — F41.9 ANXIETY: ICD-10-CM

## 2023-10-17 DIAGNOSIS — Z87.891 FORMER TOBACCO USE: ICD-10-CM

## 2023-10-17 DIAGNOSIS — E66.09 CLASS 1 OBESITY DUE TO EXCESS CALORIES WITH SERIOUS COMORBIDITY IN ADULT, UNSPECIFIED BMI: ICD-10-CM

## 2023-10-17 DIAGNOSIS — R94.31 ABNORMAL ECG: ICD-10-CM

## 2023-10-17 DIAGNOSIS — I10 HYPERTENSION, ESSENTIAL: ICD-10-CM

## 2023-10-17 PROBLEM — E66.811 CLASS 1 OBESITY DUE TO EXCESS CALORIES WITH SERIOUS COMORBIDITY IN ADULT: Status: ACTIVE | Noted: 2023-10-17

## 2023-10-17 PROCEDURE — G8427 DOCREV CUR MEDS BY ELIG CLIN: HCPCS | Performed by: INTERNAL MEDICINE

## 2023-10-17 PROCEDURE — G8417 CALC BMI ABV UP PARAM F/U: HCPCS | Performed by: INTERNAL MEDICINE

## 2023-10-17 PROCEDURE — 2022F DILAT RTA XM EVC RTNOPTHY: CPT | Performed by: INTERNAL MEDICINE

## 2023-10-17 PROCEDURE — 3074F SYST BP LT 130 MM HG: CPT | Performed by: INTERNAL MEDICINE

## 2023-10-17 PROCEDURE — 3052F HG A1C>EQUAL 8.0%<EQUAL 9.0%: CPT | Performed by: INTERNAL MEDICINE

## 2023-10-17 PROCEDURE — G8484 FLU IMMUNIZE NO ADMIN: HCPCS | Performed by: INTERNAL MEDICINE

## 2023-10-17 PROCEDURE — 3017F COLORECTAL CA SCREEN DOC REV: CPT | Performed by: INTERNAL MEDICINE

## 2023-10-17 PROCEDURE — 3078F DIAST BP <80 MM HG: CPT | Performed by: INTERNAL MEDICINE

## 2023-10-17 PROCEDURE — 99214 OFFICE O/P EST MOD 30 MIN: CPT | Performed by: INTERNAL MEDICINE

## 2023-10-17 PROCEDURE — 1036F TOBACCO NON-USER: CPT | Performed by: INTERNAL MEDICINE

## 2023-10-17 RX ORDER — DULOXETIN HYDROCHLORIDE 20 MG/1
CAPSULE, DELAYED RELEASE ORAL
Qty: 30 CAPSULE | Refills: 2 | Status: SHIPPED | OUTPATIENT
Start: 2023-10-17

## 2023-10-17 NOTE — PROGRESS NOTES
Cardiology Consultation  [facility]      10/17/23      CC & HPI:  Livier Barba  is doing well from a cardiac standpoint. Good functional capacity with no significant change in functional capacity. No chest pain, no dyspnea, no PND, no syncope or pre-syncope, no orthopnea. No symptoms of CHF or angina/chest pain.     Past Medical History:   Diagnosis Date    Allergic rhinitis     Arthritis     Depression     Fibromyalgia     Hyperlipidemia     Neuropathy     Peripheral vascular disease (HCC)     Sacroiliac joint dysfunction     Tremor     Type II or unspecified type diabetes mellitus without mention of complication, not stated as uncontrolled        Past Surgical History:   Procedure Laterality Date    BACK SURGERY       SECTION      CHOLECYSTECTOMY      KNEE SURGERY      right    OTHER SURGICAL HISTORY      darrach procedure    OTHER SURGICAL HISTORY      darrach procedure revision       Family History   Problem Relation Age of Onset    Diabetes Mother     Hypertension Mother     Allergies Mother     Hypertension Father     Arthritis Father     Heart Disease Neg Hx        Social History     Socioeconomic History    Marital status:    Tobacco Use    Smoking status: Former     Packs/day: 0.50     Years: 7.00     Additional pack years: 0.00     Total pack years: 3.50     Types: Cigarettes     Quit date: 2000     Years since quittin.4    Smokeless tobacco: Never   Vaping Use    Vaping Use: Never used   Substance and Sexual Activity    Alcohol use: No    Drug use: No     Social Determinants of Health     Financial Resource Strain: Low Risk  (2023)    Overall Financial Resource Strain (CARDIA)     Difficulty of Paying Living Expenses: Not hard at all   Food Insecurity: No Food Insecurity (2023)    Hunger Vital Sign     Worried About Running Out of Food in the Last Year: Never true     Ran Out of Food in the Last Year: Never true   Transportation Needs:

## 2023-10-18 ENCOUNTER — TELEPHONE (OUTPATIENT)
Dept: ORTHOPEDIC SURGERY | Age: 58
End: 2023-10-18

## 2023-10-18 NOTE — TELEPHONE ENCOUNTER
Patient was scheduled for right tka on 10/16/23 but was cancelled due to need for further cardiac clearance, she will call and reschedule when cleared.

## 2023-10-19 ENCOUNTER — PATIENT MESSAGE (OUTPATIENT)
Dept: PAIN MANAGEMENT | Age: 58
End: 2023-10-19

## 2023-10-19 DIAGNOSIS — M25.561 CHRONIC PAIN OF RIGHT KNEE: ICD-10-CM

## 2023-10-19 DIAGNOSIS — G89.29 CHRONIC PAIN OF RIGHT KNEE: ICD-10-CM

## 2023-10-19 RX ORDER — ACETAMINOPHEN AND CODEINE PHOSPHATE 300; 30 MG/1; MG/1
1 TABLET ORAL EVERY 6 HOURS PRN
Qty: 28 TABLET | Refills: 0 | Status: SHIPPED | OUTPATIENT
Start: 2023-10-19 | End: 2023-10-26

## 2023-10-19 NOTE — TELEPHONE ENCOUNTER
Cele called requesting a refill of the below medication which has been pended for you:     Requested Prescriptions     Pending Prescriptions Disp Refills    acetaminophen-codeine (TYLENOL/CODEINE #3) 300-30 MG per tablet 28 tablet 0     Sig: Take 1 tablet by mouth every 6 hours as needed for Pain for up to 7 days. Intended supply: 3 days. Take lowest dose possible to manage pain Max Daily Amount: 4 tablets       Last Appointment Date: 8/28/2023  Next Appointment Date: 10/27/2023    Allergies   Allergen Reactions    Iodine Rash and Swelling     Were in ER being treated when it happened spent time in hosp. Shellfish Allergy     Cefuroxime Axetil Diarrhea, Hives and Nausea And Vomiting    Ezetimibe Other (See Comments)     Other reaction(s): Muscle Pain    Nsaids Diarrhea, Hives and Nausea And Vomiting    Adhesive Tape     E-Mycin [Erythromycin]     Gabapentin     Morphine Hives     Itching, burning     Pcn [Penicillins]     Percocet [Oxycodone-Acetaminophen] Other (See Comments)     Severe drowsiness     Prednisone Other (See Comments)     Trouble swallowing    Statins      myalgias    Sulfa Antibiotics Hives    Zithromax [Azithromycin]        [facility]    [facility] for [place], [place], and [place]:Tylenol #3 09/19/23 #28. Due NOW.

## 2023-10-23 ENCOUNTER — HOSPITAL ENCOUNTER (OUTPATIENT)
Age: 58
Discharge: HOME | End: 2023-10-25
Attending: INTERNAL MEDICINE
Payer: COMMERCIAL

## 2023-10-23 VITALS
SYSTOLIC BLOOD PRESSURE: 141 MMHG | BODY MASS INDEX: 30.53 KG/M2 | HEART RATE: 75 BPM | HEIGHT: 66 IN | DIASTOLIC BLOOD PRESSURE: 87 MMHG | WEIGHT: 190 LBS

## 2023-10-23 DIAGNOSIS — E78.5 HYPERLIPIDEMIA, UNSPECIFIED HYPERLIPIDEMIA TYPE: ICD-10-CM

## 2023-10-23 DIAGNOSIS — E66.09 CLASS 1 OBESITY DUE TO EXCESS CALORIES WITH SERIOUS COMORBIDITY IN ADULT, UNSPECIFIED BMI: ICD-10-CM

## 2023-10-23 DIAGNOSIS — F41.9 ANXIETY: ICD-10-CM

## 2023-10-23 DIAGNOSIS — R94.31 ABNORMAL ECG: ICD-10-CM

## 2023-10-23 DIAGNOSIS — Z01.810 PRE-OPERATIVE CARDIOVASCULAR EXAMINATION: ICD-10-CM

## 2023-10-23 DIAGNOSIS — M79.7 FIBROMYALGIA: ICD-10-CM

## 2023-10-23 DIAGNOSIS — I10 HYPERTENSION, ESSENTIAL: ICD-10-CM

## 2023-10-23 DIAGNOSIS — E78.00 PURE HYPERCHOLESTEROLEMIA: ICD-10-CM

## 2023-10-23 DIAGNOSIS — E11.9 CONTROLLED TYPE 2 DIABETES MELLITUS WITHOUT COMPLICATION, UNSPECIFIED WHETHER LONG TERM INSULIN USE (HCC): ICD-10-CM

## 2023-10-23 DIAGNOSIS — Z87.891 FORMER TOBACCO USE: ICD-10-CM

## 2023-10-23 LAB
ECHO AO ASC DIAM: 3.5 CM
ECHO AO ASCENDING AORTA INDEX: 1.79 CM/M2
ECHO AO ROOT DIAM: 3.1 CM
ECHO AO ROOT INDEX: 1.58 CM/M2
ECHO AV AREA PEAK VELOCITY: 1.6 CM2
ECHO AV AREA/BSA PEAK VELOCITY: 0.8 CM2/M2
ECHO AV PEAK GRADIENT: 5 MMHG
ECHO AV PEAK VELOCITY: 1.1 M/S
ECHO AV VELOCITY RATIO: 0.45
ECHO BSA: 2 M2
ECHO EST RA PRESSURE: 3 MMHG
ECHO LA AREA 4C: 12.6 CM2
ECHO LA DIAMETER INDEX: 1.68 CM/M2
ECHO LA DIAMETER: 3.3 CM
ECHO LA MAJOR AXIS: 5.8 CM
ECHO LA TO AORTIC ROOT RATIO: 1.06
ECHO LA VOL 4C: 21 ML (ref 22–52)
ECHO LA VOLUME INDEX A4C: 11 ML/M2 (ref 16–34)
ECHO LV E' LATERAL VELOCITY: 5 CM/S
ECHO LV E' SEPTAL VELOCITY: 4 CM/S
ECHO LV FRACTIONAL SHORTENING: 20 % (ref 28–44)
ECHO LV INTERNAL DIMENSION DIASTOLE INDEX: 2.76 CM/M2
ECHO LV INTERNAL DIMENSION DIASTOLIC: 5.4 CM (ref 3.9–5.3)
ECHO LV INTERNAL DIMENSION SYSTOLIC INDEX: 2.19 CM/M2
ECHO LV INTERNAL DIMENSION SYSTOLIC: 4.3 CM
ECHO LV ISOVOLUMETRIC RELAXATION TIME (IVRT): 102 MS
ECHO LV IVSD: 1.1 CM (ref 0.6–0.9)
ECHO LV MASS 2D: 279.8 G (ref 67–162)
ECHO LV MASS INDEX 2D: 142.8 G/M2 (ref 43–95)
ECHO LV POSTERIOR WALL DIASTOLIC: 1.4 CM (ref 0.6–0.9)
ECHO LV RELATIVE WALL THICKNESS RATIO: 0.52
ECHO LVOT AREA: 3.8 CM2
ECHO LVOT DIAM: 2.2 CM
ECHO LVOT PEAK GRADIENT: 1 MMHG
ECHO LVOT PEAK VELOCITY: 0.5 M/S
ECHO MV A VELOCITY: 0.9 M/S
ECHO MV E DECELERATION TIME (DT): 246 MS
ECHO MV E VELOCITY: 0.49 M/S
ECHO MV E/A RATIO: 0.54
ECHO MV E/E' LATERAL: 9.8
ECHO MV E/E' RATIO (AVERAGED): 11.03
ECHO MV E/E' SEPTAL: 12.25
ECHO MV MAX VELOCITY: 0.8 M/S
ECHO MV PEAK GRADIENT: 3 MMHG
ECHO PV MAX VELOCITY: 0.8 M/S
ECHO PV PEAK GRADIENT: 3 MMHG
ECHO TV PEAK GRADIENT: 2 MMHG

## 2023-10-23 PROCEDURE — 93306 TTE W/DOPPLER COMPLETE: CPT

## 2023-10-23 PROCEDURE — 93306 TTE W/DOPPLER COMPLETE: CPT | Performed by: INTERNAL MEDICINE

## 2023-10-26 ENCOUNTER — TELEPHONE (OUTPATIENT)
Dept: CARDIOLOGY | Age: 58
End: 2023-10-26

## 2023-10-26 DIAGNOSIS — R93.1 ABNORMAL ECHOCARDIOGRAM: Primary | ICD-10-CM

## 2023-10-26 NOTE — TELEPHONE ENCOUNTER
Regarding: FW: Test Result   Contact: [phone]        ----- Message -----  From: Norbert Cruz LPN  Sent: 46/49/1438   1:44 PM EDT  To: Michael Weiss MD  Subject: Test Result                                      ----- Message from Norbert Cruz LPN sent at 13/94/9326  1:44 PM EDT -----  Dr Sujey Santos can you advise?     ----- Message from Ashely Serrano to Howard Jones DO sent at 10/24/2023  1:40 PM -----   I saw that my test result came back Abnormal on the Heart Ultrasound. I was wondering if I need to make a follow up appointment or can I be cleared for knee replacement surgery. Please let me know what I need to do.      Thank You,     Bryson Lange

## 2023-10-27 ENCOUNTER — OFFICE VISIT (OUTPATIENT)
Dept: PRIMARY CARE CLINIC | Age: 58
End: 2023-10-27
Payer: COMMERCIAL

## 2023-10-27 ENCOUNTER — HOSPITAL ENCOUNTER (OUTPATIENT)
Age: 58
Setting detail: SPECIMEN
Discharge: HOME | End: 2023-10-27
Payer: COMMERCIAL

## 2023-10-27 ENCOUNTER — OFFICE VISIT (OUTPATIENT)
Dept: PAIN MANAGEMENT | Age: 58
End: 2023-10-27
Payer: COMMERCIAL

## 2023-10-27 VITALS
HEART RATE: 97 BPM | OXYGEN SATURATION: 93 % | HEIGHT: 66 IN | DIASTOLIC BLOOD PRESSURE: 96 MMHG | SYSTOLIC BLOOD PRESSURE: 156 MMHG | RESPIRATION RATE: 16 BRPM | WEIGHT: 186 LBS | BODY MASS INDEX: 29.89 KG/M2

## 2023-10-27 VITALS
TEMPERATURE: 97.8 F | BODY MASS INDEX: 31.32 KG/M2 | HEIGHT: 65 IN | WEIGHT: 188 LBS | DIASTOLIC BLOOD PRESSURE: 88 MMHG | HEART RATE: 93 BPM | SYSTOLIC BLOOD PRESSURE: 140 MMHG | OXYGEN SATURATION: 96 %

## 2023-10-27 DIAGNOSIS — G89.29 CHRONIC PAIN OF RIGHT KNEE: ICD-10-CM

## 2023-10-27 DIAGNOSIS — M96.1 POST LAMINECTOMY SYNDROME: ICD-10-CM

## 2023-10-27 DIAGNOSIS — M25.561 CHRONIC PAIN OF RIGHT KNEE: Primary | ICD-10-CM

## 2023-10-27 DIAGNOSIS — M17.11 LOCALIZED OSTEOARTHRITIS OF RIGHT KNEE: ICD-10-CM

## 2023-10-27 DIAGNOSIS — Z02.83 ENCOUNTER FOR DRUG SCREENING: ICD-10-CM

## 2023-10-27 DIAGNOSIS — G89.29 CHRONIC PAIN OF RIGHT KNEE: Primary | ICD-10-CM

## 2023-10-27 DIAGNOSIS — M25.561 CHRONIC PAIN OF RIGHT KNEE: ICD-10-CM

## 2023-10-27 DIAGNOSIS — Z79.891 ENCOUNTER FOR LONG-TERM OPIATE ANALGESIC USE: ICD-10-CM

## 2023-10-27 DIAGNOSIS — J01.40 ACUTE NON-RECURRENT PANSINUSITIS: Primary | ICD-10-CM

## 2023-10-27 PROCEDURE — 1036F TOBACCO NON-USER: CPT | Performed by: FAMILY MEDICINE

## 2023-10-27 PROCEDURE — 3074F SYST BP LT 130 MM HG: CPT | Performed by: FAMILY MEDICINE

## 2023-10-27 PROCEDURE — 3077F SYST BP >= 140 MM HG: CPT | Performed by: PHYSICAL MEDICINE & REHABILITATION

## 2023-10-27 PROCEDURE — 99214 OFFICE O/P EST MOD 30 MIN: CPT | Performed by: PHYSICAL MEDICINE & REHABILITATION

## 2023-10-27 PROCEDURE — G8484 FLU IMMUNIZE NO ADMIN: HCPCS | Performed by: FAMILY MEDICINE

## 2023-10-27 PROCEDURE — G0481 DRUG TEST DEF 8-14 CLASSES: HCPCS

## 2023-10-27 PROCEDURE — G8417 CALC BMI ABV UP PARAM F/U: HCPCS | Performed by: PHYSICAL MEDICINE & REHABILITATION

## 2023-10-27 PROCEDURE — 80307 DRUG TEST PRSMV CHEM ANLYZR: CPT

## 2023-10-27 PROCEDURE — 3078F DIAST BP <80 MM HG: CPT | Performed by: FAMILY MEDICINE

## 2023-10-27 PROCEDURE — G8427 DOCREV CUR MEDS BY ELIG CLIN: HCPCS | Performed by: FAMILY MEDICINE

## 2023-10-27 PROCEDURE — 1036F TOBACCO NON-USER: CPT | Performed by: PHYSICAL MEDICINE & REHABILITATION

## 2023-10-27 PROCEDURE — 3080F DIAST BP >= 90 MM HG: CPT | Performed by: PHYSICAL MEDICINE & REHABILITATION

## 2023-10-27 PROCEDURE — 99214 OFFICE O/P EST MOD 30 MIN: CPT | Performed by: FAMILY MEDICINE

## 2023-10-27 PROCEDURE — 3017F COLORECTAL CA SCREEN DOC REV: CPT | Performed by: FAMILY MEDICINE

## 2023-10-27 PROCEDURE — G8427 DOCREV CUR MEDS BY ELIG CLIN: HCPCS | Performed by: PHYSICAL MEDICINE & REHABILITATION

## 2023-10-27 PROCEDURE — G8484 FLU IMMUNIZE NO ADMIN: HCPCS | Performed by: PHYSICAL MEDICINE & REHABILITATION

## 2023-10-27 PROCEDURE — 3017F COLORECTAL CA SCREEN DOC REV: CPT | Performed by: PHYSICAL MEDICINE & REHABILITATION

## 2023-10-27 PROCEDURE — G8417 CALC BMI ABV UP PARAM F/U: HCPCS | Performed by: FAMILY MEDICINE

## 2023-10-27 RX ORDER — LEVOFLOXACIN 500 MG/1
500 TABLET, FILM COATED ORAL DAILY
Qty: 10 TABLET | Refills: 0 | Status: SHIPPED | OUTPATIENT
Start: 2023-10-27 | End: 2023-11-06

## 2023-10-27 ASSESSMENT — ENCOUNTER SYMPTOMS
DIARRHEA: 0
SORE THROAT: 1
CONSTIPATION: 0
EYES NEGATIVE: 1
BACK PAIN: 1
RESPIRATORY NEGATIVE: 1
ALLERGIC/IMMUNOLOGIC NEGATIVE: 1
SINUS COMPLAINT: 1
COUGH: 1

## 2023-10-27 NOTE — PROGRESS NOTES
10/27/2023     Cele Camarena (:  1965) is a 63 y.o. female, here for evaluation of the following medical concerns:    Sinus Problem  This is a new problem. The current episode started 1 to 4 weeks ago (2 and a half weeks duration). The problem has been gradually worsening since onset. There has been no fever. Associated symptoms include congestion, coughing, ear pain (itchy), sinus pressure and a sore throat (slightly). Pertinent negatives include no chills, headaches, neck pain or shortness of breath. (Did do a covid test at home which was negative.) Treatments tried: mucinex (but has raised her blood pressure). The treatment provided no relief. Did review patient's med list, allergies, social history,pmhx and pshx today as noted in the record. Review of Systems   Constitutional:  Negative for chills, fatigue and fever. HENT:  Positive for congestion, ear pain (itchy), postnasal drip, rhinorrhea, sinus pressure, sinus pain and sore throat (slightly). Negative for trouble swallowing. Eyes:  Negative for discharge and redness. Respiratory:  Positive for cough. Negative for shortness of breath and wheezing. Cardiovascular:  Negative for chest pain. Gastrointestinal:  Negative for abdominal pain, constipation, diarrhea, nausea and vomiting. Genitourinary:  Negative for dysuria, flank pain, frequency and urgency. Musculoskeletal:  Negative for arthralgias, myalgias and neck pain. Skin:  Negative for rash and wound. Allergic/Immunologic: Negative for environmental allergies. Neurological:  Negative for dizziness, weakness, light-headedness and headaches. Hematological:  Negative for adenopathy. Psychiatric/Behavioral: Negative. Prior to Visit Medications    Medication Sig Taking?  Authorizing Provider   DULoxetine (CYMBALTA) 20 MG extended release capsule take 1 capsule by mouth once daily Yes Francie-Dorinda Gale, APRN - CNP   acetaminophen-codeine (TYLENOL

## 2023-10-27 NOTE — PROGRESS NOTES
PAIN MANAGEMENT FOLLOW-UP NOTE  10/27/23    CHIEF COMPLAINT: This is a65 y.o. female patientwho returns to the [facility] with a history of Back Pain (lower)      PAIN HPI:Cele Weinstein returns today for  reevaluation. Since the visit, the patient reports that the pain is not changed. B lumbar  pain    R knee pain     Ortho  recs conversion to  Total Knee  Has to have Cardiac eval   Allergy to steroids  non steroid           Location: Back  Location Modifier: entire back  Severity of Pain: 5  Duration of Pain: Intermittent  Frequency of Pain: Intermittent  Aggravating Factors:  -  Limiting Behavior: Twisting  Relieving Factors: relaxation        Previous pain medication trials have included:          Mental health:    Patient feels - secondary to their current pain problems as described above. H/O depression and anxiety: No   Patient is not seeing psychologist orpsychiatrist   Abuse history? No    Employed? No    ANALGESIA:   Are your Current Pain medication (s) helping to decrease pain? No.   Current Pain score:      ADVERSE AFFECTS:   Medication Side Effects: No.    ACTIVITY:  Are you able to be more active with your pain medications? No      ABERRANT BEHAVIORS SINCE LAST VISIT? No    Review of Systems   Constitutional:  Positive for fatigue. HENT: Negative. Eyes: Negative. Respiratory: Negative. Cardiovascular: Negative. Gastrointestinal:  Negative for constipation and diarrhea. Endocrine: Negative. Genitourinary:  Negative for difficulty urinating and flank pain. Musculoskeletal:  Positive for back pain and myalgias. Skin: Negative. Allergic/Immunologic: Negative. Neurological:  Positive for weakness and numbness. Hematological: Negative. Psychiatric/Behavioral:  Positive for sleep disturbance. Allergies   Allergen Reactions    Iodine Rash and Swelling     Were in ER being treated when it happened spent time in hosp.     Shellfish Allergy

## 2023-10-30 LAB
6-ACETYLMORPHINE, UR: NOT DETECTED
7-AMINOCLONAZEPAM, URINE: NOT DETECTED
ALPHA-OH-ALPRAZ, URINE: NOT DETECTED
ALPHA-OH-MIDAZOLAM, URINE: NOT DETECTED
ALPRAZOLAM, URINE: NOT DETECTED
AMPHETAMINES, URINE: NOT DETECTED
BARBITURATES, URINE: NOT DETECTED
BENZOYLECGONINE, UR: NOT DETECTED
BUPRENORPHINE URINE: NOT DETECTED
CARISOPRODOL, UR: NOT DETECTED
CLONAZEPAM, URINE: NOT DETECTED
CODEINE, URINE: PRESENT
CREATININE URINE: 51.9 MG/DL (ref 20–400)
DIAZEPAM, URINE: NOT DETECTED
EER PAIN MGT DRUG PANEL, HIGH RES/EMIT U: NORMAL
ETHYL GLUCURONIDE UR: NOT DETECTED
FENTANYL URINE: NOT DETECTED
GABAPENTIN: NOT DETECTED
HYDROCODONE, OPI6M: NOT DETECTED
HYDROMORPHONE, OPI3M: NOT DETECTED
LORAZEPAM, URINE: NOT DETECTED
MARIJUANA METAB, UR: NOT DETECTED
MDA, UR: NOT DETECTED
MDEA, EVE, UR: NOT DETECTED
MDMA URINE: NOT DETECTED
MEPERIDINE METAB, UR: NOT DETECTED
METHADONE, URINE: NOT DETECTED
METHAMPHETAMINE, URINE: NOT DETECTED
METHYLPHENIDATE: NOT DETECTED
MIDAZOLAM, URINE: NOT DETECTED
MORPHINE, OPI1M: PRESENT
NALOXONE URINE: NOT DETECTED
NORBUPRENORPHINE, URINE: NOT DETECTED
NORDIAZEPAM, URINE: NOT DETECTED
NORFENTANYL, URINE: NOT DETECTED
NORHYDROCODONE, URINE: NOT DETECTED
NOROXYCODONE, URINE: NOT DETECTED
NOROXYMORPHONE, URINE: NOT DETECTED
OXAZEPAM, URINE: NOT DETECTED
OXYCODONE URINE: NOT DETECTED
OXYMORPHONE, URINE: NOT DETECTED
PAIN MGT DRUG PANEL, HI RES, UR: NORMAL
PCP,URINE: NOT DETECTED
PHENTERMINE, UR: NOT DETECTED
PREGABALIN: NOT DETECTED
TAPENTADOL, URINE: NOT DETECTED
TAPENTADOL-O-SULFATE, URINE: NOT DETECTED
TEMAZEPAM, URINE: NOT DETECTED
TRAMADOL, URINE: NOT DETECTED
ZOLPIDEM METABOLITE (ZCA), URINE: NOT DETECTED
ZOLPIDEM, URINE: NOT DETECTED

## 2023-10-30 ASSESSMENT — ENCOUNTER SYMPTOMS
SINUS PRESSURE: 1
EYE DISCHARGE: 0
NAUSEA: 0
RHINORRHEA: 1
ABDOMINAL PAIN: 0
SINUS PAIN: 1
VOMITING: 0
SHORTNESS OF BREATH: 0
WHEEZING: 0
EYE REDNESS: 0
TROUBLE SWALLOWING: 0
CONSTIPATION: 0
DIARRHEA: 0

## 2023-10-30 NOTE — TELEPHONE ENCOUNTER
Patient returned call to office. Patient informed of echo results and of need for cath prior to obtaining clearance for knee surgery. Patient stated she will do the CATH and is okay with it. Pt was advised to go to ER if develops any chest pains or worsening sob. Patient verbalized understanding. Advised pt that [facility] will call her to schedule the cath once approval is received from her insurance company and to await call from  to schedule the cath.

## 2023-10-30 NOTE — TELEPHONE ENCOUNTER
The following orders will be implemented. Orders with a [] are choices and are NOT implemented unless the box is checked. Pre-Procedure Orders    Cele Camarena     1965     Diagnosis:      Procedure scheduled for:   Procedure ordered was discussed with the patient  including risks, benefits and alternative therapies. [] Left Heart Catheterization and coronary angiography    [x] Left Heart Catheterization and coronary angiography w/possible PCI/Coronary Stent PCI    [] Right Heart Catheterization  [] With Flolan  [] Aortic Root    [] LESLIE  [] Cardioversion    [] EPS  [] Tilt Table Study per guidelines  [] With Isuprel  [] With NTG    [] Without medication    [] ILR[]    ICD: [] Replacement  [] New implant  [] Lead Extraction    Permanent Pacemaker:  [] Replacement  [] New Implant    [] Lead Extraction    Irrigation:  [] Vancomycin 1 gram ( Check danika for PPM/ICD)    Ablation:   [] CRYO  [] SVT  [] A-Fib  [] A-Flutter  [] PVC  [] VT    [] With Anesthesia  [] W/O Anesthesia [] With EMELYN  [] W/O EMELYN     [] With CARTO  [] W/O CARTO    ORDERS    [] EKG   [] Point of care testing  [] Urine Pregnancy  [] PLT    [] PT/INR if on Coumadin  [] NA, K, Glucose, CL, Creat, Calc, HBG/HCT    IV:  [x] Start Peripheral IV: [x] N/S at   80  ml/hr. [] D5 1/2 N/S at     ml/hr.      Patient has history of contrast reaction; give below meds as prophylaxis 30-60 min prior to procedure:    [] Benadryl (diphenhydramine) 25 mg IV x 1 dose    [] Benadryl (diphenhydramine) 50 mg IV x 1 dose    [] Solu-Medrol (methylprednisolone) 125 mg IV x 1 dose    [] Hydrocortisone (SOLU-CORTEF) 100 mg. IV x 1 dose    [] Lidocaine 1% 0.4 ml subq for IV start    Electronically signed by provider ___Blanca Grier DO  _____________10/30/2023 10:46 AM

## 2023-11-04 DIAGNOSIS — J30.9 ALLERGIC RHINITIS, UNSPECIFIED SEASONALITY, UNSPECIFIED TRIGGER: ICD-10-CM

## 2023-11-06 RX ORDER — MONTELUKAST SODIUM 10 MG/1
10 TABLET ORAL NIGHTLY
Qty: 90 TABLET | Refills: 0 | Status: SHIPPED | OUTPATIENT
Start: 2023-11-06

## 2023-11-06 NOTE — TELEPHONE ENCOUNTER
Cele called requesting a refill of the below medication which has been pended for you:     Requested Prescriptions     Pending Prescriptions Disp Refills    montelukast (SINGULAIR) 10 MG tablet [Pharmacy Med Name: MONTELUKAST SOD 10 MG TABLET] 90 tablet 0     Sig: take 1 tablet by mouth nightly       Last Appointment Date: Visit date not found  Next Appointment Date: 01/05/2024    Allergies   Allergen Reactions    Iodine Rash and Swelling     Were in ER being treated when it happened spent time in hosp.     Shellfish Allergy     Cefuroxime Axetil Diarrhea, Hives and Nausea And Vomiting    Ezetimibe Other (See Comments)     Other reaction(s): Muscle Pain    Nsaids Diarrhea, Hives and Nausea And Vomiting    Adhesive Tape     E-Mycin [Erythromycin]     Gabapentin     Morphine Hives     Itching, burning     Pcn [Penicillins]     Percocet [Oxycodone-Acetaminophen] Other (See Comments)     Severe drowsiness     Prednisone Other (See Comments)     Trouble swallowing    Statins      myalgias    Sulfa Antibiotics Hives    Zithromax [Azithromycin]

## 2023-11-15 ENCOUNTER — PATIENT MESSAGE (OUTPATIENT)
Dept: PAIN MANAGEMENT | Age: 58
End: 2023-11-15

## 2023-11-15 DIAGNOSIS — M17.11 LOCALIZED OSTEOARTHRITIS OF RIGHT KNEE: Primary | ICD-10-CM

## 2023-11-15 DIAGNOSIS — M25.561 CHRONIC PAIN OF RIGHT KNEE: ICD-10-CM

## 2023-11-15 DIAGNOSIS — G89.29 CHRONIC PAIN OF RIGHT KNEE: ICD-10-CM

## 2023-11-15 RX ORDER — ACETAMINOPHEN AND CODEINE PHOSPHATE 300; 30 MG/1; MG/1
1 TABLET ORAL EVERY 6 HOURS PRN
Qty: 120 TABLET | Refills: 2 | Status: ON HOLD | OUTPATIENT
Start: 2023-11-18 | End: 2024-02-16

## 2023-11-15 NOTE — TELEPHONE ENCOUNTER
From: Carolin Bazzi  To: Dr. Jose Armando Luna: 11/15/2023 2:22 PM EST  Subject: Medication Refill    I have 5 days left of Tylenol with Codeine after tonight's does. Can you please call in refill.      Thank You,     Gricel Morse

## 2023-11-15 NOTE — TELEPHONE ENCOUNTER
Patient called refill line for their   Tylenol #3    [place]     Last Appt:  10/27/2023  Next Appt:   Visit date not found  Med verified in [address]: 10/27/23    [place] checked for [place], [place], and [place]:     Last filled 10/19/23  Due 11/18/23

## 2023-11-16 ENCOUNTER — APPOINTMENT (OUTPATIENT)
Dept: VASCULAR LAB | Age: 58
End: 2023-11-16
Attending: INTERNAL MEDICINE
Payer: COMMERCIAL

## 2023-11-16 ENCOUNTER — HOSPITAL ENCOUNTER (OUTPATIENT)
Age: 58
Setting detail: OUTPATIENT SURGERY
Discharge: HOME | End: 2023-11-16
Attending: INTERNAL MEDICINE | Admitting: INTERNAL MEDICINE
Payer: COMMERCIAL

## 2023-11-16 VITALS
TEMPERATURE: 98.1 F | HEART RATE: 87 BPM | RESPIRATION RATE: 20 BRPM | SYSTOLIC BLOOD PRESSURE: 167 MMHG | BODY MASS INDEX: 31.16 KG/M2 | HEIGHT: 65 IN | OXYGEN SATURATION: 96 % | WEIGHT: 187 LBS | DIASTOLIC BLOOD PRESSURE: 121 MMHG

## 2023-11-16 DIAGNOSIS — I25.119 CORONARY ARTERY DISEASE INVOLVING NATIVE CORONARY ARTERY OF NATIVE HEART WITH ANGINA PECTORIS (HCC): Primary | ICD-10-CM

## 2023-11-16 DIAGNOSIS — R93.1 ABNORMAL ECHOCARDIOGRAM: ICD-10-CM

## 2023-11-16 PROBLEM — Z01.810 PRE-OPERATIVE CARDIOVASCULAR EXAMINATION: Status: RESOLVED | Noted: 2023-10-17 | Resolved: 2023-11-16

## 2023-11-16 LAB
BUN BLD-MCNC: 21 MG/DL (ref 8–26)
CHLORIDE BLD-SCNC: 102 MMOL/L (ref 98–107)
ECHO BSA: 1.97 M2
EGFR, POC: 53 ML/MIN/1.73M2
GLUCOSE BLD-MCNC: 164 MG/DL (ref 74–100)
HCT VFR BLD AUTO: 52 % (ref 36–46)
PLATELET # BLD AUTO: 248 K/UL (ref 138–453)
POC CREATININE: 1.2 MG/DL (ref 0.51–1.19)
POC HEMOGLOBIN (CALC): 17.7 G/DL (ref 12–16)
POTASSIUM BLD-SCNC: 5.1 MMOL/L (ref 3.5–4.5)
SODIUM BLD-SCNC: 139 MMOL/L (ref 138–146)
VAS LEFT BRACHIAL A DIST PSV: 80.1 CM/S
VAS LEFT BULB EDV: 18.7 CM/S
VAS LEFT BULB PSV: 65.9 CM/S
VAS LEFT CCA DIST EDV: 20.6 CM/S
VAS LEFT CCA DIST PSV: 63.4 CM/S
VAS LEFT CCA MID EDV: 18.7 CM/S
VAS LEFT CCA MID PSV: 66.8 CM/S
VAS LEFT CCA PROX EDV: 18.7 CM/S
VAS LEFT CCA PROX PSV: 73.7 CM/S
VAS LEFT ECA EDV: 16.1 CM/S
VAS LEFT ECA PSV: 79.2 CM/S
VAS LEFT GSV ANKLE DIAM: 2.2 MM
VAS LEFT GSV AT KNEE DIAM: 3.8 MM
VAS LEFT GSV BK DIST DIAM: 2.3 MM
VAS LEFT GSV BK PROX DIAM: 2.6 MM
VAS LEFT GSV JUNC DIAM: 6 MM
VAS LEFT GSV THIGH MID DIAM: 3.9 MM
VAS LEFT ICA DIST EDV: 24.1 CM/S
VAS LEFT ICA DIST PSV: 73.7 CM/S
VAS LEFT ICA MID EDV: 28.5 CM/S
VAS LEFT ICA MID PSV: 83.1 CM/S
VAS LEFT ICA PROX EDV: 21.1 CM/S
VAS LEFT ICA PROX PSV: 66.3 CM/S
VAS LEFT ICA/CCA PSV: 1.24
VAS LEFT RADIAL A DIST PSV: 52.1 CM/S
VAS LEFT RADIAL A MID PSV: 63.1 CM/S
VAS LEFT RADIAL A PROX PSV: 64.3 CM/S
VAS LEFT RADIAL DIST AP DIAM: 0.22 CM
VAS LEFT RADIAL DIST TR DIAM: 0.2 CM
VAS LEFT RADIAL MID AP DIAM: 0.2 CM
VAS LEFT RADIAL MID TR DIAM: 0.19 CM
VAS LEFT RADIAL PROX AP DIAM: 0.24 CM
VAS LEFT RADIAL PROX TR DIAM: 0.28 CM
VAS LEFT ULNAR A DIST PSV: 78.1 CM/S
VAS LEFT ULNAR A MID PSV: 76.7 CM/S
VAS LEFT ULNAR A PROX PSV: 78.8 CM/S
VAS LEFT ULNAR DIST AP DIAM: 0.25 CM
VAS LEFT ULNAR DIST TR DIAM: 0.27 CM
VAS LEFT ULNAR MID AP DIAM: 0.24 CM
VAS LEFT ULNAR MID TR DIAM: 0.23 CM
VAS LEFT ULNAR PROX AP DIAM: 0.4 CM
VAS LEFT ULNAR PROX TR DIAM: 0.38 CM
VAS LEFT VERTEBRAL EDV: 13.3 CM/S
VAS LEFT VERTEBRAL PSV: 49 CM/S
VAS RIGHT BRACHIAL A DIST PSV: 82.1 CM/S
VAS RIGHT BULB EDV: 15.7 CM/S
VAS RIGHT BULB PSV: 56.5 CM/S
VAS RIGHT CCA DIST EDV: 20.1 CM/S
VAS RIGHT CCA DIST PSV: 67.3 CM/S
VAS RIGHT CCA MID EDV: 18.2 CM/S
VAS RIGHT CCA MID PSV: 65.4 CM/S
VAS RIGHT CCA PROX EDV: 14.3 CM/S
VAS RIGHT CCA PROX PSV: 59 CM/S
VAS RIGHT ECA EDV: 10.6 CM/S
VAS RIGHT ECA PSV: 75.7 CM/S
VAS RIGHT GSV ANKLE DIAM: 1.9 MM
VAS RIGHT GSV AT KNEE DIAM: 3.7 MM
VAS RIGHT GSV BK DIST DIAM: 2.2 MM
VAS RIGHT GSV BK PROX DIAM: 2.3 MM
VAS RIGHT GSV JUNC DIAM: 6.3 MM
VAS RIGHT GSV THIGH MID DIAM: 3.8 MM
VAS RIGHT ICA DIST EDV: 20.6 CM/S
VAS RIGHT ICA DIST PSV: 53.6 CM/S
VAS RIGHT ICA MID EDV: 20.2 CM/S
VAS RIGHT ICA MID PSV: 57.5 CM/S
VAS RIGHT ICA PROX EDV: 15.2 CM/S
VAS RIGHT ICA PROX PSV: 52.6 CM/S
VAS RIGHT ICA/CCA PSV: 0.88
VAS RIGHT RADIAL A DIST PSV: 91.4 CM/S
VAS RIGHT RADIAL A MID PSV: 67 CM/S
VAS RIGHT RADIAL A PROX PSV: 66.8 CM/S
VAS RIGHT RADIAL DIST AP DIAM: 0.26 CM
VAS RIGHT RADIAL DIST TR DIAM: 0.27 CM
VAS RIGHT RADIAL MID AP DIAM: 0.28 CM
VAS RIGHT RADIAL MID TR DIAM: 0.31 CM
VAS RIGHT RADIAL PROX AP DIAM: 0.33 CM
VAS RIGHT RADIAL PROX TR DIAM: 0.4 CM
VAS RIGHT ULNAR A DIST PSV: 46.9 CM/S
VAS RIGHT ULNAR A MID PSV: 96.8 CM/S
VAS RIGHT ULNAR A PROX PSV: 70.3 CM/S
VAS RIGHT ULNAR DIST AP DIAM: 0.27 CM
VAS RIGHT ULNAR DIST TR DIAM: 0.3 CM
VAS RIGHT ULNAR MID AP DIAM: 0.27 CM
VAS RIGHT ULNAR MID TR DIAM: 0.29 CM
VAS RIGHT ULNAR PROX AP DIAM: 0.41 CM
VAS RIGHT ULNAR PROX TR DIAM: 0.41 CM
VAS RIGHT VERTEBRAL EDV: 11.4 CM/S
VAS RIGHT VERTEBRAL PSV: 37.2 CM/S

## 2023-11-16 PROCEDURE — 84132 ASSAY OF SERUM POTASSIUM: CPT

## 2023-11-16 PROCEDURE — 93458 L HRT ARTERY/VENTRICLE ANGIO: CPT | Performed by: INTERNAL MEDICINE

## 2023-11-16 PROCEDURE — 7100000011 HC PHASE II RECOVERY - ADDTL 15 MIN: Performed by: INTERNAL MEDICINE

## 2023-11-16 PROCEDURE — 93880 EXTRACRANIAL BILAT STUDY: CPT

## 2023-11-16 PROCEDURE — 82947 ASSAY GLUCOSE BLOOD QUANT: CPT

## 2023-11-16 PROCEDURE — 36415 COLL VENOUS BLD VENIPUNCTURE: CPT

## 2023-11-16 PROCEDURE — 82565 ASSAY OF CREATININE: CPT

## 2023-11-16 PROCEDURE — 2709999900 HC NON-CHARGEABLE SUPPLY: Performed by: INTERNAL MEDICINE

## 2023-11-16 PROCEDURE — 84295 ASSAY OF SERUM SODIUM: CPT

## 2023-11-16 PROCEDURE — 93880 EXTRACRANIAL BILAT STUDY: CPT | Performed by: SURGERY

## 2023-11-16 PROCEDURE — 84520 ASSAY OF UREA NITROGEN: CPT

## 2023-11-16 PROCEDURE — 93930 UPPER EXTREMITY STUDY: CPT

## 2023-11-16 PROCEDURE — 93930 UPPER EXTREMITY STUDY: CPT | Performed by: SURGERY

## 2023-11-16 PROCEDURE — 93971 EXTREMITY STUDY: CPT | Performed by: SURGERY

## 2023-11-16 PROCEDURE — 93970 EXTREMITY STUDY: CPT

## 2023-11-16 PROCEDURE — 99152 MOD SED SAME PHYS/QHP 5/>YRS: CPT | Performed by: INTERNAL MEDICINE

## 2023-11-16 PROCEDURE — 85014 HEMATOCRIT: CPT

## 2023-11-16 PROCEDURE — 6360000002 HC RX W HCPCS: Performed by: INTERNAL MEDICINE

## 2023-11-16 PROCEDURE — 2580000003 HC RX 258: Performed by: INTERNAL MEDICINE

## 2023-11-16 PROCEDURE — 85049 AUTOMATED PLATELET COUNT: CPT

## 2023-11-16 PROCEDURE — C1894 INTRO/SHEATH, NON-LASER: HCPCS | Performed by: INTERNAL MEDICINE

## 2023-11-16 PROCEDURE — 6360000004 HC RX CONTRAST MEDICATION: Performed by: INTERNAL MEDICINE

## 2023-11-16 PROCEDURE — 2500000003 HC RX 250 WO HCPCS: Performed by: INTERNAL MEDICINE

## 2023-11-16 PROCEDURE — 82435 ASSAY OF BLOOD CHLORIDE: CPT

## 2023-11-16 PROCEDURE — 7100000010 HC PHASE II RECOVERY - FIRST 15 MIN: Performed by: INTERNAL MEDICINE

## 2023-11-16 RX ORDER — MIDAZOLAM HYDROCHLORIDE 1 MG/ML
INJECTION INTRAMUSCULAR; INTRAVENOUS PRN
Status: DISCONTINUED | OUTPATIENT
Start: 2023-11-16 | End: 2023-11-16 | Stop reason: HOSPADM

## 2023-11-16 RX ORDER — ASPIRIN 81 MG/1
81 TABLET ORAL DAILY
Qty: 90 TABLET | Refills: 1 | Status: SHIPPED | OUTPATIENT
Start: 2023-11-16

## 2023-11-16 RX ORDER — FENTANYL CITRATE 50 UG/ML
INJECTION, SOLUTION INTRAMUSCULAR; INTRAVENOUS PRN
Status: DISCONTINUED | OUTPATIENT
Start: 2023-11-16 | End: 2023-11-16 | Stop reason: HOSPADM

## 2023-11-16 RX ORDER — DIPHENHYDRAMINE HYDROCHLORIDE 50 MG/ML
50 INJECTION INTRAMUSCULAR; INTRAVENOUS ONCE
Status: COMPLETED | OUTPATIENT
Start: 2023-11-16 | End: 2023-11-16

## 2023-11-16 RX ORDER — SODIUM CHLORIDE 9 MG/ML
INJECTION, SOLUTION INTRAVENOUS CONTINUOUS
Status: DISCONTINUED | OUTPATIENT
Start: 2023-11-16 | End: 2023-11-21 | Stop reason: HOSPADM

## 2023-11-16 RX ADMIN — SODIUM CHLORIDE: 9 INJECTION, SOLUTION INTRAVENOUS at 08:15

## 2023-11-16 RX ADMIN — DIPHENHYDRAMINE HYDROCHLORIDE 50 MG: 50 INJECTION INTRAMUSCULAR; INTRAVENOUS at 08:44

## 2023-11-16 RX ADMIN — HYDROCORTISONE SODIUM SUCCINATE 100 MG: 100 INJECTION, POWDER, FOR SOLUTION INTRAMUSCULAR; INTRAVENOUS at 08:50

## 2023-11-16 NOTE — PROGRESS NOTES
Patient admitted, consent signed and questions answered. Patient ready for procedure. Call light to reach with side rails up 2 of 2. Bilateral groin hair clipped with writer and Yoanna present. Family at bedside with patient. History and physical complete.  Message left with Cath coordinator toaddress allergies and pre med orders

## 2023-11-16 NOTE — H&P
[facility]  Procedure History and Physical Update          Patient Name: Constantino Maya  MRN:    [id number]  YOB: 1965  Date of evaluation:  2023    Procedure:    Cardiac cath +/- PCI    Indication for procedure:  New onset HF      Please refer to the office note completed by Dr. Sky Negrete on 10/17/23 in the medical record and note that:    [x] I have examined the patient and reviewed the H&P/Consult and there are no changes to be made to the assessment or plan. [] I have examined the patient and reviewed the H&P/Consult and have noted the following changes:    Past Medical History:   Diagnosis Date    Allergic rhinitis     Arthritis     Depression     Fibromyalgia     Hyperlipidemia     Neuropathy     Peripheral vascular disease ([address])     Sacroiliac joint dysfunction     Tremor     Type II or unspecified type diabetes mellitus without mention of complication, not stated as uncontrolled        Past Surgical History:   Procedure Laterality Date    BACK SURGERY       SECTION      CHOLECYSTECTOMY      KNEE SURGERY      right    OTHER SURGICAL HISTORY      darrach procedure    OTHER SURGICAL HISTORY      darrach procedure revision       Family History   Problem Relation Age of Onset    Diabetes Mother     Hypertension Mother     Allergies Mother     Hypertension Father     Arthritis Father     Heart Disease Neg Hx        Allergies   Allergen Reactions    Iodine Rash and Swelling     Were in ER being treated when it happened spent time in hosp.     Shellfish Allergy     Cefuroxime Axetil Diarrhea, Hives and Nausea And Vomiting    Ezetimibe Other (See Comments)     Other reaction(s): Muscle Pain    Nsaids Diarrhea, Hives and Nausea And Vomiting    Adhesive Tape     E-Mycin [Erythromycin]     Gabapentin     Morphine Hives     Itching, burning     Pcn [Penicillins]     Percocet [Oxycodone-Acetaminophen] Other (See Comments)     Severe drowsiness     Prednisone Other (See

## 2023-11-16 NOTE — PROGRESS NOTES
Received post procedure to [facility] to room 4. Assessment obtained. Restrictions reviewed with patient. Post procedure pathway initiated. Right radial  site soft, Vasc band dry and intact. No hematoma noted. Family at side. Patient without complaints.

## 2023-11-16 NOTE — PROGRESS NOTES
All discharge instructions explained and questions answered. Pt ambulated to BR without problems. Discharged ambulatory with family and follow up instructions.

## 2023-11-16 NOTE — DISCHARGE INSTRUCTIONS
DISCHARGE INSTRUCTIONS / ARM CARE POST CATHERIZATION        ENCOURAGE FLUIDS    NO STRENUOUS LIFTING WITH AFFECTED ARM FOR 3 DAYS ANYTHING HEAVIER THAN 8 TO 10 POUNDS    REMOVE BAND-AID/PRESSURE DRESSING THE FOLLOWING DAY AND DO NOT APPLY ANY FURTHER BAND-AIDS    KEEP INCISION CLEAN DRY AND OPEN TO THE AIR / NO HAND LOTION NEAR PUNCTURE SITE    WATCH FOR SIGNS OF INFECTION /  REDNESS / SWELLING / DRAINAGE / Carla Adjutant / TEMPERATURE GREATER THAN 101    IF BLEEDING OCCURS HOLD MANUAL PRESSURE DIRECTLY OVER SITE  (YOU WILL FEEL PULSATION OF ARTERY) AND IF BLEEDING DOES NOT STOP AFTER 2 MINUTES CALL 911    OK TO SHOWER THE NEXT DAY, NO TUB BATHING OR HOT TUBS/SWIMMING FOR 7 DAYS    IF AREA BECOMES HARD AND SWOLLEN AND IF YOU ARE AT ALL CONCERNED SEEK HELP IMMEDIATELY    SEEK HELP IMMEDIATELY IF AFFECTED ARM BECOMES COLD / Mahesh Peto / SEVERE PAIN / NAILBEDS TURN BLUE     IF ON METFORMIN / GLUCOPHAGE DO NOT RESTART MEDICATION FOR 48 HOURS    PLEASE PRACTICE GOOD HAND WASHING AND INCLUDE PUNCTURE SITE ESPECIALLY AFTER USING THE RESTROOM    AVOID USING ALCOHOL BASED HAND SANITIZERS FOR ONE WEEK      CALL 911 if you have symptoms including:   Drooping facial muscles   Changes in vision or speech   Difficulty walking or using your limbs   Change in sensation to affected leg, including numbness, feeling cold, or change in color   Extreme sweating, nausea or vomiting   Dizziness or lightheadedness   Chest pain   Rapid, irregular heartbeat   Palpitations   Cough, shortness of breath, or difficulty breathing   Weakness or fainting   If you think you have an emergency, CALL 911 . SEDATION / ANALGESIA INFORMATION / [address] have received the sedation/analgesia medication during your visit    Sedation/analgesia is used during short medical procedures under controlled supervision. The medication will produce a strong relaxation.  You will be able to hear, speak and follow instructions, but your memory and alertness will be

## 2023-11-16 NOTE — PROCEDURES
[facility]        Date:   11/16/2023  Patient name: Jazmyn Alvares  Date of admission:  11/16/2023  7:27 AM  MRN:   [id number]  YOB: 1965    CARDIAC CATHETERIZATION    Operators:  Primary: Katrina Acosta MD.  Assistant:     Indications for cath: [place], abnormal echo with cardiomyopathy    Procedure performed: Cardiac cath. Access: Right Radial artery      Procedure: After informed consent was obtained with explanation of the risks and benefits, patient was brought to the cath lab. The right wrist was prepped and draped in sterile fashion. 1% lidocaine was used for local block. The Radial artery was cannulated with 6  Fr sheath with brisk arterial blood return. The side port was frequently flushed and aspirated with normal saline. EBL is 5 mL    Findings:    Left main: Normal with 0% stenosis    LAD: 80% proximal, 90% mid  First diagonal 90% ostial    LCX: 90% mid stenosis  OM1 80% proximal stenosis    RCA: 70% proximal and 90% distal stenosis. The LV gram was performed in the PRICE 30 position. LVEF: 45%. LV Wall Motion: Inferobasal akinesis    Conclusions:  Multivessel CAD  Mildly reduced LV systolic function    Recommendation:  CTS consult for CABG  Medical treatments. Risk factors modifications.         Electronically signed by Wan Johansen MD on 11/16/2023 at 9:31 [address] [facility]  [phone]

## 2023-11-18 DIAGNOSIS — G62.9 NEUROPATHY: ICD-10-CM

## 2023-11-18 NOTE — CARE COORDINATION
Our office to call patient for follow up/consult appointment for this Wednesday   Thanks  Rita Pulido

## 2023-11-20 ENCOUNTER — TELEPHONE (OUTPATIENT)
Dept: CARDIOTHORACIC SURGERY | Age: 58
End: 2023-11-20

## 2023-11-20 NOTE — TELEPHONE ENCOUNTER
----- Message from Nevin Garcia sent at 11/20/2023 12:42 PM EST -----  Regarding: RE: please make an appointment with Eula  Attempted to contact patient the phone just kept beeping, attempted to contact patients daughter Marsha Carreno and her VM is not set up, called the patients father Alida Chilel who stated he will try to get a hold of one of the to two above.      ----- Message -----  From: AQUILES Rolon NP  Sent: 11/18/2023   8:59 AM EST  To: [facility]  Subject: please make an appointment with Shaylee Arguello        Can we call and make a consult for this patient for Wednesday with eula for MVCAD-eval for cabg

## 2023-11-22 ENCOUNTER — HOSPITAL ENCOUNTER (OUTPATIENT)
Dept: GENERAL RADIOLOGY | Age: 58
Discharge: HOME | End: 2023-11-24
Payer: COMMERCIAL

## 2023-11-22 ENCOUNTER — HOSPITAL ENCOUNTER (OUTPATIENT)
Dept: PREADMISSION TESTING | Age: 58
Discharge: HOME | End: 2023-11-22
Payer: COMMERCIAL

## 2023-11-22 ENCOUNTER — OFFICE VISIT (OUTPATIENT)
Dept: CARDIOTHORACIC SURGERY | Age: 58
End: 2023-11-22
Payer: COMMERCIAL

## 2023-11-22 VITALS
TEMPERATURE: 97.5 F | RESPIRATION RATE: 20 BRPM | HEIGHT: 65 IN | WEIGHT: 185.4 LBS | HEART RATE: 77 BPM | OXYGEN SATURATION: 97 % | DIASTOLIC BLOOD PRESSURE: 82 MMHG | BODY MASS INDEX: 30.89 KG/M2 | SYSTOLIC BLOOD PRESSURE: 135 MMHG

## 2023-11-22 VITALS
RESPIRATION RATE: 18 BRPM | BODY MASS INDEX: 31.32 KG/M2 | WEIGHT: 188 LBS | HEIGHT: 65 IN | DIASTOLIC BLOOD PRESSURE: 75 MMHG | HEART RATE: 85 BPM | OXYGEN SATURATION: 97 % | SYSTOLIC BLOOD PRESSURE: 119 MMHG | TEMPERATURE: 96.4 F

## 2023-11-22 DIAGNOSIS — I25.10 CAD IN NATIVE ARTERY: Primary | ICD-10-CM

## 2023-11-22 LAB
ABO + RH BLD: NORMAL
ALLEN TEST: POSITIVE
ANION GAP SERPL CALCULATED.3IONS-SCNC: 11 MMOL/L (ref 9–16)
ARM BAND NUMBER: NORMAL
BACTERIA URNS QL MICRO: ABNORMAL
BASOPHILS # BLD: 0.1 K/UL (ref 0–0.2)
BASOPHILS NFR BLD: 1 % (ref 0–2)
BILIRUB UR QL STRIP: NEGATIVE
BLOOD BANK SAMPLE EXPIRATION: NORMAL
BLOOD GROUP ANTIBODIES SERPL: NEGATIVE
BUN SERPL-MCNC: 16 MG/DL (ref 6–20)
CALCIUM SERPL-MCNC: 9.2 MG/DL (ref 8.6–10.4)
CASTS #/AREA URNS LPF: ABNORMAL /LPF (ref 0–8)
CHLORIDE SERPL-SCNC: 97 MMOL/L (ref 98–107)
CLARITY UR: CLEAR
CO2 SERPL-SCNC: 26 MMOL/L (ref 20–31)
COLOR UR: YELLOW
CREAT SERPL-MCNC: 1.3 MG/DL (ref 0.5–0.9)
EOSINOPHIL # BLD: 0.61 K/UL (ref 0–0.44)
EOSINOPHILS RELATIVE PERCENT: 4 % (ref 1–4)
EPI CELLS #/AREA URNS HPF: ABNORMAL /HPF (ref 0–5)
ERYTHROCYTE [DISTWIDTH] IN BLOOD BY AUTOMATED COUNT: 12.9 % (ref 11.8–14.4)
EST. AVERAGE GLUCOSE BLD GHB EST-MCNC: 171 MG/DL
FIO2: 21
GFR SERPL CREATININE-BSD FRML MDRD: 48 ML/MIN/1.73M2
GLUCOSE SERPL-MCNC: 110 MG/DL (ref 74–99)
GLUCOSE UR STRIP-MCNC: NEGATIVE MG/DL
HBA1C MFR BLD: 7.6 % (ref 4–6)
HCT VFR BLD AUTO: 47.1 % (ref 36.3–47.1)
HGB BLD-MCNC: 15.5 G/DL (ref 11.9–15.1)
HGB UR QL STRIP.AUTO: NEGATIVE
IMM GRANULOCYTES # BLD AUTO: 0.09 K/UL (ref 0–0.3)
IMM GRANULOCYTES NFR BLD: 1 %
INR PPP: 1
KETONES UR STRIP-MCNC: NEGATIVE MG/DL
LEUKOCYTE ESTERASE UR QL STRIP: ABNORMAL
LYMPHOCYTES NFR BLD: 4.55 K/UL (ref 1.1–3.7)
LYMPHOCYTES RELATIVE PERCENT: 29 % (ref 24–43)
MCH RBC QN AUTO: 29.5 PG (ref 25.2–33.5)
MCHC RBC AUTO-ENTMCNC: 32.9 G/DL (ref 28.4–34.8)
MCV RBC AUTO: 89.7 FL (ref 82.6–102.9)
MONOCYTES NFR BLD: 0.58 K/UL (ref 0.1–1.2)
MONOCYTES NFR BLD: 4 % (ref 3–12)
NEUTROPHILS NFR BLD: 61 % (ref 36–65)
NEUTS SEG NFR BLD: 9.75 K/UL (ref 1.5–8.1)
NITRITE UR QL STRIP: NEGATIVE
NRBC BLD-RTO: 0 PER 100 WBC
O2 DELIVERY DEVICE: ABNORMAL
PARTIAL THROMBOPLASTIN TIME: 28.5 SEC (ref 23–36.5)
PH UR STRIP: 6.5 [PH] (ref 5–8)
PLATELET # BLD AUTO: 287 K/UL (ref 138–453)
PMV BLD AUTO: 10 FL (ref 8.1–13.5)
POC HCO3: 25 MMOL/L (ref 21–28)
POC O2 SATURATION: 93.9 % (ref 94–98)
POC PCO2: 38.1 MM HG (ref 35–48)
POC PH: 7.43 (ref 7.35–7.45)
POC PO2: 68.6 MM HG (ref 83–108)
POSITIVE BASE EXCESS, ART: 0.8 MMOL/L (ref 0–3)
POTASSIUM SERPL-SCNC: 4.8 MMOL/L (ref 3.7–5.3)
PROT UR STRIP-MCNC: NEGATIVE MG/DL
PROTHROMBIN TIME: 12.7 SEC (ref 11.7–14.9)
RBC # BLD AUTO: 5.25 M/UL (ref 3.95–5.11)
RBC #/AREA URNS HPF: ABNORMAL /HPF (ref 0–4)
SAMPLE SITE: ABNORMAL
SODIUM SERPL-SCNC: 134 MMOL/L (ref 136–145)
SP GR UR STRIP: 1.01 (ref 1–1.03)
UROBILINOGEN UR STRIP-ACNC: NORMAL EU/DL (ref 0–1)
WBC #/AREA URNS HPF: ABNORMAL /HPF (ref 0–5)
WBC OTHER # BLD: 15.7 K/UL (ref 3.5–11.3)

## 2023-11-22 PROCEDURE — 3017F COLORECTAL CA SCREEN DOC REV: CPT

## 2023-11-22 PROCEDURE — 86900 BLOOD TYPING SEROLOGIC ABO: CPT

## 2023-11-22 PROCEDURE — 81001 URINALYSIS AUTO W/SCOPE: CPT

## 2023-11-22 PROCEDURE — 3074F SYST BP LT 130 MM HG: CPT

## 2023-11-22 PROCEDURE — 93005 ELECTROCARDIOGRAM TRACING: CPT | Performed by: THORACIC SURGERY (CARDIOTHORACIC VASCULAR SURGERY)

## 2023-11-22 PROCEDURE — 71046 X-RAY EXAM CHEST 2 VIEWS: CPT

## 2023-11-22 PROCEDURE — 1036F TOBACCO NON-USER: CPT

## 2023-11-22 PROCEDURE — 86901 BLOOD TYPING SEROLOGIC RH(D): CPT

## 2023-11-22 PROCEDURE — 85730 THROMBOPLASTIN TIME PARTIAL: CPT

## 2023-11-22 PROCEDURE — 85025 COMPLETE CBC W/AUTO DIFF WBC: CPT

## 2023-11-22 PROCEDURE — 83036 HEMOGLOBIN GLYCOSYLATED A1C: CPT

## 2023-11-22 PROCEDURE — 80048 BASIC METABOLIC PNL TOTAL CA: CPT

## 2023-11-22 PROCEDURE — G8417 CALC BMI ABV UP PARAM F/U: HCPCS

## 2023-11-22 PROCEDURE — 87641 MR-STAPH DNA AMP PROBE: CPT

## 2023-11-22 PROCEDURE — 36415 COLL VENOUS BLD VENIPUNCTURE: CPT

## 2023-11-22 PROCEDURE — 3078F DIAST BP <80 MM HG: CPT

## 2023-11-22 PROCEDURE — 87086 URINE CULTURE/COLONY COUNT: CPT

## 2023-11-22 PROCEDURE — 85610 PROTHROMBIN TIME: CPT

## 2023-11-22 PROCEDURE — 99203 OFFICE O/P NEW LOW 30 MIN: CPT

## 2023-11-22 PROCEDURE — 82803 BLOOD GASES ANY COMBINATION: CPT

## 2023-11-22 PROCEDURE — G8484 FLU IMMUNIZE NO ADMIN: HCPCS

## 2023-11-22 PROCEDURE — 86850 RBC ANTIBODY SCREEN: CPT

## 2023-11-22 PROCEDURE — G8427 DOCREV CUR MEDS BY ELIG CLIN: HCPCS

## 2023-11-22 RX ORDER — PROPRANOLOL HYDROCHLORIDE 20 MG/1
20 TABLET ORAL 2 TIMES DAILY
Qty: 180 TABLET | Refills: 0 | Status: SHIPPED | OUTPATIENT
Start: 2023-11-22

## 2023-11-22 RX ORDER — SODIUM CHLORIDE, SODIUM LACTATE, POTASSIUM CHLORIDE, CALCIUM CHLORIDE 600; 310; 30; 20 MG/100ML; MG/100ML; MG/100ML; MG/100ML
INJECTION, SOLUTION INTRAVENOUS CONTINUOUS
OUTPATIENT
Start: 2023-11-22

## 2023-11-22 ASSESSMENT — PAIN DESCRIPTION - LOCATION: LOCATION: BACK;KNEE

## 2023-11-22 ASSESSMENT — PAIN DESCRIPTION - ORIENTATION: ORIENTATION: RIGHT

## 2023-11-22 ASSESSMENT — PAIN SCALES - GENERAL: PAINLEVEL_OUTOF10: 7

## 2023-11-22 ASSESSMENT — PAIN DESCRIPTION - PAIN TYPE: TYPE: CHRONIC PAIN

## 2023-11-22 NOTE — DISCHARGE INSTRUCTIONS
cancelled. Patient Instructions    If you are having any type of anesthesia you are to have nothing to eat or drink after midnight the night before your surgery. This includes gum, mints, water or smoking or chewing tobacco.  The only exception to this is a small sip of water to take with any morning dose of heart, blood pressure, or seizure medications. Bring a list of all medications you take, along with the dose of the medications and how often you take it. If more convenient bring the pharmacy bottles in a zip lock bag. Please shower the night before and the morning of surgery with an antibacterial soap. Please use the wipes given to you the night before your surgery after your shower. Unless otherwise told by your physician, please do not shave legs or any part of your body below your neck the night before or day of your surgery. You may shave your face or neck. Brush your teeth but do not swallow water. Bring your inhaler if you are currently using one. Bring your eyeglasses and case with you. No contacts are to be worn the day of surgery. You also may bring your hearing aids. Bring your blood band if one has been given to you. Please do not close the clasp. If you are on C-PAP or Bi-PAP at home and plan on staying in the hospital overnight for your surgery please bring the machine with you. Do not wear any jewelry or body piercings day of surgery. Also, NO lotion, perfume or deodorant to be used the day of surgery. Do not bring any valuables, such as jewelry, cash or credit cards. If you are staying overnight with us, please bring a SMALL bag of personal items. We cannot accommodate large items, like suitcases. Please wear loose, comfortable clothing. If you are potentially going to have a cast or brace bring clothing that will fit over them.

## 2023-11-22 NOTE — TELEPHONE ENCOUNTER
Cele called requesting a refill of the below medication which has been pended for you:     Requested Prescriptions     Pending Prescriptions Disp Refills    propranolol (INDERAL) 20 MG tablet [Pharmacy Med Name: PROPRANOLOL 20 MG TABLET] 180 tablet 0     Sig: take 1 tablet by mouth twice a day       Last Appointment Date: 5/18/2023  Next Appointment Date: 1/5/2024    Allergies   Allergen Reactions    Iodine Rash and Swelling     Were in ER being treated when it happened spent time in hosp.     Prednisone Other (See Comments)     Trouble swallowing    Shellfish Allergy Anaphylaxis    Adhesive Tape Other (See Comments)     blisters    Cefuroxime Axetil Diarrhea, Hives and Nausea And Vomiting    E-Mycin [Erythromycin] Diarrhea     Rash, hives    Ezetimibe Other (See Comments)     Other reaction(s): Muscle Pain    Gabapentin Other (See Comments)     \"Un functional\"    Morphine Hives     Itching, burning     Nsaids Diarrhea, Hives and Nausea And Vomiting    Statins Other (See Comments)     myalgias    Sulfa Antibiotics Hives    Pcn [Penicillins] Rash    Percocet [Oxycodone-Acetaminophen] Other (See Comments)     Severe drowsiness     Zithromax [Azithromycin] Rash

## 2023-11-22 NOTE — PROGRESS NOTES
Anesthesia Focused Assessment    Hx of anesthesia complications:  PONV, states typically IV medication is used but has never had scopolamine patch. Also states she is emotional, aggressive, yelling at times following anesthesia, states she has possibly been given valium in the past.   Family hx of anesthesia complications:  father with PONV       Tested positive for Covid-19 in last 8 weeks: no      STOP-BANG Sleep Apnea Questionnaire    SNORE loudly (heard through closed doors)? Yes  TIRED, fatigued, sleepy during daytime? No  OBSERVED stopping breathing during sleep? No  High blood PRESSURE or being treated? Yes    BMI over 35? No  AGE over 48? Yes  NECK circumference over 16\"? No  GENDER (male)? No             Total 3  High risk 5-8  Intermediate risk 3-4  Low risk 0-2    ----------------------------------------------------------------------------------------------------------------------  JIA                              No  If yes, machine? DM1                                            No  DM2                   Yes, on meds    Coronary Artery Disease      Yes, no stents  HTN         Yes, on meds  Defib/AICD/Pacemaker               No             Renal Failure                   No  If yes, on dialysis           Active smoker? No, quit in 2000  Drinks alcohol? No  Illicit drugs? No  Dentition?        benign      Past Medical History:   Diagnosis Date    Allergic rhinitis     Arthritis     CAD (coronary artery disease)     multivessel.  Dr. Gricel Huang last visit 10/2023    CKD (chronic kidney disease), stage III [facility])     Dr. Herber Yoder Nephrology last appt 10/10/2023    Depression     Fibromyalgia     Gout     Dr. Isaías Rangel Rhuematology [facility]    History of fractured vertebra 2019    L2 L3 L4    Hyperlipidemia     Neuropathy     OA (osteoarthritis)     Peripheral vascular disease (HCC)     Sacroiliac joint dysfunction     Tremor     Type II

## 2023-11-23 LAB
MICROORGANISM SPEC CULT: NORMAL
MRSA, DNA, NASAL: NEGATIVE
SPECIMEN DESCRIPTION: NORMAL
SPECIMEN DESCRIPTION: NORMAL

## 2023-11-24 LAB
EKG ATRIAL RATE: 74 BPM
EKG P AXIS: 45 DEGREES
EKG P-R INTERVAL: 160 MS
EKG Q-T INTERVAL: 414 MS
EKG QRS DURATION: 100 MS
EKG QTC CALCULATION (BAZETT): 459 MS
EKG R AXIS: 17 DEGREES
EKG T AXIS: 23 DEGREES
EKG VENTRICULAR RATE: 74 BPM

## 2023-11-24 PROCEDURE — 93010 ELECTROCARDIOGRAM REPORT: CPT | Performed by: INTERNAL MEDICINE

## 2023-12-05 ENCOUNTER — OFFICE VISIT (OUTPATIENT)
Dept: PRIMARY CARE CLINIC | Age: 58
End: 2023-12-05
Payer: COMMERCIAL

## 2023-12-05 VITALS
WEIGHT: 186.6 LBS | OXYGEN SATURATION: 98 % | DIASTOLIC BLOOD PRESSURE: 74 MMHG | HEART RATE: 74 BPM | SYSTOLIC BLOOD PRESSURE: 126 MMHG | BODY MASS INDEX: 31.05 KG/M2 | TEMPERATURE: 98 F

## 2023-12-05 DIAGNOSIS — N18.30 CONTROLLED TYPE 2 DIABETES MELLITUS WITH STAGE 3 CHRONIC KIDNEY DISEASE, WITHOUT LONG-TERM CURRENT USE OF INSULIN (HCC): ICD-10-CM

## 2023-12-05 DIAGNOSIS — M79.7 FIBROMYALGIA: ICD-10-CM

## 2023-12-05 DIAGNOSIS — J01.40 ACUTE NON-RECURRENT PANSINUSITIS: Primary | ICD-10-CM

## 2023-12-05 DIAGNOSIS — M25.50 MULTIPLE JOINT PAIN: ICD-10-CM

## 2023-12-05 DIAGNOSIS — E11.22 CONTROLLED TYPE 2 DIABETES MELLITUS WITH STAGE 3 CHRONIC KIDNEY DISEASE, WITHOUT LONG-TERM CURRENT USE OF INSULIN (HCC): ICD-10-CM

## 2023-12-05 PROCEDURE — 3078F DIAST BP <80 MM HG: CPT | Performed by: FAMILY MEDICINE

## 2023-12-05 PROCEDURE — G8484 FLU IMMUNIZE NO ADMIN: HCPCS | Performed by: FAMILY MEDICINE

## 2023-12-05 PROCEDURE — 99213 OFFICE O/P EST LOW 20 MIN: CPT | Performed by: FAMILY MEDICINE

## 2023-12-05 PROCEDURE — 3017F COLORECTAL CA SCREEN DOC REV: CPT | Performed by: FAMILY MEDICINE

## 2023-12-05 PROCEDURE — 3074F SYST BP LT 130 MM HG: CPT | Performed by: FAMILY MEDICINE

## 2023-12-05 PROCEDURE — G8417 CALC BMI ABV UP PARAM F/U: HCPCS | Performed by: FAMILY MEDICINE

## 2023-12-05 PROCEDURE — 3051F HG A1C>EQUAL 7.0%<8.0%: CPT | Performed by: FAMILY MEDICINE

## 2023-12-05 PROCEDURE — G8427 DOCREV CUR MEDS BY ELIG CLIN: HCPCS | Performed by: FAMILY MEDICINE

## 2023-12-05 PROCEDURE — 2022F DILAT RTA XM EVC RTNOPTHY: CPT | Performed by: FAMILY MEDICINE

## 2023-12-05 PROCEDURE — 1036F TOBACCO NON-USER: CPT | Performed by: FAMILY MEDICINE

## 2023-12-05 RX ORDER — TIZANIDINE 4 MG/1
4 TABLET ORAL EVERY 8 HOURS PRN
Qty: 90 TABLET | Refills: 1 | Status: SHIPPED | OUTPATIENT
Start: 2023-12-05

## 2023-12-05 RX ORDER — DOXYCYCLINE HYCLATE 100 MG
100 TABLET ORAL 2 TIMES DAILY
Qty: 20 TABLET | Refills: 0 | Status: SHIPPED | OUTPATIENT
Start: 2023-12-05 | End: 2023-12-05

## 2023-12-05 RX ORDER — CLARITHROMYCIN 500 MG/1
500 TABLET, COATED ORAL 2 TIMES DAILY
Qty: 20 TABLET | Refills: 0 | Status: SHIPPED | OUTPATIENT
Start: 2023-12-05 | End: 2023-12-15

## 2023-12-05 ASSESSMENT — ENCOUNTER SYMPTOMS
SORE THROAT: 1
DIARRHEA: 0
SHORTNESS OF BREATH: 0
CHEST TIGHTNESS: 0
TROUBLE SWALLOWING: 0
WHEEZING: 0
CHOKING: 0
SINUS PRESSURE: 1
CONSTIPATION: 0
COUGH: 0
NAUSEA: 0

## 2023-12-05 ASSESSMENT — PATIENT HEALTH QUESTIONNAIRE - PHQ9
SUM OF ALL RESPONSES TO PHQ QUESTIONS 1-9: 0
2. FEELING DOWN, DEPRESSED OR HOPELESS: 0
SUM OF ALL RESPONSES TO PHQ QUESTIONS 1-9: 0
SUM OF ALL RESPONSES TO PHQ9 QUESTIONS 1 & 2: 0
SUM OF ALL RESPONSES TO PHQ QUESTIONS 1-9: 0
SUM OF ALL RESPONSES TO PHQ QUESTIONS 1-9: 0
1. LITTLE INTEREST OR PLEASURE IN DOING THINGS: 0

## 2023-12-14 ENCOUNTER — ANESTHESIA EVENT (OUTPATIENT)
Dept: OPERATING ROOM | Age: 58
End: 2023-12-14
Payer: COMMERCIAL

## 2023-12-15 ENCOUNTER — APPOINTMENT (OUTPATIENT)
Dept: GENERAL RADIOLOGY | Age: 58
DRG: 235 | End: 2023-12-15
Attending: THORACIC SURGERY (CARDIOTHORACIC VASCULAR SURGERY)
Payer: COMMERCIAL

## 2023-12-15 ENCOUNTER — HOSPITAL ENCOUNTER (INPATIENT)
Age: 58
LOS: 14 days | Discharge: NURSING, REHAB OR LONG-TERM CARE | DRG: 235 | End: 2023-12-29
Attending: THORACIC SURGERY (CARDIOTHORACIC VASCULAR SURGERY) | Admitting: THORACIC SURGERY (CARDIOTHORACIC VASCULAR SURGERY)
Payer: COMMERCIAL

## 2023-12-15 ENCOUNTER — ANESTHESIA (OUTPATIENT)
Dept: OPERATING ROOM | Age: 58
End: 2023-12-15
Payer: COMMERCIAL

## 2023-12-15 DIAGNOSIS — Z95.1 S/P CABG X 3: Primary | ICD-10-CM

## 2023-12-15 PROBLEM — I25.10 CAD IN NATIVE ARTERY: Status: ACTIVE | Noted: 2023-12-15

## 2023-12-15 LAB
ALLEN TEST: ABNORMAL
ANION GAP SERPL CALCULATED.3IONS-SCNC: 12 MMOL/L (ref 9–17)
ANION GAP SERPL CALCULATED.3IONS-SCNC: 15 MMOL/L (ref 9–17)
BUN BLD-MCNC: 22 MG/DL (ref 8–26)
BUN SERPL-MCNC: 20 MG/DL (ref 6–20)
BUN SERPL-MCNC: 22 MG/DL (ref 6–20)
CA-I BLD-SCNC: 1.02 MMOL/L (ref 1.15–1.33)
CA-I BLD-SCNC: 1.08 MMOL/L (ref 1.13–1.33)
CA-I BLD-SCNC: 1.08 MMOL/L (ref 1.15–1.33)
CA-I BLD-SCNC: 1.16 MMOL/L (ref 1.15–1.33)
CA-I BLD-SCNC: 1.17 MMOL/L (ref 1.13–1.33)
CA-I BLD-SCNC: 1.19 MMOL/L (ref 1.15–1.33)
CA-I BLD-SCNC: 1.22 MMOL/L (ref 1.15–1.33)
CA-I BLD-SCNC: 1.22 MMOL/L (ref 1.15–1.33)
CALCIUM SERPL-MCNC: 8.5 MG/DL (ref 8.6–10.4)
CALCIUM SERPL-MCNC: 8.6 MG/DL (ref 8.6–10.4)
CHLORIDE BLD-SCNC: 107 MMOL/L (ref 98–107)
CHLORIDE SERPL-SCNC: 103 MMOL/L (ref 98–107)
CHLORIDE SERPL-SCNC: 108 MMOL/L (ref 98–107)
CLOT ANGLE.KAOLIN INDUCED BLD RES TEG: 71.9 DEG (ref 63–78)
CLOT ANGLE.KAOLIN INDUCED BLD RES TEG: 72.1 DEG (ref 63–78)
CO2 BLD CALC-SCNC: 23 MMOL/L (ref 22–30)
CO2 BLD CALC-SCNC: 24 MMOL/L (ref 22–30)
CO2 SERPL-SCNC: 22 MMOL/L (ref 20–31)
CO2 SERPL-SCNC: 25 MMOL/L (ref 20–31)
CREAT SERPL-MCNC: 1.2 MG/DL (ref 0.5–0.9)
CREAT SERPL-MCNC: 1.2 MG/DL (ref 0.5–0.9)
EGFR, POC: 59 ML/MIN/1.73M2
ERYTHROCYTE [DISTWIDTH] IN BLOOD BY AUTOMATED COUNT: 13.3 % (ref 11.8–14.4)
FIBRINOGEN, FUNCTIONAL TEG: 18.7 MM (ref 15–32)
FIBRINOGEN, FUNCTIONAL TEG: 18.9 MM (ref 15–32)
FIO2: 100
FIO2: 70
FIO2: 70
FIO2: 90
GFR SERPL CREATININE-BSD FRML MDRD: 53 ML/MIN/1.73M2
GFR SERPL CREATININE-BSD FRML MDRD: 53 ML/MIN/1.73M2
GLUCOSE BLD-MCNC: 118 MG/DL (ref 74–100)
GLUCOSE BLD-MCNC: 122 MG/DL (ref 65–105)
GLUCOSE BLD-MCNC: 123 MG/DL (ref 74–100)
GLUCOSE BLD-MCNC: 142 MG/DL (ref 65–105)
GLUCOSE BLD-MCNC: 143 MG/DL (ref 74–100)
GLUCOSE BLD-MCNC: 158 MG/DL (ref 65–105)
GLUCOSE BLD-MCNC: 158 MG/DL (ref 65–105)
GLUCOSE BLD-MCNC: 159 MG/DL (ref 74–100)
GLUCOSE BLD-MCNC: 163 MG/DL (ref 74–100)
GLUCOSE BLD-MCNC: 167 MG/DL (ref 74–100)
GLUCOSE BLD-MCNC: 170 MG/DL (ref 74–100)
GLUCOSE BLD-MCNC: 173 MG/DL (ref 74–100)
GLUCOSE BLD-MCNC: 178 MG/DL (ref 74–100)
GLUCOSE BLD-MCNC: 182 MG/DL (ref 65–105)
GLUCOSE BLD-MCNC: 208 MG/DL (ref 74–100)
GLUCOSE SERPL-MCNC: 138 MG/DL (ref 70–99)
GLUCOSE SERPL-MCNC: 168 MG/DL (ref 70–99)
HCT VFR BLD AUTO: 25 % (ref 36–46)
HCT VFR BLD AUTO: 27 % (ref 36–46)
HCT VFR BLD AUTO: 27 % (ref 36–46)
HCT VFR BLD AUTO: 28 % (ref 36–46)
HCT VFR BLD AUTO: 35.1 % (ref 36.3–47.1)
HCT VFR BLD AUTO: 36 % (ref 36–46)
HCT VFR BLD AUTO: 39 % (ref 36–46)
HCT VFR BLD AUTO: 43 % (ref 36–46)
HGB BLD-MCNC: 11.6 G/DL (ref 11.9–15.1)
INR PPP: 1.3
KINETICS TEG: 1.3 MIN (ref 0.8–2.1)
KINETICS TEG: 1.4 MIN (ref 0.8–2.1)
MA (MAX CLOT) TEG: 58.4 MM (ref 52–69)
MA (MAX CLOT) TEG: 59.9 MM (ref 52–69)
MA(MAX CLOT) RAPID TEG: 58.9 MM (ref 52–70)
MA(MAX CLOT) RAPID TEG: 61.5 MM (ref 52–70)
MAGNESIUM SERPL-MCNC: 2.4 MG/DL (ref 1.6–2.6)
MAGNESIUM SERPL-MCNC: 3.1 MG/DL (ref 1.6–2.6)
MCH RBC QN AUTO: 29.6 PG (ref 25.2–33.5)
MCHC RBC AUTO-ENTMCNC: 33 G/DL (ref 28.4–34.8)
MCV RBC AUTO: 89.5 FL (ref 82.6–102.9)
MODE: ABNORMAL
NEGATIVE BASE EXCESS, ART: 0.2 MMOL/L (ref 0–2)
NEGATIVE BASE EXCESS, ART: 0.4 MMOL/L (ref 0–2)
NEGATIVE BASE EXCESS, ART: 0.6 MMOL/L (ref 0–2)
NEGATIVE BASE EXCESS, ART: 2 MMOL/L (ref 0–2)
NEGATIVE BASE EXCESS, ART: 2.3 MMOL/L (ref 0–2)
NEGATIVE BASE EXCESS, ART: 2.5 MMOL/L (ref 0–2)
NEGATIVE BASE EXCESS, ART: 2.7 MMOL/L (ref 0–2)
NEGATIVE BASE EXCESS, ART: 3.5 MMOL/L (ref 0–2)
NEGATIVE BASE EXCESS, ART: 3.7 MMOL/L (ref 0–2)
NEGATIVE BASE EXCESS, ART: 4.5 MMOL/L (ref 0–2)
NEGATIVE BASE EXCESS, ART: 4.5 MMOL/L (ref 0–2)
NRBC BLD-RTO: 0 PER 100 WBC
O2 DELIVERY DEVICE: ABNORMAL
PARTIAL THROMBOPLASTIN TIME: 25.7 SEC (ref 23–36.5)
PATIENT TEMP: 38.5
PATIENT TEMP: 38.7
PERFORMING LOCATION: NORMAL
PERFORMING LOCATION: NORMAL
PLATELET # BLD AUTO: 181 K/UL (ref 138–453)
PMV BLD AUTO: 9.8 FL (ref 8.1–13.5)
POC ANION GAP: 13 MMOL/L (ref 7–16)
POC CREATININE: 1.1 MG/DL (ref 0.51–1.19)
POC HCO3: 21 MMOL/L (ref 21–28)
POC HCO3: 21.2 MMOL/L (ref 21–28)
POC HCO3: 21.5 MMOL/L (ref 21–28)
POC HCO3: 22.8 MMOL/L (ref 21–28)
POC HCO3: 23.2 MMOL/L (ref 21–28)
POC HCO3: 23.3 MMOL/L (ref 21–28)
POC HCO3: 23.5 MMOL/L (ref 21–28)
POC HCO3: 23.5 MMOL/L (ref 21–28)
POC HCO3: 24.3 MMOL/L (ref 21–28)
POC HCO3: 24.6 MMOL/L (ref 21–28)
POC HCO3: 25.5 MMOL/L (ref 21–28)
POC HEMOGLOBIN (CALC): 12.3 G/DL (ref 12–16)
POC HEMOGLOBIN (CALC): 13.1 G/DL (ref 12–16)
POC HEMOGLOBIN (CALC): 14.6 G/DL (ref 12–16)
POC HEMOGLOBIN (CALC): 8.5 G/DL (ref 12–16)
POC HEMOGLOBIN (CALC): 9.2 G/DL (ref 12–16)
POC HEMOGLOBIN (CALC): 9.3 G/DL (ref 12–16)
POC HEMOGLOBIN (CALC): 9.4 G/DL (ref 12–16)
POC LACTIC ACID: 3.7 MMOL/L (ref 0.56–1.39)
POC LACTIC ACID: 4.2 MMOL/L (ref 0.56–1.39)
POC LACTIC ACID: 5.6 MMOL/L (ref 0.56–1.39)
POC O2 SATURATION: 88.9 % (ref 94–98)
POC O2 SATURATION: 93 % (ref 94–98)
POC O2 SATURATION: 93.3 % (ref 94–98)
POC O2 SATURATION: 96.8 % (ref 94–98)
POC O2 SATURATION: 97.3 % (ref 94–98)
POC O2 SATURATION: 98.3 % (ref 94–98)
POC O2 SATURATION: 98.4 % (ref 94–98)
POC O2 SATURATION: 99.3 % (ref 94–98)
POC O2 SATURATION: 99.3 % (ref 94–98)
POC O2 SATURATION: 99.9 % (ref 94–98)
POC O2 SATURATION: 99.9 % (ref 94–98)
POC PCO2 TEMP: 33.2 MM HG
POC PCO2 TEMP: 35.4 MM HG
POC PCO2: 31.1 MM HG (ref 35–48)
POC PCO2: 32.8 MM HG (ref 35–48)
POC PCO2: 39.4 MM HG (ref 35–48)
POC PCO2: 39.8 MM HG (ref 35–48)
POC PCO2: 40.4 MM HG (ref 35–48)
POC PCO2: 40.7 MM HG (ref 35–48)
POC PCO2: 44.4 MM HG (ref 35–48)
POC PCO2: 45.3 MM HG (ref 35–48)
POC PCO2: 46.4 MM HG (ref 35–48)
POC PCO2: 49.2 MM HG (ref 35–48)
POC PCO2: 56 MM HG (ref 35–48)
POC PH TEMP: 7.43
POC PH TEMP: 7.43
POC PH: 7.27 (ref 7.35–7.45)
POC PH: 7.28 (ref 7.35–7.45)
POC PH: 7.31 (ref 7.35–7.45)
POC PH: 7.31 (ref 7.35–7.45)
POC PH: 7.33 (ref 7.35–7.45)
POC PH: 7.39 (ref 7.35–7.45)
POC PH: 7.39 (ref 7.35–7.45)
POC PH: 7.45 (ref 7.35–7.45)
POC PH: 7.46 (ref 7.35–7.45)
POC PO2 TEMP: 147.3 MM HG
POC PO2 TEMP: 97.8 MM HG
POC PO2: 123.8 MM HG (ref 83–108)
POC PO2: 127.5 MM HG (ref 83–108)
POC PO2: 137.8 MM HG (ref 83–108)
POC PO2: 151.6 MM HG (ref 83–108)
POC PO2: 341 MM HG (ref 83–108)
POC PO2: 378.6 MM HG (ref 83–108)
POC PO2: 64 MM HG (ref 83–108)
POC PO2: 73.1 MM HG (ref 83–108)
POC PO2: 73.5 MM HG (ref 83–108)
POC PO2: 87.8 MM HG (ref 83–108)
POC PO2: 94.7 MM HG (ref 83–108)
POTASSIUM BLD-SCNC: 4.3 MMOL/L (ref 3.5–4.5)
POTASSIUM BLD-SCNC: 4.5 MMOL/L (ref 3.5–4.5)
POTASSIUM BLD-SCNC: 4.6 MMOL/L (ref 3.5–4.5)
POTASSIUM BLD-SCNC: 4.7 MMOL/L (ref 3.5–4.5)
POTASSIUM BLD-SCNC: 4.7 MMOL/L (ref 3.5–4.5)
POTASSIUM BLD-SCNC: 5.3 MMOL/L (ref 3.5–4.5)
POTASSIUM SERPL-SCNC: 4.1 MMOL/L (ref 3.7–5.3)
POTASSIUM SERPL-SCNC: 4.3 MMOL/L (ref 3.7–5.3)
PROTHROMBIN TIME: 15.8 SEC (ref 11.7–14.9)
RBC # BLD AUTO: 3.92 M/UL (ref 3.95–5.11)
REACTION TIME TEG W HEPARIN: 5.9 MIN (ref 4.3–8.3)
REACTION TIME TEG W HEPARIN: 6.2 MIN (ref 4.3–8.3)
REACTION TIME TEG: 5.7 MIN (ref 4.6–9.1)
REACTION TIME TEG: 6.9 MIN (ref 4.6–9.1)
SAMPLE SITE: ABNORMAL
SODIUM BLD-SCNC: 140 MMOL/L (ref 138–146)
SODIUM BLD-SCNC: 142 MMOL/L (ref 138–146)
SODIUM SERPL-SCNC: 140 MMOL/L (ref 135–144)
SODIUM SERPL-SCNC: 145 MMOL/L (ref 135–144)
WBC OTHER # BLD: 30 K/UL (ref 3.5–11.3)

## 2023-12-15 PROCEDURE — 2580000003 HC RX 258: Performed by: THORACIC SURGERY (CARDIOTHORACIC VASCULAR SURGERY)

## 2023-12-15 PROCEDURE — 06BQ4ZZ EXCISION OF LEFT SAPHENOUS VEIN, PERCUTANEOUS ENDOSCOPIC APPROACH: ICD-10-PCS | Performed by: THORACIC SURGERY (CARDIOTHORACIC VASCULAR SURGERY)

## 2023-12-15 PROCEDURE — 33534 CABG ARTERIAL TWO: CPT | Performed by: THORACIC SURGERY (CARDIOTHORACIC VASCULAR SURGERY)

## 2023-12-15 PROCEDURE — 2500000003 HC RX 250 WO HCPCS: Performed by: THORACIC SURGERY (CARDIOTHORACIC VASCULAR SURGERY)

## 2023-12-15 PROCEDURE — 33517 CABG ARTERY-VEIN SINGLE: CPT | Performed by: THORACIC SURGERY (CARDIOTHORACIC VASCULAR SURGERY)

## 2023-12-15 PROCEDURE — 82803 BLOOD GASES ANY COMBINATION: CPT

## 2023-12-15 PROCEDURE — 94002 VENT MGMT INPAT INIT DAY: CPT

## 2023-12-15 PROCEDURE — 85576 BLOOD PLATELET AGGREGATION: CPT

## 2023-12-15 PROCEDURE — 3700000000 HC ANESTHESIA ATTENDED CARE: Performed by: THORACIC SURGERY (CARDIOTHORACIC VASCULAR SURGERY)

## 2023-12-15 PROCEDURE — 2000000000 HC ICU R&B

## 2023-12-15 PROCEDURE — 84132 ASSAY OF SERUM POTASSIUM: CPT

## 2023-12-15 PROCEDURE — 2720000010 HC SURG SUPPLY STERILE: Performed by: THORACIC SURGERY (CARDIOTHORACIC VASCULAR SURGERY)

## 2023-12-15 PROCEDURE — 6360000002 HC RX W HCPCS: Performed by: THORACIC SURGERY (CARDIOTHORACIC VASCULAR SURGERY)

## 2023-12-15 PROCEDURE — 83605 ASSAY OF LACTIC ACID: CPT

## 2023-12-15 PROCEDURE — 93005 ELECTROCARDIOGRAM TRACING: CPT | Performed by: PHYSICIAN ASSISTANT

## 2023-12-15 PROCEDURE — 94761 N-INVAS EAR/PLS OXIMETRY MLT: CPT

## 2023-12-15 PROCEDURE — 2500000003 HC RX 250 WO HCPCS: Performed by: STUDENT IN AN ORGANIZED HEALTH CARE EDUCATION/TRAINING PROGRAM

## 2023-12-15 PROCEDURE — 6360000002 HC RX W HCPCS: Performed by: STUDENT IN AN ORGANIZED HEALTH CARE EDUCATION/TRAINING PROGRAM

## 2023-12-15 PROCEDURE — 33509 NDSC HRV UXTR ART 1 SGM CAB: CPT | Performed by: THORACIC SURGERY (CARDIOTHORACIC VASCULAR SURGERY)

## 2023-12-15 PROCEDURE — 85384 FIBRINOGEN ACTIVITY: CPT

## 2023-12-15 PROCEDURE — 02100AW BYPASS CORONARY ARTERY, ONE ARTERY FROM AORTA WITH AUTOLOGOUS ARTERIAL TISSUE, OPEN APPROACH: ICD-10-PCS | Performed by: THORACIC SURGERY (CARDIOTHORACIC VASCULAR SURGERY)

## 2023-12-15 PROCEDURE — 86901 BLOOD TYPING SEROLOGIC RH(D): CPT

## 2023-12-15 PROCEDURE — 71045 X-RAY EXAM CHEST 1 VIEW: CPT

## 2023-12-15 PROCEDURE — 84520 ASSAY OF UREA NITROGEN: CPT

## 2023-12-15 PROCEDURE — 85610 PROTHROMBIN TIME: CPT

## 2023-12-15 PROCEDURE — 85014 HEMATOCRIT: CPT

## 2023-12-15 PROCEDURE — 86900 BLOOD TYPING SEROLOGIC ABO: CPT

## 2023-12-15 PROCEDURE — 02100Z9 BYPASS CORONARY ARTERY, ONE ARTERY FROM LEFT INTERNAL MAMMARY, OPEN APPROACH: ICD-10-PCS | Performed by: THORACIC SURGERY (CARDIOTHORACIC VASCULAR SURGERY)

## 2023-12-15 PROCEDURE — 82330 ASSAY OF CALCIUM: CPT

## 2023-12-15 PROCEDURE — 3700000001 HC ADD 15 MINUTES (ANESTHESIA): Performed by: THORACIC SURGERY (CARDIOTHORACIC VASCULAR SURGERY)

## 2023-12-15 PROCEDURE — 5A1221Z PERFORMANCE OF CARDIAC OUTPUT, CONTINUOUS: ICD-10-PCS | Performed by: THORACIC SURGERY (CARDIOTHORACIC VASCULAR SURGERY)

## 2023-12-15 PROCEDURE — 3600000018 HC SURGERY OHS ADDTL 15MIN: Performed by: THORACIC SURGERY (CARDIOTHORACIC VASCULAR SURGERY)

## 2023-12-15 PROCEDURE — 6360000002 HC RX W HCPCS: Performed by: PHYSICIAN ASSISTANT

## 2023-12-15 PROCEDURE — 6370000000 HC RX 637 (ALT 250 FOR IP): Performed by: PHYSICIAN ASSISTANT

## 2023-12-15 PROCEDURE — 80048 BASIC METABOLIC PNL TOTAL CA: CPT

## 2023-12-15 PROCEDURE — 03BC4ZZ EXCISION OF LEFT RADIAL ARTERY, PERCUTANEOUS ENDOSCOPIC APPROACH: ICD-10-PCS | Performed by: THORACIC SURGERY (CARDIOTHORACIC VASCULAR SURGERY)

## 2023-12-15 PROCEDURE — 6360000002 HC RX W HCPCS

## 2023-12-15 PROCEDURE — 84295 ASSAY OF SERUM SODIUM: CPT

## 2023-12-15 PROCEDURE — 2580000003 HC RX 258

## 2023-12-15 PROCEDURE — 76942 ECHO GUIDE FOR BIOPSY: CPT | Performed by: ANESTHESIOLOGY

## 2023-12-15 PROCEDURE — 80051 ELECTROLYTE PANEL: CPT

## 2023-12-15 PROCEDURE — 2580000003 HC RX 258: Performed by: PHYSICIAN ASSISTANT

## 2023-12-15 PROCEDURE — 6370000000 HC RX 637 (ALT 250 FOR IP)

## 2023-12-15 PROCEDURE — 3600000008 HC SURGERY OHS BASE: Performed by: THORACIC SURGERY (CARDIOTHORACIC VASCULAR SURGERY)

## 2023-12-15 PROCEDURE — P9041 ALBUMIN (HUMAN),5%, 50ML: HCPCS | Performed by: PHYSICIAN ASSISTANT

## 2023-12-15 PROCEDURE — 2100000001 HC CVICU R&B

## 2023-12-15 PROCEDURE — 2580000003 HC RX 258: Performed by: STUDENT IN AN ORGANIZED HEALTH CARE EDUCATION/TRAINING PROGRAM

## 2023-12-15 PROCEDURE — 6360000002 HC RX W HCPCS: Performed by: ANESTHESIOLOGY

## 2023-12-15 PROCEDURE — P9041 ALBUMIN (HUMAN),5%, 50ML: HCPCS | Performed by: STUDENT IN AN ORGANIZED HEALTH CARE EDUCATION/TRAINING PROGRAM

## 2023-12-15 PROCEDURE — 86850 RBC ANTIBODY SCREEN: CPT

## 2023-12-15 PROCEDURE — 85730 THROMBOPLASTIN TIME PARTIAL: CPT

## 2023-12-15 PROCEDURE — 86920 COMPATIBILITY TEST SPIN: CPT

## 2023-12-15 PROCEDURE — 83735 ASSAY OF MAGNESIUM: CPT

## 2023-12-15 PROCEDURE — 021009W BYPASS CORONARY ARTERY, ONE ARTERY FROM AORTA WITH AUTOLOGOUS VENOUS TISSUE, OPEN APPROACH: ICD-10-PCS | Performed by: THORACIC SURGERY (CARDIOTHORACIC VASCULAR SURGERY)

## 2023-12-15 PROCEDURE — 2709999900 HC NON-CHARGEABLE SUPPLY: Performed by: THORACIC SURGERY (CARDIOTHORACIC VASCULAR SURGERY)

## 2023-12-15 PROCEDURE — 33508 ENDOSCOPIC VEIN HARVEST: CPT | Performed by: THORACIC SURGERY (CARDIOTHORACIC VASCULAR SURGERY)

## 2023-12-15 PROCEDURE — 2700000000 HC OXYGEN THERAPY PER DAY

## 2023-12-15 PROCEDURE — 6370000000 HC RX 637 (ALT 250 FOR IP): Performed by: THORACIC SURGERY (CARDIOTHORACIC VASCULAR SURGERY)

## 2023-12-15 PROCEDURE — C1713 ANCHOR/SCREW BN/BN,TIS/BN: HCPCS | Performed by: THORACIC SURGERY (CARDIOTHORACIC VASCULAR SURGERY)

## 2023-12-15 PROCEDURE — 85347 COAGULATION TIME ACTIVATED: CPT

## 2023-12-15 PROCEDURE — 82947 ASSAY GLUCOSE BLOOD QUANT: CPT

## 2023-12-15 PROCEDURE — 82374 ASSAY BLOOD CARBON DIOXIDE: CPT

## 2023-12-15 PROCEDURE — 3E0T3BZ INTRODUCTION OF ANESTHETIC AGENT INTO PERIPHERAL NERVES AND PLEXI, PERCUTANEOUS APPROACH: ICD-10-PCS | Performed by: ANESTHESIOLOGY

## 2023-12-15 PROCEDURE — 2500000003 HC RX 250 WO HCPCS: Performed by: PHYSICIAN ASSISTANT

## 2023-12-15 PROCEDURE — B24BZZ4 ULTRASONOGRAPHY OF HEART WITH AORTA, TRANSESOPHAGEAL: ICD-10-PCS | Performed by: ANESTHESIOLOGY

## 2023-12-15 PROCEDURE — 85027 COMPLETE CBC AUTOMATED: CPT

## 2023-12-15 PROCEDURE — A4217 STERILE WATER/SALINE, 500 ML: HCPCS | Performed by: THORACIC SURGERY (CARDIOTHORACIC VASCULAR SURGERY)

## 2023-12-15 PROCEDURE — C1889 IMPLANT/INSERT DEVICE, NOC: HCPCS | Performed by: THORACIC SURGERY (CARDIOTHORACIC VASCULAR SURGERY)

## 2023-12-15 PROCEDURE — 82565 ASSAY OF CREATININE: CPT

## 2023-12-15 PROCEDURE — 37799 UNLISTED PX VASCULAR SURGERY: CPT

## 2023-12-15 PROCEDURE — 6370000000 HC RX 637 (ALT 250 FOR IP): Performed by: STUDENT IN AN ORGANIZED HEALTH CARE EDUCATION/TRAINING PROGRAM

## 2023-12-15 DEVICE — IMPLANTABLE DEVICE: Type: IMPLANTABLE DEVICE | Site: STERNUM | Status: FUNCTIONAL

## 2023-12-15 DEVICE — CLIP LIG M BLU TI HRT SHP WIRE HORZ 6 CLIPS PER PK: Type: IMPLANTABLE DEVICE | Status: FUNCTIONAL

## 2023-12-15 DEVICE — DEVICE STRNL CLSR MULTIIMPLANT STRNLOCK 360: Type: IMPLANTABLE DEVICE | Status: FUNCTIONAL

## 2023-12-15 DEVICE — PLATE BNE 100DEG 4 H L SHP STERNALOCK BLU PRI CLSR SYS: Type: IMPLANTABLE DEVICE | Site: STERNUM | Status: FUNCTIONAL

## 2023-12-15 DEVICE — IMPLANTABLE DEVICE: Type: IMPLANTABLE DEVICE | Status: FUNCTIONAL

## 2023-12-15 RX ORDER — DEXTROSE MONOHYDRATE 100 MG/ML
INJECTION, SOLUTION INTRAVENOUS CONTINUOUS PRN
Status: DISCONTINUED | OUTPATIENT
Start: 2023-12-15 | End: 2023-12-17

## 2023-12-15 RX ORDER — OXYCODONE HYDROCHLORIDE 5 MG/1
5 TABLET ORAL EVERY 4 HOURS PRN
Status: DISCONTINUED | OUTPATIENT
Start: 2023-12-15 | End: 2023-12-17

## 2023-12-15 RX ORDER — ALBUMIN, HUMAN INJ 5% 5 %
25 SOLUTION INTRAVENOUS PRN
Status: DISCONTINUED | OUTPATIENT
Start: 2023-12-15 | End: 2023-12-29 | Stop reason: HOSPADM

## 2023-12-15 RX ORDER — HEPARIN SODIUM 1000 [USP'U]/ML
INJECTION, SOLUTION INTRAVENOUS; SUBCUTANEOUS PRN
Status: DISCONTINUED | OUTPATIENT
Start: 2023-12-15 | End: 2023-12-15 | Stop reason: SDUPTHER

## 2023-12-15 RX ORDER — AMIODARONE HYDROCHLORIDE 200 MG/1
200 TABLET ORAL ONCE
Status: COMPLETED | OUTPATIENT
Start: 2023-12-15 | End: 2023-12-15

## 2023-12-15 RX ORDER — NITROGLYCERIN 20 MG/100ML
INJECTION INTRAVENOUS
Status: COMPLETED
Start: 2023-12-15 | End: 2023-12-15

## 2023-12-15 RX ORDER — FENTANYL CITRATE 50 UG/ML
50 INJECTION, SOLUTION INTRAMUSCULAR; INTRAVENOUS
Status: DISCONTINUED | OUTPATIENT
Start: 2023-12-15 | End: 2023-12-17

## 2023-12-15 RX ORDER — NITROGLYCERIN 20 MG/100ML
INJECTION INTRAVENOUS CONTINUOUS PRN
Status: DISCONTINUED | OUTPATIENT
Start: 2023-12-15 | End: 2023-12-15 | Stop reason: SDUPTHER

## 2023-12-15 RX ORDER — POLYETHYLENE GLYCOL 3350 17 G/17G
17 POWDER, FOR SOLUTION ORAL DAILY
Status: DISCONTINUED | OUTPATIENT
Start: 2023-12-15 | End: 2023-12-27

## 2023-12-15 RX ORDER — MILRINONE LACTATE 0.2 MG/ML
.0625-.75 INJECTION, SOLUTION INTRAVENOUS CONTINUOUS
Status: DISCONTINUED | OUTPATIENT
Start: 2023-12-15 | End: 2023-12-27

## 2023-12-15 RX ORDER — DIPHENHYDRAMINE HCL 25 MG
25 TABLET ORAL NIGHTLY PRN
Status: DISCONTINUED | OUTPATIENT
Start: 2023-12-16 | End: 2023-12-29 | Stop reason: HOSPADM

## 2023-12-15 RX ORDER — MEPERIDINE HYDROCHLORIDE 50 MG/ML
25 INJECTION INTRAMUSCULAR; INTRAVENOUS; SUBCUTANEOUS
Status: ACTIVE | OUTPATIENT
Start: 2023-12-16 | End: 2023-12-17

## 2023-12-15 RX ORDER — PROTAMINE SULFATE 10 MG/ML
INJECTION, SOLUTION INTRAVENOUS
Status: COMPLETED
Start: 2023-12-15 | End: 2023-12-15

## 2023-12-15 RX ORDER — VANCOMYCIN HYDROCHLORIDE 1 G/20ML
INJECTION, POWDER, LYOPHILIZED, FOR SOLUTION INTRAVENOUS PRN
Status: DISCONTINUED | OUTPATIENT
Start: 2023-12-15 | End: 2023-12-15 | Stop reason: ALTCHOICE

## 2023-12-15 RX ORDER — ROPIVACAINE HYDROCHLORIDE 2 MG/ML
INJECTION, SOLUTION EPIDURAL; INFILTRATION; PERINEURAL
Status: COMPLETED
Start: 2023-12-15 | End: 2023-12-15

## 2023-12-15 RX ORDER — MAGNESIUM SULFATE IN WATER 40 MG/ML
2000 INJECTION, SOLUTION INTRAVENOUS PRN
Status: DISCONTINUED | OUTPATIENT
Start: 2023-12-15 | End: 2023-12-17

## 2023-12-15 RX ORDER — ROCURONIUM BROMIDE 10 MG/ML
INJECTION, SOLUTION INTRAVENOUS PRN
Status: DISCONTINUED | OUTPATIENT
Start: 2023-12-15 | End: 2023-12-15 | Stop reason: SDUPTHER

## 2023-12-15 RX ORDER — SODIUM CHLORIDE 9 MG/ML
INJECTION, SOLUTION INTRAVENOUS PRN
Status: DISCONTINUED | OUTPATIENT
Start: 2023-12-15 | End: 2023-12-29 | Stop reason: HOSPADM

## 2023-12-15 RX ORDER — VANCOMYCIN HYDROCHLORIDE 1 G/20ML
INJECTION, POWDER, LYOPHILIZED, FOR SOLUTION INTRAVENOUS
Status: DISCONTINUED
Start: 2023-12-15 | End: 2023-12-15

## 2023-12-15 RX ORDER — ASPIRIN 81 MG/1
81 TABLET ORAL ONCE
Status: COMPLETED | OUTPATIENT
Start: 2023-12-15 | End: 2023-12-15

## 2023-12-15 RX ORDER — PHENYLEPHRINE HCL IN 0.9% NACL 1 MG/10 ML
SYRINGE (ML) INTRAVENOUS PRN
Status: DISCONTINUED | OUTPATIENT
Start: 2023-12-15 | End: 2023-12-15 | Stop reason: SDUPTHER

## 2023-12-15 RX ORDER — ROPIVACAINE HYDROCHLORIDE 5 MG/ML
INJECTION, SOLUTION EPIDURAL; INFILTRATION; PERINEURAL
Status: COMPLETED
Start: 2023-12-15 | End: 2023-12-15

## 2023-12-15 RX ORDER — ROPIVACAINE HYDROCHLORIDE 5 MG/ML
INJECTION, SOLUTION EPIDURAL; INFILTRATION; PERINEURAL
Status: DISCONTINUED | OUTPATIENT
Start: 2023-12-15 | End: 2023-12-15 | Stop reason: SDUPTHER

## 2023-12-15 RX ORDER — SODIUM CHLORIDE 0.9 % (FLUSH) 0.9 %
5-40 SYRINGE (ML) INJECTION PRN
Status: DISCONTINUED | OUTPATIENT
Start: 2023-12-15 | End: 2023-12-29 | Stop reason: HOSPADM

## 2023-12-15 RX ORDER — AMIODARONE HYDROCHLORIDE 200 MG/1
200 TABLET ORAL 3 TIMES DAILY
Status: DISCONTINUED | OUTPATIENT
Start: 2023-12-15 | End: 2023-12-16

## 2023-12-15 RX ORDER — BISACODYL 10 MG
10 SUPPOSITORY, RECTAL RECTAL DAILY PRN
Status: DISCONTINUED | OUTPATIENT
Start: 2023-12-15 | End: 2023-12-29 | Stop reason: HOSPADM

## 2023-12-15 RX ORDER — CLOPIDOGREL BISULFATE 75 MG/1
75 TABLET ORAL DAILY
Status: DISCONTINUED | OUTPATIENT
Start: 2023-12-16 | End: 2023-12-29 | Stop reason: HOSPADM

## 2023-12-15 RX ORDER — DEXMEDETOMIDINE HYDROCHLORIDE 4 UG/ML
INJECTION, SOLUTION INTRAVENOUS
Status: COMPLETED
Start: 2023-12-15 | End: 2023-12-15

## 2023-12-15 RX ORDER — CHLORHEXIDINE GLUCONATE ORAL RINSE 1.2 MG/ML
15 SOLUTION DENTAL ONCE
Status: COMPLETED | OUTPATIENT
Start: 2023-12-15 | End: 2023-12-15

## 2023-12-15 RX ORDER — MILRINONE LACTATE 0.2 MG/ML
INJECTION, SOLUTION INTRAVENOUS CONTINUOUS PRN
Status: DISCONTINUED | OUTPATIENT
Start: 2023-12-15 | End: 2023-12-15 | Stop reason: SDUPTHER

## 2023-12-15 RX ORDER — ROPIVACAINE HYDROCHLORIDE 2 MG/ML
INJECTION, SOLUTION EPIDURAL; INFILTRATION; PERINEURAL
Status: DISCONTINUED | OUTPATIENT
Start: 2023-12-15 | End: 2023-12-15 | Stop reason: SDUPTHER

## 2023-12-15 RX ORDER — PAPAVERINE HYDROCHLORIDE 30 MG/ML
INJECTION INTRAMUSCULAR; INTRAVENOUS
Status: DISCONTINUED
Start: 2023-12-15 | End: 2023-12-15

## 2023-12-15 RX ORDER — MIDAZOLAM HYDROCHLORIDE 1 MG/ML
INJECTION INTRAMUSCULAR; INTRAVENOUS PRN
Status: DISCONTINUED | OUTPATIENT
Start: 2023-12-15 | End: 2023-12-15 | Stop reason: SDUPTHER

## 2023-12-15 RX ORDER — POTASSIUM CHLORIDE 29.8 MG/ML
20 INJECTION INTRAVENOUS PRN
Status: DISCONTINUED | OUTPATIENT
Start: 2023-12-15 | End: 2023-12-17

## 2023-12-15 RX ORDER — WATER 10 ML/10ML
INJECTION INTRAMUSCULAR; INTRAVENOUS; SUBCUTANEOUS
Status: DISCONTINUED
Start: 2023-12-15 | End: 2023-12-15

## 2023-12-15 RX ORDER — SENNA AND DOCUSATE SODIUM 50; 8.6 MG/1; MG/1
1 TABLET, FILM COATED ORAL 2 TIMES DAILY
Status: DISCONTINUED | OUTPATIENT
Start: 2023-12-15 | End: 2023-12-27

## 2023-12-15 RX ORDER — MAGNESIUM HYDROXIDE 1200 MG/15ML
LIQUID ORAL CONTINUOUS PRN
Status: COMPLETED | OUTPATIENT
Start: 2023-12-15 | End: 2023-12-15

## 2023-12-15 RX ORDER — SODIUM CHLORIDE, SODIUM LACTATE, POTASSIUM CHLORIDE, CALCIUM CHLORIDE 600; 310; 30; 20 MG/100ML; MG/100ML; MG/100ML; MG/100ML
INJECTION, SOLUTION INTRAVENOUS CONTINUOUS
Status: DISCONTINUED | OUTPATIENT
Start: 2023-12-15 | End: 2023-12-15

## 2023-12-15 RX ORDER — SODIUM CHLORIDE, SODIUM LACTATE, POTASSIUM CHLORIDE, CALCIUM CHLORIDE 600; 310; 30; 20 MG/100ML; MG/100ML; MG/100ML; MG/100ML
INJECTION, SOLUTION INTRAVENOUS CONTINUOUS PRN
Status: DISCONTINUED | OUTPATIENT
Start: 2023-12-15 | End: 2023-12-15 | Stop reason: SDUPTHER

## 2023-12-15 RX ORDER — DEXTROSE MONOHYDRATE 100 MG/ML
INJECTION, SOLUTION INTRAVENOUS CONTINUOUS PRN
Status: DISCONTINUED | OUTPATIENT
Start: 2023-12-15 | End: 2023-12-29 | Stop reason: HOSPADM

## 2023-12-15 RX ORDER — NOREPINEPHRINE BITARTRATE 0.06 MG/ML
.01-.08 INJECTION, SOLUTION INTRAVENOUS CONTINUOUS PRN
Status: DISCONTINUED | OUTPATIENT
Start: 2023-12-15 | End: 2023-12-17

## 2023-12-15 RX ORDER — ALBUMIN, HUMAN INJ 5% 5 %
SOLUTION INTRAVENOUS PRN
Status: DISCONTINUED | OUTPATIENT
Start: 2023-12-15 | End: 2023-12-15 | Stop reason: SDUPTHER

## 2023-12-15 RX ORDER — IPRATROPIUM BROMIDE AND ALBUTEROL SULFATE 2.5; .5 MG/3ML; MG/3ML
1 SOLUTION RESPIRATORY (INHALATION) EVERY 4 HOURS PRN
Status: DISCONTINUED | OUTPATIENT
Start: 2023-12-15 | End: 2023-12-17

## 2023-12-15 RX ORDER — SODIUM CHLORIDE 9 MG/ML
INJECTION, SOLUTION INTRAVENOUS CONTINUOUS
Status: DISCONTINUED | OUTPATIENT
Start: 2023-12-15 | End: 2023-12-15

## 2023-12-15 RX ORDER — FENTANYL CITRATE 50 UG/ML
25 INJECTION, SOLUTION INTRAMUSCULAR; INTRAVENOUS
Status: DISCONTINUED | OUTPATIENT
Start: 2023-12-15 | End: 2023-12-17

## 2023-12-15 RX ORDER — CHLORHEXIDINE GLUCONATE 4 G/100ML
SOLUTION TOPICAL SEE ADMIN INSTRUCTIONS
Status: DISCONTINUED | OUTPATIENT
Start: 2023-12-15 | End: 2023-12-15

## 2023-12-15 RX ORDER — KETAMINE HYDROCHLORIDE 100 MG/ML
INJECTION INTRAMUSCULAR; INTRAVENOUS PRN
Status: DISCONTINUED | OUTPATIENT
Start: 2023-12-15 | End: 2023-12-15

## 2023-12-15 RX ORDER — DEXMEDETOMIDINE HYDROCHLORIDE 4 UG/ML
INJECTION, SOLUTION INTRAVENOUS CONTINUOUS PRN
Status: DISCONTINUED | OUTPATIENT
Start: 2023-12-15 | End: 2023-12-15 | Stop reason: SDUPTHER

## 2023-12-15 RX ORDER — ISOFLURANE 1 ML/ML
LIQUID RESPIRATORY (INHALATION)
Status: DISCONTINUED
Start: 2023-12-15 | End: 2023-12-15

## 2023-12-15 RX ORDER — PROPOFOL 10 MG/ML
INJECTION, EMULSION INTRAVENOUS
Status: DISCONTINUED
Start: 2023-12-15 | End: 2023-12-15

## 2023-12-15 RX ORDER — KETAMINE HCL IN NACL, ISO-OSM 100MG/10ML
SYRINGE (ML) INJECTION PRN
Status: DISCONTINUED | OUTPATIENT
Start: 2023-12-15 | End: 2023-12-15 | Stop reason: SDUPTHER

## 2023-12-15 RX ORDER — MILRINONE LACTATE 0.2 MG/ML
INJECTION, SOLUTION INTRAVENOUS
Status: COMPLETED
Start: 2023-12-15 | End: 2023-12-15

## 2023-12-15 RX ORDER — ONDANSETRON 2 MG/ML
4 INJECTION INTRAMUSCULAR; INTRAVENOUS EVERY 6 HOURS PRN
Status: DISCONTINUED | OUTPATIENT
Start: 2023-12-15 | End: 2023-12-29 | Stop reason: HOSPADM

## 2023-12-15 RX ORDER — DEXMEDETOMIDINE HYDROCHLORIDE 4 UG/ML
.1-1.5 INJECTION, SOLUTION INTRAVENOUS CONTINUOUS
Status: DISCONTINUED | OUTPATIENT
Start: 2023-12-15 | End: 2023-12-16

## 2023-12-15 RX ORDER — ETOMIDATE 2 MG/ML
INJECTION INTRAVENOUS PRN
Status: DISCONTINUED | OUTPATIENT
Start: 2023-12-15 | End: 2023-12-15 | Stop reason: SDUPTHER

## 2023-12-15 RX ORDER — PROTAMINE SULFATE 10 MG/ML
INJECTION, SOLUTION INTRAVENOUS PRN
Status: DISCONTINUED | OUTPATIENT
Start: 2023-12-15 | End: 2023-12-15 | Stop reason: SDUPTHER

## 2023-12-15 RX ORDER — SODIUM CHLORIDE 0.9 % (FLUSH) 0.9 %
5-40 SYRINGE (ML) INJECTION EVERY 12 HOURS SCHEDULED
Status: DISCONTINUED | OUTPATIENT
Start: 2023-12-15 | End: 2023-12-29 | Stop reason: HOSPADM

## 2023-12-15 RX ORDER — PROPOFOL 10 MG/ML
10 INJECTION, EMULSION INTRAVENOUS CONTINUOUS
Status: DISCONTINUED | OUTPATIENT
Start: 2023-12-15 | End: 2023-12-17

## 2023-12-15 RX ORDER — ALBUMIN, HUMAN INJ 5% 5 %
SOLUTION INTRAVENOUS
Status: COMPLETED
Start: 2023-12-15 | End: 2023-12-15

## 2023-12-15 RX ORDER — ALBUMIN (HUMAN) 12.5 G/50ML
25 SOLUTION INTRAVENOUS PRN
Status: DISCONTINUED | OUTPATIENT
Start: 2023-12-15 | End: 2023-12-29 | Stop reason: HOSPADM

## 2023-12-15 RX ORDER — FENTANYL CITRATE 50 UG/ML
INJECTION, SOLUTION INTRAMUSCULAR; INTRAVENOUS PRN
Status: DISCONTINUED | OUTPATIENT
Start: 2023-12-15 | End: 2023-12-15 | Stop reason: SDUPTHER

## 2023-12-15 RX ORDER — AMINOCAPROIC ACID 250 MG/ML
INJECTION, SOLUTION INTRAVENOUS PRN
Status: DISCONTINUED | OUTPATIENT
Start: 2023-12-15 | End: 2023-12-15 | Stop reason: SDUPTHER

## 2023-12-15 RX ORDER — ONDANSETRON 4 MG/1
4 TABLET, ORALLY DISINTEGRATING ORAL EVERY 8 HOURS PRN
Status: DISCONTINUED | OUTPATIENT
Start: 2023-12-15 | End: 2023-12-29 | Stop reason: HOSPADM

## 2023-12-15 RX ORDER — CEFAZOLIN SODIUM 1 G/3ML
INJECTION, POWDER, FOR SOLUTION INTRAMUSCULAR; INTRAVENOUS PRN
Status: DISCONTINUED | OUTPATIENT
Start: 2023-12-15 | End: 2023-12-15 | Stop reason: SDUPTHER

## 2023-12-15 RX ORDER — ETOMIDATE 2 MG/ML
INJECTION INTRAVENOUS
Status: COMPLETED
Start: 2023-12-15 | End: 2023-12-15

## 2023-12-15 RX ORDER — HYDRALAZINE HYDROCHLORIDE 20 MG/ML
5 INJECTION INTRAMUSCULAR; INTRAVENOUS EVERY 5 MIN PRN
Status: DISCONTINUED | OUTPATIENT
Start: 2023-12-15 | End: 2023-12-29 | Stop reason: HOSPADM

## 2023-12-15 RX ADMIN — SODIUM CHLORIDE, PRESERVATIVE FREE 10 ML: 5 INJECTION INTRAVENOUS at 21:00

## 2023-12-15 RX ADMIN — SODIUM CHLORIDE: 9 INJECTION, SOLUTION INTRAVENOUS at 15:10

## 2023-12-15 RX ADMIN — VASOPRESSIN 0.01 UNITS/MIN: 20 INJECTION, SOLUTION INTRAVENOUS at 19:30

## 2023-12-15 RX ADMIN — DEXMEDETOMIDINE HYDROCHLORIDE 0.4 MCG/KG/HR: 400 INJECTION, SOLUTION INTRAVENOUS at 22:48

## 2023-12-15 RX ADMIN — MIDAZOLAM 2 MG: 1 INJECTION INTRAMUSCULAR; INTRAVENOUS at 08:15

## 2023-12-15 RX ADMIN — SODIUM CHLORIDE 2.14 UNITS/HR: 9 INJECTION, SOLUTION INTRAVENOUS at 15:56

## 2023-12-15 RX ADMIN — MILRINONE LACTATE 0.38 MCG/KG/MIN: 0.2 INJECTION, SOLUTION INTRAVENOUS at 13:19

## 2023-12-15 RX ADMIN — SODIUM CHLORIDE 2 UNITS/HR: 9 INJECTION, SOLUTION INTRAVENOUS at 09:46

## 2023-12-15 RX ADMIN — AMINOCAPROIC ACID 5000 MG: 250 INJECTION, SOLUTION INTRAVENOUS at 09:30

## 2023-12-15 RX ADMIN — VASOPRESSIN 0.04 UNITS/MIN: 20 INJECTION, SOLUTION INTRAVENOUS at 15:10

## 2023-12-15 RX ADMIN — FENTANYL CITRATE 100 MCG: 0.05 INJECTION, SOLUTION INTRAMUSCULAR; INTRAVENOUS at 08:52

## 2023-12-15 RX ADMIN — PROTAMINE SULFATE 300 MG: 10 INJECTION, SOLUTION INTRAVENOUS at 13:41

## 2023-12-15 RX ADMIN — ROPIVACAINE HYDROCHLORIDE 20 ML: 5 INJECTION EPIDURAL; INFILTRATION; PERINEURAL at 15:06

## 2023-12-15 RX ADMIN — CHLORHEXIDINE GLUCONATE 15 ML: 1.2 SOLUTION ORAL at 07:47

## 2023-12-15 RX ADMIN — MILRINONE LACTATE IN DEXTROSE 0.38 MCG/KG/MIN: 200 INJECTION, SOLUTION INTRAVENOUS at 15:10

## 2023-12-15 RX ADMIN — AMINOCAPROIC ACID 1 G/HR: 250 INJECTION, SOLUTION INTRAVENOUS at 09:30

## 2023-12-15 RX ADMIN — HEPARIN SODIUM 30000 UNITS: 1000 INJECTION, SOLUTION INTRAVENOUS; SUBCUTANEOUS at 11:50

## 2023-12-15 RX ADMIN — EPINEPHRINE 0.04 MCG/KG/MIN: 1 INJECTION INTRAMUSCULAR; INTRAVENOUS; SUBCUTANEOUS at 09:45

## 2023-12-15 RX ADMIN — OXYCODONE 5 MG: 5 TABLET ORAL at 20:59

## 2023-12-15 RX ADMIN — Medication 100 MCG: at 10:47

## 2023-12-15 RX ADMIN — NITROGLYCERIN 30 MCG/MIN: 20 INJECTION INTRAVENOUS at 09:47

## 2023-12-15 RX ADMIN — AMIODARONE HYDROCHLORIDE 200 MG: 200 TABLET ORAL at 07:47

## 2023-12-15 RX ADMIN — SODIUM BICARBONATE 50 MEQ: 84 INJECTION, SOLUTION INTRAVENOUS at 17:02

## 2023-12-15 RX ADMIN — METOPROLOL TARTRATE 12.5 MG: 25 TABLET, FILM COATED ORAL at 07:47

## 2023-12-15 RX ADMIN — ALBUMIN (HUMAN) 25 G: 12.5 INJECTION, SOLUTION INTRAVENOUS at 17:04

## 2023-12-15 RX ADMIN — FENTANYL CITRATE 100 MCG: 0.05 INJECTION, SOLUTION INTRAMUSCULAR; INTRAVENOUS at 11:24

## 2023-12-15 RX ADMIN — ALBUMIN (HUMAN) 25 G: 12.5 SOLUTION INTRAVENOUS at 14:17

## 2023-12-15 RX ADMIN — FENTANYL CITRATE 50 MCG: 50 INJECTION, SOLUTION INTRAMUSCULAR; INTRAVENOUS at 19:44

## 2023-12-15 RX ADMIN — DILTIAZEM HYDROCHLORIDE 5 MG/HR: 5 INJECTION INTRAVENOUS at 15:10

## 2023-12-15 RX ADMIN — Medication 20 MG: at 08:30

## 2023-12-15 RX ADMIN — FENTANYL CITRATE 100 MCG: 0.05 INJECTION, SOLUTION INTRAMUSCULAR; INTRAVENOUS at 09:54

## 2023-12-15 RX ADMIN — FENTANYL CITRATE 50 MCG: 0.05 INJECTION, SOLUTION INTRAMUSCULAR; INTRAVENOUS at 10:12

## 2023-12-15 RX ADMIN — DILTIAZEM HYDROCHLORIDE 5 MG/HR: 5 INJECTION, SOLUTION INTRAVENOUS at 09:45

## 2023-12-15 RX ADMIN — MUPIROCIN: 20 OINTMENT TOPICAL at 21:00

## 2023-12-15 RX ADMIN — PROPOFOL 10 MCG/KG/MIN: 10 INJECTION, EMULSION INTRAVENOUS at 20:58

## 2023-12-15 RX ADMIN — CEFAZOLIN 2 G: 1 INJECTION, POWDER, FOR SOLUTION INTRAMUSCULAR; INTRAVENOUS at 09:40

## 2023-12-15 RX ADMIN — VANCOMYCIN HYDROCHLORIDE 1500 MG: 1.5 INJECTION, POWDER, LYOPHILIZED, FOR SOLUTION INTRAVENOUS at 09:30

## 2023-12-15 RX ADMIN — PROPOFOL 30 MCG/KG/MIN: 10 INJECTION, EMULSION INTRAVENOUS at 15:10

## 2023-12-15 RX ADMIN — MUPIROCIN: 20 OINTMENT TOPICAL at 07:48

## 2023-12-15 RX ADMIN — Medication 30 MG: at 12:24

## 2023-12-15 RX ADMIN — Medication 30 MG: at 11:10

## 2023-12-15 RX ADMIN — DEXMEDETOMIDINE HYDROCHLORIDE 0.2 MCG/KG/HR: 400 INJECTION, SOLUTION INTRAVENOUS at 15:10

## 2023-12-15 RX ADMIN — SODIUM BICARBONATE 50 MEQ: 84 INJECTION, SOLUTION INTRAVENOUS at 13:36

## 2023-12-15 RX ADMIN — FENTANYL CITRATE 100 MCG: 0.05 INJECTION, SOLUTION INTRAMUSCULAR; INTRAVENOUS at 08:50

## 2023-12-15 RX ADMIN — FENTANYL CITRATE 50 MCG: 50 INJECTION, SOLUTION INTRAMUSCULAR; INTRAVENOUS at 18:43

## 2023-12-15 RX ADMIN — FENTANYL CITRATE 50 MCG: 50 INJECTION, SOLUTION INTRAMUSCULAR; INTRAVENOUS at 17:07

## 2023-12-15 RX ADMIN — FENTANYL CITRATE 150 MCG: 0.05 INJECTION, SOLUTION INTRAMUSCULAR; INTRAVENOUS at 10:26

## 2023-12-15 RX ADMIN — Medication 40 MG: at 13:44

## 2023-12-15 RX ADMIN — SODIUM CHLORIDE, POTASSIUM CHLORIDE, SODIUM LACTATE AND CALCIUM CHLORIDE: 600; 310; 30; 20 INJECTION, SOLUTION INTRAVENOUS at 09:30

## 2023-12-15 RX ADMIN — FENTANYL CITRATE 150 MCG: 0.05 INJECTION, SOLUTION INTRAMUSCULAR; INTRAVENOUS at 11:15

## 2023-12-15 RX ADMIN — ASPIRIN 81 MG: 81 TABLET, COATED ORAL at 07:47

## 2023-12-15 RX ADMIN — SODIUM CHLORIDE: 9 INJECTION, SOLUTION INTRAVENOUS at 07:49

## 2023-12-15 RX ADMIN — ROPIVACAINE HYDROCHLORIDE 20 ML: 2 INJECTION, SOLUTION EPIDURAL; INFILTRATION; PERINEURAL at 15:06

## 2023-12-15 RX ADMIN — SODIUM BICARBONATE 50 MEQ: 84 INJECTION, SOLUTION INTRAVENOUS at 16:06

## 2023-12-15 RX ADMIN — Medication 30 MG: at 09:54

## 2023-12-15 RX ADMIN — AMIODARONE HYDROCHLORIDE 200 MG: 200 TABLET ORAL at 20:59

## 2023-12-15 RX ADMIN — SENNOSIDES AND DOCUSATE SODIUM 1 TABLET: 50; 8.6 TABLET ORAL at 20:58

## 2023-12-15 RX ADMIN — ROCURONIUM BROMIDE 100 MG: 10 INJECTION, SOLUTION INTRAVENOUS at 08:50

## 2023-12-15 RX ADMIN — DEXMEDETOMIDINE HYDROCHLORIDE 0.2 MCG/KG/HR: 4 INJECTION, SOLUTION INTRAVENOUS at 10:13

## 2023-12-15 RX ADMIN — INSULIN HUMAN 4 UNITS: 100 INJECTION, SOLUTION PARENTERAL at 11:59

## 2023-12-15 RX ADMIN — EPINEPHRINE 0.06 MCG/KG/MIN: 1 INJECTION INTRAMUSCULAR; INTRAVENOUS; SUBCUTANEOUS at 15:10

## 2023-12-15 RX ADMIN — ROCURONIUM BROMIDE 50 MG: 10 INJECTION, SOLUTION INTRAVENOUS at 12:24

## 2023-12-15 RX ADMIN — Medication 100 MCG: at 10:49

## 2023-12-15 RX ADMIN — VASOPRESSIN 0.04 UNITS/MIN: 20 INJECTION, SOLUTION INTRAVENOUS at 09:45

## 2023-12-15 RX ADMIN — ETOMIDATE INJECTION 20 MG: 2 SOLUTION INTRAVENOUS at 08:50

## 2023-12-15 RX ADMIN — VANCOMYCIN HYDROCHLORIDE 1500 MG: 1.5 INJECTION, POWDER, LYOPHILIZED, FOR SOLUTION INTRAVENOUS at 16:10

## 2023-12-15 RX ADMIN — MILRINONE LACTATE IN DEXTROSE 0.19 MCG/KG/MIN: 200 INJECTION, SOLUTION INTRAVENOUS at 22:46

## 2023-12-15 RX ADMIN — ROCURONIUM BROMIDE 50 MG: 10 INJECTION, SOLUTION INTRAVENOUS at 11:18

## 2023-12-15 ASSESSMENT — PAIN SCALES - GENERAL: PAINLEVEL_OUTOF10: 0

## 2023-12-15 ASSESSMENT — PULMONARY FUNCTION TESTS
PIF_VALUE: 30
PIF_VALUE: 28
PIF_VALUE: 31

## 2023-12-15 NOTE — ANESTHESIA POSTPROCEDURE EVALUATION
Department of Anesthesiology  Postprocedure Note    Patient: Chantelle Ochoa  MRN: [id number]  YOB: 1965  Date of evaluation: 12/15/2023    Procedure Summary     Date: 12/15/23 Room / Location: [address]    Anesthesia Start: 4281 Anesthesia Stop: 80    Procedure: CABG CORONARY ARTERY BYPASS X3, ON PUMP SWAN SHANTHI, LESLIE, RADIAL HARVEST Diagnosis:       Multiple vessel coronary artery disease      (Multiple vessel coronary artery disease [I25.10])    Surgeons: Marva Estevez MD Responsible Provider: Charissa Davis MD    Anesthesia Type: general ASA Status: 4          Anesthesia Type: No value filed. Sandra Phase I:      Sandra Phase II:      Anesthesia Post Evaluation    Patient location during evaluation: PACU  Patient participation: complete - patient participated  Level of consciousness: obtunded/minimal responses  Airway patency: patent  Nausea & Vomiting: no vomiting  Cardiovascular status: hemodynamically stable and vasoactive/inotropes  Respiratory status: intubated and ventilator  Hydration status: euvolemic  Pain management: adequate        No notable events documented.

## 2023-12-15 NOTE — ANESTHESIA PROCEDURE NOTES
Airway  Date/Time: 12/15/2023 8:54 AM  Urgency: elective    Difficult airway    General Information and Staff    Patient location during procedure: OR  Anesthesiologist: Pedro Menchaca MD  Resident/CRNA: Gracia Chris MD  Performed: anesthesiologist   Performed by:  Gracia Chris MD  Authorized by: Pedro Menchaca MD      Indications and Patient Condition  Indications for airway management: anesthesia  Spontaneous ventilation: present  Sedation level: deep  Preoxygenated: yes  Patient position: sniffing  MILS not maintained throughout  Mask difficulty assessment: vent by bag mask + OA or adjuvant +/- NMBA    Final Airway Details  Final airway type: endotracheal airway      Successful airway: ETT  Cuffed: yes   Successful intubation technique: video laryngoscopy  Facilitating devices/methods: intubating stylet  Endotracheal tube insertion site: oral  Blade: Sheyla  Blade size: #3  ETT size (mm): 8.0  Cormack-Lehane Classification: grade IIa - partial view of glottis  Placement verified by: chest auscultation and capnometry   Measured from: lips  Number of attempts at approach: 1    Additional Comments  Attempt made with DL Kennedy 3 with cormack-lehane III
Arterial Line:    An arterial line was placed using ultrasound guidance, in the OR for the following indication(s): continuous blood pressure monitoring and blood sampling needed. A 20 gauge (size), 12 cm (length), Angiocath (type) catheter was placed, Seldinger technique used, into the right brachial artery, secured by tape and Tegaderm. Anesthesia type: Local  Local infiltration: Injection    Events:  patient tolerated procedure well with no complications. 12/15/2023 8:39 AM12/15/2023 8:54 AM  Anesthesiologist: Yuriy Guy MD  Resident/CRNA: Abigail Villarreal MD  Performed: Anesthesiologist   Preanesthetic Checklist  Completed: patient identified, IV checked, site marked, risks and benefits discussed, surgical/procedural consents, equipment checked, pre-op evaluation, timeout performed, anesthesia consent given, oxygen available, monitors applied/VS acknowledged, fire risk safety assessment completed and verbalized and blood product R/B/A discussed and consented
Central Venous Line:    A central venous line was placed in the OR for the following indication(s): central venous access and CVP monitoring. 12/15/2023 8:55 AM12/15/2023 9:10 AM    Sterility preparation included the following: hand hygiene performed prior to procedure, maximum sterile barriers used and sterile technique used to drape from head to toe. The patient was placed in supine position. The right internal jugular vein was prepped. The site was prepped with Chloraprep. A 9 Fr (size), 11.5 (length), introducer double lumen was placed. During the procedure, the following specific steps were taken: target vein identified, needle advanced into vein and blood aspirated and guidewire advanced into vein. Intravenous verification was obtained by ultrasound, venous blood return, LESLIE and LESLIE. Post insertion care included: all ports aspirated, all ports flushed easily, guidewire removed intact, Biopatch applied, line sutured in place and dressing applied. During the procedure the patient experienced: patient tolerated procedure well with no complications. Insertion site scrubbed per usage guidelines?: Yes  Skin prep agent dried for 3 minutes prior to procedure?:yes  Outcomes: uncomplicated  Anesthesia type: general  Staffing  Performed: Resident/CRNA   Anesthesiologist: Cheryl Stuart MD  Resident/CRNA: Javi Cifuentes MD  Performed by:  Javi Cifuentes MD  Authorized by: Cheryl Stuart MD    Preanesthetic Checklist  Completed: patient identified, IV checked, site marked, risks and benefits discussed, surgical/procedural consents, equipment checked, pre-op evaluation, timeout performed, anesthesia consent given, oxygen available, monitors applied/VS acknowledged, fire risk safety assessment completed and verbalized and blood product R/B/A discussed and consented
Peripheral Block    Patient location during procedure: OR  Reason for block: post-op pain management and at surgeon's request  Start time: 12/15/2023 3:06 PM  End time: 12/15/2023 3:11 PM  Staffing  Performed: anesthesiologist   Anesthesiologist: Jenelle Mcgowan MD  Performed by: Jenelle Mcgowan MD  Authorized by: Jenelle Mcgowan MD    Peripheral Block   Patient position: supine  Prep: ChloraPrep  Provider prep: mask and sterile gloves  Patient monitoring: continuous pulse ox, continuous capnometry, frequent blood pressure checks, IV access and oxygen  Block type: PECS II  Laterality: bilateral  Injection technique: single-shot  Guidance: ultrasound guided    Needle   Needle type: insulated echogenic nerve stimulator needle   Needle gauge: 20 G  Needle localization: ultrasound guidance  Needle length: 10 cm  Assessment   Injection assessment: negative aspiration for heme and local visualized surrounding nerve on ultrasound  Slow fractionated injection: yes  Hemodynamics: stable  Outcomes: uncomplicated    Additional Notes  Pectoral intercostal plane block  Bilateral - 2 inches lateral to sternum  2 injections on each side -  at the cranial and caudal half on the incision  Equal Volume mixture of  of  20 mls 0.5% plus  20 mls of  0.2 % Ropivacaine  = final concentration 0.35 %  10 mls of 0.35 % Ropivacaine at each injection level  Total 40 mls.   LA spread between muscle layers  well visualized-   Negative aspirations -   5 mls incremental injections  Medications Administered  ropivacaine (NAROPIN) injection 0.2% - Perineural   20 mL - 12/15/2023 3:06:00 PM  ropivacaine (NAROPIN) injection 0.5% - Perineural   20 mL - 12/15/2023 3:06:00 PM
Personally      Echocardiogram Comments:       Pre  CPB    1-  Severe LV systolic dysfunction  - EF 15-20 %  by Biplane Wright -  severe RWMA . Cardiac Output from LVOT = 1.6 l/min. 2- Severly Reduced GLS  - 8 % average  3- Diastolic dysfunction -  impaired relaxation,  lateral  e'= 4  4- RV   Normal size, moderate RV dysfunction function  5- AV : tri-leaflet. 1+   AR  central origin , no AS  6- MV ;  mild 1+  MR-  7- TV : mild TR  8-  Left atrium : Normal  RA   normal , IAS   aneurysmal fossa ovalis -  PFO  -  left to right shunting - Positive saline contrast bubble study   9-  Thoracic aorta : Severe protruding atheroma with small ulcers and mobile components - descending aorta. . Ascending aorta : clear. Epiaortic scanning - available and offered. All findings discussed with Dr France Fonseca  CPB LESLIE     1-  LESLIE guided de-airing maneuvers till resolution of intracardiac air  2-  LESLIE guided hemodynamic management and volume replacement  3- Improved LV systolic function with moderate inotropic support  EF 40 % . lateral wall improved most.  Basal and mid inferior segments remain akinetic- Septum improved but mildly hypokinetic. 4- Normal RV function- improved  5-  Mild to moderate MR.    6- Mild Central AR unchanged - Mild TR    7- PFO- left to right shunt  8- No aortic dissection.   Severe distal atheroma in descending aorta  All findings discussed with Dr Clover Ohara

## 2023-12-15 NOTE — ANESTHESIA POSTPROCEDURE EVALUATION
Department of Anesthesiology  Postprocedure Note    Patient: Leoncio Welch  MRN: [id number]  YOB: 1965  Date of evaluation: 12/15/2023    Procedure Summary       Date: 12/15/23 Room / Location: [address]    Anesthesia Start: 2581 Anesthesia Stop: 80    Procedure: CABG CORONARY ARTERY BYPASS X3, ON PUMP SWAN SHANTHI, LESLIE, RADIAL HARVEST Diagnosis:       Multiple vessel coronary artery disease      (Multiple vessel coronary artery disease [I25.10])    Surgeons: Vidhya Botello MD Responsible Provider: Karla King MD    Anesthesia Type: general ASA Status: 4            Anesthesia Type: No value filed. Sandra Phase I:      Sandra Phase II:      Anesthesia Post Evaluation    Patient location during evaluation: ICU  Patient participation: complete - patient cannot participate  Level of consciousness: sedated and ventilated  Airway patency: patent  Cardiovascular status: vasoactive/inotropes and hemodynamically stable  Respiratory status: intubated and ventilator  Hydration status: euvolemic  Comments: No known anesthesia related complications  Multimodal analgesia pain management approach    No notable events documented.

## 2023-12-15 NOTE — PLAN OF CARE
Problem: Discharge Planning  Goal: Discharge to home or other facility with appropriate resources  Outcome: Progressing  Flowsheets (Taken 12/15/2023 1600)  Discharge to home or other facility with appropriate resources: Identify barriers to discharge with patient and caregiver     Problem: Chronic Conditions and Co-morbidities  Goal: Patient's chronic conditions and co-morbidity symptoms are monitored and maintained or improved  Outcome: Progressing  Flowsheets (Taken 12/15/2023 1600)  Care Plan - Patient's Chronic Conditions and Co-Morbidity Symptoms are Monitored and Maintained or Improved: Monitor and assess patient's chronic conditions and comorbid symptoms for stability, deterioration, or improvement     Problem: Respiratory - Adult  Goal: Achieves optimal ventilation and oxygenation  12/15/2023 1830 by Alfredo Garcia RN  Outcome: Progressing  Flowsheets (Taken 12/15/2023 1600)  Achieves optimal ventilation and oxygenation:   Assess for changes in respiratory status   Assess for changes in mentation and behavior     Problem: Safety - Medical Restraint  Goal: Remains free of injury from restraints (Restraint for Interference with Medical Device)  Description: INTERVENTIONS:  1. Determine that other, less restrictive measures have been tried or would not be effective before applying the restraint  2. Evaluate the patient's condition at the time of restraint application  3. Inform patient/family regarding the reason for restraint  4.  Q2H: Monitor safety, psychosocial status, comfort, nutrition and hydration  Outcome: Progressing  Flowsheets (Taken 12/15/2023 1800)  Remains free of injury from restraints (restraint for interference with medical device):   Determine that other, less restrictive measures have been tried or would not be effective before applying the restraint   Evaluate the patient's condition at the time of restraint application   Inform patient/family regarding the reason for restraint

## 2023-12-15 NOTE — ANESTHESIA PRE PROCEDURE
11/22/2023 11:40 AM    BILITOT 0.3 05/16/2023 08:29 AM    ALKPHOS 120 05/16/2023 08:29 AM    AST 23 05/16/2023 08:29 AM    ALT 24 05/16/2023 08:29 AM       POC Tests: No results for input(s): \"POCGLU\", \"POCNA\", \"POCK\", \"POCCL\", \"POCBUN\", \"POCHEMO\", \"POCHCT\" in the last 72 hours. Coags:   Lab Results   Component Value Date/Time    PROTIME 12.7 11/22/2023 11:40 AM    INR 1.0 11/22/2023 11:40 AM    APTT 28.5 11/22/2023 11:40 AM       HCG (If Applicable): No results found for: \"PREGTESTUR\", \"PREGSERUM\", \"HCG\", \"HCGQUANT\"     ABGs: No results found for: \"PHART\", \"PO2ART\", \"MYJ3YUD\", \"GAR4TMY\", \"BEART\", \"A3FIQOLX\"     Type & Screen (If Applicable):  No results found for: \"LABABO\", \"LABRH\"    Drug/Infectious Status (If Applicable):  No results found for: \"HIV\", \"HEPCAB\"    COVID-19 Screening (If Applicable):   Lab Results   Component Value Date/Time    COVID19 Not Detected 01/11/2022 09:09 PM           Anesthesia Evaluation    Airway: Mallampati: II  TM distance: >3 FB   Neck ROM: full  Mouth opening: > = 3 FB   Dental:          Pulmonary: breath sounds clear to auscultation                             Cardiovascular:    (+) hypertension:, CAD:      ECG reviewed  Rhythm: regular  Rate: normal  Echocardiogram reviewed  Stress test reviewed  Cleared by cardiology              Neuro/Psych:   (+) neuromuscular disease:, psychiatric history:            GI/Hepatic/Renal:             Endo/Other:    (+) DiabetesType II DM. Abdominal:       Abdomen: soft. Vascular: Other Findings:        Interpretation Summary         Left Ventricle: Reduced left ventricular systolic function with a visually estimated EF of 40 - 45%. Poor apical visualization and unable to perform Wright's EF measurement. Left ventricle size is normal. Mildly increased wall thickness. See diagram for wall motion findings. Abnormal diastolic function.    Findings:     Left main: Normal with 0% stenosis     LAD: 80% proximal, 90%

## 2023-12-15 NOTE — BRIEF OP NOTE
Brief Postoperative Note      Patient: Allison Yang  YOB: 1965  MRN: [id number]    Date of Procedure: 12/15/2023    Pre-Op Diagnosis Codes:     * Multiple vessel coronary artery disease [I25.10]    Post-Op Diagnosis: Same       Procedure(s):  CABG CORONARY ARTERY BYPASS X3, ON PUMP SWAN SHANTHI, LESLIE, RADIAL HARVEST    Surgeon(s):  Teddy Pichardo MD    Assistant:  Surgical Assistant: Sueellen Dandy First Assistant: Syeda Ham RN    Anesthesia: General    Estimated Blood Loss (mL): N/A    Complications: None    Specimens:   * No specimens in log *    Implants:  Implant Name Type Inv. Item Serial No.  Lot No. LRB No. Used Action   CLIP INT WECK SM WIDE RED TI TRNSVRS GRV CHEVRON SHP W/ PERCIS TIP 6 PER PK - [id number]  CLIP INT WECK SM WIDE RED TI TRNSVRS GRV CHEVRON SHP W/ PERCIS TIP 6 PER PK  [employer]  N/A 1 Implanted   CLIP LIG M ROLANDO TI HRT SHP WIRE HORZ 6 CLIPS PER PK - [id number]  CLIP LIG M ROLANDO TI HRT SHP WIRE HORZ 6 CLIPS PER PK  [employer]  N/A 1 Implanted   CLIP INT WECK SM WIDE RED TI TRNSVRS GRV CHEVRON SHP W/ PERCIS TIP 6 PER PK - [id number]  CLIP INT WECK SM WIDE RED TI TRNSVRS GRV CHEVRON SHP W/ PERCIS TIP 6 PER PK  [employer]  N/A 1 Implanted   CLIP LIG M ROLANDO TI HRT SHP WIRE HORZ 6 CLIPS PER PK - [id number]  CLIP LIG M ROLANDO TI HRT SHP WIRE HORZ 6 CLIPS PER PK  [employer]  N/A 1 Implanted   DEVICE STRNL CLSR MULTIIMPLANT STRNLOCK 360 - [id number]  DEVICE STRNL CLSR MULTIIMPLANT STRNLOCK 360  [employer]  N/A 1 Implanted   SCREW BNE L10MM DIA2. 4MM G CANC TI SELF DRL HANDY FULL THRD - [id number]  SCREW BNE L10MM DIA2. 4MM G CANC TI SELF DRL HANDY FULL THRD  [employer]WD  N/A 4 Implanted   SCREW BNE L12MM DIA2. 4MM G CANC TI SELF DRL HANDY FULL THRD - [id number]  SCREW BNE L12MM DIA2. 4MM G CANC TI SELF DRL HANDY FULL THRD  [facility] MICROFIXATION-WD  N/A 8 Implanted   SCREW BNE L14MM DIA2. 4MM G CANC TI SELF DRL HANDY FULL

## 2023-12-16 ENCOUNTER — APPOINTMENT (OUTPATIENT)
Dept: GENERAL RADIOLOGY | Age: 58
DRG: 235 | End: 2023-12-16
Attending: THORACIC SURGERY (CARDIOTHORACIC VASCULAR SURGERY)
Payer: COMMERCIAL

## 2023-12-16 ENCOUNTER — ANESTHESIA (OUTPATIENT)
Dept: SURGICAL ICU | Age: 58
End: 2023-12-16
Payer: COMMERCIAL

## 2023-12-16 ENCOUNTER — ANESTHESIA EVENT (OUTPATIENT)
Dept: SURGICAL ICU | Age: 58
End: 2023-12-16
Payer: COMMERCIAL

## 2023-12-16 PROBLEM — Z95.1 S/P CABG X 3: Status: ACTIVE | Noted: 2023-12-16

## 2023-12-16 LAB
ABO/RH: NORMAL
ALLEN TEST: ABNORMAL
ANION GAP SERPL CALCULATED.3IONS-SCNC: 11 MMOL/L (ref 9–17)
ANION GAP SERPL CALCULATED.3IONS-SCNC: 15 MMOL/L (ref 9–17)
ANTIBODY SCREEN: NEGATIVE
ARM BAND NUMBER: NORMAL
BLOOD BANK DISPENSE STATUS: NORMAL
BLOOD BANK DISPENSE STATUS: NORMAL
BLOOD BANK SAMPLE EXPIRATION: NORMAL
BPU ID: NORMAL
BPU ID: NORMAL
BUN BLD-MCNC: 16 MG/DL (ref 8–26)
BUN SERPL-MCNC: 18 MG/DL (ref 6–20)
BUN SERPL-MCNC: 19 MG/DL (ref 6–20)
CA-I BLD-SCNC: 1.07 MMOL/L (ref 1.13–1.33)
CA-I BLD-SCNC: 1.15 MMOL/L (ref 1.13–1.33)
CA-I BLD-SCNC: 1.27 MMOL/L (ref 1.15–1.33)
CALCIUM SERPL-MCNC: 8.2 MG/DL (ref 8.6–10.4)
CALCIUM SERPL-MCNC: 9 MG/DL (ref 8.6–10.4)
CHLORIDE BLD-SCNC: 116 MMOL/L (ref 98–107)
CHLORIDE SERPL-SCNC: 106 MMOL/L (ref 98–107)
CHLORIDE SERPL-SCNC: 111 MMOL/L (ref 98–107)
CO2 BLD CALC-SCNC: 24 MMOL/L (ref 22–30)
CO2 SERPL-SCNC: 22 MMOL/L (ref 20–31)
CO2 SERPL-SCNC: 25 MMOL/L (ref 20–31)
COMPONENT: NORMAL
COMPONENT: NORMAL
CREAT SERPL-MCNC: 1.2 MG/DL (ref 0.5–0.9)
CREAT SERPL-MCNC: 1.2 MG/DL (ref 0.5–0.9)
CRITICAL ACTION: NORMAL
CRITICAL NOTIFICATION DATE/TIME: NORMAL
CRITICAL NOTIFICATION: NORMAL
CRITICAL VALUE READ BACK: YES
CROSSMATCH RESULT: NORMAL
CROSSMATCH RESULT: NORMAL
EGFR, POC: 59 ML/MIN/1.73M2
ERYTHROCYTE [DISTWIDTH] IN BLOOD BY AUTOMATED COUNT: 13.7 % (ref 11.8–14.4)
ERYTHROCYTE [DISTWIDTH] IN BLOOD BY AUTOMATED COUNT: 14.4 % (ref 11.8–14.4)
FIO2: 100
FIO2: 100
FIO2: 40
FIO2: 50
FIO2: 50
FIO2: 80
GFR SERPL CREATININE-BSD FRML MDRD: 53 ML/MIN/1.73M2
GFR SERPL CREATININE-BSD FRML MDRD: 53 ML/MIN/1.73M2
GLUCOSE BLD-MCNC: 107 MG/DL (ref 74–100)
GLUCOSE BLD-MCNC: 109 MG/DL (ref 65–105)
GLUCOSE BLD-MCNC: 109 MG/DL (ref 74–100)
GLUCOSE BLD-MCNC: 114 MG/DL (ref 74–100)
GLUCOSE BLD-MCNC: 115 MG/DL (ref 65–105)
GLUCOSE BLD-MCNC: 122 MG/DL (ref 74–100)
GLUCOSE BLD-MCNC: 126 MG/DL (ref 65–105)
GLUCOSE BLD-MCNC: 142 MG/DL (ref 65–105)
GLUCOSE BLD-MCNC: 156 MG/DL (ref 65–105)
GLUCOSE BLD-MCNC: 164 MG/DL (ref 74–100)
GLUCOSE BLD-MCNC: 186 MG/DL (ref 74–100)
GLUCOSE BLD-MCNC: 204 MG/DL (ref 74–100)
GLUCOSE BLD-MCNC: 67 MG/DL (ref 74–100)
GLUCOSE SERPL-MCNC: 122 MG/DL (ref 70–99)
GLUCOSE SERPL-MCNC: 248 MG/DL (ref 70–99)
HCT VFR BLD AUTO: 26 % (ref 36–46)
HCT VFR BLD AUTO: 26.6 % (ref 36.3–47.1)
HCT VFR BLD AUTO: 28.2 % (ref 36.3–47.1)
HGB BLD-MCNC: 8.5 G/DL (ref 11.9–15.1)
HGB BLD-MCNC: 9.3 G/DL (ref 11.9–15.1)
INR PPP: 1.3
MAGNESIUM SERPL-MCNC: 2 MG/DL (ref 1.6–2.6)
MAGNESIUM SERPL-MCNC: 2.3 MG/DL (ref 1.6–2.6)
MCH RBC QN AUTO: 29.6 PG (ref 25.2–33.5)
MCH RBC QN AUTO: 30.1 PG (ref 25.2–33.5)
MCHC RBC AUTO-ENTMCNC: 32 G/DL (ref 28.4–34.8)
MCHC RBC AUTO-ENTMCNC: 33 G/DL (ref 28.4–34.8)
MCV RBC AUTO: 91.3 FL (ref 82.6–102.9)
MCV RBC AUTO: 92.7 FL (ref 82.6–102.9)
MODE: ABNORMAL
NEGATIVE BASE EXCESS, ART: 0.2 MMOL/L (ref 0–2)
NEGATIVE BASE EXCESS, ART: 0.7 MMOL/L (ref 0–2)
NEGATIVE BASE EXCESS, ART: 1 MMOL/L (ref 0–2)
NEGATIVE BASE EXCESS, ART: 1.4 MMOL/L (ref 0–2)
NEGATIVE BASE EXCESS, ART: 1.7 MMOL/L (ref 0–2)
NEGATIVE BASE EXCESS, ART: 3.4 MMOL/L (ref 0–2)
NRBC BLD-RTO: 0 PER 100 WBC
NRBC BLD-RTO: 0 PER 100 WBC
O2 DELIVERY DEVICE: ABNORMAL
PATIENT TEMP: 38.1
PATIENT TEMP: 38.6
PLATELET # BLD AUTO: 115 K/UL (ref 138–453)
PLATELET # BLD AUTO: 121 K/UL (ref 138–453)
PMV BLD AUTO: 10.1 FL (ref 8.1–13.5)
PMV BLD AUTO: 10.2 FL (ref 8.1–13.5)
POC ANION GAP: 9 MMOL/L (ref 7–16)
POC CREATININE: 1.1 MG/DL (ref 0.51–1.19)
POC HCO3: 20.5 MMOL/L (ref 21–28)
POC HCO3: 22.4 MMOL/L (ref 21–28)
POC HCO3: 22.5 MMOL/L (ref 21–28)
POC HCO3: 22.6 MMOL/L (ref 21–28)
POC HCO3: 23.1 MMOL/L (ref 21–28)
POC HCO3: 23.5 MMOL/L (ref 21–28)
POC HCO3: 23.9 MMOL/L (ref 21–28)
POC HCO3: 24 MMOL/L (ref 21–28)
POC HEMOGLOBIN (CALC): 8.8 G/DL (ref 12–16)
POC LACTIC ACID: 0.8 MMOL/L (ref 0.56–1.39)
POC LACTIC ACID: 0.8 MMOL/L (ref 0.56–1.39)
POC LACTIC ACID: 3.4 MMOL/L (ref 0.56–1.39)
POC LACTIC ACID: 3.6 MMOL/L (ref 0.56–1.39)
POC O2 SATURATION: 83.4 % (ref 94–98)
POC O2 SATURATION: 85.7 % (ref 94–98)
POC O2 SATURATION: 87.7 % (ref 94–98)
POC O2 SATURATION: 88.9 % (ref 94–98)
POC O2 SATURATION: 93.2 % (ref 94–98)
POC O2 SATURATION: 94.6 % (ref 94–98)
POC O2 SATURATION: 94.8 % (ref 94–98)
POC O2 SATURATION: 95.2 % (ref 94–98)
POC PCO2 TEMP: 35.5 MM HG
POC PCO2 TEMP: 36.1 MM HG
POC PCO2: 31.4 MM HG (ref 35–48)
POC PCO2: 31.5 MM HG (ref 35–48)
POC PCO2: 32.6 MM HG (ref 35–48)
POC PCO2: 33.2 MM HG (ref 35–48)
POC PCO2: 33.7 MM HG (ref 35–48)
POC PCO2: 33.8 MM HG (ref 35–48)
POC PCO2: 34.6 MM HG (ref 35–48)
POC PCO2: 35.4 MM HG (ref 35–48)
POC PH TEMP: 7.43
POC PH TEMP: 7.43
POC PH: 7.42 (ref 7.35–7.45)
POC PH: 7.42 (ref 7.35–7.45)
POC PH: 7.44 (ref 7.35–7.45)
POC PH: 7.45 (ref 7.35–7.45)
POC PH: 7.45 (ref 7.35–7.45)
POC PH: 7.49 (ref 7.35–7.45)
POC PO2 TEMP: 69.3 MM HG
POC PO2 TEMP: 78.1 MM HG
POC PO2: 45.1 MM HG (ref 83–108)
POC PO2: 49.5 MM HG (ref 83–108)
POC PO2: 51 MM HG (ref 83–108)
POC PO2: 51.7 MM HG (ref 83–108)
POC PO2: 64.2 MM HG (ref 83–108)
POC PO2: 69.9 MM HG (ref 83–108)
POC PO2: 70.1 MM HG (ref 83–108)
POC PO2: 74.1 MM HG (ref 83–108)
POSITIVE BASE EXCESS, ART: 0 MMOL/L (ref 0–3)
POSITIVE BASE EXCESS, ART: 0.8 MMOL/L (ref 0–3)
POTASSIUM BLD-SCNC: 4.1 MMOL/L (ref 3.5–4.5)
POTASSIUM SERPL-SCNC: 4 MMOL/L (ref 3.7–5.3)
POTASSIUM SERPL-SCNC: 4 MMOL/L (ref 3.7–5.3)
PROTHROMBIN TIME: 15.7 SEC (ref 11.7–14.9)
RBC # BLD AUTO: 2.87 M/UL (ref 3.95–5.11)
RBC # BLD AUTO: 3.09 M/UL (ref 3.95–5.11)
SAMPLE SITE: ABNORMAL
SODIUM BLD-SCNC: 147 MMOL/L (ref 138–146)
SODIUM SERPL-SCNC: 143 MMOL/L (ref 135–144)
SODIUM SERPL-SCNC: 147 MMOL/L (ref 135–144)
TRANSFUSION STATUS: NORMAL
TRANSFUSION STATUS: NORMAL
UNIT DIVISION: 0
UNIT DIVISION: 0
WBC OTHER # BLD: 14.4 K/UL (ref 3.5–11.3)
WBC OTHER # BLD: 22.2 K/UL (ref 3.5–11.3)

## 2023-12-16 PROCEDURE — 6370000000 HC RX 637 (ALT 250 FOR IP): Performed by: PHYSICIAN ASSISTANT

## 2023-12-16 PROCEDURE — 5A2204Z RESTORATION OF CARDIAC RHYTHM, SINGLE: ICD-10-PCS | Performed by: INTERNAL MEDICINE

## 2023-12-16 PROCEDURE — 94761 N-INVAS EAR/PLS OXIMETRY MLT: CPT

## 2023-12-16 PROCEDURE — 2580000003 HC RX 258: Performed by: THORACIC SURGERY (CARDIOTHORACIC VASCULAR SURGERY)

## 2023-12-16 PROCEDURE — 82803 BLOOD GASES ANY COMBINATION: CPT

## 2023-12-16 PROCEDURE — 94003 VENT MGMT INPAT SUBQ DAY: CPT

## 2023-12-16 PROCEDURE — 0BH17EZ INSERTION OF ENDOTRACHEAL AIRWAY INTO TRACHEA, VIA NATURAL OR ARTIFICIAL OPENING: ICD-10-PCS | Performed by: ANESTHESIOLOGY

## 2023-12-16 PROCEDURE — 6360000002 HC RX W HCPCS: Performed by: THORACIC SURGERY (CARDIOTHORACIC VASCULAR SURGERY)

## 2023-12-16 PROCEDURE — 2500000003 HC RX 250 WO HCPCS

## 2023-12-16 PROCEDURE — 94640 AIRWAY INHALATION TREATMENT: CPT

## 2023-12-16 PROCEDURE — 6370000000 HC RX 637 (ALT 250 FOR IP): Performed by: SURGERY

## 2023-12-16 PROCEDURE — 85027 COMPLETE CBC AUTOMATED: CPT

## 2023-12-16 PROCEDURE — 99255 IP/OBS CONSLTJ NEW/EST HI 80: CPT | Performed by: INTERNAL MEDICINE

## 2023-12-16 PROCEDURE — 6360000002 HC RX W HCPCS: Performed by: PHYSICIAN ASSISTANT

## 2023-12-16 PROCEDURE — 85014 HEMATOCRIT: CPT

## 2023-12-16 PROCEDURE — 80051 ELECTROLYTE PANEL: CPT

## 2023-12-16 PROCEDURE — 82330 ASSAY OF CALCIUM: CPT

## 2023-12-16 PROCEDURE — 83735 ASSAY OF MAGNESIUM: CPT

## 2023-12-16 PROCEDURE — 6360000002 HC RX W HCPCS: Performed by: STUDENT IN AN ORGANIZED HEALTH CARE EDUCATION/TRAINING PROGRAM

## 2023-12-16 PROCEDURE — 2100000001 HC CVICU R&B

## 2023-12-16 PROCEDURE — 74018 RADEX ABDOMEN 1 VIEW: CPT

## 2023-12-16 PROCEDURE — 82947 ASSAY GLUCOSE BLOOD QUANT: CPT

## 2023-12-16 PROCEDURE — 82565 ASSAY OF CREATININE: CPT

## 2023-12-16 PROCEDURE — 85610 PROTHROMBIN TIME: CPT

## 2023-12-16 PROCEDURE — 2580000003 HC RX 258: Performed by: PHYSICIAN ASSISTANT

## 2023-12-16 PROCEDURE — 2000000000 HC ICU R&B

## 2023-12-16 PROCEDURE — 71045 X-RAY EXAM CHEST 1 VIEW: CPT

## 2023-12-16 PROCEDURE — 2500000003 HC RX 250 WO HCPCS: Performed by: PHYSICIAN ASSISTANT

## 2023-12-16 PROCEDURE — 99024 POSTOP FOLLOW-UP VISIT: CPT | Performed by: PHYSICIAN ASSISTANT

## 2023-12-16 PROCEDURE — 84520 ASSAY OF UREA NITROGEN: CPT

## 2023-12-16 PROCEDURE — P9041 ALBUMIN (HUMAN),5%, 50ML: HCPCS | Performed by: PHYSICIAN ASSISTANT

## 2023-12-16 PROCEDURE — 6370000000 HC RX 637 (ALT 250 FOR IP): Performed by: THORACIC SURGERY (CARDIOTHORACIC VASCULAR SURGERY)

## 2023-12-16 PROCEDURE — 83605 ASSAY OF LACTIC ACID: CPT

## 2023-12-16 PROCEDURE — 37799 UNLISTED PX VASCULAR SURGERY: CPT

## 2023-12-16 PROCEDURE — 2700000000 HC OXYGEN THERAPY PER DAY

## 2023-12-16 PROCEDURE — 6360000002 HC RX W HCPCS

## 2023-12-16 PROCEDURE — 80048 BASIC METABOLIC PNL TOTAL CA: CPT

## 2023-12-16 PROCEDURE — 5A1955Z RESPIRATORY VENTILATION, GREATER THAN 96 CONSECUTIVE HOURS: ICD-10-PCS | Performed by: THORACIC SURGERY (CARDIOTHORACIC VASCULAR SURGERY)

## 2023-12-16 RX ORDER — MIDAZOLAM HYDROCHLORIDE 1 MG/ML
1-10 INJECTION, SOLUTION INTRAVENOUS CONTINUOUS
Status: DISCONTINUED | OUTPATIENT
Start: 2023-12-16 | End: 2023-12-19

## 2023-12-16 RX ORDER — IPRATROPIUM BROMIDE AND ALBUTEROL SULFATE 2.5; .5 MG/3ML; MG/3ML
1 SOLUTION RESPIRATORY (INHALATION)
Status: DISCONTINUED | OUTPATIENT
Start: 2023-12-17 | End: 2023-12-17

## 2023-12-16 RX ORDER — INSULIN LISPRO 100 [IU]/ML
0-4 INJECTION, SOLUTION INTRAVENOUS; SUBCUTANEOUS NIGHTLY
Status: DISCONTINUED | OUTPATIENT
Start: 2023-12-16 | End: 2023-12-17

## 2023-12-16 RX ORDER — ACETAMINOPHEN 325 MG/1
650 TABLET ORAL EVERY 4 HOURS PRN
Status: DISCONTINUED | OUTPATIENT
Start: 2023-12-16 | End: 2023-12-17

## 2023-12-16 RX ORDER — MAGNESIUM SULFATE 1 G/100ML
1000 INJECTION INTRAVENOUS ONCE
Status: COMPLETED | OUTPATIENT
Start: 2023-12-16 | End: 2023-12-17

## 2023-12-16 RX ORDER — FUROSEMIDE 10 MG/ML
20 INJECTION INTRAMUSCULAR; INTRAVENOUS ONCE
Status: COMPLETED | OUTPATIENT
Start: 2023-12-16 | End: 2023-12-16

## 2023-12-16 RX ORDER — INSULIN LISPRO 100 [IU]/ML
0-8 INJECTION, SOLUTION INTRAVENOUS; SUBCUTANEOUS
Status: DISCONTINUED | OUTPATIENT
Start: 2023-12-16 | End: 2023-12-17

## 2023-12-16 RX ORDER — DIGOXIN 0.25 MG/ML
250 INJECTION INTRAMUSCULAR; INTRAVENOUS ONCE
Status: COMPLETED | OUTPATIENT
Start: 2023-12-16 | End: 2023-12-16

## 2023-12-16 RX ADMIN — Medication 0.02 MCG/KG/MIN: at 15:46

## 2023-12-16 RX ADMIN — ACETAMINOPHEN 650 MG: 325 TABLET ORAL at 16:10

## 2023-12-16 RX ADMIN — VANCOMYCIN HYDROCHLORIDE 1500 MG: 1.5 INJECTION, POWDER, LYOPHILIZED, FOR SOLUTION INTRAVENOUS at 16:13

## 2023-12-16 RX ADMIN — DIGOXIN 250 MCG: 0.25 INJECTION INTRAMUSCULAR; INTRAVENOUS at 21:53

## 2023-12-16 RX ADMIN — FENTANYL CITRATE 50 MCG: 50 INJECTION, SOLUTION INTRAMUSCULAR; INTRAVENOUS at 23:03

## 2023-12-16 RX ADMIN — MILRINONE LACTATE IN DEXTROSE 0.38 MCG/KG/MIN: 200 INJECTION, SOLUTION INTRAVENOUS at 21:02

## 2023-12-16 RX ADMIN — VANCOMYCIN HYDROCHLORIDE 1500 MG: 1.5 INJECTION, POWDER, LYOPHILIZED, FOR SOLUTION INTRAVENOUS at 04:23

## 2023-12-16 RX ADMIN — SODIUM CHLORIDE, PRESERVATIVE FREE 10 ML: 5 INJECTION INTRAVENOUS at 09:23

## 2023-12-16 RX ADMIN — SODIUM CHLORIDE 5.4 UNITS/HR: 9 INJECTION, SOLUTION INTRAVENOUS at 05:06

## 2023-12-16 RX ADMIN — Medication 2 MG/HR: at 22:32

## 2023-12-16 RX ADMIN — FENTANYL CITRATE 50 MCG: 50 INJECTION, SOLUTION INTRAMUSCULAR; INTRAVENOUS at 13:42

## 2023-12-16 RX ADMIN — FUROSEMIDE 20 MG: 10 INJECTION, SOLUTION INTRAMUSCULAR; INTRAVENOUS at 22:35

## 2023-12-16 RX ADMIN — FENTANYL CITRATE 50 MCG: 50 INJECTION, SOLUTION INTRAMUSCULAR; INTRAVENOUS at 00:14

## 2023-12-16 RX ADMIN — FENTANYL CITRATE 50 MCG: 50 INJECTION, SOLUTION INTRAMUSCULAR; INTRAVENOUS at 19:30

## 2023-12-16 RX ADMIN — SENNOSIDES AND DOCUSATE SODIUM 1 TABLET: 50; 8.6 TABLET ORAL at 22:13

## 2023-12-16 RX ADMIN — DILTIAZEM HYDROCHLORIDE 30 MG: 30 TABLET, FILM COATED ORAL at 21:53

## 2023-12-16 RX ADMIN — MUPIROCIN: 20 OINTMENT TOPICAL at 22:13

## 2023-12-16 RX ADMIN — IPRATROPIUM BROMIDE AND ALBUTEROL SULFATE 1 DOSE: 2.5; .5 SOLUTION RESPIRATORY (INHALATION) at 23:46

## 2023-12-16 RX ADMIN — FENTANYL CITRATE 50 MCG: 50 INJECTION, SOLUTION INTRAMUSCULAR; INTRAVENOUS at 08:56

## 2023-12-16 RX ADMIN — ALBUMIN (HUMAN) 25 G: 12.5 INJECTION, SOLUTION INTRAVENOUS at 05:01

## 2023-12-16 RX ADMIN — PROPOFOL 10 MCG/KG/MIN: 10 INJECTION, EMULSION INTRAVENOUS at 06:16

## 2023-12-16 RX ADMIN — MAGNESIUM SULFATE HEPTAHYDRATE 1000 MG: 1 INJECTION, SOLUTION INTRAVENOUS at 23:33

## 2023-12-16 RX ADMIN — MUPIROCIN: 20 OINTMENT TOPICAL at 09:24

## 2023-12-16 RX ADMIN — CLOPIDOGREL BISULFATE 75 MG: 75 TABLET ORAL at 09:23

## 2023-12-16 RX ADMIN — PROPOFOL 20 MCG/KG/MIN: 10 INJECTION, EMULSION INTRAVENOUS at 14:23

## 2023-12-16 RX ADMIN — FENTANYL CITRATE 50 MCG: 50 INJECTION, SOLUTION INTRAMUSCULAR; INTRAVENOUS at 14:39

## 2023-12-16 RX ADMIN — CALCIUM CHLORIDE 1000 MG: 100 INJECTION, SOLUTION INTRAVENOUS at 08:58

## 2023-12-16 RX ADMIN — FUROSEMIDE 20 MG: 10 INJECTION, SOLUTION INTRAMUSCULAR; INTRAVENOUS at 15:28

## 2023-12-16 RX ADMIN — POTASSIUM CHLORIDE 20 MEQ: 29.8 INJECTION, SOLUTION INTRAVENOUS at 22:12

## 2023-12-16 RX ADMIN — DILTIAZEM HYDROCHLORIDE 30 MG: 30 TABLET, FILM COATED ORAL at 15:29

## 2023-12-16 RX ADMIN — SENNOSIDES AND DOCUSATE SODIUM 1 TABLET: 50; 8.6 TABLET ORAL at 09:23

## 2023-12-16 RX ADMIN — IPRATROPIUM BROMIDE AND ALBUTEROL SULFATE 1 DOSE: 2.5; .5 SOLUTION RESPIRATORY (INHALATION) at 19:37

## 2023-12-16 RX ADMIN — POTASSIUM CHLORIDE 20 MEQ: 29.8 INJECTION, SOLUTION INTRAVENOUS at 11:42

## 2023-12-16 RX ADMIN — PROPOFOL 50 MCG/KG/MIN: 10 INJECTION, EMULSION INTRAVENOUS at 21:05

## 2023-12-16 RX ADMIN — FENTANYL CITRATE 50 MCG: 50 INJECTION, SOLUTION INTRAMUSCULAR; INTRAVENOUS at 15:29

## 2023-12-16 RX ADMIN — DEXTROSE MONOHYDRATE 125 ML: 100 INJECTION, SOLUTION INTRAVENOUS at 14:07

## 2023-12-16 ASSESSMENT — PULMONARY FUNCTION TESTS
PIF_VALUE: 25
PIF_VALUE: 27
PIF_VALUE: 17
PIF_VALUE: 27
PIF_VALUE: 18
PIF_VALUE: 44
PIF_VALUE: 30
PIF_VALUE: 27
PIF_VALUE: 20
PIF_VALUE: 19
PIF_VALUE: 20

## 2023-12-16 NOTE — CARE COORDINATION
Case Management Assessment  Initial Evaluation    Date/Time of Evaluation: 12/16/2023 3:40 PM  Assessment Completed by: Lore Friedman RN    If patient is discharged prior to next notation, then this note serves as note for discharge by case management.    Patient Name: Cele Camarena                   YOB: 1965  Diagnosis: Multiple vessel coronary artery disease [I25.10]  CAD in native artery [I25.10]                   Date / Time: 12/15/2023  7:30 AM    Patient Admission Status: Inpatient   Readmission Risk (Low < 19, Mod (19-27), High > 27): Readmission Risk Score: 11.5    Current PCP: Leena Murrieta, AQUILES - CNP  PCP verified by CM? (P) Yes (Dr Murrieta)    Chart Reviewed: Yes      History Provided by: (P) Child/Family (spoke to dtr Lynne)  Patient Orientation: (P) Other (see comment) (intubated)    Patient Cognition:      Hospitalization in the last 30 days (Readmission):  No    If yes, Readmission Assessment in CM Navigator will be completed.    Advance Directives:      Code Status: Full Code   Patient's Primary Decision Maker is: (P) Legal Next of Kin (not , one child-Lynne, no HCPOA)      Discharge Planning:    Patient lives with: (P) Children Type of Home: (P) House  Primary Care Giver: (P) Self  Patient Support Systems include: (P) Family Members, Children   Current Financial resources: (P) Other (Comment) (Soci[employer])  Current community resources: (P) None  Current services prior to admission: (P) None            Current DME:              Type of Home Care services:  (P) Skilled Therapy    ADLS  Prior functional level: (P) Independent in ADLs/IADLs  Current functional level: (P) Assistance with the following:, Bathing, Dressing, Toileting, Feeding, Cooking, Housework, Shopping, Mobility    PT AM-PAC:   /24  OT AM-PAC:   /24    Family can provide assistance at DC: (P) Yes (dtr and family members)  Would you like Case Management to discuss the discharge plan  (P) Narayan Fine dtr     The Patient and/or Patient Representative Agree with the Discharge Plan?  (P) Yes    Josue Lucas RN  Case Management Department  Ph: [phone] Fax:

## 2023-12-16 NOTE — PROGRESS NOTES
Date: 12/16/2023  Time: 0220  Patient identity confirmed:  Yes  Indications: resp failure  Preoxygenation: yes bipap    Laryngoscope size and type Glidescope  Airway introducer used: No  Evac: No  ETT size:a 7.5 cuffed  Number of attempts:1   Cords visualized:  [x] Clearly  [] Poorly  Breath sounds present bilaterally: Yes   ETCO2   [x] Positive   ETT secured at  22 teeth    ETT secured with martinez  Chest x-ray ordered: Yes     Difficult airway:    No       If yes, was red tape placed around ETT:   No    Was this a Code Situation:    No       If yes, was the rescue pod used:    No      BP: 137/88        Procedure performed by: Kobe Cyr RCP  3:43 PM

## 2023-12-16 NOTE — PROGRESS NOTES
Notified dr. Darren Pinzon of patients latest abg and vent settings.  No new orders    Brittani Tate RN

## 2023-12-16 NOTE — PLAN OF CARE
Problem: Respiratory - Adult  Goal: Achieves optimal ventilation and oxygenation  12/15/2023 2136 by Ana Nicole RCP  Outcome: Progressing

## 2023-12-16 NOTE — PROGRESS NOTES
[facility]   Progress Note                   Date:   12/16/2023  Patient name: Cele Camarena  Date of admission:  12/15/2023  7:30 AM  MRN:   [id number]  YOB: 1965  PCP: Leena Murrieta APRN - CNP    Reason for Admission: status post CABG    Subjective:   Patient was seen and examined.  Patient extubated yesterday to nasal cannula oxygen shortly later patient had worsening respiratory status and patient was reintubated.  Patient remains on IV milrinone and Iv Epi. Also overnight patient went in to afib with RVR with HR in 180 and she was cardioverted 3 times initially with 200 J followed by 300 Jouls. She initially converted to NSR but later flipped back to Afib. She was subsequently started on cardizem drip with increased pressor requirement.     Medications:   Scheduled Meds:   dilTIAZem  30 mg Oral 3 times per day    insulin lispro  0-8 Units SubCUTAneous TID WC    insulin lispro  0-4 Units SubCUTAneous Nightly    sodium chloride flush  5-40 mL IntraVENous 2 times per day    clopidogrel  75 mg Oral Daily    mupirocin   Each Nostril BID    polyethylene glycol  17 g Oral Daily    sennosides-docusate sodium  1 tablet Oral BID    metoprolol tartrate  12.5 mg Oral BID     Continuous Infusions:   sodium chloride 50 mL/hr at 12/16/23 1548    sodium chloride 25 mL/hr at 12/15/23 1919    propofol 50 mcg/kg/min (12/16/23 1548)    norepinephrine 0.02 mcg/kg/min (12/16/23 1548)    EPINEPHrine Stopped (12/16/23 1206)    dextrose      vasopressin 20 Units in sodium chloride 0.9 % 100 mL infusion Stopped (12/15/23 1942)    milrinone 0.375 mcg/kg/min (12/16/23 1548)    insulin Stopped (12/16/23 1337)    dextrose      dexmedetomidine 1 mcg/kg/hr (12/16/23 1548)     CBC:   Recent Labs     12/15/23  1526 12/16/23  0619   WBC 30.0* 14.4*   HGB 11.6* 8.5*    121*     BMP:    Recent Labs     12/15/23  1526 12/15/23  2214 12/16/23  0619 12/16/23  1359    145* 147*  --    K 4.3  edema. Overnight events noted. Patient required emergent DCCV for rapid Atrial fib with hemodynamic instability. Currently in sinus tach with -120. On low dose epi and milrinone. Good urine output. CO/CI normal. SVR ~ 600. Recommend to wean off epinephrine. Continue low dose cardizem due to radial graft. Continue iv diuresis. Start iv heparin when ok with CTS. Patient is allergic to amiodarone.      Melonie Caba MD, [facility]

## 2023-12-16 NOTE — PROGRESS NOTES
20 etomidate given at 1422   Succ 100 given at 1422  7.5 tube in at 1423  Positive color change at 1423  Bilateral lung sounds at 1423  7.5 tube 22 at the teeth.     Lu Hammans, RN

## 2023-12-16 NOTE — PROGRESS NOTES
Order obtained for extubation. SpO2 of 90 on 40% FiO2. Patient extubated and placed on 50% O2 via  bipap . Post extubation SpO2 is 89% with HR  110 bpm and RR 21 breaths/min. Patient had strong cough that was productive of clear  and white sputum. Extubation Well tolerated by patient. .   Breath Sounds: rhonchi    Ursula Mcclellan RCP   11:42 AM

## 2023-12-16 NOTE — PROGRESS NOTES
[facility] called and updated on abg results and patient resp status orders to call Dr. Fouzia Yang. 26 Dr. Fouzia Yang called and updated on patient abg results and resp status and high bipap settings. Orders to call anesthesia to intubate patient and then consult pulm.     Chris Darling RN

## 2023-12-16 NOTE — PLAN OF CARE
Problem: Discharge Planning  Goal: Discharge to home or other facility with appropriate resources  12/16/2023 1713 by Lucila Luna RN  Outcome: Progressing     Problem: Chronic Conditions and Co-morbidities  Goal: Patient's chronic conditions and co-morbidity symptoms are monitored and maintained or improved  12/16/2023 1713 by Lucila Luna RN  Outcome: Progressing     Problem: Respiratory - Adult  Goal: Achieves optimal ventilation and oxygenation  12/16/2023 1713 by Lucila Luna RN  Outcome: Progressing     Problem: Safety - Medical Restraint  Goal: Remains free of injury from restraints (Restraint for Interference with Medical Device)  Description: INTERVENTIONS:  1. Determine that other, less restrictive measures have been tried or would not be effective before applying the restraint  2. Evaluate the patient's condition at the time of restraint application  3. Inform patient/family regarding the reason for restraint  4.  Q2H: Monitor safety, psychosocial status, comfort, nutrition and hydration  12/16/2023 1315 by Lucila Luna RN  Outcome: Completed  Flowsheets  Taken 12/16/2023 1000 by Lucila Luna RN  Remains free of injury from restraints (restraint for interference with medical device):   Determine that other, less restrictive measures have been tried or would not be effective before applying the restraint   Evaluate the patient's condition at the time of restraint application  Taken 72/44/5853 0800 by Lucila Luna RN  Remains free of injury from restraints (restraint for interference with medical device):   Determine that other, less restrictive measures have been tried or would not be effective before applying the restraint   Evaluate the patient's condition at the time of restraint application  Taken 64/01/9215 0600 by Johnathan Alvarez RN  Remains free of injury from restraints (restraint for interference with medical device):   Determine that other, less

## 2023-12-16 NOTE — PLAN OF CARE
Problem: Discharge Planning  Goal: Discharge to home or other facility with appropriate resources  12/16/2023 0017 by Glenda Lauren RN  Outcome: Progressing  12/15/2023 1830 by Shu Hunt RN  Outcome: Progressing  Flowsheets (Taken 12/15/2023 1600)  Discharge to home or other facility with appropriate resources: Identify barriers to discharge with patient and caregiver     Problem: Chronic Conditions and Co-morbidities  Goal: Patient's chronic conditions and co-morbidity symptoms are monitored and maintained or improved  12/16/2023 0017 by Glenda Lauren RN  Outcome: Progressing  12/15/2023 1830 by Shu Hunt RN  Outcome: Progressing  Flowsheets (Taken 12/15/2023 1600)  Care Plan - Patient's Chronic Conditions and Co-Morbidity Symptoms are Monitored and Maintained or Improved: Monitor and assess patient's chronic conditions and comorbid symptoms for stability, deterioration, or improvement     Problem: Respiratory - Adult  Goal: Achieves optimal ventilation and oxygenation  12/16/2023 0017 by Glenda Lauren RN  Outcome: Progressing  12/15/2023 2136 by Robbie Perla RCP  Outcome: Progressing  12/15/2023 1830 by Shu Hunt RN  Outcome: Progressing  Flowsheets (Taken 12/15/2023 1600)  Achieves optimal ventilation and oxygenation:   Assess for changes in respiratory status   Assess for changes in mentation and behavior  12/15/2023 1533 by Janice Sher RCP  Outcome: Progressing     Problem: Safety - Medical Restraint  Goal: Remains free of injury from restraints (Restraint for Interference with Medical Device)  Description: INTERVENTIONS:  1. Determine that other, less restrictive measures have been tried or would not be effective before applying the restraint  2. Evaluate the patient's condition at the time of restraint application  3. Inform patient/family regarding the reason for restraint  4.  Q2H: Monitor safety, psychosocial status, comfort, nutrition and hydration  12/16/2023

## 2023-12-16 NOTE — CONSULTS
[facility] Cardiology Cardiology    Consult / H&P               Today's Date: 2023  Patient Name: Allison Yang  Date of admission: 12/15/2023  7:30 AM  Patient's age: 63 y.o., 1965  Admission Dx: Multiple vessel coronary artery disease [I25.10]  CAD in native artery [I25.10]    Requesting Physician: Teddy Pichardo MD    Cardiac Evaluation Reason: Post CABG cardiac management    History Obtained From: patient and chart review     History of Present Illness: This patient 63y.o. years old with past medical history given below. Who was here for elective CABG yesterday, underwent CABG x 3 with LIMA to LAD, radial to OM and SVG to RCA. Patient remained intubated and sedated. On milrinone and Iv epi for pressor support. In normal sinus rhythm. On IV Cardizem for radial harvest.    Past Medical History:   has a past medical history of Allergic rhinitis, Arthritis, CAD (coronary artery disease), CKD (chronic kidney disease), stage III ([address]), Depression, Fibromyalgia, Gout, History of fractured vertebra, Hyperlipidemia, Neuropathy, OA (osteoarthritis), Peripheral vascular disease ([address]), Sacroiliac joint dysfunction, Tremor, Type II or unspecified type diabetes mellitus without mention of complication, not stated as uncontrolled, Under care of team, and Wellness examination. Past Surgical History:   has a past surgical history that includes other surgical history (); Cholecystectomy;  section; other surgical history (); knee surgery; back surgery; Cardiac procedure (N/A, 2023); and joint replacement (Right). Home Medications:    Prior to Admission medications    Medication Sig Start Date End Date Taking?  Authorizing Provider   metFORMIN (GLUCOPHAGE) 1000 MG tablet take 1 tablet by mouth every morning and evening with meals 23   Piter Oliveira MD   tiZANidine (ZANAFLEX) 4 MG tablet Take 1 tablet by mouth every 8 hours as needed (muscle spasm) 23   Ana Marr

## 2023-12-16 NOTE — ANESTHESIA PROCEDURE NOTES
Airway  Date/Time: 12/16/2023 2:20 AM  Urgency: elective    Airway not difficult    General Information and Staff    Patient location during procedure: OR  Anesthesiologist: Rogelio Marcial MD  Resident/CRNA: AQUILES Matthew CRNA  Performed: resident/CRNA   Performed by: AQUILES Matthew CRNA  Authorized by: AQUILES Matthew CRNA      Indications and Patient Condition  Indications for airway management: airway protection, hypoxemia and respiratory failure  Spontaneous ventilation: present  Sedation level: deep  Preoxygenated: yes  Patient position: sniffing  MILS not maintained throughout  Mask difficulty assessment: not attempted    Final Airway Details  Final airway type: endotracheal airway      Successful airway: ETT  Cuffed: yes   Successful intubation technique: video laryngoscopy  Endotracheal tube insertion site: oral  Blade: Sheyla  Blade size: #3  ETT size (mm): 7.5  Cormack-Lehane Classification: grade I - full view of glottis  Placement verified by: chest auscultation and capnometry   Inital cuff pressure (cm H2O): 10  Measured from: lips  ETT to lips (cm): 22  Number of attempts at approach: 1  Ventilation between attempts: none  Number of other approaches attempted: 0    Additional Comments  Uneventful intubation on first attempt.   no

## 2023-12-16 NOTE — PROGRESS NOTES
Called dr. Abdulkadir Enrique about consult and patients post intubation abg. No new orders.     Eugenefabiola Jules RN

## 2023-12-16 NOTE — PROGRESS NOTES
Dr. Keshia Campuzano rounded on patient orders to turn fio2 down to 40 and turn patient to cpap at 1100 and extubate. He says he is ok with po2 in the 50s and we will extubate to bipap.     Landon Vegas RN

## 2023-12-16 NOTE — PLAN OF CARE
Problem: Discharge Planning  Goal: Discharge to home or other facility with appropriate resources  12/16/2023 1315 by Lorraine Garcia RN  Outcome: Progressing  Flowsheets (Taken 12/16/2023 0800)  Discharge to home or other facility with appropriate resources: Identify barriers to discharge with patient and caregiver     Problem: Chronic Conditions and Co-morbidities  Goal: Patient's chronic conditions and co-morbidity symptoms are monitored and maintained or improved  12/16/2023 1315 by Lorraine Garcia RN  Outcome: Progressing  Flowsheets (Taken 12/16/2023 0800)  Care Plan - Patient's Chronic Conditions and Co-Morbidity Symptoms are Monitored and Maintained or Improved: Monitor and assess patient's chronic conditions and comorbid symptoms for stability, deterioration, or improvement     Problem: Respiratory - Adult  Goal: Achieves optimal ventilation and oxygenation  12/16/2023 1315 by Lorraine Garcia RN  Outcome: Progressing  Flowsheets  Taken 12/16/2023 1200  Achieves optimal ventilation and oxygenation: Assess for changes in respiratory status  Taken 12/16/2023 0800  Achieves optimal ventilation and oxygenation: Assess for changes in respiratory status     Problem: Safety - Medical Restraint  Goal: Remains free of injury from restraints (Restraint for Interference with Medical Device)  Description: INTERVENTIONS:  1. Determine that other, less restrictive measures have been tried or would not be effective before applying the restraint  2. Evaluate the patient's condition at the time of restraint application  3. Inform patient/family regarding the reason for restraint  4. Q2H: Monitor safety, psychosocial status, comfort, nutrition and hydration  12/16/2023 1315 by Lorraine Garcia RN  Outcome: Completed  Flowsheets  Taken 12/16/2023 1000 by Lorraine Garcia RN  Remains free of injury from restraints (restraint for interference with medical device):   Determine that other, less restrictive  measures have been tried or would not be effective before applying the restraint   Evaluate the patient's condition at the time of restraint application  Taken 84/32/5302 0800 by Garrett Pina RN  Remains free of injury from restraints (restraint for interference with medical device):   Determine that other, less restrictive measures have been tried or would not be effective before applying the restraint   Evaluate the patient's condition at the time of restraint application  Taken 39/94/3657 0600 by Mallory Cueva RN  Remains free of injury from restraints (restraint for interference with medical device):   Determine that other, less restrictive measures have been tried or would not be effective before applying the restraint   Evaluate the patient's condition at the time of restraint application   Inform patient/family regarding the reason for restraint   Every 2 hours: Monitor safety, psychosocial status, comfort, nutrition and hydration  Taken 12/16/2023 0400 by Mallory Cueva RN  Remains free of injury from restraints (restraint for interference with medical device):   Determine that other, less restrictive measures have been tried or would not be effective before applying the restraint   Evaluate the patient's condition at the time of restraint application   Inform patient/family regarding the reason for restraint   Every 2 hours: Monitor safety, psychosocial status, comfort, nutrition and hydration  Taken 12/16/2023 0200 by Mallory Cueva RN  Remains free of injury from restraints (restraint for interference with medical device):   Determine that other, less restrictive measures have been tried or would not be effective before applying the restraint   Evaluate the patient's condition at the time of restraint application   Inform patient/family regarding the reason for restraint   Every 2 hours: Monitor safety, psychosocial status, comfort, nutrition and hydration

## 2023-12-17 ENCOUNTER — APPOINTMENT (OUTPATIENT)
Dept: GENERAL RADIOLOGY | Age: 58
DRG: 235 | End: 2023-12-17
Attending: THORACIC SURGERY (CARDIOTHORACIC VASCULAR SURGERY)
Payer: COMMERCIAL

## 2023-12-17 LAB
ALBUMIN SERPL-MCNC: 3.1 G/DL (ref 3.5–5.2)
ALBUMIN/GLOB SERPL: 1.4 {RATIO} (ref 1–2.5)
ALLEN TEST: ABNORMAL
ALP SERPL-CCNC: 60 U/L (ref 35–104)
ALT SERPL-CCNC: 18 U/L (ref 5–33)
ANION GAP SERPL CALCULATED.3IONS-SCNC: 10 MMOL/L (ref 9–17)
ANION GAP SERPL CALCULATED.3IONS-SCNC: 13 MMOL/L (ref 9–17)
AST SERPL-CCNC: 39 U/L
BILIRUB DIRECT SERPL-MCNC: 0.1 MG/DL
BILIRUB INDIRECT SERPL-MCNC: 0.2 MG/DL (ref 0–1)
BILIRUB SERPL-MCNC: 0.3 MG/DL (ref 0.3–1.2)
BUN BLD-MCNC: 18 MG/DL (ref 8–26)
BUN BLD-MCNC: 18 MG/DL (ref 8–26)
BUN BLD-MCNC: 19 MG/DL (ref 8–26)
BUN BLD-MCNC: 20 MG/DL (ref 8–26)
BUN SERPL-MCNC: 19 MG/DL (ref 6–20)
BUN SERPL-MCNC: 19 MG/DL (ref 6–20)
CA-I BLD-SCNC: 1.17 MMOL/L (ref 1.15–1.33)
CA-I BLD-SCNC: 1.18 MMOL/L (ref 1.13–1.33)
CA-I BLD-SCNC: 1.19 MMOL/L (ref 1.15–1.33)
CA-I BLD-SCNC: 1.2 MMOL/L (ref 1.15–1.33)
CA-I BLD-SCNC: 1.22 MMOL/L (ref 1.15–1.33)
CALCIUM SERPL-MCNC: 8.5 MG/DL (ref 8.6–10.4)
CALCIUM SERPL-MCNC: 8.6 MG/DL (ref 8.6–10.4)
CHLORIDE BLD-SCNC: 110 MMOL/L (ref 98–107)
CHLORIDE BLD-SCNC: 110 MMOL/L (ref 98–107)
CHLORIDE BLD-SCNC: 111 MMOL/L (ref 98–107)
CHLORIDE BLD-SCNC: 112 MMOL/L (ref 98–107)
CHLORIDE SERPL-SCNC: 106 MMOL/L (ref 98–107)
CHLORIDE SERPL-SCNC: 106 MMOL/L (ref 98–107)
CO2 BLD CALC-SCNC: 21 MMOL/L (ref 22–30)
CO2 BLD CALC-SCNC: 23 MMOL/L (ref 22–30)
CO2 BLD CALC-SCNC: 25 MMOL/L (ref 22–30)
CO2 BLD CALC-SCNC: 26 MMOL/L (ref 22–30)
CO2 SERPL-SCNC: 24 MMOL/L (ref 20–31)
CO2 SERPL-SCNC: 26 MMOL/L (ref 20–31)
CREAT SERPL-MCNC: 1.1 MG/DL (ref 0.5–0.9)
CREAT SERPL-MCNC: 1.2 MG/DL (ref 0.5–0.9)
EGFR, POC: 53 ML/MIN/1.73M2
EGFR, POC: 53 ML/MIN/1.73M2
EGFR, POC: 59 ML/MIN/1.73M2
EGFR, POC: 59 ML/MIN/1.73M2
ERYTHROCYTE [DISTWIDTH] IN BLOOD BY AUTOMATED COUNT: 14.5 % (ref 11.8–14.4)
FIO2: 100
FIO2: 90
GFR SERPL CREATININE-BSD FRML MDRD: 53 ML/MIN/1.73M2
GFR SERPL CREATININE-BSD FRML MDRD: 59 ML/MIN/1.73M2
GLUCOSE BLD-MCNC: 103 MG/DL (ref 74–100)
GLUCOSE BLD-MCNC: 111 MG/DL (ref 74–100)
GLUCOSE BLD-MCNC: 122 MG/DL (ref 74–100)
GLUCOSE BLD-MCNC: 136 MG/DL (ref 65–105)
GLUCOSE BLD-MCNC: 167 MG/DL (ref 74–100)
GLUCOSE BLD-MCNC: 247 MG/DL (ref 65–105)
GLUCOSE BLD-MCNC: 258 MG/DL (ref 65–105)
GLUCOSE BLD-MCNC: 288 MG/DL (ref 74–100)
GLUCOSE BLD-MCNC: 317 MG/DL (ref 74–100)
GLUCOSE BLD-MCNC: 344 MG/DL (ref 74–100)
GLUCOSE BLD-MCNC: 98 MG/DL (ref 74–100)
GLUCOSE SERPL-MCNC: 295 MG/DL (ref 70–99)
GLUCOSE SERPL-MCNC: 339 MG/DL (ref 70–99)
HCT VFR BLD AUTO: 25 % (ref 36–46)
HCT VFR BLD AUTO: 27 % (ref 36–46)
HCT VFR BLD AUTO: 29 % (ref 36.3–47.1)
HCT VFR BLD AUTO: 29 % (ref 36–46)
HCT VFR BLD AUTO: 29 % (ref 36–46)
HGB BLD-MCNC: 9.4 G/DL (ref 11.9–15.1)
INR PPP: 1.3
MAGNESIUM SERPL-MCNC: 2.2 MG/DL (ref 1.6–2.6)
MAGNESIUM SERPL-MCNC: 2.3 MG/DL (ref 1.6–2.6)
MCH RBC QN AUTO: 29.7 PG (ref 25.2–33.5)
MCHC RBC AUTO-ENTMCNC: 32.4 G/DL (ref 28.4–34.8)
MCV RBC AUTO: 91.8 FL (ref 82.6–102.9)
METHEMOGLOBIN: 1.2 % (ref 0–1.5)
METHEMOGLOBIN: 1.2 % (ref 0–1.5)
MODE: ABNORMAL
MODE: ABNORMAL
MODE: NORMAL
NEGATIVE BASE EXCESS, ART: 0.2 MMOL/L (ref 0–2)
NEGATIVE BASE EXCESS, ART: 1.9 MMOL/L (ref 0–2)
NEGATIVE BASE EXCESS, ART: 3.9 MMOL/L (ref 0–2)
NRBC BLD-RTO: 0 PER 100 WBC
O2 DELIVERY DEVICE: ABNORMAL
O2 DELIVERY DEVICE: NORMAL
PATIENT TEMP: 37
PATIENT TEMP: 37.5
PATIENT TEMP: 38.6
PHOSPHATE SERPL-MCNC: 3.2 MG/DL (ref 2.6–4.5)
PLATELET # BLD AUTO: 126 K/UL (ref 138–453)
PMV BLD AUTO: 10.7 FL (ref 8.1–13.5)
POC ANION GAP: 10 MMOL/L (ref 7–16)
POC ANION GAP: 12 MMOL/L (ref 7–16)
POC CREATININE: 1.1 MG/DL (ref 0.51–1.19)
POC CREATININE: 1.1 MG/DL (ref 0.51–1.19)
POC CREATININE: 1.2 MG/DL (ref 0.51–1.19)
POC CREATININE: 1.2 MG/DL (ref 0.51–1.19)
POC HCO3: 20.9 MMOL/L (ref 21–28)
POC HCO3: 23.1 MMOL/L (ref 21–28)
POC HCO3: 25.3 MMOL/L (ref 21–28)
POC HCO3: 26.2 MMOL/L (ref 21–28)
POC HCO3: 26.2 MMOL/L (ref 21–28)
POC HCO3: 26.9 MMOL/L (ref 21–28)
POC HCO3: 27.6 MMOL/L (ref 21–28)
POC HEMOGLOBIN (CALC): 8.4 G/DL (ref 12–16)
POC HEMOGLOBIN (CALC): 9.1 G/DL (ref 12–16)
POC HEMOGLOBIN (CALC): 9.7 G/DL (ref 12–16)
POC HEMOGLOBIN (CALC): 9.8 G/DL (ref 12–16)
POC LACTIC ACID: 0.5 MMOL/L (ref 0.56–1.39)
POC LACTIC ACID: 0.6 MMOL/L (ref 0.56–1.39)
POC LACTIC ACID: 0.6 MMOL/L (ref 0.56–1.39)
POC LACTIC ACID: 0.7 MMOL/L (ref 0.56–1.39)
POC LACTIC ACID: 1 MMOL/L (ref 0.56–1.39)
POC LACTIC ACID: 1 MMOL/L (ref 0.56–1.39)
POC LACTIC ACID: 1.3 MMOL/L (ref 0.56–1.39)
POC LACTIC ACID: 2.3 MMOL/L (ref 0.56–1.39)
POC LACTIC ACID: 4.5 MMOL/L (ref 0.56–1.39)
POC O2 SATURATION: 90.3 % (ref 94–98)
POC O2 SATURATION: 91.5 % (ref 94–98)
POC O2 SATURATION: 93.1 % (ref 94–98)
POC O2 SATURATION: 94.2 % (ref 94–98)
POC O2 SATURATION: 94.2 % (ref 94–98)
POC O2 SATURATION: 94.3 % (ref 94–98)
POC O2 SATURATION: 94.4 % (ref 94–98)
POC O2 SATURATION: 96.1 % (ref 94–98)
POC O2 SATURATION: 96.6 % (ref 94–98)
POC PCO2 TEMP: 43 MM HG
POC PCO2 TEMP: 45.6 MM HG
POC PCO2: 35.9 MM HG (ref 35–48)
POC PCO2: 39.6 MM HG (ref 35–48)
POC PCO2: 41.2 MM HG (ref 35–48)
POC PCO2: 42.1 MM HG (ref 35–48)
POC PCO2: 42.4 MM HG (ref 35–48)
POC PCO2: 42.5 MM HG (ref 35–48)
POC PCO2: 43.3 MM HG (ref 35–48)
POC PCO2: 43.7 MM HG (ref 35–48)
POC PCO2: 44.9 MM HG (ref 35–48)
POC PH TEMP: 7.39
POC PH TEMP: 7.39
POC PH: 7.36 (ref 7.35–7.45)
POC PH: 7.37 (ref 7.35–7.45)
POC PH: 7.37 (ref 7.35–7.45)
POC PH: 7.38 (ref 7.35–7.45)
POC PH: 7.39 (ref 7.35–7.45)
POC PH: 7.4 (ref 7.35–7.45)
POC PH: 7.4 (ref 7.35–7.45)
POC PH: 7.41 (ref 7.35–7.45)
POC PH: 7.41 (ref 7.35–7.45)
POC PO2 TEMP: 74.5 MM HG
POC PO2 TEMP: 96.1 MM HG
POC PO2: 60.5 MM HG (ref 83–108)
POC PO2: 63.2 MM HG (ref 83–108)
POC PO2: 70.6 MM HG (ref 83–108)
POC PO2: 71.4 MM HG (ref 83–108)
POC PO2: 72 MM HG (ref 83–108)
POC PO2: 74 MM HG (ref 83–108)
POC PO2: 74.1 MM HG (ref 83–108)
POC PO2: 83 MM HG (ref 83–108)
POC PO2: 86.7 MM HG (ref 83–108)
POSITIVE BASE EXCESS, ART: 0.1 MMOL/L (ref 0–3)
POSITIVE BASE EXCESS, ART: 0.4 MMOL/L (ref 0–3)
POSITIVE BASE EXCESS, ART: 1 MMOL/L (ref 0–3)
POSITIVE BASE EXCESS, ART: 1.3 MMOL/L (ref 0–3)
POSITIVE BASE EXCESS, ART: 2 MMOL/L (ref 0–3)
POSITIVE BASE EXCESS, ART: 2.6 MMOL/L (ref 0–3)
POTASSIUM BLD-SCNC: 3.8 MMOL/L (ref 3.5–4.5)
POTASSIUM BLD-SCNC: 3.9 MMOL/L (ref 3.5–4.5)
POTASSIUM BLD-SCNC: 4 MMOL/L (ref 3.5–4.5)
POTASSIUM BLD-SCNC: 4.1 MMOL/L (ref 3.5–4.5)
POTASSIUM SERPL-SCNC: 4.2 MMOL/L (ref 3.7–5.3)
POTASSIUM SERPL-SCNC: 4.5 MMOL/L (ref 3.7–5.3)
PROCALCITONIN SERPL-MCNC: 2.28 NG/ML
PROT SERPL-MCNC: 5.3 G/DL (ref 6.4–8.3)
PROTHROMBIN TIME: 15.6 SEC (ref 11.7–14.9)
RBC # BLD AUTO: 3.16 M/UL (ref 3.95–5.11)
SAMPLE SITE: ABNORMAL
SARS-COV-2 RDRP RESP QL NAA+PROBE: ABNORMAL
SARS-COV-2 RDRP RESP QL NAA+PROBE: NOT DETECTED
SODIUM BLD-SCNC: 143 MMOL/L (ref 138–146)
SODIUM BLD-SCNC: 144 MMOL/L (ref 138–146)
SODIUM BLD-SCNC: 144 MMOL/L (ref 138–146)
SODIUM BLD-SCNC: 145 MMOL/L (ref 138–146)
SODIUM SERPL-SCNC: 142 MMOL/L (ref 135–144)
SODIUM SERPL-SCNC: 143 MMOL/L (ref 135–144)
SPECIMEN DESCRIPTION: ABNORMAL
SPECIMEN DESCRIPTION: NORMAL
WBC OTHER # BLD: 18.6 K/UL (ref 3.5–11.3)

## 2023-12-17 PROCEDURE — 94761 N-INVAS EAR/PLS OXIMETRY MLT: CPT

## 2023-12-17 PROCEDURE — 83735 ASSAY OF MAGNESIUM: CPT

## 2023-12-17 PROCEDURE — 2580000003 HC RX 258: Performed by: SURGERY

## 2023-12-17 PROCEDURE — 6360000002 HC RX W HCPCS: Performed by: THORACIC SURGERY (CARDIOTHORACIC VASCULAR SURGERY)

## 2023-12-17 PROCEDURE — 2500000003 HC RX 250 WO HCPCS: Performed by: STUDENT IN AN ORGANIZED HEALTH CARE EDUCATION/TRAINING PROGRAM

## 2023-12-17 PROCEDURE — 80076 HEPATIC FUNCTION PANEL: CPT

## 2023-12-17 PROCEDURE — 6360000002 HC RX W HCPCS

## 2023-12-17 PROCEDURE — 6370000000 HC RX 637 (ALT 250 FOR IP): Performed by: NURSE PRACTITIONER

## 2023-12-17 PROCEDURE — 2700000000 HC OXYGEN THERAPY PER DAY

## 2023-12-17 PROCEDURE — 2580000003 HC RX 258: Performed by: STUDENT IN AN ORGANIZED HEALTH CARE EDUCATION/TRAINING PROGRAM

## 2023-12-17 PROCEDURE — 6370000000 HC RX 637 (ALT 250 FOR IP): Performed by: SURGERY

## 2023-12-17 PROCEDURE — 84145 PROCALCITONIN (PCT): CPT

## 2023-12-17 PROCEDURE — 99233 SBSQ HOSP IP/OBS HIGH 50: CPT | Performed by: INTERNAL MEDICINE

## 2023-12-17 PROCEDURE — 94003 VENT MGMT INPAT SUBQ DAY: CPT

## 2023-12-17 PROCEDURE — 6360000002 HC RX W HCPCS: Performed by: STUDENT IN AN ORGANIZED HEALTH CARE EDUCATION/TRAINING PROGRAM

## 2023-12-17 PROCEDURE — 99223 1ST HOSP IP/OBS HIGH 75: CPT | Performed by: SURGERY

## 2023-12-17 PROCEDURE — 71045 X-RAY EXAM CHEST 1 VIEW: CPT

## 2023-12-17 PROCEDURE — 2580000003 HC RX 258: Performed by: PHYSICIAN ASSISTANT

## 2023-12-17 PROCEDURE — 2500000003 HC RX 250 WO HCPCS: Performed by: NURSE PRACTITIONER

## 2023-12-17 PROCEDURE — 6360000002 HC RX W HCPCS: Performed by: NURSE PRACTITIONER

## 2023-12-17 PROCEDURE — 94640 AIRWAY INHALATION TREATMENT: CPT

## 2023-12-17 PROCEDURE — 6360000002 HC RX W HCPCS: Performed by: PHYSICIAN ASSISTANT

## 2023-12-17 PROCEDURE — 2580000003 HC RX 258: Performed by: THORACIC SURGERY (CARDIOTHORACIC VASCULAR SURGERY)

## 2023-12-17 PROCEDURE — 99024 POSTOP FOLLOW-UP VISIT: CPT | Performed by: PHYSICIAN ASSISTANT

## 2023-12-17 PROCEDURE — 80048 BASIC METABOLIC PNL TOTAL CA: CPT

## 2023-12-17 PROCEDURE — 84520 ASSAY OF UREA NITROGEN: CPT

## 2023-12-17 PROCEDURE — 2580000003 HC RX 258: Performed by: NURSE PRACTITIONER

## 2023-12-17 PROCEDURE — 85014 HEMATOCRIT: CPT

## 2023-12-17 PROCEDURE — 83050 HGB METHEMOGLOBIN QUAN: CPT

## 2023-12-17 PROCEDURE — 37799 UNLISTED PX VASCULAR SURGERY: CPT

## 2023-12-17 PROCEDURE — 85027 COMPLETE CBC AUTOMATED: CPT

## 2023-12-17 PROCEDURE — 84100 ASSAY OF PHOSPHORUS: CPT

## 2023-12-17 PROCEDURE — 2500000003 HC RX 250 WO HCPCS: Performed by: SURGERY

## 2023-12-17 PROCEDURE — 82330 ASSAY OF CALCIUM: CPT

## 2023-12-17 PROCEDURE — 6370000000 HC RX 637 (ALT 250 FOR IP): Performed by: PHYSICIAN ASSISTANT

## 2023-12-17 PROCEDURE — 80051 ELECTROLYTE PANEL: CPT

## 2023-12-17 PROCEDURE — 82803 BLOOD GASES ANY COMBINATION: CPT

## 2023-12-17 PROCEDURE — 85610 PROTHROMBIN TIME: CPT

## 2023-12-17 PROCEDURE — 2500000003 HC RX 250 WO HCPCS

## 2023-12-17 PROCEDURE — 6370000000 HC RX 637 (ALT 250 FOR IP): Performed by: THORACIC SURGERY (CARDIOTHORACIC VASCULAR SURGERY)

## 2023-12-17 PROCEDURE — 87635 SARS-COV-2 COVID-19 AMP PRB: CPT

## 2023-12-17 PROCEDURE — 2100000001 HC CVICU R&B

## 2023-12-17 PROCEDURE — 83605 ASSAY OF LACTIC ACID: CPT

## 2023-12-17 PROCEDURE — 82565 ASSAY OF CREATININE: CPT

## 2023-12-17 PROCEDURE — 6360000002 HC RX W HCPCS: Performed by: SURGERY

## 2023-12-17 RX ORDER — SODIUM CHLORIDE FOR INHALATION 3 %
4 VIAL, NEBULIZER (ML) INHALATION EVERY 4 HOURS
Status: DISPENSED | OUTPATIENT
Start: 2023-12-17 | End: 2023-12-20

## 2023-12-17 RX ORDER — PROPOFOL 10 MG/ML
5-50 INJECTION, EMULSION INTRAVENOUS CONTINUOUS
Status: DISCONTINUED | OUTPATIENT
Start: 2023-12-17 | End: 2023-12-23

## 2023-12-17 RX ORDER — MAGNESIUM SULFATE HEPTAHYDRATE 40 MG/ML
4000 INJECTION, SOLUTION INTRAVENOUS ONCE
Status: COMPLETED | OUTPATIENT
Start: 2023-12-17 | End: 2023-12-17

## 2023-12-17 RX ORDER — INSULIN LISPRO 100 [IU]/ML
0-8 INJECTION, SOLUTION INTRAVENOUS; SUBCUTANEOUS EVERY 4 HOURS
Status: DISCONTINUED | OUTPATIENT
Start: 2023-12-17 | End: 2023-12-17

## 2023-12-17 RX ORDER — DIGOXIN 0.25 MG/ML
250 INJECTION INTRAMUSCULAR; INTRAVENOUS ONCE
Status: COMPLETED | OUTPATIENT
Start: 2023-12-17 | End: 2023-12-17

## 2023-12-17 RX ORDER — DIGOXIN 0.25 MG/ML
INJECTION INTRAMUSCULAR; INTRAVENOUS
Status: COMPLETED
Start: 2023-12-17 | End: 2023-12-17

## 2023-12-17 RX ORDER — VECURONIUM BROMIDE 1 MG/ML
10 INJECTION, POWDER, LYOPHILIZED, FOR SOLUTION INTRAVENOUS ONCE
Status: COMPLETED | OUTPATIENT
Start: 2023-12-17 | End: 2023-12-17

## 2023-12-17 RX ORDER — NOREPINEPHRINE BITARTRATE 0.06 MG/ML
.01-.08 INJECTION, SOLUTION INTRAVENOUS CONTINUOUS PRN
Status: DISCONTINUED | OUTPATIENT
Start: 2023-12-17 | End: 2023-12-29 | Stop reason: HOSPADM

## 2023-12-17 RX ORDER — IPRATROPIUM BROMIDE AND ALBUTEROL SULFATE 2.5; .5 MG/3ML; MG/3ML
1 SOLUTION RESPIRATORY (INHALATION) EVERY 4 HOURS
Status: DISCONTINUED | OUTPATIENT
Start: 2023-12-17 | End: 2023-12-29

## 2023-12-17 RX ORDER — ACETAMINOPHEN 160 MG/5ML
1000 LIQUID ORAL EVERY 8 HOURS SCHEDULED
Status: DISCONTINUED | OUTPATIENT
Start: 2023-12-17 | End: 2023-12-29 | Stop reason: HOSPADM

## 2023-12-17 RX ORDER — POTASSIUM CHLORIDE 29.8 MG/ML
20 INJECTION INTRAVENOUS PRN
Status: DISCONTINUED | OUTPATIENT
Start: 2023-12-17 | End: 2023-12-29 | Stop reason: HOSPADM

## 2023-12-17 RX ORDER — METOPROLOL TARTRATE 1 MG/ML
2.5 INJECTION, SOLUTION INTRAVENOUS ONCE
Status: COMPLETED | OUTPATIENT
Start: 2023-12-17 | End: 2023-12-17

## 2023-12-17 RX ORDER — VECURONIUM BROMIDE 1 MG/ML
INJECTION, POWDER, LYOPHILIZED, FOR SOLUTION INTRAVENOUS
Status: COMPLETED
Start: 2023-12-17 | End: 2023-12-17

## 2023-12-17 RX ORDER — ACETYLCYSTEINE 200 MG/ML
600 SOLUTION ORAL; RESPIRATORY (INHALATION) EVERY 4 HOURS
Status: DISPENSED | OUTPATIENT
Start: 2023-12-17 | End: 2023-12-20

## 2023-12-17 RX ORDER — METOPROLOL TARTRATE 1 MG/ML
INJECTION, SOLUTION INTRAVENOUS
Status: COMPLETED
Start: 2023-12-17 | End: 2023-12-17

## 2023-12-17 RX ADMIN — FENTANYL CITRATE 50 MCG: 50 INJECTION, SOLUTION INTRAMUSCULAR; INTRAVENOUS at 02:22

## 2023-12-17 RX ADMIN — ACETAMINOPHEN 1000 MG: 650 SOLUTION ORAL at 21:29

## 2023-12-17 RX ADMIN — PROPOFOL 50 MCG/KG/MIN: 10 INJECTION, EMULSION INTRAVENOUS at 11:41

## 2023-12-17 RX ADMIN — PROPOFOL 10 MCG/KG/MIN: 10 INJECTION, EMULSION INTRAVENOUS at 05:24

## 2023-12-17 RX ADMIN — DILTIAZEM HYDROCHLORIDE 30 MG: 30 TABLET, FILM COATED ORAL at 13:16

## 2023-12-17 RX ADMIN — METOPROLOL TARTRATE 2.5 MG: 5 INJECTION INTRAVENOUS at 01:15

## 2023-12-17 RX ADMIN — ACETYLCYSTEINE 600 MG: 200 INHALANT RESPIRATORY (INHALATION) at 23:49

## 2023-12-17 RX ADMIN — CLOPIDOGREL BISULFATE 75 MG: 75 TABLET ORAL at 07:47

## 2023-12-17 RX ADMIN — MAGNESIUM SULFATE HEPTAHYDRATE 4000 MG: 40 INJECTION, SOLUTION INTRAVENOUS at 11:48

## 2023-12-17 RX ADMIN — ACETYLCYSTEINE 600 MG: 200 INHALANT RESPIRATORY (INHALATION) at 11:24

## 2023-12-17 RX ADMIN — IPRATROPIUM BROMIDE AND ALBUTEROL SULFATE 1 DOSE: 2.5; .5 SOLUTION RESPIRATORY (INHALATION) at 07:34

## 2023-12-17 RX ADMIN — ACETAMINOPHEN 1000 MG: 650 SOLUTION ORAL at 13:16

## 2023-12-17 RX ADMIN — METOPROLOL TARTRATE 2.5 MG: 1 INJECTION, SOLUTION INTRAVENOUS at 01:15

## 2023-12-17 RX ADMIN — MILRINONE LACTATE IN DEXTROSE 0.38 MCG/KG/MIN: 200 INJECTION, SOLUTION INTRAVENOUS at 23:44

## 2023-12-17 RX ADMIN — SODIUM CHLORIDE: 9 INJECTION, SOLUTION INTRAVENOUS at 22:21

## 2023-12-17 RX ADMIN — SODIUM CHLORIDE 30 MG/ML INHALATION SOLUTION 4 ML: 30 SOLUTION INHALANT at 20:04

## 2023-12-17 RX ADMIN — CISATRACURIUM BESYLATE 2 MCG/KG/MIN: 10 INJECTION INTRAVENOUS at 01:48

## 2023-12-17 RX ADMIN — ACETYLCYSTEINE 600 MG: 200 INHALANT RESPIRATORY (INHALATION) at 20:05

## 2023-12-17 RX ADMIN — FENTANYL CITRATE 50 MCG: 50 INJECTION, SOLUTION INTRAMUSCULAR; INTRAVENOUS at 01:00

## 2023-12-17 RX ADMIN — SODIUM CHLORIDE 8.56 UNITS/HR: 9 INJECTION, SOLUTION INTRAVENOUS at 22:41

## 2023-12-17 RX ADMIN — SODIUM CHLORIDE 30 MG/ML INHALATION SOLUTION 4 ML: 30 SOLUTION INHALANT at 11:25

## 2023-12-17 RX ADMIN — SODIUM CHLORIDE, PRESERVATIVE FREE 10 ML: 5 INJECTION INTRAVENOUS at 21:43

## 2023-12-17 RX ADMIN — Medication 10 MG/HR: at 08:50

## 2023-12-17 RX ADMIN — SENNOSIDES AND DOCUSATE SODIUM 1 TABLET: 50; 8.6 TABLET ORAL at 21:29

## 2023-12-17 RX ADMIN — FENTANYL CITRATE 50 MCG: 50 INJECTION, SOLUTION INTRAMUSCULAR; INTRAVENOUS at 05:15

## 2023-12-17 RX ADMIN — DILTIAZEM HYDROCHLORIDE 30 MG: 30 TABLET, FILM COATED ORAL at 21:29

## 2023-12-17 RX ADMIN — SODIUM CHLORIDE 30 MG/ML INHALATION SOLUTION 4 ML: 30 SOLUTION INHALANT at 15:15

## 2023-12-17 RX ADMIN — FENTANYL CITRATE 50 MCG: 50 INJECTION, SOLUTION INTRAMUSCULAR; INTRAVENOUS at 08:16

## 2023-12-17 RX ADMIN — Medication 75 MCG/HR: at 10:56

## 2023-12-17 RX ADMIN — IPRATROPIUM BROMIDE AND ALBUTEROL SULFATE 1 DOSE: 2.5; .5 SOLUTION RESPIRATORY (INHALATION) at 15:15

## 2023-12-17 RX ADMIN — POTASSIUM CHLORIDE 20 MEQ: 29.8 INJECTION, SOLUTION INTRAVENOUS at 15:49

## 2023-12-17 RX ADMIN — VECURONIUM BROMIDE 10 MG: 1 INJECTION, POWDER, LYOPHILIZED, FOR SOLUTION INTRAVENOUS at 01:30

## 2023-12-17 RX ADMIN — ACETYLCYSTEINE 600 MG: 200 INHALANT RESPIRATORY (INHALATION) at 15:15

## 2023-12-17 RX ADMIN — SODIUM CHLORIDE: 9 INJECTION, SOLUTION INTRAVENOUS at 23:25

## 2023-12-17 RX ADMIN — PROPOFOL 50 MCG/KG/MIN: 10 INJECTION, EMULSION INTRAVENOUS at 02:00

## 2023-12-17 RX ADMIN — DILTIAZEM HYDROCHLORIDE 5 MG/HR: 5 INJECTION, SOLUTION INTRAVENOUS at 02:21

## 2023-12-17 RX ADMIN — PIPERACILLIN AND TAZOBACTAM 3375 MG: 3; .375 INJECTION, POWDER, FOR SOLUTION INTRAVENOUS; PARENTERAL at 22:23

## 2023-12-17 RX ADMIN — METOPROLOL TARTRATE 12.5 MG: 25 TABLET, FILM COATED ORAL at 21:28

## 2023-12-17 RX ADMIN — DIGOXIN 250 MCG: 0.25 INJECTION INTRAMUSCULAR; INTRAVENOUS at 01:04

## 2023-12-17 RX ADMIN — SODIUM CHLORIDE 5.18 UNITS/HR: 9 INJECTION, SOLUTION INTRAVENOUS at 05:42

## 2023-12-17 RX ADMIN — CISATRACURIUM BESYLATE 3.5 MCG/KG/MIN: 10 INJECTION INTRAVENOUS at 22:27

## 2023-12-17 RX ADMIN — PROPOFOL 10 MCG/KG/MIN: 10 INJECTION, EMULSION INTRAVENOUS at 23:59

## 2023-12-17 RX ADMIN — IPRATROPIUM BROMIDE AND ALBUTEROL SULFATE 1 DOSE: 2.5; .5 SOLUTION RESPIRATORY (INHALATION) at 11:23

## 2023-12-17 RX ADMIN — MUPIROCIN: 20 OINTMENT TOPICAL at 21:37

## 2023-12-17 RX ADMIN — SODIUM CHLORIDE 30 MG/ML INHALATION SOLUTION 4 ML: 30 SOLUTION INHALANT at 23:49

## 2023-12-17 RX ADMIN — IPRATROPIUM BROMIDE AND ALBUTEROL SULFATE 1 DOSE: 2.5; .5 SOLUTION RESPIRATORY (INHALATION) at 20:04

## 2023-12-17 RX ADMIN — POTASSIUM CHLORIDE 20 MEQ: 29.8 INJECTION, SOLUTION INTRAVENOUS at 14:47

## 2023-12-17 RX ADMIN — FENTANYL CITRATE 50 MCG: 50 INJECTION, SOLUTION INTRAMUSCULAR; INTRAVENOUS at 04:08

## 2023-12-17 RX ADMIN — METOPROLOL TARTRATE 2.5 MG: 5 INJECTION INTRAVENOUS at 02:34

## 2023-12-17 RX ADMIN — MUPIROCIN: 20 OINTMENT TOPICAL at 08:03

## 2023-12-17 RX ADMIN — VASOPRESSIN 0.04 UNITS/MIN: 20 INJECTION, SOLUTION INTRAVENOUS at 11:05

## 2023-12-17 RX ADMIN — PIPERACILLIN AND TAZOBACTAM 3375 MG: 3; .375 INJECTION, POWDER, FOR SOLUTION INTRAVENOUS; PARENTERAL at 15:50

## 2023-12-17 RX ADMIN — IPRATROPIUM BROMIDE AND ALBUTEROL SULFATE 1 DOSE: 2.5; .5 SOLUTION RESPIRATORY (INHALATION) at 23:48

## 2023-12-17 ASSESSMENT — PULMONARY FUNCTION TESTS
PIF_VALUE: 31
PIF_VALUE: 32
PIF_VALUE: 33
PIF_VALUE: 31
PIF_VALUE: 31
PIF_VALUE: 33
PIF_VALUE: 33

## 2023-12-17 NOTE — PROGRESS NOTES
Occupational [address]  Occupational Therapy Not Seen Note    DATE: 2023    NAME: James Parks  MRN: [id number]   : 1965      Patient not seen this date for Occupational Therapy due to:    Patient is not appropriate for active participation in OT evaluation/treatment at this time d/t intubated/sedated.      Electronically signed by Adelfo Robin OT on 2023 at 10:10 AM

## 2023-12-17 NOTE — PROGRESS NOTES
2006: Reached out to Cardiology Fellow about pts change in rhythm. No new orders. 2120: Reached out to Cardiology Fellow; pt in afib RVR, BP not tolerating, desatting into 80s. Order received for digoxin, administered digoxin. 2144: Reached out to Cardiology Fellow about pts continual changes in rhythm. No new orders. 6641: Reached out to Cardiology Fellow; pt in afib RVR, BP not tolerating, desatting into 80s again. 0100: Clin lead on phone with Cardiology Fellow. EKG showed aflutter/afib RVR. -180s. RR 40s. RT bagging patient. 0104: Order received for digoxin, digoxin given. 0110: Telephone order from Cardiology Fellow- give lopressor, if no changes okay to cardiovert if CTS okays it. 0110: Lopressor given, no changes. 0112: Reached out to CTS PA, okay to cardiovert. 0123: Dr Aquiles Faulkner at bedside. Spoke with CTS PA on pts status. 0124: 200J shock delivered - still in RVR  0125: Second 200J shock delivered - still in RVR  0126: Dr Aquiles Faulkner verbal order to paralyze for vent synchrony and add nitric oxide. 0130: Dr Aquiles Faulkner at bedside gave verbal order for Vecuronium, administered. 0132: Per Cardiology Fellow 300J shock - still in RVR  0136: Cardiology Fellow on phone, no other meds at this time. \"If HR sustains 170s-180s, call back. \"  1975: Nimbex gtt started. Base TOF 0/4 @ 10 d/t recent vecuronium. BIS 27, SQI 95.  0149: Clin lead on phone with Cardiology Fellow d/t HR sustaining 170s-180s. Order received for lopressor & to begin cardizem gtt. 0150: Lopressor given. 0221: Cardizem gtt started. 65: Daughter called unit for updates on pt, updated on current situation.

## 2023-12-17 NOTE — CONSULTS
Surgical Critical Care Consult    PATIENT NAME: Cele Camarena  AGE: 63 y.o. MEDICAL RECORD NO. [id number]  DATE: 12/17/2023  SURGEON: Dr. Aida Santillan: Renee Murrieta, AQUILES - CNP        IMPRESSION:     Patient Active Problem List   Diagnosis    Tremor    Lumbar disc disease    Hyperlipidemia    Depression    Fibromyalgia    Sacroiliac joint dysfunction    Neuropathy    Allergic rhinitis    Former tobacco use    Abnormal ECG    Anxiety    Pure hypercholesterolemia    Hypertension, essential    Controlled type 2 diabetes mellitus without complication ([facility])    Class 1 obesity due to excess calories with serious comorbidity in adult    CAD in native artery    S/P CABG x 3         PLAN:   CABG  ARDS  Ischemic cardiomyopathy  COVID negative  Wean nitric  Check ABG 1200  Give 4 gm mag  Daily CXR  Wean Versed-maintain propofol  Start tube feeds  Check LFTs  Schedule mycomyst, 3% inh, duonebs  Check procal-if elevated will get CT chest      HISTORY:   History of Chief Complaint:    Cedric Aguirre is a 63 y.o. female who presents s/p CABG. Patient was reportedly getting a cardiac workup for risk stratification for OR on her knee. She had an echo which showed decreased EF so she had a cardiac cath which showed multivessel disease. Patient had CABG on Friday 12/15. Was extubated postoperatively and then reintubated. Last night, patient went into AFib withRVR requiring cardioversion. Past Medical History   has a past medical history of Allergic rhinitis, Arthritis, CAD (coronary artery disease), CKD (chronic kidney disease), stage III ([address]), Depression, Fibromyalgia, Gout, History of fractured vertebra, Hyperlipidemia, Neuropathy, OA (osteoarthritis), Peripheral vascular disease ([address]), Sacroiliac joint dysfunction, Tremor, Type II or unspecified type diabetes mellitus without mention of complication, not stated as uncontrolled, Under care of team, and Wellness examination.   Past

## 2023-12-17 NOTE — PROGRESS NOTES
Dr. Loulou Shah rounded on patient updated vitals, labs and output. Orders to wean nitric pbtain abg at 1200. Orders to labs, start tube feed, start fentanyl drip. Also adjusted ventilator settings increased peep to 14, increased rate to 20 and decreased tidal volume to 460.     Gordy Villarreal RN

## 2023-12-17 NOTE — PROGRESS NOTES
Dr. Miguel Ángel Carrington updated on abg results and procal result. Orders to continue weaning nittric and obtain an abg in 2 hours at 1600. Orders for zosyn 3.375 q8 hours. Checked with pharmacy ok to order zosyn.     Mckayla Shaw RN

## 2023-12-17 NOTE — PLAN OF CARE
Problem: Discharge Planning  Goal: Discharge to home or other facility with appropriate resources  12/17/2023 0411 by Jesus Garcia RN  Outcome: Progressing  12/16/2023 1713 by Lorraine Garcia RN  Outcome: Progressing     Problem: Chronic Conditions and Co-morbidities  Goal: Patient's chronic conditions and co-morbidity symptoms are monitored and maintained or improved  12/17/2023 0411 by Jesus Garcia RN  Outcome: Progressing  12/16/2023 1713 by Lorraine Garcia RN  Outcome: Progressing     Problem: Respiratory - Adult  Goal: Achieves optimal ventilation and oxygenation  12/17/2023 0411 by Jesus Garcia RN  Outcome: Progressing  12/16/2023 1948 by Kiel Viramontes RCP  Outcome: Progressing  12/16/2023 1713 by Lorraine Garcia RN  Outcome: Progressing     Problem: Safety - Adult  Goal: Free from fall injury  12/17/2023 0411 by Jesus Garcia RN  Outcome: Progressing  12/16/2023 1713 by Lorraine Garcia RN  Outcome: Progressing     Problem: Safety - Medical Restraint  Goal: Remains free of injury from restraints (Restraint for Interference with Medical Device)  Description: INTERVENTIONS:  1. Determine that other, less restrictive measures have been tried or would not be effective before applying the restraint  2. Evaluate the patient's condition at the time of restraint application  3. Inform patient/family regarding the reason for restraint  4. Q2H: Monitor safety, psychosocial status, comfort, nutrition and hydration  12/17/2023 0411 by Jesus Garcia RN  Outcome: Progressing  Flowsheets (Taken 12/16/2023 1800 by Lorraine Garcia RN)  Remains free of injury from restraints (restraint for interference with medical device):   Determine that other, less restrictive measures have been tried or would not be effective before applying the restraint   Evaluate the patient's condition at the time of restraint application   Inform patient/family regarding the reason for restraint   Every 2 hours: Monitor  Monitor safety, psychosocial status, comfort, nutrition and hydration  Taken 12/16/2023 0400 by Chris Coronado RN  Remains free of injury from restraints (restraint for interference with medical device):   Determine that other, less restrictive measures have been tried or would not be effective before applying the restraint   Evaluate the patient's condition at the time of restraint application   Inform patient/family regarding the reason for restraint   Every 2 hours: Monitor safety, psychosocial status, comfort, nutrition and hydration

## 2023-12-17 NOTE — PROGRESS NOTES
Dr. Rayo Woodruff called and updated on abg post vent changes and decreasing nitric. Orders to half the nitric and get an abg at 1400.     Rosey Patel RN

## 2023-12-17 NOTE — PROGRESS NOTES
[facility] Cardiothoracic Surgical   Progress Note    12/17/2023 10:01 AM  Surgeon:  Velma          POD# 2  S/P :  Coronary artery bypass with radial  EF: pre op LESLIE 10-15%. Post op with pressors 30-35    Subjective:  Ms. Camarena intubated and sedated.    Vital Signs: /60   Pulse (!) 119   Temp 100 °F (37.8 °C)   Resp 18   Ht 1.651 m (5' 5\")   Wt 84.1 kg (185 lb 6.5 oz)   SpO2 95%   BMI 30.85 kg/m²  O2 Flow Rate (L/min): (S) 15 L/min   Admit Weight: Weight - Scale: 84.1 kg (185 lb 6.5 oz)     CV: no murmur noted, Normal S1, S2  Lungs: diminished  Abd: The abdomen is soft without tenderness, guarding, mass, rebound or organomegaly.Bowel    Lower Extremities: 1+ edema    Labs and Studies:  CBC:   Recent Labs     12/16/23  0619 12/16/23 2136 12/17/23  0600   WBC 14.4* 22.2* 18.6*   HGB 8.5* 9.3* 9.4*   HCT 26.6* 28.2* 29.0*   MCV 92.7 91.3 91.8   * 115* 126*     BMP:   Recent Labs     12/16/23  2136 12/17/23  0103 12/17/23  0244 12/17/23  0246 12/17/23  0600     --   --  143 142   K 4.0  --   --  4.5 4.2     --   --  106 106   CO2 22  --   --  24 26   BUN 19  --   --  19 19   CREATININE 1.2*   < > 1.1 1.2* 1.1*    < > = values in this interval not displayed.     PT/INR:   Recent Labs     12/15/23  1526 12/16/23  0619 12/17/23  0600   PROTIME 15.8* 15.7* 15.6*   INR 1.3 1.3 1.3     APTT:   Recent Labs     12/15/23  1526   APTT 25.7     I/O:  I/O last 3 completed shifts:  In: 3920.6 [I.V.:3220.7; IV Piggyback:699.9]  Out: 7135 [Urine:6395; Chest Tube:740]    Scheduled Meds:    dilTIAZem  30 mg Oral 3 times per day    insulin lispro  0-8 Units SubCUTAneous TID WC    insulin lispro  0-4 Units SubCUTAneous Nightly    ipratropium 0.5 mg-albuterol 2.5 mg  1 Dose Inhalation Q4H RT    sodium chloride flush  5-40 mL IntraVENous 2 times per day    clopidogrel  75 mg Oral Daily    mupirocin   Each Nostril BID    polyethylene glycol  17 g Oral Daily    sennosides-docusate sodium  1 tablet Oral BID     metoprolol tartrate  12.5 mg Oral BID     Continuous Infusions:    cisatracurium besylate (NIMBEX) 200 mg in sodium chloride 0.9 % 100 mL infusion 3 mcg/kg/min (12/17/23 0328)    dilTIAZem 125 mg in sodium chloride 0.9 % 125 mL infusion 5 mg/hr (12/17/23 0328)    midazolam 10 mg/hr (12/17/23 0850)    sodium chloride 10 mL/hr at 12/17/23 0328    sodium chloride 25 mL/hr at 12/15/23 1919    propofol 10 mcg/kg/min (12/17/23 0524)    norepinephrine Stopped (12/16/23 1603)    EPINEPHrine 0.02 mcg/kg/min (12/17/23 0328)    dextrose      vasopressin 20 Units in sodium chloride 0.9 % 100 mL infusion Stopped (12/15/23 1942)    milrinone 0.375 mcg/kg/min (12/17/23 0328)    insulin 7.35 Units/hr (12/17/23 0957)    dextrose       Assessment/Plan:   S/P CABG x 3 with radial artery harvest. Reintubated and sedated. Pulmonary consulted yesterday. Hemodynamically patient still on milrinone and epi .01. Cardiac output is 8 . Afib yesterday cardiology started digoxin patient has iodine allergy. Discussed with Dr. Maryana Mallory. Chest x-ray still appears wet. Patient is diuresing well with Lasix, increase as tolerated. Would like to to DC epi use esmolol and hold Cardizem. Will discuss with Dr. Melvina Wolf. S/P CABG x 3 with radial artery harvest.  Temp 100.4. Patient has a low allergy to NSAIDs. Will try Percocet today with the Tylenol. Sinus rhythm. Blood pressure soft 90s with epi, milrinone, Cardizem. Change Cardizem to 30 mg 3 times daily. Chest x-ray wet atelectatic throughout. Patient still intubated on 50%, PEEP of 8. Weaned to extubate today. Hemoglobin 8.5 ABLA. Sodium 147 creatinine 1.2 baseline start Lasix 20 mg IV daily. 270 chest tubes. Remove sylvester once extubated. No amiodarone due to allergy. D/C planning.      LUISA Samson

## 2023-12-17 NOTE — PROGRESS NOTES
Dr. Cecilia Chavez rounded on patient updated on vent settings, labs and vitals and chest tube output. Orders to consult trauma critical care for vent management.      Chu Christianson RN

## 2023-12-17 NOTE — SIGNIFICANT EVENT
Trauma Attending On Call    Came to bedside to assist bedside nurses. Patient being bagged and in afib with RVR. Patient breathing over 40s rate on the vent and dyssynchronous. On prop and versed gtt maxed. Hypotensive. CXR shows bilateral infiltrates and ARDS picture. Discussed with CT covering PA that patient clinically looks ill. Recommend cardioversion and addressing cardiac issue. Will need to be more aggressive with vent as she is significant dysnchronous and desating due to her rapid rate. Will paralyze and increase peep to improve oxygenation. Cardiology aware of RVR. Patient cardioverted x 3. Discussed paralyzing patient and Karolyn to improve oxygenation in the meantime. Will assist as able.     Greg Flores MD

## 2023-12-17 NOTE — PLAN OF CARE
Problem: Safety - Medical Restraint  Goal: Remains free of injury from restraints (Restraint for Interference with Medical Device)  Description: INTERVENTIONS:  1. Determine that other, less restrictive measures have been tried or would not be effective before applying the restraint  2. Evaluate the patient's condition at the time of restraint application  3. Inform patient/family regarding the reason for restraint  4.  Q2H: Monitor safety, psychosocial status, comfort, nutrition and hydration  Recent Flowsheet Documentation  Taken 12/17/2023 0000 by Sary Wyman RN  Remains free of injury from restraints (restraint for interference with medical device):   Determine that other, less restrictive measures have been tried or would not be effective before applying the restraint   Evaluate the patient's condition at the time of restraint application   Inform patient/family regarding the reason for restraint   Every 2 hours: Monitor safety, psychosocial status, comfort, nutrition and hydration  Taken 12/16/2023 2200 by Sary Wyman RN  Remains free of injury from restraints (restraint for interference with medical device):   Determine that other, less restrictive measures have been tried or would not be effective before applying the restraint   Evaluate the patient's condition at the time of restraint application   Inform patient/family regarding the reason for restraint   Every 2 hours: Monitor safety, psychosocial status, comfort, nutrition and hydration  Taken 12/16/2023 2000 by Sary Wyman RN  Remains free of injury from restraints (restraint for interference with medical device):   Determine that other, less restrictive measures have been tried or would not be effective before applying the restraint   Evaluate the patient's condition at the time of restraint application   Inform patient/family regarding the reason for restraint   Every 2 hours: Monitor safety, psychosocial status, comfort, nutrition and

## 2023-12-17 NOTE — PROGRESS NOTES
Dr. Jatin Perera updated on abg results orders to continue weaning nitric and obtain an abg at 1800.     Karmen Lorenz RN

## 2023-12-18 ENCOUNTER — APPOINTMENT (OUTPATIENT)
Dept: GENERAL RADIOLOGY | Age: 58
DRG: 235 | End: 2023-12-18
Attending: THORACIC SURGERY (CARDIOTHORACIC VASCULAR SURGERY)
Payer: COMMERCIAL

## 2023-12-18 ENCOUNTER — APPOINTMENT (OUTPATIENT)
Age: 58
DRG: 235 | End: 2023-12-18
Attending: THORACIC SURGERY (CARDIOTHORACIC VASCULAR SURGERY)
Payer: COMMERCIAL

## 2023-12-18 LAB
ALLEN TEST: NORMAL
ANION GAP SERPL CALCULATED.3IONS-SCNC: 8 MMOL/L (ref 9–17)
ANION GAP SERPL CALCULATED.3IONS-SCNC: 9 MMOL/L (ref 9–17)
BUN SERPL-MCNC: 20 MG/DL (ref 6–20)
BUN SERPL-MCNC: 23 MG/DL (ref 6–20)
CA-I BLD-SCNC: 1.12 MMOL/L (ref 1.13–1.33)
CA-I BLD-SCNC: 1.13 MMOL/L (ref 1.13–1.33)
CALCIUM SERPL-MCNC: 8.5 MG/DL (ref 8.6–10.4)
CALCIUM SERPL-MCNC: 8.6 MG/DL (ref 8.6–10.4)
CHLORIDE SERPL-SCNC: 105 MMOL/L (ref 98–107)
CHLORIDE SERPL-SCNC: 107 MMOL/L (ref 98–107)
CO2 SERPL-SCNC: 26 MMOL/L (ref 20–31)
CO2 SERPL-SCNC: 27 MMOL/L (ref 20–31)
CREAT SERPL-MCNC: 1.1 MG/DL (ref 0.5–0.9)
CREAT SERPL-MCNC: 1.5 MG/DL (ref 0.5–0.9)
ECHO BSA: 1.96 M2
ECHO BSA: 1.96 M2
ECHO LV EDV A2C: 19 ML
ECHO LV EDV A4C: 54 ML
ECHO LV EDV INDEX A4C: 28 ML/M2
ECHO LV EDV NDEX A2C: 10 ML/M2
ECHO LV EF PHYSICIAN: 30 %
ECHO LV EJECTION FRACTION A4C: 38 %
ECHO LV ESV A4C: 34 ML
ECHO LV ESV INDEX A4C: 18 ML/M2
EKG ATRIAL RATE: 89 BPM
EKG P AXIS: 60 DEGREES
EKG P-R INTERVAL: 208 MS
EKG Q-T INTERVAL: 416 MS
EKG QRS DURATION: 106 MS
EKG QTC CALCULATION (BAZETT): 506 MS
EKG R AXIS: 26 DEGREES
EKG T AXIS: 29 DEGREES
EKG VENTRICULAR RATE: 89 BPM
ERYTHROCYTE [DISTWIDTH] IN BLOOD BY AUTOMATED COUNT: 14.6 % (ref 11.8–14.4)
FIO2: 100
FIO2: 100
FIO2: 60
FIO2: 70
FIO2: 70
FIO2: 80
GFR SERPL CREATININE-BSD FRML MDRD: 40 ML/MIN/1.73M2
GFR SERPL CREATININE-BSD FRML MDRD: 59 ML/MIN/1.73M2
GLUCOSE BLD-MCNC: 100 MG/DL (ref 65–105)
GLUCOSE BLD-MCNC: 105 MG/DL (ref 65–105)
GLUCOSE BLD-MCNC: 109 MG/DL (ref 74–100)
GLUCOSE BLD-MCNC: 114 MG/DL (ref 74–100)
GLUCOSE BLD-MCNC: 116 MG/DL (ref 65–105)
GLUCOSE BLD-MCNC: 122 MG/DL (ref 65–105)
GLUCOSE BLD-MCNC: 123 MG/DL (ref 65–105)
GLUCOSE BLD-MCNC: 125 MG/DL (ref 65–105)
GLUCOSE BLD-MCNC: 126 MG/DL (ref 65–105)
GLUCOSE BLD-MCNC: 127 MG/DL (ref 74–100)
GLUCOSE BLD-MCNC: 128 MG/DL (ref 65–105)
GLUCOSE BLD-MCNC: 136 MG/DL (ref 65–105)
GLUCOSE BLD-MCNC: 138 MG/DL (ref 65–105)
GLUCOSE BLD-MCNC: 138 MG/DL (ref 65–105)
GLUCOSE BLD-MCNC: 140 MG/DL (ref 65–105)
GLUCOSE BLD-MCNC: 143 MG/DL (ref 65–105)
GLUCOSE BLD-MCNC: 143 MG/DL (ref 65–105)
GLUCOSE BLD-MCNC: 151 MG/DL (ref 65–105)
GLUCOSE BLD-MCNC: 153 MG/DL (ref 74–100)
GLUCOSE BLD-MCNC: 154 MG/DL (ref 65–105)
GLUCOSE BLD-MCNC: 155 MG/DL (ref 65–105)
GLUCOSE BLD-MCNC: 163 MG/DL (ref 65–105)
GLUCOSE BLD-MCNC: 163 MG/DL (ref 65–105)
GLUCOSE BLD-MCNC: 166 MG/DL (ref 65–105)
GLUCOSE BLD-MCNC: 168 MG/DL (ref 65–105)
GLUCOSE BLD-MCNC: 176 MG/DL (ref 65–105)
GLUCOSE BLD-MCNC: 207 MG/DL (ref 65–105)
GLUCOSE BLD-MCNC: 319 MG/DL (ref 65–105)
GLUCOSE BLD-MCNC: 84 MG/DL (ref 74–100)
GLUCOSE BLD-MCNC: 88 MG/DL (ref 65–105)
GLUCOSE BLD-MCNC: 89 MG/DL (ref 65–105)
GLUCOSE BLD-MCNC: 94 MG/DL (ref 74–100)
GLUCOSE SERPL-MCNC: 170 MG/DL (ref 70–99)
GLUCOSE SERPL-MCNC: 85 MG/DL (ref 70–99)
HCT VFR BLD AUTO: 28.3 % (ref 36.3–47.1)
HGB BLD-MCNC: 8.9 G/DL (ref 11.9–15.1)
MAGNESIUM SERPL-MCNC: 2.4 MG/DL (ref 1.6–2.6)
MAGNESIUM SERPL-MCNC: 2.7 MG/DL (ref 1.6–2.6)
MCH RBC QN AUTO: 29.6 PG (ref 25.2–33.5)
MCHC RBC AUTO-ENTMCNC: 31.4 G/DL (ref 28.4–34.8)
MCV RBC AUTO: 94 FL (ref 82.6–102.9)
METHEMOGLOBIN: 0.8 % (ref 0–1.5)
MODE: ABNORMAL
MODE: NORMAL
NEGATIVE BASE EXCESS, ART: 0.7 MMOL/L (ref 0–2)
NRBC BLD-RTO: 0 PER 100 WBC
O2 DELIVERY DEVICE: ABNORMAL
O2 DELIVERY DEVICE: NORMAL
O2 DELIVERY DEVICE: NORMAL
PATIENT TEMP: 38.1
PLATELET # BLD AUTO: 146 K/UL (ref 138–453)
PMV BLD AUTO: 10.4 FL (ref 8.1–13.5)
POC HCO3: 24.9 MMOL/L (ref 21–28)
POC HCO3: 25.9 MMOL/L (ref 21–28)
POC HCO3: 25.9 MMOL/L (ref 21–28)
POC HCO3: 26.9 MMOL/L (ref 21–28)
POC HCO3: 27 MMOL/L (ref 21–28)
POC HCO3: 28.8 MMOL/L (ref 21–28)
POC LACTIC ACID: 0.7 MMOL/L (ref 0.56–1.39)
POC LACTIC ACID: 0.7 MMOL/L (ref 0.56–1.39)
POC LACTIC ACID: 0.9 MMOL/L (ref 0.56–1.39)
POC LACTIC ACID: 1.3 MMOL/L (ref 0.56–1.39)
POC LACTIC ACID: 1.5 MMOL/L (ref 0.56–1.39)
POC LACTIC ACID: 1.5 MMOL/L (ref 0.56–1.39)
POC O2 SATURATION: 96.6 % (ref 94–98)
POC O2 SATURATION: 96.8 % (ref 94–98)
POC O2 SATURATION: 97.3 % (ref 94–98)
POC O2 SATURATION: 97.7 % (ref 94–98)
POC O2 SATURATION: 98.4 % (ref 94–98)
POC O2 SATURATION: 99.8 % (ref 94–98)
POC PCO2 TEMP: 42.1 MM HG
POC PCO2 TEMP: 42.6 MM HG
POC PCO2 TEMP: 48.2 MM HG
POC PCO2: 40.1 MM HG (ref 35–48)
POC PCO2: 40.6 MM HG (ref 35–48)
POC PCO2: 46 MM HG (ref 35–48)
POC PCO2: 47.4 MM HG (ref 35–48)
POC PCO2: 51.8 MM HG (ref 35–48)
POC PCO2: 68.3 MM HG (ref 35–48)
POC PH TEMP: 7.36
POC PH TEMP: 7.39
POC PH TEMP: 7.4
POC PH: 7.23 (ref 7.35–7.45)
POC PH: 7.31 (ref 7.35–7.45)
POC PH: 7.36 (ref 7.35–7.45)
POC PH: 7.38 (ref 7.35–7.45)
POC PH: 7.4 (ref 7.35–7.45)
POC PH: 7.41 (ref 7.35–7.45)
POC PO2 TEMP: 120 MM HG
POC PO2 TEMP: 229.3 MM HG
POC PO2 TEMP: 96.3 MM HG
POC PO2: 103.9 MM HG (ref 83–108)
POC PO2: 112.9 MM HG (ref 83–108)
POC PO2: 114.4 MM HG (ref 83–108)
POC PO2: 223.8 MM HG (ref 83–108)
POC PO2: 89.8 MM HG (ref 83–108)
POC PO2: 98.1 MM HG (ref 83–108)
POSITIVE BASE EXCESS, ART: 0.1 MMOL/L (ref 0–3)
POSITIVE BASE EXCESS, ART: 0.5 MMOL/L (ref 0–3)
POSITIVE BASE EXCESS, ART: 1.2 MMOL/L (ref 0–3)
POSITIVE BASE EXCESS, ART: 1.2 MMOL/L (ref 0–3)
POSITIVE BASE EXCESS, ART: 1.4 MMOL/L (ref 0–3)
POTASSIUM SERPL-SCNC: 4.2 MMOL/L (ref 3.7–5.3)
POTASSIUM SERPL-SCNC: 4.5 MMOL/L (ref 3.7–5.3)
RBC # BLD AUTO: 3.01 M/UL (ref 3.95–5.11)
SAMPLE SITE: ABNORMAL
SAMPLE SITE: NORMAL
SAMPLE SITE: NORMAL
SODIUM SERPL-SCNC: 141 MMOL/L (ref 135–144)
SODIUM SERPL-SCNC: 141 MMOL/L (ref 135–144)
WBC OTHER # BLD: 17.5 K/UL (ref 3.5–11.3)

## 2023-12-18 PROCEDURE — 6370000000 HC RX 637 (ALT 250 FOR IP)

## 2023-12-18 PROCEDURE — 2580000003 HC RX 258: Performed by: SURGERY

## 2023-12-18 PROCEDURE — 74018 RADEX ABDOMEN 1 VIEW: CPT

## 2023-12-18 PROCEDURE — 2500000003 HC RX 250 WO HCPCS

## 2023-12-18 PROCEDURE — 71045 X-RAY EXAM CHEST 1 VIEW: CPT

## 2023-12-18 PROCEDURE — 2700000000 HC OXYGEN THERAPY PER DAY

## 2023-12-18 PROCEDURE — 2100000001 HC CVICU R&B

## 2023-12-18 PROCEDURE — 2500000003 HC RX 250 WO HCPCS: Performed by: SURGERY

## 2023-12-18 PROCEDURE — 99024 POSTOP FOLLOW-UP VISIT: CPT | Performed by: NURSE PRACTITIONER

## 2023-12-18 PROCEDURE — 93321 DOPPLER ECHO F-UP/LMTD STD: CPT | Performed by: INTERNAL MEDICINE

## 2023-12-18 PROCEDURE — 93325 DOPPLER ECHO COLOR FLOW MAPG: CPT | Performed by: INTERNAL MEDICINE

## 2023-12-18 PROCEDURE — 6360000002 HC RX W HCPCS

## 2023-12-18 PROCEDURE — 93312 ECHO TRANSESOPHAGEAL: CPT | Performed by: INTERNAL MEDICINE

## 2023-12-18 PROCEDURE — 2580000003 HC RX 258: Performed by: NURSE PRACTITIONER

## 2023-12-18 PROCEDURE — 83605 ASSAY OF LACTIC ACID: CPT

## 2023-12-18 PROCEDURE — 6360000002 HC RX W HCPCS: Performed by: NURSE PRACTITIONER

## 2023-12-18 PROCEDURE — 82803 BLOOD GASES ANY COMBINATION: CPT

## 2023-12-18 PROCEDURE — 2580000003 HC RX 258

## 2023-12-18 PROCEDURE — C9113 INJ PANTOPRAZOLE SODIUM, VIA: HCPCS | Performed by: SURGERY

## 2023-12-18 PROCEDURE — 82947 ASSAY GLUCOSE BLOOD QUANT: CPT

## 2023-12-18 PROCEDURE — 6360000002 HC RX W HCPCS: Performed by: THORACIC SURGERY (CARDIOTHORACIC VASCULAR SURGERY)

## 2023-12-18 PROCEDURE — 6360000002 HC RX W HCPCS: Performed by: SURGERY

## 2023-12-18 PROCEDURE — 83735 ASSAY OF MAGNESIUM: CPT

## 2023-12-18 PROCEDURE — 2580000003 HC RX 258: Performed by: PHYSICIAN ASSISTANT

## 2023-12-18 PROCEDURE — 2500000003 HC RX 250 WO HCPCS: Performed by: THORACIC SURGERY (CARDIOTHORACIC VASCULAR SURGERY)

## 2023-12-18 PROCEDURE — 93005 ELECTROCARDIOGRAM TRACING: CPT | Performed by: THORACIC SURGERY (CARDIOTHORACIC VASCULAR SURGERY)

## 2023-12-18 PROCEDURE — 6370000000 HC RX 637 (ALT 250 FOR IP): Performed by: NURSE PRACTITIONER

## 2023-12-18 PROCEDURE — 80048 BASIC METABOLIC PNL TOTAL CA: CPT

## 2023-12-18 PROCEDURE — 2580000003 HC RX 258: Performed by: THORACIC SURGERY (CARDIOTHORACIC VASCULAR SURGERY)

## 2023-12-18 PROCEDURE — 6370000000 HC RX 637 (ALT 250 FOR IP): Performed by: PHYSICIAN ASSISTANT

## 2023-12-18 PROCEDURE — 6370000000 HC RX 637 (ALT 250 FOR IP): Performed by: THORACIC SURGERY (CARDIOTHORACIC VASCULAR SURGERY)

## 2023-12-18 PROCEDURE — A4216 STERILE WATER/SALINE, 10 ML: HCPCS | Performed by: SURGERY

## 2023-12-18 PROCEDURE — 93312 ECHO TRANSESOPHAGEAL: CPT

## 2023-12-18 PROCEDURE — 94003 VENT MGMT INPAT SUBQ DAY: CPT

## 2023-12-18 PROCEDURE — 93308 TTE F-UP OR LMTD: CPT

## 2023-12-18 PROCEDURE — 94761 N-INVAS EAR/PLS OXIMETRY MLT: CPT

## 2023-12-18 PROCEDURE — 6360000002 HC RX W HCPCS: Performed by: INTERNAL MEDICINE

## 2023-12-18 PROCEDURE — 36620 INSERTION CATHETER ARTERY: CPT

## 2023-12-18 PROCEDURE — 83050 HGB METHEMOGLOBIN QUAN: CPT

## 2023-12-18 PROCEDURE — 2500000003 HC RX 250 WO HCPCS: Performed by: NURSE PRACTITIONER

## 2023-12-18 PROCEDURE — P9045 ALBUMIN (HUMAN), 5%, 250 ML: HCPCS

## 2023-12-18 PROCEDURE — 99291 CRITICAL CARE FIRST HOUR: CPT | Performed by: SURGERY

## 2023-12-18 PROCEDURE — 99233 SBSQ HOSP IP/OBS HIGH 50: CPT | Performed by: INTERNAL MEDICINE

## 2023-12-18 PROCEDURE — 85027 COMPLETE CBC AUTOMATED: CPT

## 2023-12-18 PROCEDURE — 37799 UNLISTED PX VASCULAR SURGERY: CPT

## 2023-12-18 PROCEDURE — 93308 TTE F-UP OR LMTD: CPT | Performed by: INTERNAL MEDICINE

## 2023-12-18 PROCEDURE — 94640 AIRWAY INHALATION TREATMENT: CPT

## 2023-12-18 PROCEDURE — 82330 ASSAY OF CALCIUM: CPT

## 2023-12-18 RX ORDER — METOPROLOL TARTRATE 1 MG/ML
5 INJECTION, SOLUTION INTRAVENOUS EVERY 6 HOURS
Status: DISCONTINUED | OUTPATIENT
Start: 2023-12-18 | End: 2023-12-18

## 2023-12-18 RX ORDER — FUROSEMIDE 10 MG/ML
20 INJECTION INTRAMUSCULAR; INTRAVENOUS 2 TIMES DAILY
Status: DISCONTINUED | OUTPATIENT
Start: 2023-12-19 | End: 2023-12-18

## 2023-12-18 RX ORDER — METOPROLOL TARTRATE 1 MG/ML
5 INJECTION, SOLUTION INTRAVENOUS ONCE
Status: COMPLETED | OUTPATIENT
Start: 2023-12-18 | End: 2023-12-20

## 2023-12-18 RX ORDER — FUROSEMIDE 10 MG/ML
20 INJECTION INTRAMUSCULAR; INTRAVENOUS ONCE
Status: COMPLETED | OUTPATIENT
Start: 2023-12-18 | End: 2023-12-18

## 2023-12-18 RX ORDER — FUROSEMIDE 10 MG/ML
20 INJECTION INTRAMUSCULAR; INTRAVENOUS ONCE
Status: DISCONTINUED | OUTPATIENT
Start: 2023-12-18 | End: 2023-12-23

## 2023-12-18 RX ORDER — ALBUMIN, HUMAN INJ 5% 5 %
12.5 SOLUTION INTRAVENOUS ONCE
Status: COMPLETED | OUTPATIENT
Start: 2023-12-18 | End: 2023-12-18

## 2023-12-18 RX ORDER — METOPROLOL TARTRATE 1 MG/ML
INJECTION, SOLUTION INTRAVENOUS
Status: COMPLETED
Start: 2023-12-18 | End: 2023-12-18

## 2023-12-18 RX ADMIN — SODIUM CHLORIDE 30 MG/ML INHALATION SOLUTION 4 ML: 30 SOLUTION INHALANT at 09:30

## 2023-12-18 RX ADMIN — SENNOSIDES AND DOCUSATE SODIUM 1 TABLET: 50; 8.6 TABLET ORAL at 20:25

## 2023-12-18 RX ADMIN — EPINEPHRINE 0.1 MCG/KG/MIN: 1 INJECTION INTRAMUSCULAR; INTRAVENOUS; SUBCUTANEOUS at 21:53

## 2023-12-18 RX ADMIN — METOPROLOL TARTRATE 5 MG: 1 INJECTION, SOLUTION INTRAVENOUS at 04:27

## 2023-12-18 RX ADMIN — EPINEPHRINE 0.02 MCG/KG/MIN: 1 INJECTION INTRAMUSCULAR; INTRAVENOUS; SUBCUTANEOUS at 23:48

## 2023-12-18 RX ADMIN — NYSTATIN 500000 UNITS: 100000 SUSPENSION ORAL at 20:26

## 2023-12-18 RX ADMIN — IPRATROPIUM BROMIDE AND ALBUTEROL SULFATE 1 DOSE: 2.5; .5 SOLUTION RESPIRATORY (INHALATION) at 23:36

## 2023-12-18 RX ADMIN — SODIUM CHLORIDE 30 MG/ML INHALATION SOLUTION 4 ML: 30 SOLUTION INHALANT at 16:08

## 2023-12-18 RX ADMIN — ALBUMIN (HUMAN) 12.5 G: 12.5 INJECTION, SOLUTION INTRAVENOUS at 18:06

## 2023-12-18 RX ADMIN — MILRINONE LACTATE IN DEXTROSE 0.38 MCG/KG/MIN: 200 INJECTION, SOLUTION INTRAVENOUS at 18:14

## 2023-12-18 RX ADMIN — Medication 125 MCG/HR: at 02:59

## 2023-12-18 RX ADMIN — ACETYLCYSTEINE 600 MG: 200 INHALANT RESPIRATORY (INHALATION) at 20:47

## 2023-12-18 RX ADMIN — PIPERACILLIN AND TAZOBACTAM 3375 MG: 3; .375 INJECTION, POWDER, FOR SOLUTION INTRAVENOUS; PARENTERAL at 22:11

## 2023-12-18 RX ADMIN — PROPOFOL 45 MCG/KG/MIN: 10 INJECTION, EMULSION INTRAVENOUS at 13:13

## 2023-12-18 RX ADMIN — DILTIAZEM HYDROCHLORIDE 30 MG: 30 TABLET, FILM COATED ORAL at 20:25

## 2023-12-18 RX ADMIN — Medication 0.01 MCG/KG/MIN: at 17:09

## 2023-12-18 RX ADMIN — METOPROLOL TARTRATE 5 MG: 5 INJECTION INTRAVENOUS at 04:27

## 2023-12-18 RX ADMIN — SODIUM CHLORIDE, PRESERVATIVE FREE 10 ML: 5 INJECTION INTRAVENOUS at 20:25

## 2023-12-18 RX ADMIN — MILRINONE LACTATE IN DEXTROSE 0.38 MCG/KG/MIN: 200 INJECTION, SOLUTION INTRAVENOUS at 10:03

## 2023-12-18 RX ADMIN — PROPOFOL 45 MCG/KG/MIN: 10 INJECTION, EMULSION INTRAVENOUS at 11:05

## 2023-12-18 RX ADMIN — CISATRACURIUM BESYLATE 3.5 MCG/KG/MIN: 10 INJECTION INTRAVENOUS at 07:58

## 2023-12-18 RX ADMIN — MUPIROCIN: 20 OINTMENT TOPICAL at 20:25

## 2023-12-18 RX ADMIN — ACETYLCYSTEINE 600 MG: 200 INHALANT RESPIRATORY (INHALATION) at 23:36

## 2023-12-18 RX ADMIN — MAGNESIUM HYDROXIDE 30 ML: 400 SUSPENSION ORAL at 09:33

## 2023-12-18 RX ADMIN — ACETAMINOPHEN 1000 MG: 650 SOLUTION ORAL at 20:25

## 2023-12-18 RX ADMIN — ACETYLCYSTEINE 600 MG: 200 INHALANT RESPIRATORY (INHALATION) at 09:28

## 2023-12-18 RX ADMIN — SENNOSIDES AND DOCUSATE SODIUM 1 TABLET: 50; 8.6 TABLET ORAL at 09:32

## 2023-12-18 RX ADMIN — SODIUM CHLORIDE 30 MG/ML INHALATION SOLUTION 4 ML: 30 SOLUTION INHALANT at 23:36

## 2023-12-18 RX ADMIN — IPRATROPIUM BROMIDE AND ALBUTEROL SULFATE 1 DOSE: 2.5; .5 SOLUTION RESPIRATORY (INHALATION) at 12:31

## 2023-12-18 RX ADMIN — SODIUM CHLORIDE 30 MG/ML INHALATION SOLUTION 4 ML: 30 SOLUTION INHALANT at 20:47

## 2023-12-18 RX ADMIN — ACETAMINOPHEN 1000 MG: 650 SOLUTION ORAL at 05:30

## 2023-12-18 RX ADMIN — ACETYLCYSTEINE 600 MG: 200 INHALANT RESPIRATORY (INHALATION) at 16:08

## 2023-12-18 RX ADMIN — PIPERACILLIN AND TAZOBACTAM 3375 MG: 3; .375 INJECTION, POWDER, FOR SOLUTION INTRAVENOUS; PARENTERAL at 06:00

## 2023-12-18 RX ADMIN — SODIUM CHLORIDE 30 MG/ML INHALATION SOLUTION 4 ML: 30 SOLUTION INHALANT at 12:31

## 2023-12-18 RX ADMIN — NYSTATIN 500000 UNITS: 100000 SUSPENSION ORAL at 18:23

## 2023-12-18 RX ADMIN — MUPIROCIN: 20 OINTMENT TOPICAL at 09:34

## 2023-12-18 RX ADMIN — CLOPIDOGREL BISULFATE 75 MG: 75 TABLET ORAL at 09:32

## 2023-12-18 RX ADMIN — PIPERACILLIN AND TAZOBACTAM 3375 MG: 3; .375 INJECTION, POWDER, FOR SOLUTION INTRAVENOUS; PARENTERAL at 16:27

## 2023-12-18 RX ADMIN — Medication 150 MCG/HR: at 16:36

## 2023-12-18 RX ADMIN — FUROSEMIDE 20 MG: 10 INJECTION, SOLUTION INTRAMUSCULAR; INTRAVENOUS at 13:57

## 2023-12-18 RX ADMIN — PROPOFOL 35 MCG/KG/MIN: 10 INJECTION, EMULSION INTRAVENOUS at 19:33

## 2023-12-18 RX ADMIN — SODIUM CHLORIDE 3.48 UNITS/HR: 9 INJECTION, SOLUTION INTRAVENOUS at 17:21

## 2023-12-18 RX ADMIN — IPRATROPIUM BROMIDE AND ALBUTEROL SULFATE 1 DOSE: 2.5; .5 SOLUTION RESPIRATORY (INHALATION) at 09:29

## 2023-12-18 RX ADMIN — IPRATROPIUM BROMIDE AND ALBUTEROL SULFATE 1 DOSE: 2.5; .5 SOLUTION RESPIRATORY (INHALATION) at 20:47

## 2023-12-18 RX ADMIN — SODIUM CHLORIDE 30 MG/ML INHALATION SOLUTION 4 ML: 30 SOLUTION INHALANT at 03:43

## 2023-12-18 RX ADMIN — IPRATROPIUM BROMIDE AND ALBUTEROL SULFATE 1 DOSE: 2.5; .5 SOLUTION RESPIRATORY (INHALATION) at 16:08

## 2023-12-18 RX ADMIN — PROPOFOL 10 MCG/KG/MIN: 10 INJECTION, EMULSION INTRAVENOUS at 05:29

## 2023-12-18 RX ADMIN — SODIUM CHLORIDE 10.92 UNITS/HR: 9 INJECTION, SOLUTION INTRAVENOUS at 07:53

## 2023-12-18 RX ADMIN — IPRATROPIUM BROMIDE AND ALBUTEROL SULFATE 1 DOSE: 2.5; .5 SOLUTION RESPIRATORY (INHALATION) at 03:41

## 2023-12-18 RX ADMIN — DILTIAZEM HYDROCHLORIDE 30 MG: 30 TABLET, FILM COATED ORAL at 04:11

## 2023-12-18 RX ADMIN — DILTIAZEM HYDROCHLORIDE 30 MG: 30 TABLET, FILM COATED ORAL at 16:37

## 2023-12-18 RX ADMIN — POLYETHYLENE GLYCOL 3350 17 G: 17 POWDER, FOR SOLUTION ORAL at 10:04

## 2023-12-18 RX ADMIN — ACETYLCYSTEINE 600 MG: 200 INHALANT RESPIRATORY (INHALATION) at 12:31

## 2023-12-18 RX ADMIN — ACETAMINOPHEN 1000 MG: 650 SOLUTION ORAL at 16:37

## 2023-12-18 RX ADMIN — METOPROLOL TARTRATE 12.5 MG: 25 TABLET, FILM COATED ORAL at 09:32

## 2023-12-18 RX ADMIN — ACETYLCYSTEINE 600 MG: 200 INHALANT RESPIRATORY (INHALATION) at 03:46

## 2023-12-18 RX ADMIN — SODIUM CHLORIDE, PRESERVATIVE FREE 40 MG: 5 INJECTION INTRAVENOUS at 09:33

## 2023-12-18 ASSESSMENT — PULMONARY FUNCTION TESTS
PIF_VALUE: 28
PIF_VALUE: 39
PIF_VALUE: 39

## 2023-12-18 NOTE — PROGRESS NOTES
Comprehensive Nutrition Assessment    Type and Reason for Visit:  Initial (Vent Check/Tube Feedings)    Nutrition Recommendations/Plan:   Continue current TF of Standard with Fiber formula at goal 35 mL/hr. Monitor rate of propofol and modify TF as needed. Will monitor labs, weights, and plan of care. Malnutrition Assessment:  Malnutrition Status:  Insufficient data (12/18/23 1217)    Context:  Acute Illness     Findings of the 6 clinical characteristics of malnutrition:  Energy Intake:  Mild decrease in energy intake   Weight Loss:  No significant weight loss     Body Fat Loss:  Unable to assess   Muscle Mass Loss:  Unable to assess  Fluid Accumulation:  Mild Generalized, Extremities   Strength:  Not Performed    Nutrition Assessment:    Pt seen due to Tube Feeding order. Admitted and s/p CABG x 3. PMH: CAD, CKD, DM, PVD. Pt was extubated and then re-intubated due to worsening respiratory status. Propofol running at 20.2 mL/hr at visit. TF of Standard with Fiber running at 35 mL/hr at visit. Labs reviewed: Glucose 114-170 mg/dL, Mg 2.7 mg/dL. Meds include: Glycolax, Senokot, Insulin Drip. Nutrition Related Findings:    Labs/Meds reviewed. Hypoactive bowel sounds. No recorded BM since admission. Wound Type: Multiple, Surgical Incision       Current Nutrition Intake & Therapies:    Average Meal Intake: NPO  Average Supplements Intake: NPO  ADULT TUBE FEEDING; Nasogastric; Standard with Fiber; Continuous; 20; Yes; 10; Q 4 hours; 45; 30; Q 4 hours  Current Tube Feeding (TF) Orders:  Feeding Route: Orogastric  Formula: Standard with Fiber  Schedule: Continuous  Feeding Regimen: 35 mL/hr  Additives/Modulars: None  Water Flushes: 30 mL q 4 hrs  Current TF & Flush Orders Provides:  At 35 mL/hr = 1260 kcals, 54 gm protein  Goal TF & Flush Orders Provides: Standard with Fiber goal 35 mL/hr with current rate of propofol = 1260 kcals (+propofol kcals), 54 gm protein per day    Additional Calorie

## 2023-12-18 NOTE — PROGRESS NOTES
Dr. Cathryn Cantu rounded on patient updated vitals, labs and output. Verbal order for IV protonix. Order for ABG at 2200. Tube Feed changed to rate of 25/hr.

## 2023-12-18 NOTE — PROGRESS NOTES
Occupational [address]  Occupational Therapy Not Seen Note    DATE: 2023    NAME: Monse Adkins  MRN: [id number]   : 1965      Patient not seen this date for Occupational Therapy due to:    Patient is not appropriate for active participation in OT evaluation/treatment at this time d/t Writer discussing with RN, pt INT/SED     Next Scheduled Treatment:     Electronically signed by WILLIAM Knight on 2023 at 8:27 AM

## 2023-12-18 NOTE — PLAN OF CARE
Problem: Discharge Planning  Goal: Discharge to home or other facility with appropriate resources  Outcome: Progressing  Flowsheets (Taken 12/17/2023 2000)  Discharge to home or other facility with appropriate resources:   Identify barriers to discharge with patient and caregiver   Arrange for needed discharge resources and transportation as appropriate   Identify discharge learning needs (meds, wound care, etc)   Arrange for interpreters to assist at discharge as needed   Refer to discharge planning if patient needs post-hospital services based on physician order or complex needs related to functional status, cognitive ability or social support system     Problem: Chronic Conditions and Co-morbidities  Goal: Patient's chronic conditions and co-morbidity symptoms are monitored and maintained or improved  Outcome: Progressing  Flowsheets (Taken 12/17/2023 2000)  Care Plan - Patient's Chronic Conditions and Co-Morbidity Symptoms are Monitored and Maintained or Improved:   Monitor and assess patient's chronic conditions and comorbid symptoms for stability, deterioration, or improvement   Collaborate with multidisciplinary team to address chronic and comorbid conditions and prevent exacerbation or deterioration   Update acute care plan with appropriate goals if chronic or comorbid symptoms are exacerbated and prevent overall improvement and discharge       Problem: Safety - Adult  Goal: Free from fall injury  Outcome: Progressing     Problem: Skin/Tissue Integrity  Goal: Absence of new skin breakdown  Description: 1. Monitor for areas of redness and/or skin breakdown  2. Assess vascular access sites hourly  3. Every 4-6 hours minimum:  Change oxygen saturation probe site  4. Every 4-6 hours:  If on nasal continuous positive airway pressure, respiratory therapy assess nares and determine need for appliance change or resting period.   Outcome: Progressing

## 2023-12-18 NOTE — PLAN OF CARE
BRONCHOSPASM/BRONCHOCONSTRICTION      [x]   IMPROVE AERATION/BREATH SOUNDS  [x]   ADMINISTER BRONCHODILATOR THERAPY AS APPROPRIATE  [x]   ASSESS BREATH SOUNDS  []  PATIENT EDUCATION AS NEEDED  [x]   IMPLEMENT AEROSOL/MDI PROTOCOL   Problem: Respiratory - Adult  Goal: Achieves optimal ventilation and oxygenation  Outcome: Progressing  Flowsheets  Taken 12/17/2023 2000 by Jessica Castillo RN  Achieves optimal ventilation and oxygenation:   Assess for changes in respiratory status   Assess for changes in mentation and behavior   Position to facilitate oxygenation and minimize respiratory effort   Oxygen supplementation based on oxygen saturation or arterial blood gases  Taken 12/17/2023 1600 by Casi Paez RN  Achieves optimal ventilation and oxygenation: Assess for changes in respiratory status  Taken 12/17/2023 0800 by Casi Paez RN  Achieves optimal ventilation and oxygenation: Assess for changes in respiratory status

## 2023-12-18 NOTE — PROGRESS NOTES
Dr Kiki Springer at bedside for rounding. Patient switched to BiVent, orders for ABG and CXR. Patient breathing over respiration rate on ventilator, nimbex increased for ventilator compliance.      Electronically signed by Muna Gordillo RN on 12/18/2023 at 12:23 PM

## 2023-12-18 NOTE — OP NOTE
[address] [facility], [facility] [facility]                                OPERATIVE REPORT    PATIENT NAME: Bryon Black                   :        1965  MED REC NO:   [id number]                             ROOM:       3626  ACCOUNT NO:   [de-identified]                           ADMIT DATE: 12/15/2023  PROVIDER:     Jimbo Ambrosio MD    DATE OF PROCEDURE:  12/15/2023    PREOPERATIVE DIAGNOSES:  Severe multivessel coronary artery disease,  status post non-STEMI, CHF, ejection fraction 20%, volume overload. POSTOPERATIVE DIAGNOSES:  Severe multivessel coronary artery disease,  status post non-STEMI, CHF, ejection fraction 20%, volume overload. OPERATION PERFORMED:  Median sternotomy, LESLIE, endoscopic vein harvest,  endoscopic left radial artery harvest, CABG x3 with skeletonized LIMA to  LAD, left radial 2 obtuse marginal and reverse saphenous vein graft to  distal RCA. PRIMARY ATTENDING SURGEON:  Jimbo Ambrosio MD.    OTHER ASSISTANTS:  Included Alissa Ross CSA and Rajinder Tatum RN,  RFA. COMPLICATIONS:  None. CONDITION:  Stable. DISPOSITION:  To CVICU. ESTIMATED BLOOD LOSS:  Not applicable. ANESTHESIA:  General endotracheal.    CARDIOPULMONARY BYPASS TIME:  87 minutes. CROSS-CLAMP TIME:  64 minutes. LOWEST TEMPERATURE:  33.5 degrees. INDICATIONS FOR SURGERY:  The patient is a 20-year-old woman who  unfortunately is in overall poor state of health, who requires  revascularization for severe multivessel coronary artery disease in the  setting of congestive heart failure and volume overload with an ejection  fraction of 20-30%.   I took her to surgery with the anticipation of  performing mostly arterial grafting including skeletonized LIMA and  radial grafting given her young age, but given her preoperative studies  and her overall poor state of health, I was not even sure this would

## 2023-12-18 NOTE — CARE COORDINATION
Transition planning/Quality flow rounds. CABGx3, POD3. Reintubated 12/16 FiO2 100%, PEEP 14. Nimbex, Epinephrine, fentanyl, versed, insulin, milrinone, levophed, propofol, vasopressin. 1 Contacted daughter, Dionicio Luciano, to discuss SNF and IPR placement. Lists left at bedside for her to look at when she returns later today.

## 2023-12-18 NOTE — PROCEDURES
[facility]  Transesophageal Echocardiogram       Today's Date:  12/18/2023  Indication:   Pulm edema, poor TTE images    Operators:  Primary:   Braxton Casarez MD (Attending Physician)  Assistant:   Gary Marte MD (Cardiovascular Fellow)    Pre Procedure Conscious Sedation Data:  ASA Class:    [] I [] II [x] III [] IV    Mallampati Class:  [] I [x] II [] III [] IV    Procedure:  Patient seen and examined. History and Physical reviewed. Labs reviewed. Informed consent was obtained from the daughter on phone with explanation of the risks and benefits. Patient was already intubated. All sedation was administered by the cardiologist. The oropharynx was pre-anesthesized with cetacaine spray. The ultrasound probe was passed without any difficulty. LESLIE findings:  LA:  Normal  DANIELLE:  No thrombus  RA:  Normal  RV:   Normal  LV:  Normal  Estimated LVEF:  30%  Aorta:   Mild atheromatous disease arch  Pericardium: No pericardial effusion  Septum:  No intracardiac shunt via color Doppler. Positive for PFO via injection of agitated saline    Valves:  Mitral Valve: Structurally normal. Mild regurgitation is identified. Aortic Valve: The aortic valve is trileaflet and opens adequately. Mild regurgitiation is identified. Tricuspid valve: Structurally normal. No regurgitation is identified. Pulmonary valve: Normal. No significant regurgitation    No valvular vegetations or thrombus identified. Summary:   1. A LESLIE was performed without complications. 2. LVEF 30-35%. Normal RV size and function  3. No thrombus or valvular vegetation identified  4. Aneurysmal interatrial septum with Positive bubble study for PFO via injection of agitated saline  5.  Mild MR and mild AI      Gary Marte MD  Fellow, cardiovascular diseases  [address]     Attending Physician   Braxton Casarez MD, Kat Marino

## 2023-12-18 NOTE — PROGRESS NOTES
ICU PROGRESS NOTE        PATIENT NAME: Zeenat Ceja  MEDICAL RECORD NO. [id number]  DATE: 2023    HD: # 3    ASSESSMENT :    Patient Active Problem List   Diagnosis    Tremor    Lumbar disc disease    Hyperlipidemia    Depression    Fibromyalgia    Sacroiliac joint dysfunction    Neuropathy    Allergic rhinitis    Former tobacco use    Abnormal ECG    Anxiety    Pure hypercholesterolemia    Hypertension, essential    Controlled type 2 diabetes mellitus without complication (HCC)    Class 1 obesity due to excess calories with serious comorbidity in adult    CAD in native artery    S/P CABG x 3       ACUTE DIAGNOSES/PLAN  Neuro:  Sedated with Fentanyl, propofol, and paralyzed with Cisatracurium  Decreased rates of above for APRV and better self ventilation     CV  Severe multivessel coronary artery disease, status post non-STEMI, CHF, ejection fraction 20%, volume overload  -12/15/23: Median sternotomy, LESLIE, endoscopic vein harvest, endoscopic left radial artery harvest, CABG x3 with skeletonized LIMA to LAD, left radial 2 obtuse marginal and reverse saphenous vein graft to distal RCA. -SBP: 156-98, MAP 90-62, -101    Pulm  Intubated and sedated with paralytic with ARDS:   PRVC 20/460/14/100% with AB.363/47.4/103.9/  Adjusted vent to APRV 70%/0-38   -Decreased paralytic    -Repeat blood gas: 7.304/51.8/98.1    -Chest XR after showed improvement    Heme  Hgb: 8.9 from 9.4  WBC: 17.5 from 18.6    Family/dispo  Following in ICU for vent management    Lines  CVC, Art, PIV, ETT, Cox         Chief Complaint: Sedated and intubated    SUBJECTIVE    Cele HUSSEIN Josselyn Pat  is a cardiothoracic surgery primary patient who consulted us for vent management. Patient seems to be in respiratory failure d/t ARDS. On exam, patient is paralyzed and sedated with intubation. Midline wound with bandages. Family is in room and received updates.         OBJECTIVE  VITALS:   Vitals:    23 1045   BP:    Pulse: (!) 108

## 2023-12-18 NOTE — PROGRESS NOTES
[facility]   Progress Note                   Date:   12/18/2023  Patient name: John Reed  Date of admission:  12/15/2023  7:30 AM  MRN:   [id number]  YOB: 1965  PCP: AQUILES Tee CNP    Reason for Admission: status post CABG    Subjective:   Patient was seen and examined. Patient remains intubated. She went to [facility] with [facility] overnight. She was given scheduled dose of digoxin earlier. She was also given a dose of Lopressor. Currently she is intubated and sedated. She is on milrinone.  Currently in NSR/sinus tachy      Medications:   Scheduled Meds:   pantoprazole (PROTONIX) 40 mg in sodium chloride (PF) 0.9 % 10 mL injection  40 mg IntraVENous Daily    metoprolol  5 mg IntraVENous Once    acetaminophen  1,000 mg Oral 3 times per day    ipratropium 0.5 mg-albuterol 2.5 mg  1 Dose Inhalation Q4H    acetylcysteine  600 mg Inhalation Q4H    sodium chloride (Inhalant)  4 mL Nebulization Q4H    piperacillin-tazobactam  3,375 mg IntraVENous Q8H    dilTIAZem  30 mg Oral 3 times per day    sodium chloride flush  5-40 mL IntraVENous 2 times per day    clopidogrel  75 mg Oral Daily    mupirocin   Each Nostril BID    polyethylene glycol  17 g Oral Daily    sennosides-docusate sodium  1 tablet Oral BID    metoprolol tartrate  12.5 mg Oral BID     Continuous Infusions:   cisatracurium besylate (NIMBEX) 200 mg in sodium chloride 0.9 % 100 mL infusion 3.5 mcg/kg/min (12/18/23 0828)    dilTIAZem 125 mg in sodium chloride 0.9 % 125 mL infusion Stopped (12/17/23 1111)    fentaNYL 50 mcg/mL 125 mcg/hr (12/18/23 0828)    propofol 40 mcg/kg/min (12/18/23 0828)    norepinephrine      midazolam Stopped (12/17/23 1730)    sodium chloride Stopped (12/17/23 2323)    sodium chloride 10 mL/hr at 12/18/23 0828    dextrose      vasopressin 20 Units in sodium chloride 0.9 % 100 mL infusion Stopped (12/17/23 1731)    milrinone 0.375 mcg/kg/min (12/18/23 0828)    insulin 10.92 Units/hr (12/18/23 0828)     CBC:   Recent Labs     12/16/23  2136 12/17/23  0600 12/18/23  0617   WBC 22.2* 18.6* 17.5*   HGB 9.3* 9.4* 8.9*   * 126* 146       BMP:    Recent Labs     12/17/23  0246 12/17/23  0600 12/17/23  1350 12/17/23  2202 12/18/23  0617    142  --   --  141   K 4.5 4.2  --   --  4.5    106  --   --  107   CO2 24 26  --   --  26   BUN 19 19  --   --  20   CREATININE 1.2* 1.1* 1.1 1.2* 1.1*   GLUCOSE 339* 295*  --   --  170*       Hepatic:   Recent Labs     12/17/23  1119   AST 39*   ALT 18   BILITOT 0.3   ALKPHOS 60     Troponin: No results for input(s): \"TROPONINI\" in the last 72 hours.  BNP: No results for input(s): \"BNP\" in the last 72 hours.  Lipids: No results for input(s): \"CHOL\", \"HDL\" in the last 72 hours.    Invalid input(s): \"LDLCALCU\"  INR:   Recent Labs     12/15/23  1526 12/16/23  0619 12/17/23  0600   INR 1.3 1.3 1.3         Objective:   Vitals: BP (!) 88/47   Pulse (!) 102   Temp 99.3 °F (37.4 °C)   Resp 20   Ht 1.651 m (5' 5\")   Wt 84.1 kg (185 lb 6.5 oz)   SpO2 99%   BMI 30.85 kg/m²   Constitutional and General Appearance: Intubated and sedated   HEENT: atraumatic, normocephalic.   Respiratory:  Bilateral ventilatory breath sounds present  Cardiovascular:  Regular S1 and S2.  No JVD  Peripheral pulses are symmetrical and full   Abdomen:   Soft abdomen  No organomegaly  Bowel sounds present  Extremities:  No LE edema or cyanosis   Neurological:  Intubated and sedated      EKG:   Normal sinus rhythm  T wave abnormality, consider anterolateral ischemia  Prolonged QT  Abnormal ECG  When compared with ECG of 22-NOV-2023 12:08,  No significant change was found     ECHO: 10/17/2023.  Left Ventricle: Reduced left ventricular systolic function with a visually estimated EF of 40 - 45%. Poor apical visualization and unable to perform Wright's EF measurement. Left ventricle size is normal. Mildly increased wall thickness. See diagram for wall motion findings. Abnormal

## 2023-12-18 NOTE — PROGRESS NOTES
[facility]   Progress Note    12/18/2023 10:33 AM  Surgeon:  Velma          POD#  2  S/P :  Coronary artery bypass with radial  EF: pre op LESLIE 10-15%. Post op with pressors 30-35    Subjective:  Ms. Camarena intubated and sedated.  Intubated. Status guarded. No acute distress present. Family bedside.     Vital Signs: BP (!) 88/47   Pulse (!) 102   Temp 99.3 °F (37.4 °C)   Resp 20   Ht 1.651 m (5' 5\")   Wt 84.1 kg (185 lb 6.5 oz)   SpO2 99%   BMI 30.85 kg/m²  O2 Flow Rate (L/min): (S) 15 L/min   Admit Weight: Weight - Scale: 84.1 kg (185 lb 6.5 oz)     CV: no murmur noted, Normal S1, S2  Lungs: diminished  Abd: The abdomen is soft without tenderness, guarding, mass, rebound or organomegaly.Bowel    Lower Extremities: 1+ edema  CXR  Relatively stable cardiopulmonary status  Postop changes in the chest with similar perihilar opacities.    Support catheters and tubes from recent cardiac surgery appear stable.  Sternal plates from heart surgery.  Stable cardiomegaly with similar moderate  perihilar airspace disease which may be due to edema, atelectasis, infection,  or a combination.  No new focal infiltrates are seen.  No significant pleural  effusions or large pneumothorax.  Monitor leads overlie the chest.    Labs and Studies:  CBC:   Recent Labs     12/16/23  2136 12/17/23  0600 12/18/23  0617   WBC 22.2* 18.6* 17.5*   HGB 9.3* 9.4* 8.9*   HCT 28.2* 29.0* 28.3*   MCV 91.3 91.8 94.0   * 126* 146     BMP:   Recent Labs     12/17/23  0246 12/17/23  0600 12/17/23  1119 12/17/23  1350 12/17/23  2202 12/18/23  0617    142  --   --   --  141   K 4.5 4.2  --   --   --  4.5    106  --   --   --  107   CO2 24 26  --   --   --  26   PHOS  --   --  3.2  --   --   --    BUN 19 19  --   --   --  20   CREATININE 1.2* 1.1*  --  1.1 1.2* 1.1*     PT/INR:   Recent Labs     12/15/23  1526 12/16/23  0619 12/17/23  0600   PROTIME 15.8* 15.7* 15.6*   INR 1.3 1.3 1.3     APTT:   Recent Labs  Trauma services assisting with vent management. Cardizem as rate control for Afib. Please replace elytes as appropriate   Monitor strict Is and Os. ICU status      S/P CABG x 3 with radial artery harvest. Reintubated and sedated. Pulmonary consulted yesterday. Hemodynamically patient still on milrinone and epi .01. Cardiac output is 8 . Afib yesterday cardiology started digoxin patient has iodine allergy. Discussed with Dr. Zuleyka Owen. Chest x-ray still appears wet. Patient is diuresing well with Lasix, increase as tolerated. Would like to to DC epi use esmolol and hold Cardizem. Will discuss with Dr. Fouzia Yang. S/P CABG x 3 with radial artery harvest.  Temp 100.4. Patient has a low allergy to NSAIDs. Will try Percocet today with the Tylenol. Sinus rhythm. Blood pressure soft 90s with epi, milrinone, Cardizem. Change Cardizem to 30 mg 3 times daily. Chest x-ray wet atelectatic throughout. Patient still intubated on 50%, PEEP of 8. Weaned to extubate today. Hemoglobin 8.5 ABLA. Sodium 147 creatinine 1.2 baseline start Lasix 20 mg IV daily. 270 chest tubes. Remove sylvester once extubated. No amiodarone due to allergy. D/C planning.      AQUILES Mojica - BOONE

## 2023-12-19 ENCOUNTER — APPOINTMENT (OUTPATIENT)
Dept: GENERAL RADIOLOGY | Age: 58
DRG: 235 | End: 2023-12-19
Attending: THORACIC SURGERY (CARDIOTHORACIC VASCULAR SURGERY)
Payer: COMMERCIAL

## 2023-12-19 LAB
ALLEN TEST: ABNORMAL
ALLEN TEST: ABNORMAL
ANION GAP SERPL CALCULATED.3IONS-SCNC: 12 MMOL/L (ref 9–17)
ANION GAP SERPL CALCULATED.3IONS-SCNC: 13 MMOL/L (ref 9–17)
BUN SERPL-MCNC: 28 MG/DL (ref 6–20)
BUN SERPL-MCNC: 30 MG/DL (ref 6–20)
CA-I BLD-SCNC: 1.13 MMOL/L (ref 1.13–1.33)
CALCIUM SERPL-MCNC: 8.4 MG/DL (ref 8.6–10.4)
CALCIUM SERPL-MCNC: 8.6 MG/DL (ref 8.6–10.4)
CHLORIDE SERPL-SCNC: 109 MMOL/L (ref 98–107)
CHLORIDE SERPL-SCNC: 110 MMOL/L (ref 98–107)
CO2 SERPL-SCNC: 23 MMOL/L (ref 20–31)
CO2 SERPL-SCNC: 25 MMOL/L (ref 20–31)
CREAT SERPL-MCNC: 1.5 MG/DL (ref 0.5–0.9)
CREAT SERPL-MCNC: 1.6 MG/DL (ref 0.5–0.9)
ERYTHROCYTE [DISTWIDTH] IN BLOOD BY AUTOMATED COUNT: 14.7 % (ref 11.8–14.4)
FIO2: 40
FIO2: 40
FIO2: 50
GFR SERPL CREATININE-BSD FRML MDRD: 37 ML/MIN/1.73M2
GFR SERPL CREATININE-BSD FRML MDRD: 40 ML/MIN/1.73M2
GLUCOSE BLD-MCNC: 108 MG/DL (ref 65–105)
GLUCOSE BLD-MCNC: 108 MG/DL (ref 74–100)
GLUCOSE BLD-MCNC: 109 MG/DL (ref 65–105)
GLUCOSE BLD-MCNC: 112 MG/DL (ref 65–105)
GLUCOSE BLD-MCNC: 113 MG/DL (ref 65–105)
GLUCOSE BLD-MCNC: 113 MG/DL (ref 65–105)
GLUCOSE BLD-MCNC: 116 MG/DL (ref 74–100)
GLUCOSE BLD-MCNC: 121 MG/DL (ref 74–100)
GLUCOSE BLD-MCNC: 122 MG/DL (ref 65–105)
GLUCOSE BLD-MCNC: 124 MG/DL (ref 65–105)
GLUCOSE BLD-MCNC: 126 MG/DL (ref 65–105)
GLUCOSE BLD-MCNC: 147 MG/DL (ref 65–105)
GLUCOSE BLD-MCNC: 149 MG/DL (ref 65–105)
GLUCOSE BLD-MCNC: 152 MG/DL (ref 65–105)
GLUCOSE BLD-MCNC: 154 MG/DL (ref 65–105)
GLUCOSE BLD-MCNC: 154 MG/DL (ref 65–105)
GLUCOSE BLD-MCNC: 168 MG/DL (ref 65–105)
GLUCOSE BLD-MCNC: 168 MG/DL (ref 65–105)
GLUCOSE BLD-MCNC: 184 MG/DL (ref 65–105)
GLUCOSE BLD-MCNC: 194 MG/DL (ref 65–105)
GLUCOSE BLD-MCNC: 226 MG/DL (ref 65–105)
GLUCOSE BLD-MCNC: 235 MG/DL (ref 65–105)
GLUCOSE BLD-MCNC: 256 MG/DL (ref 65–105)
GLUCOSE BLD-MCNC: 87 MG/DL (ref 65–105)
GLUCOSE BLD-MCNC: 92 MG/DL (ref 65–105)
GLUCOSE SERPL-MCNC: 123 MG/DL (ref 70–99)
GLUCOSE SERPL-MCNC: 124 MG/DL (ref 70–99)
HCT VFR BLD AUTO: 25.8 % (ref 36.3–47.1)
HGB BLD-MCNC: 8.2 G/DL (ref 11.9–15.1)
MAGNESIUM SERPL-MCNC: 2.6 MG/DL (ref 1.6–2.6)
MAGNESIUM SERPL-MCNC: 2.6 MG/DL (ref 1.6–2.6)
MCH RBC QN AUTO: 29.8 PG (ref 25.2–33.5)
MCHC RBC AUTO-ENTMCNC: 31.8 G/DL (ref 28.4–34.8)
MCV RBC AUTO: 93.8 FL (ref 82.6–102.9)
METHEMOGLOBIN: 1 % (ref 0–1.5)
MODE: ABNORMAL
MODE: ABNORMAL
NEGATIVE BASE EXCESS, ART: 0.3 MMOL/L (ref 0–2)
NRBC BLD-RTO: 0.1 PER 100 WBC
O2 DELIVERY DEVICE: ABNORMAL
O2 DELIVERY DEVICE: ABNORMAL
PLATELET # BLD AUTO: 197 K/UL (ref 138–453)
PMV BLD AUTO: 10.5 FL (ref 8.1–13.5)
POC HCO3: 25 MMOL/L (ref 21–28)
POC HCO3: 25.4 MMOL/L (ref 21–28)
POC HCO3: 25.6 MMOL/L (ref 21–28)
POC LACTIC ACID: 1.2 MMOL/L (ref 0.56–1.39)
POC LACTIC ACID: 1.6 MMOL/L (ref 0.56–1.39)
POC O2 SATURATION: 95.7 % (ref 94–98)
POC O2 SATURATION: 96 % (ref 94–98)
POC O2 SATURATION: 97.5 % (ref 94–98)
POC PCO2: 41.3 MM HG (ref 35–48)
POC PCO2: 41.6 MM HG (ref 35–48)
POC PCO2: 42.7 MM HG (ref 35–48)
POC PH: 7.38 (ref 7.35–7.45)
POC PH: 7.39 (ref 7.35–7.45)
POC PH: 7.4 (ref 7.35–7.45)
POC PO2: 81.8 MM HG (ref 83–108)
POC PO2: 81.9 MM HG (ref 83–108)
POC PO2: 97.8 MM HG (ref 83–108)
POSITIVE BASE EXCESS, ART: 0.4 MMOL/L (ref 0–3)
POSITIVE BASE EXCESS, ART: 0.7 MMOL/L (ref 0–3)
POTASSIUM SERPL-SCNC: 3.2 MMOL/L (ref 3.7–5.3)
POTASSIUM SERPL-SCNC: 4.2 MMOL/L (ref 3.7–5.3)
RBC # BLD AUTO: 2.75 M/UL (ref 3.95–5.11)
SAMPLE SITE: ABNORMAL
SAMPLE SITE: ABNORMAL
SODIUM SERPL-SCNC: 144 MMOL/L (ref 135–144)
SODIUM SERPL-SCNC: 148 MMOL/L (ref 135–144)
WBC OTHER # BLD: 17 K/UL (ref 3.5–11.3)

## 2023-12-19 PROCEDURE — 2700000000 HC OXYGEN THERAPY PER DAY

## 2023-12-19 PROCEDURE — A4216 STERILE WATER/SALINE, 10 ML: HCPCS | Performed by: SURGERY

## 2023-12-19 PROCEDURE — 83050 HGB METHEMOGLOBIN QUAN: CPT

## 2023-12-19 PROCEDURE — 94003 VENT MGMT INPAT SUBQ DAY: CPT

## 2023-12-19 PROCEDURE — 6360000002 HC RX W HCPCS: Performed by: THORACIC SURGERY (CARDIOTHORACIC VASCULAR SURGERY)

## 2023-12-19 PROCEDURE — 6370000000 HC RX 637 (ALT 250 FOR IP): Performed by: NURSE PRACTITIONER

## 2023-12-19 PROCEDURE — 82803 BLOOD GASES ANY COMBINATION: CPT

## 2023-12-19 PROCEDURE — 6360000002 HC RX W HCPCS: Performed by: NURSE PRACTITIONER

## 2023-12-19 PROCEDURE — 6360000002 HC RX W HCPCS: Performed by: STUDENT IN AN ORGANIZED HEALTH CARE EDUCATION/TRAINING PROGRAM

## 2023-12-19 PROCEDURE — 99233 SBSQ HOSP IP/OBS HIGH 50: CPT | Performed by: INTERNAL MEDICINE

## 2023-12-19 PROCEDURE — 2580000003 HC RX 258: Performed by: SURGERY

## 2023-12-19 PROCEDURE — 2500000003 HC RX 250 WO HCPCS: Performed by: NURSE PRACTITIONER

## 2023-12-19 PROCEDURE — 85027 COMPLETE CBC AUTOMATED: CPT

## 2023-12-19 PROCEDURE — 99024 POSTOP FOLLOW-UP VISIT: CPT

## 2023-12-19 PROCEDURE — 71045 X-RAY EXAM CHEST 1 VIEW: CPT

## 2023-12-19 PROCEDURE — 2580000003 HC RX 258

## 2023-12-19 PROCEDURE — 6360000002 HC RX W HCPCS: Performed by: SURGERY

## 2023-12-19 PROCEDURE — 6370000000 HC RX 637 (ALT 250 FOR IP): Performed by: THORACIC SURGERY (CARDIOTHORACIC VASCULAR SURGERY)

## 2023-12-19 PROCEDURE — 99291 CRITICAL CARE FIRST HOUR: CPT | Performed by: SURGERY

## 2023-12-19 PROCEDURE — 82947 ASSAY GLUCOSE BLOOD QUANT: CPT

## 2023-12-19 PROCEDURE — 37799 UNLISTED PX VASCULAR SURGERY: CPT

## 2023-12-19 PROCEDURE — 83735 ASSAY OF MAGNESIUM: CPT

## 2023-12-19 PROCEDURE — 2580000003 HC RX 258: Performed by: NURSE PRACTITIONER

## 2023-12-19 PROCEDURE — 80048 BASIC METABOLIC PNL TOTAL CA: CPT

## 2023-12-19 PROCEDURE — 6370000000 HC RX 637 (ALT 250 FOR IP): Performed by: PHYSICIAN ASSISTANT

## 2023-12-19 PROCEDURE — C9113 INJ PANTOPRAZOLE SODIUM, VIA: HCPCS | Performed by: SURGERY

## 2023-12-19 PROCEDURE — 94761 N-INVAS EAR/PLS OXIMETRY MLT: CPT

## 2023-12-19 PROCEDURE — 82330 ASSAY OF CALCIUM: CPT

## 2023-12-19 PROCEDURE — 2580000003 HC RX 258: Performed by: THORACIC SURGERY (CARDIOTHORACIC VASCULAR SURGERY)

## 2023-12-19 PROCEDURE — 2100000001 HC CVICU R&B

## 2023-12-19 PROCEDURE — 94640 AIRWAY INHALATION TREATMENT: CPT

## 2023-12-19 PROCEDURE — 6370000000 HC RX 637 (ALT 250 FOR IP)

## 2023-12-19 PROCEDURE — 83605 ASSAY OF LACTIC ACID: CPT

## 2023-12-19 RX ORDER — SODIUM CHLORIDE 450 MG/100ML
INJECTION, SOLUTION INTRAVENOUS PRN
Status: DISCONTINUED | OUTPATIENT
Start: 2023-12-19 | End: 2023-12-29 | Stop reason: HOSPADM

## 2023-12-19 RX ORDER — LIDOCAINE HYDROCHLORIDE ANHYDROUS AND DEXTROSE MONOHYDRATE 5; 400 G/100ML; MG/100ML
0.5 INJECTION, SOLUTION INTRAVENOUS CONTINUOUS
Status: DISCONTINUED | OUTPATIENT
Start: 2023-12-19 | End: 2023-12-22

## 2023-12-19 RX ADMIN — SODIUM CHLORIDE 5.27 UNITS/HR: 9 INJECTION, SOLUTION INTRAVENOUS at 11:11

## 2023-12-19 RX ADMIN — PIPERACILLIN AND TAZOBACTAM 3375 MG: 3; .375 INJECTION, POWDER, FOR SOLUTION INTRAVENOUS; PARENTERAL at 15:25

## 2023-12-19 RX ADMIN — DILTIAZEM HYDROCHLORIDE 30 MG: 30 TABLET, FILM COATED ORAL at 05:16

## 2023-12-19 RX ADMIN — POLYETHYLENE GLYCOL 3350 17 G: 17 POWDER, FOR SOLUTION ORAL at 08:01

## 2023-12-19 RX ADMIN — NYSTATIN 500000 UNITS: 100000 SUSPENSION ORAL at 21:13

## 2023-12-19 RX ADMIN — ACETAMINOPHEN 1000 MG: 650 SOLUTION ORAL at 21:13

## 2023-12-19 RX ADMIN — SODIUM CHLORIDE 30 MG/ML INHALATION SOLUTION 4 ML: 30 SOLUTION INHALANT at 07:54

## 2023-12-19 RX ADMIN — SENNOSIDES AND DOCUSATE SODIUM 1 TABLET: 50; 8.6 TABLET ORAL at 21:13

## 2023-12-19 RX ADMIN — ACETYLCYSTEINE 600 MG: 200 INHALANT RESPIRATORY (INHALATION) at 20:46

## 2023-12-19 RX ADMIN — MUPIROCIN: 20 OINTMENT TOPICAL at 08:01

## 2023-12-19 RX ADMIN — IPRATROPIUM BROMIDE AND ALBUTEROL SULFATE 1 DOSE: 2.5; .5 SOLUTION RESPIRATORY (INHALATION) at 15:33

## 2023-12-19 RX ADMIN — IPRATROPIUM BROMIDE AND ALBUTEROL SULFATE 1 DOSE: 2.5; .5 SOLUTION RESPIRATORY (INHALATION) at 20:45

## 2023-12-19 RX ADMIN — SODIUM CHLORIDE 16.54 UNITS/HR: 9 INJECTION, SOLUTION INTRAVENOUS at 04:04

## 2023-12-19 RX ADMIN — PROPOFOL 40 MCG/KG/MIN: 10 INJECTION, EMULSION INTRAVENOUS at 11:11

## 2023-12-19 RX ADMIN — SODIUM CHLORIDE 30 MG/ML INHALATION SOLUTION 4 ML: 30 SOLUTION INHALANT at 15:34

## 2023-12-19 RX ADMIN — MILRINONE LACTATE IN DEXTROSE 0.25 MCG/KG/MIN: 200 INJECTION, SOLUTION INTRAVENOUS at 10:39

## 2023-12-19 RX ADMIN — ACETYLCYSTEINE 600 MG: 200 INHALANT RESPIRATORY (INHALATION) at 03:11

## 2023-12-19 RX ADMIN — DILTIAZEM HYDROCHLORIDE 30 MG: 30 TABLET, FILM COATED ORAL at 14:26

## 2023-12-19 RX ADMIN — PROPOFOL 45 MCG/KG/MIN: 10 INJECTION, EMULSION INTRAVENOUS at 20:54

## 2023-12-19 RX ADMIN — SENNOSIDES AND DOCUSATE SODIUM 1 TABLET: 50; 8.6 TABLET ORAL at 08:01

## 2023-12-19 RX ADMIN — LIDOCAINE HYDROCHLORIDE 1 MG/MIN: 4 INJECTION, SOLUTION INTRAVENOUS at 20:50

## 2023-12-19 RX ADMIN — ACETYLCYSTEINE 600 MG: 200 INHALANT RESPIRATORY (INHALATION) at 11:19

## 2023-12-19 RX ADMIN — POTASSIUM CHLORIDE 20 MEQ: 29.8 INJECTION, SOLUTION INTRAVENOUS at 06:57

## 2023-12-19 RX ADMIN — ACETAMINOPHEN 1000 MG: 650 SOLUTION ORAL at 05:16

## 2023-12-19 RX ADMIN — IPRATROPIUM BROMIDE AND ALBUTEROL SULFATE 1 DOSE: 2.5; .5 SOLUTION RESPIRATORY (INHALATION) at 11:19

## 2023-12-19 RX ADMIN — ACETYLCYSTEINE 600 MG: 200 INHALANT RESPIRATORY (INHALATION) at 15:34

## 2023-12-19 RX ADMIN — ACETYLCYSTEINE 600 MG: 200 INHALANT RESPIRATORY (INHALATION) at 07:54

## 2023-12-19 RX ADMIN — DILTIAZEM HYDROCHLORIDE 30 MG: 30 TABLET, FILM COATED ORAL at 21:13

## 2023-12-19 RX ADMIN — IPRATROPIUM BROMIDE AND ALBUTEROL SULFATE 1 DOSE: 2.5; .5 SOLUTION RESPIRATORY (INHALATION) at 03:11

## 2023-12-19 RX ADMIN — CLOPIDOGREL BISULFATE 75 MG: 75 TABLET ORAL at 08:01

## 2023-12-19 RX ADMIN — SODIUM CHLORIDE, PRESERVATIVE FREE 40 MG: 5 INJECTION INTRAVENOUS at 08:01

## 2023-12-19 RX ADMIN — PIPERACILLIN AND TAZOBACTAM 3375 MG: 3; .375 INJECTION, POWDER, FOR SOLUTION INTRAVENOUS; PARENTERAL at 06:16

## 2023-12-19 RX ADMIN — PROPOFOL 45 MCG/KG/MIN: 10 INJECTION, EMULSION INTRAVENOUS at 16:51

## 2023-12-19 RX ADMIN — ACETAMINOPHEN 1000 MG: 650 SOLUTION ORAL at 14:26

## 2023-12-19 RX ADMIN — NYSTATIN 500000 UNITS: 100000 SUSPENSION ORAL at 13:00

## 2023-12-19 RX ADMIN — Medication 175 MCG/HR: at 17:39

## 2023-12-19 RX ADMIN — NYSTATIN 500000 UNITS: 100000 SUSPENSION ORAL at 18:10

## 2023-12-19 RX ADMIN — PIPERACILLIN AND TAZOBACTAM 3375 MG: 3; .375 INJECTION, POWDER, FOR SOLUTION INTRAVENOUS; PARENTERAL at 23:00

## 2023-12-19 RX ADMIN — SODIUM CHLORIDE 30 MG/ML INHALATION SOLUTION 4 ML: 30 SOLUTION INHALANT at 20:45

## 2023-12-19 RX ADMIN — SODIUM CHLORIDE: 4.5 INJECTION, SOLUTION INTRAVENOUS at 06:55

## 2023-12-19 RX ADMIN — Medication 175 MCG/HR: at 03:09

## 2023-12-19 RX ADMIN — NYSTATIN 500000 UNITS: 100000 SUSPENSION ORAL at 08:01

## 2023-12-19 RX ADMIN — MAGNESIUM HYDROXIDE 30 ML: 400 SUSPENSION ORAL at 08:01

## 2023-12-19 RX ADMIN — SODIUM CHLORIDE 30 MG/ML INHALATION SOLUTION 4 ML: 30 SOLUTION INHALANT at 11:19

## 2023-12-19 RX ADMIN — IPRATROPIUM BROMIDE AND ALBUTEROL SULFATE 1 DOSE: 2.5; .5 SOLUTION RESPIRATORY (INHALATION) at 07:54

## 2023-12-19 RX ADMIN — PROPOFOL 30 MCG/KG/MIN: 10 INJECTION, EMULSION INTRAVENOUS at 06:10

## 2023-12-19 RX ADMIN — SODIUM CHLORIDE 30 MG/ML INHALATION SOLUTION 4 ML: 30 SOLUTION INHALANT at 03:11

## 2023-12-19 RX ADMIN — PROPOFOL 35 MCG/KG/MIN: 10 INJECTION, EMULSION INTRAVENOUS at 01:22

## 2023-12-19 RX ADMIN — MUPIROCIN: 20 OINTMENT TOPICAL at 21:13

## 2023-12-19 ASSESSMENT — PULMONARY FUNCTION TESTS
PIF_VALUE: 28
PIF_VALUE: 36
PIF_VALUE: 36
PIF_VALUE: 34
PIF_VALUE: 36
PIF_VALUE: 36
PIF_VALUE: 38
PIF_VALUE: 28
PIF_VALUE: 27
PIF_VALUE: 36
PIF_VALUE: 35
PIF_VALUE: 37
PIF_VALUE: 34
PIF_VALUE: 37
PIF_VALUE: 28
PIF_VALUE: 39
PIF_VALUE: 37
PIF_VALUE: 35
PIF_VALUE: 26
PIF_VALUE: 28
PIF_VALUE: 34
PIF_VALUE: 28
PIF_VALUE: 38

## 2023-12-19 NOTE — PROGRESS NOTES
Occupational [address]  Occupational Therapy Not Seen Note    DATE: 2023    NAME: Livier Barba  MRN: [id number]   : 1965      Patient not seen this date for Occupational Therapy due to:    Spoke with RN. Patient is not appropriate for active participation in OT evaluation/treatment at this time d/t intubated/sedated.     Electronically signed by Dao Rai OT on 2023 at 8:06 AM

## 2023-12-19 NOTE — PLAN OF CARE
Problem: Discharge Planning  Goal: Discharge to home or other facility with appropriate resources  Outcome: Progressing     Problem: Chronic Conditions and Co-morbidities  Goal: Patient's chronic conditions and co-morbidity symptoms are monitored and maintained or improved  Outcome: Progressing     Problem: Respiratory - Adult  Goal: Achieves optimal ventilation and oxygenation  Outcome: Progressing     Problem: Safety - Adult  Goal: Free from fall injury  Outcome: Progressing     Problem: Skin/Tissue Integrity  Goal: Absence of new skin breakdown  Description: 1. Monitor for areas of redness and/or skin breakdown  2. Assess vascular access sites hourly  3. Every 4-6 hours minimum:  Change oxygen saturation probe site  4. Every 4-6 hours:  If on nasal continuous positive airway pressure, respiratory therapy assess nares and determine need for appliance change or resting period.   Outcome: Progressing     Problem: ABCDS Injury Assessment  Goal: Absence of physical injury  Outcome: Progressing     Problem: Pain  Goal: Verbalizes/displays adequate comfort level or baseline comfort level  Outcome: Progressing     Problem: Nutrition Deficit:  Goal: Optimize nutritional status  Outcome: Progressing

## 2023-12-19 NOTE — PLAN OF CARE
Problem: Respiratory - Adult  Goal: Achieves optimal ventilation and oxygenation  Flowsheets (Taken 12/18/2023 2106)  Achieves optimal ventilation and oxygenation:   Respiratory therapy support as indicated   Assess the need for suctioning and aspirate as needed   Assess for changes in respiratory status   Assess for changes in mentation and behavior   Oxygen supplementation based on oxygen saturation or arterial blood gases

## 2023-12-19 NOTE — PLAN OF CARE
Problem: Discharge Planning  Goal: Discharge to home or other facility with appropriate resources  Outcome: Progressing     Problem: Chronic Conditions and Co-morbidities  Goal: Patient's chronic conditions and co-morbidity symptoms are monitored and maintained or improved  Outcome: Progressing     Problem: Respiratory - Adult  Goal: Achieves optimal ventilation and oxygenation  12/18/2023 2257 by Dileep Trinidad RN  Outcome: Progressing  12/18/2023 2106 by Brandy Aceves RCP  Flowsheets (Taken 12/18/2023 2106)  Achieves optimal ventilation and oxygenation:   Respiratory therapy support as indicated   Assess the need for suctioning and aspirate as needed   Assess for changes in respiratory status   Assess for changes in mentation and behavior   Oxygen supplementation based on oxygen saturation or arterial blood gases     Problem: Safety - Adult  Goal: Free from fall injury  Outcome: Progressing     Problem: Skin/Tissue Integrity  Goal: Absence of new skin breakdown  Description: 1. Monitor for areas of redness and/or skin breakdown  2. Assess vascular access sites hourly  3. Every 4-6 hours minimum:  Change oxygen saturation probe site  4. Every 4-6 hours:  If on nasal continuous positive airway pressure, respiratory therapy assess nares and determine need for appliance change or resting period. Outcome: Progressing     Problem: ABCDS Injury Assessment  Goal: Absence of physical injury  Outcome: Progressing     Problem: Pain  Goal: Verbalizes/displays adequate comfort level or baseline comfort level  Outcome: Progressing     Problem: Nutrition Deficit:  Goal: Optimize nutritional status  Outcome: Progressing     Problem: Safety - Medical Restraint  Goal: Remains free of injury from restraints (Restraint for Interference with Medical Device)  Description: INTERVENTIONS:  1. Determine that other, less restrictive measures have been tried or would not be effective before applying the restraint  2.  Evaluate the

## 2023-12-19 NOTE — PROGRESS NOTES
Epi started at ordered rate of 0.1 mcg/kg/min.  David Noyola NP notified and order changed to CT parameters of 0.01 - 0.08 and epi re-started at 0.01.

## 2023-12-19 NOTE — PROGRESS NOTES
Dr Coletta Soulier at bedside to evaluate. ABG drawn. Orders to continue sedation overnight, do not titrate down. Ventilator adjustments to be made by a trauma attending only.  ABG draw @ 2100    Electronically signed by Karla Monson RN on 12/18/2023

## 2023-12-19 NOTE — PROGRESS NOTES
ICU PROGRESS NOTE        PATIENT NAME: Shmuel Londono  MEDICAL RECORD NO. [id number]  DATE: 2023    HD: # 4    ASSESSMENT :    Patient Active Problem List   Diagnosis    Tremor    Lumbar disc disease    Hyperlipidemia    Depression    Fibromyalgia    Sacroiliac joint dysfunction    Neuropathy    Allergic rhinitis    Former tobacco use    Abnormal ECG    Anxiety    Pure hypercholesterolemia    Hypertension, essential    Controlled type 2 diabetes mellitus without complication (HCC)    Class 1 obesity due to excess calories with serious comorbidity in adult    CAD in native artery    S/P CABG x 3       ACUTE DIAGNOSES/PLAN  Neuro:  Sedated with Fentanyl, propofol    CV  Severe multivessel coronary artery disease, status post non-STEMI, CHF, ejection fraction 20%, volume overload  -12/15/23: Median sternotomy, LESLIE, endoscopic vein harvest, endoscopic left radial artery harvest, CABG x3 with skeletonized LIMA to LAD, left radial 2 obtuse marginal and reverse saphenous vein graft to distal RCA. -SBP: , MAP 90-62, -132    Pulm  Intubated and sedated with paralytic with ARDS:   AB.393/41.6/97.8/25.4  Adjusted vent to APRV 40% 26/0   -Decreased paralytic    -Repeat blood gas:7.37/42.7/81.1/25        4. Blood Gas stable - plan for repeat ABG at 5 pm to re-assess    Heme  Hgb: 8.2 from 8.9  WBC: 17.0     Family/dispo  Following in ICU for vent management    Lines  CVC, Art, PIV, ETT, Cox         Chief Complaint: Sedated and intubated    SUBJECTIVE    Cele Delgado  is a cardiothoracic surgery primary patient who consulted us for vent management. Patient seems to be in respiratory failure d/t ARDS. On exam, patient sedated with intubation. Midline wound with bandages. Family is in room and received updates. OBJECTIVE  VITALS:   Vitals:    23 0939   BP:    Pulse: (!) 110   Resp: 13   Temp:    SpO2: 98%       Physical Exam  Constitutional:       General: She is not in acute distress.

## 2023-12-19 NOTE — PROGRESS NOTES
[facility]   Progress Note                   Date:   12/19/2023  Patient name: Luanne Mercado  Date of admission:  12/15/2023  7:30 AM  MRN:   [id number]  YOB: 1965  PCP: AQUILES Cotton CNP    Reason for Admission: status post CABG    Subjective:   Patient was seen and examined. Patient remains intubated. Remains in normal sinus rhythm/sinus tachycardia. Currently on milrinone, Levophed, epi drips. LESLIE yesterday with LVEF 30 to 35%, normal RV function. Creatinine uptrending, most recent creatinine 1.6.   Potassium 3.2    Medications:   Scheduled Meds:   pantoprazole (PROTONIX) 40 mg in sodium chloride (PF) 0.9 % 10 mL injection  40 mg IntraVENous Daily    metoprolol  5 mg IntraVENous Once    furosemide  20 mg IntraVENous Once    nystatin  5 mL Oral 4x Daily    acetaminophen  1,000 mg Oral 3 times per day    ipratropium 0.5 mg-albuterol 2.5 mg  1 Dose Inhalation Q4H    acetylcysteine  600 mg Inhalation Q4H    sodium chloride (Inhalant)  4 mL Nebulization Q4H    piperacillin-tazobactam  3,375 mg IntraVENous Q8H    dilTIAZem  30 mg Oral 3 times per day    sodium chloride flush  5-40 mL IntraVENous 2 times per day    clopidogrel  75 mg Oral Daily    mupirocin   Each Nostril BID    polyethylene glycol  17 g Oral Daily    sennosides-docusate sodium  1 tablet Oral BID    metoprolol tartrate  12.5 mg Oral BID     Continuous Infusions:   sodium chloride Stopped (12/19/23 0657)    EPINEPHrine 0.04 mcg/kg/min (12/19/23 0717)    cisatracurium besylate (NIMBEX) 200 mg in sodium chloride 0.9 % 100 mL infusion Stopped (12/18/23 1233)    dilTIAZem 125 mg in sodium chloride 0.9 % 125 mL infusion Stopped (12/17/23 1111)    fentaNYL 50 mcg/mL 175 mcg/hr (12/19/23 0717)    propofol 40 mcg/kg/min (12/19/23 0717)    norepinephrine 0.05 mcg/kg/min (12/19/23 0717)    midazolam Stopped (12/17/23 1730)    sodium chloride Stopped (12/17/23 1153)    sodium chloride Stopped (12/18/23

## 2023-12-19 NOTE — PROGRESS NOTES
Patient continued tachycardia with increased vent compliance and sedation. Sidney Banuelos NP with CTS at the bedside. Labs ordered.

## 2023-12-19 NOTE — CARE COORDINATION
Transitional planning: Phone call from Hu Garcia with [address] [place] [address] requesting clinicals be faxed to [phone] Faxed clinicals to [address] [place] [address].

## 2023-12-19 NOTE — PROGRESS NOTES
Sedation decreased per Dr Denise Perez to allow patient to breath over ventilator. Patient became agitated, not tolerating vent. Patient turning red, hypertensive, coughing, tachycardia 130s. Writer reached out to Dr Denise Perez, sedation increased okay to turn on nimbex if patient continues to not tolerate vent.

## 2023-12-19 NOTE — CARE COORDINATION
[facility] ICU Quality Flow/Interdisciplinary Rounds Progress Note    Quality Flow Rounds held on December 19, 2023 at [address] [facility] Attending:  Dr. Joanna Rico, Bedside nurse, , Unit Supervisor    Anticipated Discharge Date:  Intubated/Sedated     Anticipated Discharge Disposition:  SNF vs IPR    Readmission Risk              Risk of Unplanned Readmission:  18           Discussed patient goal for the day, patient clinical progression, and barriers to discharge. The following Goal(s) of the Day/Commitment(s) have been identified:      Remains Intubated: FiO2 40; Peep 0. Multiple IV drips including insulin, Epinephrine, Propofol, Fentanyl, Milrinone, and Norepinephrine. Spoke with daughter regarding SNF choice and IPR choice. Daughter states that she has not looked at them and wants her aunt's assistance. Writer requesting choice, if possible today. Daughter verbalized that she would try to contact her aunt.           Saurav Levi  December 19, 2023    1414 Choices from Daughter   [address] at [address] [facility] [address] of Pain

## 2023-12-19 NOTE — PROGRESS NOTES
Dr Elnora Gilford at bedside for rounds. Verbal orders for lasix and if patient requires pressors, use levophed. (3) adequate

## 2023-12-19 NOTE — PROGRESS NOTES
Writer reached out to Sisseb with CTS, reporting increased lactic acid and continued tachycardia. Orders for albumin. Hold 1700 lasix dose.      Electronically signed by Karla Monson RN on 12/18/2023

## 2023-12-20 ENCOUNTER — APPOINTMENT (OUTPATIENT)
Dept: GENERAL RADIOLOGY | Age: 58
DRG: 235 | End: 2023-12-20
Attending: THORACIC SURGERY (CARDIOTHORACIC VASCULAR SURGERY)
Payer: COMMERCIAL

## 2023-12-20 LAB
ALLEN TEST: NORMAL
ALLEN TEST: NORMAL
ANION GAP SERPL CALCULATED.3IONS-SCNC: 11 MMOL/L (ref 9–16)
BUN SERPL-MCNC: 33 MG/DL (ref 6–20)
CA-I BLD-SCNC: 1.13 MMOL/L (ref 1.13–1.33)
CALCIUM SERPL-MCNC: 8.5 MG/DL (ref 8.6–10.4)
CHLORIDE SERPL-SCNC: 112 MMOL/L (ref 98–107)
CO2 SERPL-SCNC: 24 MMOL/L (ref 20–31)
CREAT SERPL-MCNC: 1.5 MG/DL (ref 0.5–0.9)
EKG ATRIAL RATE: 308 BPM
EKG ATRIAL RATE: 375 BPM
EKG P AXIS: 88 DEGREES
EKG Q-T INTERVAL: 248 MS
EKG Q-T INTERVAL: 290 MS
EKG QRS DURATION: 100 MS
EKG QRS DURATION: 88 MS
EKG QTC CALCULATION (BAZETT): 397 MS
EKG QTC CALCULATION (BAZETT): 461 MS
EKG R AXIS: 42 DEGREES
EKG R AXIS: 47 DEGREES
EKG T AXIS: -115 DEGREES
EKG T AXIS: -172 DEGREES
EKG VENTRICULAR RATE: 152 BPM
EKG VENTRICULAR RATE: 154 BPM
ERYTHROCYTE [DISTWIDTH] IN BLOOD BY AUTOMATED COUNT: 14.9 % (ref 11.8–14.4)
FIO2: 40
GFR SERPL CREATININE-BSD FRML MDRD: 39 ML/MIN/1.73M2
GLUCOSE BLD-MCNC: 104 MG/DL (ref 74–100)
GLUCOSE BLD-MCNC: 111 MG/DL (ref 65–105)
GLUCOSE BLD-MCNC: 114 MG/DL (ref 74–100)
GLUCOSE BLD-MCNC: 117 MG/DL (ref 65–105)
GLUCOSE BLD-MCNC: 121 MG/DL (ref 65–105)
GLUCOSE BLD-MCNC: 123 MG/DL (ref 65–105)
GLUCOSE BLD-MCNC: 124 MG/DL (ref 65–105)
GLUCOSE BLD-MCNC: 126 MG/DL (ref 65–105)
GLUCOSE BLD-MCNC: 128 MG/DL (ref 65–105)
GLUCOSE BLD-MCNC: 144 MG/DL (ref 65–105)
GLUCOSE BLD-MCNC: 144 MG/DL (ref 65–105)
GLUCOSE BLD-MCNC: 146 MG/DL (ref 65–105)
GLUCOSE BLD-MCNC: 150 MG/DL (ref 65–105)
GLUCOSE BLD-MCNC: 152 MG/DL (ref 65–105)
GLUCOSE BLD-MCNC: 157 MG/DL (ref 74–100)
GLUCOSE BLD-MCNC: 181 MG/DL (ref 65–105)
GLUCOSE BLD-MCNC: 182 MG/DL (ref 65–105)
GLUCOSE BLD-MCNC: 187 MG/DL (ref 74–100)
GLUCOSE BLD-MCNC: 223 MG/DL (ref 65–105)
GLUCOSE BLD-MCNC: 77 MG/DL (ref 65–105)
GLUCOSE SERPL-MCNC: 124 MG/DL (ref 74–99)
HCT VFR BLD AUTO: 25.7 % (ref 36.3–47.1)
HGB BLD-MCNC: 7.9 G/DL (ref 11.9–15.1)
MAGNESIUM SERPL-MCNC: 2.7 MG/DL (ref 1.6–2.6)
MCH RBC QN AUTO: 29 PG (ref 25.2–33.5)
MCHC RBC AUTO-ENTMCNC: 30.7 G/DL (ref 28.4–34.8)
MCV RBC AUTO: 94.5 FL (ref 82.6–102.9)
MODE: NORMAL
NEGATIVE BASE EXCESS, ART: 0.9 MMOL/L (ref 0–2)
NEGATIVE BASE EXCESS, ART: 1.1 MMOL/L (ref 0–2)
NRBC BLD-RTO: 0.3 PER 100 WBC
O2 DELIVERY DEVICE: NORMAL
PATIENT TEMP: 38.3
PLATELET # BLD AUTO: 240 K/UL (ref 138–453)
PMV BLD AUTO: 10 FL (ref 8.1–13.5)
POC HCO3: 23.5 MMOL/L (ref 21–28)
POC HCO3: 24.2 MMOL/L (ref 21–28)
POC HCO3: 24.5 MMOL/L (ref 21–28)
POC HCO3: 25.6 MMOL/L (ref 21–28)
POC LACTIC ACID: 0.4 MMOL/L (ref 0.56–1.39)
POC LACTIC ACID: 0.6 MMOL/L (ref 0.56–1.39)
POC LACTIC ACID: 0.8 MMOL/L (ref 0.56–1.39)
POC LACTIC ACID: 0.8 MMOL/L (ref 0.56–1.39)
POC O2 SATURATION: 96.1 % (ref 94–98)
POC O2 SATURATION: 96.4 % (ref 94–98)
POC O2 SATURATION: 96.9 % (ref 94–98)
POC O2 SATURATION: 97.8 % (ref 94–98)
POC PCO2 TEMP: 40.4 MM HG
POC PCO2: 37.6 MM HG (ref 35–48)
POC PCO2: 38.1 MM HG (ref 35–48)
POC PCO2: 41.1 MM HG (ref 35–48)
POC PCO2: 43 MM HG (ref 35–48)
POC PH TEMP: 7.4
POC PH: 7.38 (ref 7.35–7.45)
POC PH: 7.38 (ref 7.35–7.45)
POC PH: 7.4 (ref 7.35–7.45)
POC PH: 7.42 (ref 7.35–7.45)
POC PO2 TEMP: 95.3 MM HG
POC PO2: 84.4 MM HG (ref 83–108)
POC PO2: 87 MM HG (ref 83–108)
POC PO2: 87.8 MM HG (ref 83–108)
POC PO2: 99.5 MM HG (ref 83–108)
POSITIVE BASE EXCESS, ART: 0 MMOL/L (ref 0–3)
POSITIVE BASE EXCESS, ART: 0.4 MMOL/L (ref 0–3)
POTASSIUM SERPL-SCNC: 4.5 MMOL/L (ref 3.7–5.3)
RBC # BLD AUTO: 2.72 M/UL (ref 3.95–5.11)
SAMPLE SITE: NORMAL
SODIUM SERPL-SCNC: 147 MMOL/L (ref 136–145)
TRIGL SERPL-MCNC: 166 MG/DL (ref 0–149)
WBC OTHER # BLD: 16.1 K/UL (ref 3.5–11.3)

## 2023-12-20 PROCEDURE — 6360000002 HC RX W HCPCS: Performed by: NURSE PRACTITIONER

## 2023-12-20 PROCEDURE — 94003 VENT MGMT INPAT SUBQ DAY: CPT

## 2023-12-20 PROCEDURE — 83605 ASSAY OF LACTIC ACID: CPT

## 2023-12-20 PROCEDURE — 71045 X-RAY EXAM CHEST 1 VIEW: CPT

## 2023-12-20 PROCEDURE — A4216 STERILE WATER/SALINE, 10 ML: HCPCS | Performed by: SURGERY

## 2023-12-20 PROCEDURE — 2580000003 HC RX 258: Performed by: NURSE PRACTITIONER

## 2023-12-20 PROCEDURE — 6360000002 HC RX W HCPCS: Performed by: STUDENT IN AN ORGANIZED HEALTH CARE EDUCATION/TRAINING PROGRAM

## 2023-12-20 PROCEDURE — 83735 ASSAY OF MAGNESIUM: CPT

## 2023-12-20 PROCEDURE — 99233 SBSQ HOSP IP/OBS HIGH 50: CPT | Performed by: INTERNAL MEDICINE

## 2023-12-20 PROCEDURE — 2500000003 HC RX 250 WO HCPCS: Performed by: STUDENT IN AN ORGANIZED HEALTH CARE EDUCATION/TRAINING PROGRAM

## 2023-12-20 PROCEDURE — 6360000002 HC RX W HCPCS

## 2023-12-20 PROCEDURE — 94761 N-INVAS EAR/PLS OXIMETRY MLT: CPT

## 2023-12-20 PROCEDURE — 85027 COMPLETE CBC AUTOMATED: CPT

## 2023-12-20 PROCEDURE — 6360000002 HC RX W HCPCS: Performed by: THORACIC SURGERY (CARDIOTHORACIC VASCULAR SURGERY)

## 2023-12-20 PROCEDURE — 6370000000 HC RX 637 (ALT 250 FOR IP)

## 2023-12-20 PROCEDURE — 94640 AIRWAY INHALATION TREATMENT: CPT

## 2023-12-20 PROCEDURE — 2580000003 HC RX 258

## 2023-12-20 PROCEDURE — 82803 BLOOD GASES ANY COMBINATION: CPT

## 2023-12-20 PROCEDURE — 37799 UNLISTED PX VASCULAR SURGERY: CPT

## 2023-12-20 PROCEDURE — 6370000000 HC RX 637 (ALT 250 FOR IP): Performed by: NURSE PRACTITIONER

## 2023-12-20 PROCEDURE — 82947 ASSAY GLUCOSE BLOOD QUANT: CPT

## 2023-12-20 PROCEDURE — 2700000000 HC OXYGEN THERAPY PER DAY

## 2023-12-20 PROCEDURE — 2500000003 HC RX 250 WO HCPCS: Performed by: NURSE PRACTITIONER

## 2023-12-20 PROCEDURE — 6360000002 HC RX W HCPCS: Performed by: PHYSICIAN ASSISTANT

## 2023-12-20 PROCEDURE — C9113 INJ PANTOPRAZOLE SODIUM, VIA: HCPCS | Performed by: SURGERY

## 2023-12-20 PROCEDURE — 99233 SBSQ HOSP IP/OBS HIGH 50: CPT | Performed by: SURGERY

## 2023-12-20 PROCEDURE — 2580000003 HC RX 258: Performed by: SURGERY

## 2023-12-20 PROCEDURE — 99024 POSTOP FOLLOW-UP VISIT: CPT

## 2023-12-20 PROCEDURE — 2100000001 HC CVICU R&B

## 2023-12-20 PROCEDURE — 80048 BASIC METABOLIC PNL TOTAL CA: CPT

## 2023-12-20 PROCEDURE — 82330 ASSAY OF CALCIUM: CPT

## 2023-12-20 PROCEDURE — 2500000003 HC RX 250 WO HCPCS

## 2023-12-20 PROCEDURE — 2580000003 HC RX 258: Performed by: PHYSICIAN ASSISTANT

## 2023-12-20 PROCEDURE — 84478 ASSAY OF TRIGLYCERIDES: CPT

## 2023-12-20 PROCEDURE — 93010 ELECTROCARDIOGRAM REPORT: CPT | Performed by: INTERNAL MEDICINE

## 2023-12-20 PROCEDURE — 6360000002 HC RX W HCPCS: Performed by: SURGERY

## 2023-12-20 PROCEDURE — 6370000000 HC RX 637 (ALT 250 FOR IP): Performed by: THORACIC SURGERY (CARDIOTHORACIC VASCULAR SURGERY)

## 2023-12-20 PROCEDURE — 6370000000 HC RX 637 (ALT 250 FOR IP): Performed by: PHYSICIAN ASSISTANT

## 2023-12-20 PROCEDURE — 2580000003 HC RX 258: Performed by: THORACIC SURGERY (CARDIOTHORACIC VASCULAR SURGERY)

## 2023-12-20 RX ORDER — METOPROLOL TARTRATE 1 MG/ML
2.5 INJECTION, SOLUTION INTRAVENOUS EVERY 6 HOURS PRN
Status: DISCONTINUED | OUTPATIENT
Start: 2023-12-20 | End: 2023-12-29 | Stop reason: HOSPADM

## 2023-12-20 RX ORDER — DEXMEDETOMIDINE HYDROCHLORIDE 4 UG/ML
.1-1.5 INJECTION, SOLUTION INTRAVENOUS CONTINUOUS
Status: DISCONTINUED | OUTPATIENT
Start: 2023-12-20 | End: 2023-12-27

## 2023-12-20 RX ADMIN — PROPOFOL 35 MCG/KG/MIN: 10 INJECTION, EMULSION INTRAVENOUS at 01:02

## 2023-12-20 RX ADMIN — PIPERACILLIN AND TAZOBACTAM 3375 MG: 3; .375 INJECTION, POWDER, FOR SOLUTION INTRAVENOUS; PARENTERAL at 14:09

## 2023-12-20 RX ADMIN — IPRATROPIUM BROMIDE AND ALBUTEROL SULFATE 1 DOSE: 2.5; .5 SOLUTION RESPIRATORY (INHALATION) at 11:40

## 2023-12-20 RX ADMIN — DEXMEDETOMIDINE HYDROCHLORIDE 0.4 MCG/KG/HR: 400 INJECTION, SOLUTION INTRAVENOUS at 12:04

## 2023-12-20 RX ADMIN — CLOPIDOGREL BISULFATE 75 MG: 75 TABLET ORAL at 08:14

## 2023-12-20 RX ADMIN — DEXMEDETOMIDINE HYDROCHLORIDE 0.5 MCG/KG/HR: 400 INJECTION, SOLUTION INTRAVENOUS at 19:20

## 2023-12-20 RX ADMIN — SODIUM CHLORIDE 9.24 UNITS/HR: 9 INJECTION, SOLUTION INTRAVENOUS at 21:25

## 2023-12-20 RX ADMIN — NYSTATIN 500000 UNITS: 100000 SUSPENSION ORAL at 13:39

## 2023-12-20 RX ADMIN — Medication 75 MCG/HR: at 21:39

## 2023-12-20 RX ADMIN — MUPIROCIN: 20 OINTMENT TOPICAL at 08:15

## 2023-12-20 RX ADMIN — DILTIAZEM HYDROCHLORIDE 30 MG: 30 TABLET, FILM COATED ORAL at 05:42

## 2023-12-20 RX ADMIN — HYDRALAZINE HYDROCHLORIDE 10 MG: 20 INJECTION INTRAMUSCULAR; INTRAVENOUS at 11:45

## 2023-12-20 RX ADMIN — ACETAMINOPHEN 1000 MG: 650 SOLUTION ORAL at 12:39

## 2023-12-20 RX ADMIN — METOPROLOL TARTRATE 2.5 MG: 5 INJECTION INTRAVENOUS at 10:58

## 2023-12-20 RX ADMIN — ACETAMINOPHEN 1000 MG: 650 SOLUTION ORAL at 05:41

## 2023-12-20 RX ADMIN — Medication 175 MCG/HR: at 05:52

## 2023-12-20 RX ADMIN — MILRINONE LACTATE IN DEXTROSE 0.25 MCG/KG/MIN: 200 INJECTION, SOLUTION INTRAVENOUS at 21:31

## 2023-12-20 RX ADMIN — MILRINONE LACTATE IN DEXTROSE 0.25 MCG/KG/MIN: 200 INJECTION, SOLUTION INTRAVENOUS at 19:22

## 2023-12-20 RX ADMIN — MILRINONE LACTATE IN DEXTROSE 0.25 MCG/KG/MIN: 200 INJECTION, SOLUTION INTRAVENOUS at 03:23

## 2023-12-20 RX ADMIN — DILTIAZEM HYDROCHLORIDE 30 MG: 30 TABLET, FILM COATED ORAL at 20:24

## 2023-12-20 RX ADMIN — DEXMEDETOMIDINE HYDROCHLORIDE 0.5 MCG/KG/HR: 400 INJECTION, SOLUTION INTRAVENOUS at 21:34

## 2023-12-20 RX ADMIN — DILTIAZEM HYDROCHLORIDE 30 MG: 30 TABLET, FILM COATED ORAL at 12:41

## 2023-12-20 RX ADMIN — IPRATROPIUM BROMIDE AND ALBUTEROL SULFATE 1 DOSE: 2.5; .5 SOLUTION RESPIRATORY (INHALATION) at 15:44

## 2023-12-20 RX ADMIN — EPINEPHRINE 0.02 MCG/KG/MIN: 1 INJECTION INTRAMUSCULAR; INTRAVENOUS; SUBCUTANEOUS at 06:24

## 2023-12-20 RX ADMIN — ACETAMINOPHEN 1000 MG: 650 SOLUTION ORAL at 20:24

## 2023-12-20 RX ADMIN — NYSTATIN 500000 UNITS: 100000 SUSPENSION ORAL at 17:13

## 2023-12-20 RX ADMIN — SODIUM CHLORIDE, PRESERVATIVE FREE 40 MG: 5 INJECTION INTRAVENOUS at 08:14

## 2023-12-20 RX ADMIN — PROPOFOL 25 MCG/KG/MIN: 10 INJECTION, EMULSION INTRAVENOUS at 08:04

## 2023-12-20 RX ADMIN — HYDRALAZINE HYDROCHLORIDE 5 MG: 20 INJECTION INTRAMUSCULAR; INTRAVENOUS at 11:17

## 2023-12-20 RX ADMIN — SENNOSIDES AND DOCUSATE SODIUM 1 TABLET: 50; 8.6 TABLET ORAL at 20:24

## 2023-12-20 RX ADMIN — PIPERACILLIN AND TAZOBACTAM 3375 MG: 3; .375 INJECTION, POWDER, FOR SOLUTION INTRAVENOUS; PARENTERAL at 06:08

## 2023-12-20 RX ADMIN — IPRATROPIUM BROMIDE AND ALBUTEROL SULFATE 1 DOSE: 2.5; .5 SOLUTION RESPIRATORY (INHALATION) at 03:51

## 2023-12-20 RX ADMIN — NYSTATIN 500000 UNITS: 100000 SUSPENSION ORAL at 08:15

## 2023-12-20 RX ADMIN — METOPROLOL TARTRATE 12.5 MG: 25 TABLET, FILM COATED ORAL at 08:14

## 2023-12-20 RX ADMIN — ACETYLCYSTEINE 600 MG: 200 INHALANT RESPIRATORY (INHALATION) at 03:51

## 2023-12-20 RX ADMIN — SODIUM CHLORIDE, PRESERVATIVE FREE 10 ML: 5 INJECTION INTRAVENOUS at 08:15

## 2023-12-20 RX ADMIN — METOPROLOL TARTRATE 12.5 MG: 25 TABLET, FILM COATED ORAL at 20:25

## 2023-12-20 RX ADMIN — LIDOCAINE HYDROCHLORIDE 1 MG/MIN: 4 INJECTION, SOLUTION INTRAVENOUS at 21:12

## 2023-12-20 RX ADMIN — IPRATROPIUM BROMIDE AND ALBUTEROL SULFATE 1 DOSE: 2.5; .5 SOLUTION RESPIRATORY (INHALATION) at 08:22

## 2023-12-20 RX ADMIN — IPRATROPIUM BROMIDE AND ALBUTEROL SULFATE 1 DOSE: 2.5; .5 SOLUTION RESPIRATORY (INHALATION) at 01:04

## 2023-12-20 RX ADMIN — SODIUM CHLORIDE: 4.5 INJECTION, SOLUTION INTRAVENOUS at 21:13

## 2023-12-20 RX ADMIN — SODIUM CHLORIDE 30 MG/ML INHALATION SOLUTION 4 ML: 30 SOLUTION INHALANT at 01:05

## 2023-12-20 RX ADMIN — PIPERACILLIN AND TAZOBACTAM 3375 MG: 3; .375 INJECTION, POWDER, FOR SOLUTION INTRAVENOUS; PARENTERAL at 22:43

## 2023-12-20 RX ADMIN — HYDRALAZINE HYDROCHLORIDE 5 MG: 20 INJECTION INTRAMUSCULAR; INTRAVENOUS at 11:28

## 2023-12-20 RX ADMIN — IPRATROPIUM BROMIDE AND ALBUTEROL SULFATE 1 DOSE: 2.5; .5 SOLUTION RESPIRATORY (INHALATION) at 20:14

## 2023-12-20 RX ADMIN — ACETYLCYSTEINE 600 MG: 200 INHALANT RESPIRATORY (INHALATION) at 01:04

## 2023-12-20 RX ADMIN — SODIUM CHLORIDE 30 MG/ML INHALATION SOLUTION 4 ML: 30 SOLUTION INHALANT at 03:51

## 2023-12-20 RX ADMIN — NYSTATIN 500000 UNITS: 100000 SUSPENSION ORAL at 20:24

## 2023-12-20 ASSESSMENT — PULMONARY FUNCTION TESTS
PIF_VALUE: 30
PIF_VALUE: 36
PIF_VALUE: 30
PIF_VALUE: 35
PIF_VALUE: 32
PIF_VALUE: 30
PIF_VALUE: 37
PIF_VALUE: 30
PIF_VALUE: 36
PIF_VALUE: 31
PIF_VALUE: 31
PIF_VALUE: 33
PIF_VALUE: 28
PIF_VALUE: 30
PIF_VALUE: 28
PIF_VALUE: 36
PIF_VALUE: 22
PIF_VALUE: 30
PIF_VALUE: 28
PIF_VALUE: 21
PIF_VALUE: 36
PIF_VALUE: 36
PIF_VALUE: 28
PIF_VALUE: 28
PIF_VALUE: 36
PIF_VALUE: 21
PIF_VALUE: 30
PIF_VALUE: 34
PIF_VALUE: 31
PIF_VALUE: 36
PIF_VALUE: 35
PIF_VALUE: 28
PIF_VALUE: 36
PIF_VALUE: 34
PIF_VALUE: 36
PIF_VALUE: 32
PIF_VALUE: 35
PIF_VALUE: 33
PIF_VALUE: 36
PIF_VALUE: 36
PIF_VALUE: 30
PIF_VALUE: 21
PIF_VALUE: 26
PIF_VALUE: 28
PIF_VALUE: 28
PIF_VALUE: 21
PIF_VALUE: 35

## 2023-12-20 NOTE — PROGRESS NOTES
Cardiothoracic [address] / HISTORY AND PHYSICAL EXAMINATION            Date:   12/20/2023  Patient name:  Zeenat Ceja  MRN:   [id number]  YOB: 1965  PCP:    AQUILES Begum CNP    Physician Requesting Consult: Shelby Mary MD    Reason for Consult:  MVAD    Chief Complaint:     No chief complaint on file. History Obtained From:     patient    History of Present Illness:     Ms Josselyn Pat is a 57y.o. female who presents to [facility] for possible CABG surgery. She discovered her heart disease during work up for knee replacement surgery. She has a history of OA, HLD, DMII, depression, CKDIII, PVD, and fibromyalgia. She denies CP, SOB, swelling in lower extremities, dizziness, syncope. She denies history of CVA, MI, AF, liver disease, or prior cancers. Echo shows non-valvular heart disease with EF 40-45%. LHC shows significant stenosis in LAD, Rca, and Lcx without LM disease. She is not currently on [address] She denies smoking, etoh use or illicit drug use. Past Medical History:     Past Medical History:   Diagnosis Date    Allergic rhinitis     Arthritis     CAD (coronary artery disease)     multivessel.  Dr. Cheryl Jaffe last visit 10/2023    CKD (chronic kidney disease), stage III [facility])     Dr. Adrianne Casarez Nephrology last appt 10/10/2023    Depression     Fibromyalgia     Gout     Dr. Jersey Rob [facility]emat[facility]    History of fractured vertebra 2019    L2 L3 L4    Hyperlipidemia     Neuropathy     OA (osteoarthritis)     Peripheral vascular disease ([address])     Sacroiliac joint dysfunction     Tremor     Type II or unspecified type diabetes mellitus without mention of complication, not stated as uncontrolled     PCP    Under care of team     Dr. Juliana Stevens last appt 9/2023    Wellness examination     Amy Mckay CNP PCP last appt 10/2023        Past Surgical History:

## 2023-12-20 NOTE — PROGRESS NOTES
Pt having runs of Vtach. Strip sent to Cardiology fellow by dayshift RN. Writer followed up with cardiology d/t frequent PVC's and some short runs of bigeminy. Ct surgery was also made aware by dayshift RN and they would like intervention to come from cardiology. Orders received:  Lidocaine infusion at 1mg.

## 2023-12-20 NOTE — PROGRESS NOTES
thickness. See diagram for wall motion findings. Abnormal diastolic function..      Cardiac Angiography: 11/8/2023  Findings:     Left main: Normal with 0% stenosis     LAD: 80% proximal, 90% mid  First diagonal 90% ostial     LCX: 90% mid stenosis  OM1 80% proximal stenosis     RCA: 70% proximal and 90% distal stenosis.     The LV gram was performed in the PRICE 30 position.   LVEF: 45%. LV Wall Motion: Inferobasal akinesis     Conclusions:  Multivessel CAD  Mildly reduced LV systolic function     Recommendation:  CTS consult for CABG  Medical treatments.  Risk factors modifications..    Assessment:   Status post CABG x 3 (LIMA-LAD, radial-OM, SVG-RCA) on 12/15/2023  Ischemic cardiomyopathy with most recent EF 30 to 35% on LESLIE 12/18/2023  Pulmonary edema  Acute hypoxic respiratory failure - extubated but then required reintubation.  NSVT  New onset Afib with RVR, unstable s/p bedside CV x3 on 12/16  Hypertension  Hyperlipidemia  Type II DM  Former tobacco use  Obesity  Allergy to amiodarone    Plan:   Patient is allergic to amiodarone and has been started on lidocaine for NSVT  Recommend IV anticoagulation once okay with CT surgery  Continue milrinone, Levophed and wean off epinephrine.  Continue oral Cardizem for radial graft harvesting  Continue low-dose beta-blocker along with Plavix  Continue with as needed Iv lopressor 2.5 for HR above 130.   Routine postop chest tube care as per CTS.  Maintain K> 4 and Mg> 2  We will follow      Please wait for final attestation from rounding attending     Franklyn Alcala MD  Fellow, cardiovascular diseases  [facility]     Attending Physician Statement  I have discussed the case of Cele Camarena including pertinent history and exam findings with the student/resident/fellow. I have seen and examined the patient and the key elements of the encounter have been performed by me. I agree with the assessment, plan and orders as documented by the  resident With changes made to the note.      Electronically signed by Maulik Edmond MD on 12/20/2023 at 5:25 PM.    [facility]      [phone]

## 2023-12-20 NOTE — PROGRESS NOTES
[facility] Cardiothoracic Surgical   Progress Note    12/20/2023 11:20 AM  Surgeon:  Velma          POD#  5  S/P :  Coronary artery bypass with radial  EF: 30% on LESLIE with .375 of milrinone     Subjective:  Ms. Camarena intubated, sedation lightened, cough and gag intact     Vital Signs: BP (!) 166/80   Pulse (!) 119   Temp (!) 101.1 °F (38.4 °C)   Resp 19   Ht 1.651 m (5' 5\")   Wt 76.4 kg (168 lb 6.9 oz)   SpO2 96%   BMI 28.03 kg/m²  O2 Flow Rate (L/min): (S) 15 L/min   Admit Weight: Weight - Scale: 84.1 kg (185 lb 6.5 oz)     CV: no murmur noted, Normal S1, S2  Lungs: diminished  Abd: The abdomen is soft without tenderness, guarding, mass, rebound or organomegaly.Bowel    Lower Extremities: 1+ edema    CXR   12/20/2023  FINDINGS:  There is chronic pulmonary change.  There are bibasilar infiltrates.  There  is no pneumothorax.  The mediastinal structures are unremarkable.  The upper  abdomen unremarkable.  The extrathoracic soft tissues are unremarkable.  There is endotracheal tube with the tip in the proximal midtrachea.  There is  a gastric tube and the tip is not visualized.  There is a stable right  internal jugular line.     IMPRESSION:  Chronic pulmonary change with bibasilar infiltrates.     Support tubes as described above.    12/19/2023  FINDINGS:  Cardiomediastinal silhouette is stable.  Similar position of devices.  Slightly improved aeration of the lungs.  No pleural effusion or  pneumothorax.  No gross bony abnormality.     IMPRESSION:  Slightly improved aeration of the lungs.    Labs and Studies:  CBC:   Recent Labs     12/18/23  0617 12/19/23  0538 12/20/23  0444   WBC 17.5* 17.0* 16.1*   HGB 8.9* 8.2* 7.9*   HCT 28.3* 25.8* 25.7*   MCV 94.0 93.8 94.5    197 240       BMP:   Recent Labs     12/19/23  0538 12/19/23  1625 12/20/23  0444   * 144 147*   K 3.2* 4.2 4.5   * 109* 112*   CO2 25 23 24   BUN 28* 30* 33*   CREATININE 1.6* 1.5* 1.5*       PT/INR:   No results for  evaluation of TF to ensure low sodium. Febrile, on zosyn WBC trending down, with [place], CVC, and sylvester will attempt to de-line as patient stabilizes. 12/19/2023  Intubated sedated on 175mcg of fentanyl and 40 propofol. Less tachycardic today. Ectopy decreased with reduction of milrinone to .250, epinephrine added for additional inotropy support - improvement in status after, now afebrile, lactic down trended to 1.6. Levophed for pressor support. Holding diuresis today, LESLIE completed yesterday with good RV function, moderate reduction in LV function 30% EF on .375 of milrinone. Cardizem for AF and radial harvest. APRV settings managed by trauma service. sCr 1.6- 620ml overnight, will CTM today, if worsening KENRICK will consult nephrology. TF running, patient tolerating. Elytes per protocol. Hypernatremic today, change med line to . 45NACL to limit Na intake. 12-18-23  Pt status guarded. CI 3.8 with SVRs around 1400. Pt is on Vaso to assist with MARS >65. Concern for ARDS. ECMO team following is warranted. We would like to get a better Idea of cardiac Function today. We asked cardiology for LESLIE today to view the right heart and left heart Function. To assist with pressor and inotropic management. Pt is on zosyn for antibiotic coverage. Trauma services assisting with vent management. Cardizem as rate control for Afib. Please replace elytes as appropriate   Monitor strict Is and Os. ICU status      S/P CABG x 3 with radial artery harvest. Reintubated and sedated. Pulmonary consulted yesterday. Hemodynamically patient still on milrinone and epi .01. Cardiac output is 8 . Afib yesterday cardiology started digoxin patient has iodine allergy. Discussed with Dr. Hill Sender. Chest x-ray still appears wet. Patient is diuresing well with Lasix, increase as tolerated. Would like to to DC epi use esmolol and hold Cardizem. Will discuss with Dr. Mya Conley.      S/P CABG x 3 with radial artery harvest.  Temp

## 2023-12-20 NOTE — PROGRESS NOTES
Occupational [address]  Occupational Therapy Not Seen Note    DATE: 2023    NAME: Alberto Cast  MRN: [id number]   : 1965      Patient not seen this date for Occupational Therapy due to:    Patient is not appropriate for active participation in OT evaluation/treatment at this time d/t RN reports pt INT/SED    Next Scheduled Treatment:     Electronically signed by WILLIAM Art on 2023 at 10:01 AM

## 2023-12-20 NOTE — PLAN OF CARE
Problem: Discharge Planning  Goal: Discharge to home or other facility with appropriate resources  12/20/2023 0302 by Ron Rhodes RN  Outcome: Progressing  12/19/2023 1655 by Gisela Ramirez RN  Outcome: Progressing     Problem: Chronic Conditions and Co-morbidities  Goal: Patient's chronic conditions and co-morbidity symptoms are monitored and maintained or improved  12/20/2023 0302 by Ron Rhodes RN  Outcome: Progressing  12/19/2023 1655 by Gisela Ramirez RN  Outcome: Progressing     Problem: Respiratory - Adult  Goal: Achieves optimal ventilation and oxygenation  12/20/2023 0302 by Ron Rhodes RN  Outcome: Progressing  12/19/2023 1655 by Gisela Ramirez RN  Outcome: Progressing       Problem: Safety - Adult  Goal: Free from fall injury  12/20/2023 0302 by Ron Rhodes RN  Outcome: Progressing  12/19/2023 1655 by Gisela Ramirez RN  Outcome: Progressing     Problem: Skin/Tissue Integrity  Goal: Absence of new skin breakdown  Description: 1. Monitor for areas of redness and/or skin breakdown  2. Assess vascular access sites hourly  3. Every 4-6 hours minimum:  Change oxygen saturation probe site  4. Every 4-6 hours:  If on nasal continuous positive airway pressure, respiratory therapy assess nares and determine need for appliance change or resting period.   12/20/2023 0302 by Ron Rhodes RN  Outcome: Progressing  12/19/2023 1655 by Gisela Ramirez RN  Outcome: Progressing     Problem: Pain  Goal: Verbalizes/displays adequate comfort level or baseline comfort level  12/20/2023 0302 by Ron Rhodes RN  Outcome: Progressing  12/19/2023 1655 by Gisela Ramirez RN  Outcome: Progressing     Problem: Nutrition Deficit:  Goal: Optimize nutritional status  12/20/2023 0302 by Ron Rhodes RN  Outcome: Progressing  12/19/2023 1655 by Gisela Ramirez RN  Outcome: Progressing

## 2023-12-20 NOTE — PROGRESS NOTES
Dr. Ivan Abebe and team rounding, vent settings adjusted. RT updated, ABG to be obtained in 30 mins.

## 2023-12-20 NOTE — PLAN OF CARE
Problem: Respiratory - Adult  Goal: Achieves optimal ventilation and oxygenation  12/20/2023 0833 by Socorro George RCP  Outcome: Progressing

## 2023-12-20 NOTE — PROGRESS NOTES
Physician Progress Note      Rosendo Loera  CSN #:                  [id number]  :                       1965  ADMIT DATE:       12/15/2023 7:30 AM  [address] DATE:  RESPONDING  PROVIDER #:        Sean Manzo MD          QUERY TEXT:    Pt admitted with CAD s/p CABG . Pt noted to have  been extubated on  and   subsequently re intubated after going into A fib with RVR and noted ARDS CXR   showing increase pulmonary opacities . cardiology progress notes on  and    notes pulmonary edema noted history of ischemic cmp with EF of 20%. ordered on iv Lasix. If possible, please document in the progress notes and   discharge summary if you are evaluating and/or treating any of the following: The medical record reflects the following:  Risk Factors: Age, surgery, ARDS,  Clinical Indicators: admitted with CAD s/p CABG . Pt noted to have  been   extubated on  and subsequently re intubated after going into A fib with   RVR and noted ARDS. cardiology progress notes on  and  notes   pulmonary edema noted history of ischemic cmp with EF of 20%. ordered on iv   Lasix. Treatment: ICU monitoring, cardiology consult, CXR, iv Lasix, mechanical   ventilation    Thank Sujatha Horn RN BSN CCDS  Email [email or web address]  Cell [phone]  office hours M-F 6am to 2:30p  Options provided:  -- Acute pulmonary edema due to heart disease  -- Acute pulmonary edema due to Acute systolic  heart failure  -- Noncardiogenic acute pulmonary edema due to ARDS  -- Other - I will add my own diagnosis  -- Disagree - Not applicable / Not valid  -- Disagree - Clinically unable to determine / Unknown  -- Refer to Clinical Documentation Reviewer    PROVIDER RESPONSE TEXT:    This patient has acute pulmonary edema due to acute systolic heart failure    Query created by:  Fozia Larson on 2023 9:29 AM      Electronically signed by:  Sean Manzo MD 2023 5:55 AM

## 2023-12-20 NOTE — PROGRESS NOTES
During sedation holiday, patient markedly agitated/uncomfortable; RN asked patient to shake head \"yes or no\" if she was in pain without response, but noted facial grimace, RR 50s, SBP 200s. SCC updated, CTS updated. Orders placed.

## 2023-12-20 NOTE — CARE COORDINATION
Transition planning    Received VM from [address] [place] [address] Drive from [address] [place] [address]. Need more clinicals and states pt is not insured by insurance listed. No family at bedside to clarify. Attempt to return call- no answer. 1550 Attempt to call [facility] back- no answer.

## 2023-12-21 ENCOUNTER — APPOINTMENT (OUTPATIENT)
Dept: GENERAL RADIOLOGY | Age: 58
DRG: 235 | End: 2023-12-21
Attending: THORACIC SURGERY (CARDIOTHORACIC VASCULAR SURGERY)
Payer: COMMERCIAL

## 2023-12-21 LAB
ANION GAP SERPL CALCULATED.3IONS-SCNC: 11 MMOL/L (ref 9–17)
BUN SERPL-MCNC: 35 MG/DL (ref 6–20)
CA-I BLD-SCNC: 1.16 MMOL/L (ref 1.13–1.33)
CALCIUM SERPL-MCNC: 8.9 MG/DL (ref 8.6–10.4)
CHLORIDE SERPL-SCNC: 115 MMOL/L (ref 98–107)
CO2 SERPL-SCNC: 25 MMOL/L (ref 20–31)
CREAT SERPL-MCNC: 1.4 MG/DL (ref 0.5–0.9)
ERYTHROCYTE [DISTWIDTH] IN BLOOD BY AUTOMATED COUNT: 15 % (ref 11.8–14.4)
FIO2: 40
GFR SERPL CREATININE-BSD FRML MDRD: 44 ML/MIN/1.73M2
GLUCOSE BLD-MCNC: 100 MG/DL (ref 65–105)
GLUCOSE BLD-MCNC: 108 MG/DL (ref 65–105)
GLUCOSE BLD-MCNC: 111 MG/DL (ref 65–105)
GLUCOSE BLD-MCNC: 114 MG/DL (ref 74–100)
GLUCOSE BLD-MCNC: 115 MG/DL (ref 65–105)
GLUCOSE BLD-MCNC: 120 MG/DL (ref 65–105)
GLUCOSE BLD-MCNC: 122 MG/DL (ref 65–105)
GLUCOSE BLD-MCNC: 122 MG/DL (ref 65–105)
GLUCOSE BLD-MCNC: 124 MG/DL (ref 65–105)
GLUCOSE BLD-MCNC: 124 MG/DL (ref 65–105)
GLUCOSE BLD-MCNC: 136 MG/DL (ref 65–105)
GLUCOSE BLD-MCNC: 152 MG/DL (ref 65–105)
GLUCOSE BLD-MCNC: 155 MG/DL (ref 65–105)
GLUCOSE BLD-MCNC: 180 MG/DL (ref 74–100)
GLUCOSE BLD-MCNC: 190 MG/DL (ref 65–105)
GLUCOSE BLD-MCNC: 193 MG/DL (ref 65–105)
GLUCOSE BLD-MCNC: 66 MG/DL (ref 65–105)
GLUCOSE BLD-MCNC: 74 MG/DL (ref 65–105)
GLUCOSE BLD-MCNC: 83 MG/DL (ref 65–105)
GLUCOSE BLD-MCNC: 86 MG/DL (ref 65–105)
GLUCOSE SERPL-MCNC: 92 MG/DL (ref 70–99)
HCT VFR BLD AUTO: 30.3 % (ref 36.3–47.1)
HGB BLD-MCNC: 9.2 G/DL (ref 11.9–15.1)
MAGNESIUM SERPL-MCNC: 2.7 MG/DL (ref 1.6–2.6)
MCH RBC QN AUTO: 28.5 PG (ref 25.2–33.5)
MCHC RBC AUTO-ENTMCNC: 30.4 G/DL (ref 28.4–34.8)
MCV RBC AUTO: 93.8 FL (ref 82.6–102.9)
MODE: ABNORMAL
NEGATIVE BASE EXCESS, ART: 0.9 MMOL/L (ref 0–2)
NEGATIVE BASE EXCESS, ART: 1.4 MMOL/L (ref 0–2)
NRBC BLD-RTO: 0.2 PER 100 WBC
O2 DELIVERY DEVICE: ABNORMAL
PATIENT TEMP: 38
PLATELET # BLD AUTO: 250 K/UL (ref 138–453)
PMV BLD AUTO: 9.5 FL (ref 8.1–13.5)
POC HCO3: 21.5 MMOL/L (ref 21–28)
POC HCO3: 24.2 MMOL/L (ref 21–28)
POC LACTIC ACID: 0.5 MMOL/L (ref 0.56–1.39)
POC LACTIC ACID: 0.8 MMOL/L (ref 0.56–1.39)
POC O2 SATURATION: 93.6 % (ref 94–98)
POC O2 SATURATION: 94.3 % (ref 94–98)
POC PCO2 TEMP: 30.2 MM HG
POC PCO2: 28.9 MM HG (ref 35–48)
POC PCO2: 41.2 MM HG (ref 35–48)
POC PH TEMP: 7.46
POC PH: 7.38 (ref 7.35–7.45)
POC PH: 7.48 (ref 7.35–7.45)
POC PO2 TEMP: 67.4 MM HG
POC PO2: 62.9 MM HG (ref 83–108)
POC PO2: 73.8 MM HG (ref 83–108)
POTASSIUM SERPL-SCNC: 4 MMOL/L (ref 3.7–5.3)
RBC # BLD AUTO: 3.23 M/UL (ref 3.95–5.11)
SAMPLE SITE: ABNORMAL
SODIUM SERPL-SCNC: 151 MMOL/L (ref 135–144)
WBC OTHER # BLD: 14.5 K/UL (ref 3.5–11.3)

## 2023-12-21 PROCEDURE — C9113 INJ PANTOPRAZOLE SODIUM, VIA: HCPCS | Performed by: SURGERY

## 2023-12-21 PROCEDURE — 2580000003 HC RX 258: Performed by: THORACIC SURGERY (CARDIOTHORACIC VASCULAR SURGERY)

## 2023-12-21 PROCEDURE — 6370000000 HC RX 637 (ALT 250 FOR IP): Performed by: NURSE PRACTITIONER

## 2023-12-21 PROCEDURE — 2700000000 HC OXYGEN THERAPY PER DAY

## 2023-12-21 PROCEDURE — 82803 BLOOD GASES ANY COMBINATION: CPT

## 2023-12-21 PROCEDURE — 2500000003 HC RX 250 WO HCPCS

## 2023-12-21 PROCEDURE — 6370000000 HC RX 637 (ALT 250 FOR IP)

## 2023-12-21 PROCEDURE — 99233 SBSQ HOSP IP/OBS HIGH 50: CPT | Performed by: SURGERY

## 2023-12-21 PROCEDURE — 82947 ASSAY GLUCOSE BLOOD QUANT: CPT

## 2023-12-21 PROCEDURE — 37799 UNLISTED PX VASCULAR SURGERY: CPT

## 2023-12-21 PROCEDURE — 6360000002 HC RX W HCPCS: Performed by: THORACIC SURGERY (CARDIOTHORACIC VASCULAR SURGERY)

## 2023-12-21 PROCEDURE — 6360000002 HC RX W HCPCS: Performed by: PHYSICIAN ASSISTANT

## 2023-12-21 PROCEDURE — 2500000003 HC RX 250 WO HCPCS: Performed by: NURSE PRACTITIONER

## 2023-12-21 PROCEDURE — 99233 SBSQ HOSP IP/OBS HIGH 50: CPT | Performed by: INTERNAL MEDICINE

## 2023-12-21 PROCEDURE — 6370000000 HC RX 637 (ALT 250 FOR IP): Performed by: THORACIC SURGERY (CARDIOTHORACIC VASCULAR SURGERY)

## 2023-12-21 PROCEDURE — 2580000003 HC RX 258: Performed by: SURGERY

## 2023-12-21 PROCEDURE — A4216 STERILE WATER/SALINE, 10 ML: HCPCS | Performed by: SURGERY

## 2023-12-21 PROCEDURE — 94640 AIRWAY INHALATION TREATMENT: CPT

## 2023-12-21 PROCEDURE — 80048 BASIC METABOLIC PNL TOTAL CA: CPT

## 2023-12-21 PROCEDURE — 6370000000 HC RX 637 (ALT 250 FOR IP): Performed by: PHYSICIAN ASSISTANT

## 2023-12-21 PROCEDURE — 83605 ASSAY OF LACTIC ACID: CPT

## 2023-12-21 PROCEDURE — 94761 N-INVAS EAR/PLS OXIMETRY MLT: CPT

## 2023-12-21 PROCEDURE — 2500000003 HC RX 250 WO HCPCS: Performed by: THORACIC SURGERY (CARDIOTHORACIC VASCULAR SURGERY)

## 2023-12-21 PROCEDURE — 71045 X-RAY EXAM CHEST 1 VIEW: CPT

## 2023-12-21 PROCEDURE — 82330 ASSAY OF CALCIUM: CPT

## 2023-12-21 PROCEDURE — 2580000003 HC RX 258: Performed by: PHYSICIAN ASSISTANT

## 2023-12-21 PROCEDURE — 6360000002 HC RX W HCPCS: Performed by: NURSE PRACTITIONER

## 2023-12-21 PROCEDURE — 94003 VENT MGMT INPAT SUBQ DAY: CPT

## 2023-12-21 PROCEDURE — 6360000002 HC RX W HCPCS: Performed by: SURGERY

## 2023-12-21 PROCEDURE — 83735 ASSAY OF MAGNESIUM: CPT

## 2023-12-21 PROCEDURE — 2100000001 HC CVICU R&B

## 2023-12-21 PROCEDURE — 85027 COMPLETE CBC AUTOMATED: CPT

## 2023-12-21 RX ORDER — INSULIN LISPRO 100 [IU]/ML
0-4 INJECTION, SOLUTION INTRAVENOUS; SUBCUTANEOUS NIGHTLY
Status: DISCONTINUED | OUTPATIENT
Start: 2023-12-21 | End: 2023-12-26

## 2023-12-21 RX ORDER — INSULIN LISPRO 100 [IU]/ML
0-16 INJECTION, SOLUTION INTRAVENOUS; SUBCUTANEOUS
Status: DISCONTINUED | OUTPATIENT
Start: 2023-12-22 | End: 2023-12-26

## 2023-12-21 RX ADMIN — PIPERACILLIN AND TAZOBACTAM 3375 MG: 3; .375 INJECTION, POWDER, FOR SOLUTION INTRAVENOUS; PARENTERAL at 22:36

## 2023-12-21 RX ADMIN — HYDRALAZINE HYDROCHLORIDE 5 MG: 20 INJECTION INTRAMUSCULAR; INTRAVENOUS at 04:47

## 2023-12-21 RX ADMIN — SODIUM CHLORIDE, PRESERVATIVE FREE 10 ML: 5 INJECTION INTRAVENOUS at 07:39

## 2023-12-21 RX ADMIN — CLOPIDOGREL BISULFATE 75 MG: 75 TABLET ORAL at 07:39

## 2023-12-21 RX ADMIN — DEXMEDETOMIDINE HYDROCHLORIDE 1.3 MCG/KG/HR: 400 INJECTION, SOLUTION INTRAVENOUS at 12:58

## 2023-12-21 RX ADMIN — IPRATROPIUM BROMIDE AND ALBUTEROL SULFATE 1 DOSE: 2.5; .5 SOLUTION RESPIRATORY (INHALATION) at 00:15

## 2023-12-21 RX ADMIN — DEXMEDETOMIDINE HYDROCHLORIDE 0.9 MCG/KG/HR: 400 INJECTION, SOLUTION INTRAVENOUS at 04:17

## 2023-12-21 RX ADMIN — IPRATROPIUM BROMIDE AND ALBUTEROL SULFATE 1 DOSE: 2.5; .5 SOLUTION RESPIRATORY (INHALATION) at 07:55

## 2023-12-21 RX ADMIN — ACETAMINOPHEN 1000 MG: 650 SOLUTION ORAL at 04:49

## 2023-12-21 RX ADMIN — PIPERACILLIN AND TAZOBACTAM 3375 MG: 3; .375 INJECTION, POWDER, FOR SOLUTION INTRAVENOUS; PARENTERAL at 07:09

## 2023-12-21 RX ADMIN — NYSTATIN 500000 UNITS: 100000 SUSPENSION ORAL at 20:44

## 2023-12-21 RX ADMIN — METOPROLOL TARTRATE 12.5 MG: 25 TABLET, FILM COATED ORAL at 07:39

## 2023-12-21 RX ADMIN — ACETAMINOPHEN 1000 MG: 650 SOLUTION ORAL at 20:44

## 2023-12-21 RX ADMIN — SODIUM CHLORIDE 7.9 UNITS/HR: 9 INJECTION, SOLUTION INTRAVENOUS at 16:16

## 2023-12-21 RX ADMIN — Medication 0.01 MCG/KG/MIN: at 08:00

## 2023-12-21 RX ADMIN — METOPROLOL TARTRATE 12.5 MG: 25 TABLET, FILM COATED ORAL at 20:44

## 2023-12-21 RX ADMIN — NYSTATIN 500000 UNITS: 100000 SUSPENSION ORAL at 14:06

## 2023-12-21 RX ADMIN — Medication 150 MCG/HR: at 13:05

## 2023-12-21 RX ADMIN — DEXMEDETOMIDINE HYDROCHLORIDE 1.5 MCG/KG/HR: 400 INJECTION, SOLUTION INTRAVENOUS at 21:24

## 2023-12-21 RX ADMIN — DILTIAZEM HYDROCHLORIDE 30 MG: 30 TABLET, FILM COATED ORAL at 04:50

## 2023-12-21 RX ADMIN — DEXMEDETOMIDINE HYDROCHLORIDE 1.3 MCG/KG/HR: 400 INJECTION, SOLUTION INTRAVENOUS at 17:28

## 2023-12-21 RX ADMIN — PROPOFOL 10 MCG/KG/MIN: 10 INJECTION, EMULSION INTRAVENOUS at 05:53

## 2023-12-21 RX ADMIN — DILTIAZEM HYDROCHLORIDE 30 MG: 30 TABLET, FILM COATED ORAL at 15:12

## 2023-12-21 RX ADMIN — POTASSIUM CHLORIDE 20 MEQ: 29.8 INJECTION, SOLUTION INTRAVENOUS at 06:07

## 2023-12-21 RX ADMIN — DEXMEDETOMIDINE HYDROCHLORIDE 1.3 MCG/KG/HR: 400 INJECTION, SOLUTION INTRAVENOUS at 08:42

## 2023-12-21 RX ADMIN — IPRATROPIUM BROMIDE AND ALBUTEROL SULFATE 1 DOSE: 2.5; .5 SOLUTION RESPIRATORY (INHALATION) at 04:48

## 2023-12-21 RX ADMIN — NYSTATIN 500000 UNITS: 100000 SUSPENSION ORAL at 07:41

## 2023-12-21 RX ADMIN — IPRATROPIUM BROMIDE AND ALBUTEROL SULFATE 1 DOSE: 2.5; .5 SOLUTION RESPIRATORY (INHALATION) at 11:45

## 2023-12-21 RX ADMIN — PIPERACILLIN AND TAZOBACTAM 3375 MG: 3; .375 INJECTION, POWDER, FOR SOLUTION INTRAVENOUS; PARENTERAL at 15:21

## 2023-12-21 RX ADMIN — SODIUM CHLORIDE, PRESERVATIVE FREE 40 MG: 5 INJECTION INTRAVENOUS at 07:39

## 2023-12-21 RX ADMIN — IPRATROPIUM BROMIDE AND ALBUTEROL SULFATE 1 DOSE: 2.5; .5 SOLUTION RESPIRATORY (INHALATION) at 15:47

## 2023-12-21 RX ADMIN — IPRATROPIUM BROMIDE AND ALBUTEROL SULFATE 1 DOSE: 2.5; .5 SOLUTION RESPIRATORY (INHALATION) at 20:34

## 2023-12-21 RX ADMIN — NYSTATIN 500000 UNITS: 100000 SUSPENSION ORAL at 17:00

## 2023-12-21 RX ADMIN — ACETAMINOPHEN 1000 MG: 650 SOLUTION ORAL at 15:12

## 2023-12-21 RX ADMIN — DILTIAZEM HYDROCHLORIDE 30 MG: 30 TABLET, FILM COATED ORAL at 20:44

## 2023-12-21 ASSESSMENT — PULMONARY FUNCTION TESTS
PIF_VALUE: 23
PIF_VALUE: 30
PIF_VALUE: 18
PIF_VALUE: 31
PIF_VALUE: 27

## 2023-12-21 NOTE — PLAN OF CARE
Problem: Respiratory - Adult  Goal: Achieves optimal ventilation and oxygenation  12/21/2023 0805 by Adrianne Avila RCP  Outcome: Progressing

## 2023-12-21 NOTE — PROGRESS NOTES
0530: Writer made [facility] NP aware of patient becoming agitated, tachypenic, and hypertensive. Patient was able to nod head yes to being uncomfortable. Precedex and Fentanyl were maxed. Milo Allen NP ordered writer to restart propofol at low dose. 1931: Writer reached out to Dr. Saurav Mckeon with trauma to make him aware of adding propofol.      .Electronically signed by Jesús Foster RN on 12/21/2023 at 7:28 AM [employer].

## 2023-12-21 NOTE — PROGRESS NOTES
started digoxin patient has iodine allergy. Discussed with Dr. Zuleyka Owen. Chest x-ray still appears wet. Patient is diuresing well with Lasix, increase as tolerated. Would like to to DC epi use esmolol and hold Cardizem. Will discuss with Dr. Fouzia Yang. S/P CABG x 3 with radial artery harvest.  Temp 100.4. Patient has a low allergy to NSAIDs. Will try Percocet today with the Tylenol. Sinus rhythm. Blood pressure soft 90s with epi, milrinone, Cardizem. Change Cardizem to 30 mg 3 times daily. Chest x-ray wet atelectatic throughout. Patient still intubated on 50%, PEEP of 8. Weaned to extubate today. Hemoglobin 8.5 ABLA. Sodium 147 creatinine 1.2 baseline start Lasix 20 mg IV daily. 270 chest tubes. Remove sylvester once extubated. No amiodarone due to allergy. D/C planning.      Alvarez Melo, AQUILES - CNP

## 2023-12-21 NOTE — PROGRESS NOTES
Occupational [address]  Occupational Therapy Not Seen Note    DATE: 2023    NAME: Zeenat Ceja  MRN: [id number]   : 1965      Patient not seen this date for Occupational Therapy due to:    Patient is not appropriate for active participation in OT evaluation/treatment at this time d/t RN reports pt INT/SED. Please hold therapy     Next Scheduled Treatment: Please check  or as appropriate.      Electronically signed by WILLIAM Anthony on 2023 at 8:33 AM

## 2023-12-21 NOTE — PROGRESS NOTES
12/21/23 1635   Vent Information   Vent Mode (S)  AC/PRVC   Ventilator Settings   FiO2  (S)  40 %   Resp Rate (Set) (S)  16 bpm   PEEP/CPAP (cmH2O) (S)  12   Vt (Set, mL) (S)  440 mL         Mode changed per Dr Leslie Rankin. ABG in 30 minutes.

## 2023-12-21 NOTE — PLAN OF CARE
Problem: Safety - Medical Restraint  Goal: Remains free of injury from restraints (Restraint for Interference with Medical Device)  Description: INTERVENTIONS:  1. Determine that other, less restrictive measures have been tried or would not be effective before applying the restraint  2. Evaluate the patient's condition at the time of restraint application  3. Inform patient/family regarding the reason for restraint  4.  Q2H: Monitor safety, psychosocial status, comfort, nutrition and hydration  12/21/2023 1057 by Jame Yu RN  Outcome: Progressing  12/21/2023 0558 by Gio Russo RN  Outcome: Progressing

## 2023-12-21 NOTE — PROGRESS NOTES
Comprehensive Nutrition Assessment    Type and Reason for Visit:  Reassess    Nutrition Recommendations/Plan:   Continue TF of Standard with Fiber formula at 35 mL/hr. Suggest starting free water flushes to improve hypernatremia. Monitor TF tolerance/adequacy of intakes and rate of propofol, labs, weights, and plan of care. Malnutrition Assessment:  Malnutrition Status:  Insufficient data (12/18/23 1217)    Context:  Acute Illness       Nutrition Assessment:    Pt remains intubated and sedated. Propofol running at 5 mL/hr. Tolerating TF of Standard with Fiber formula at 35 mL/hr well. RN reports Cardio asking if pt's TF can be adjusted due to elevated sodium. Suggested starting free water flushes to help with elevated sodium. Will monitor need to modify TF formula as needed. RN reports pt with some loose stools overnight and bowel meds were held. Weights reviewed - fluctuations noted - ? due to fluids. Labs reviewed: Na 151 mmol/L, Cl 115 mmol/L, Mg 2.7 mg/dL, Glucose  mg/dL. Meds reviewed. Nutrition Related Findings:    Labs/Meds reviewed. Last BM 12/21. Wound Type: Multiple, Surgical Incision       Current Nutrition Intake & Therapies:    Average Meal Intake: NPO  Average Supplements Intake: NPO  ADULT TUBE FEEDING; Orogastric; Standard with Fiber; Continuous; 20; Yes; 10; Q 4 hours; 35; 30; Q 4 hours  Current Tube Feeding (TF) Orders:  Feeding Route: Orogastric  Formula: Standard with Fiber  Schedule: Continuous  Feeding Regimen: 35 mL/hr  Additives/Modulars: None  Water Flushes: 30 mL q 4 hrs  Current TF & Flush Orders Provides:  At 35 mL/hr = 1260 kcals, 54 gm protein  Goal TF & Flush Orders Provides: Standard with Fiber goal 35 mL/hr with current rate of propofol = 1260 kcals (+propofol kcals), 54 gm protein per day    Additional Calorie Sources:  Propofol at 5 mL/hr = 132 kcals    Anthropometric Measures:  Height: 165.1 cm (5' 5\")  Ideal Body Weight (IBW): 125 lbs (57 kg)   Admission Body Weight: 84.1 kg (185 lb 6.5 oz)  Current Body Weight: 91.1 kg (200 lb 13.4 oz), 160.7 % IBW. Weight Source: Bed Scale  Current BMI (kg/m2): 33.4        Weight Adjustment For: No Adjustment                 BMI Categories: Obese Class 1 (BMI 30.0-34.9)    Estimated Daily Nutrient Needs:  Energy Requirements Based On: Formula  Weight Used for Energy Requirements: Admission  Energy (kcal/day): 2074-7598 kcals/day  Weight Used for Protein Requirements: Ideal  Protein (g/day): 70-90 gm pro/day  Method Used for Fluid Requirements: Other (Comment)  Fluid (ml/day): per MD    Nutrition Diagnosis:   Inadequate oral intake related to impaired respiratory function as evidenced by NPO or clear liquid status due to medical condition, nutrition support - enteral nutrition    Nutrition Interventions:   Food and/or Nutrient Delivery: Continue Current Tube Feeding (Suggest starting free water flushes.)  Nutrition Education/Counseling: No recommendation at this time  Coordination of Nutrition Care: Continue to monitor while inpatient  Plan of Care discussed with: RN    Goals:  Previous Goal Met: Progressing toward Goal(s)  Goals: Meet at least 75% of estimated needs, prior to discharge       Nutrition Monitoring and Evaluation:   Behavioral-Environmental Outcomes: None Identified  Food/Nutrient Intake Outcomes: Enteral Nutrition Intake/Tolerance  Physical Signs/Symptoms Outcomes: Biochemical Data, GI Status, Diarrhea, Hemodynamic Status, Fluid Status or Edema, Weight, Skin, Nutrition Focused Physical Findings    Discharge Planning:    Too soon to determine     Verónica Whiting RD, LD  Contact: [phone]

## 2023-12-21 NOTE — PROGRESS NOTES
Physician Progress Note      Rolanda LEYVA #:                  [id number]  :                       1965  ADMIT DATE:       12/15/2023 7:30 AM  DISCH DATE:  Tani Floyd  PROVIDER #:        Malik Adame          QUERY TEXT:    Pt admitted with cad s/p cabg on 12/15. patient extubated on  developed A   fib with RVR with worsening respiratory status and was re intubated on    Noted documentation of Acute hypoxic resp failure post extubation per   cardiology and noted  documentation of ARDS. Please document in progress   notes and discharge summary if you are evaluating/treating any of the   following: The medical record reflects the following:  Risk Factors: age, surgery, new onset A fib, ischemic cmp, pulm edema  Clinical Indicators:admitted with cad s/p cabg on 12/15. patient extubated on    developed A fib with RVR with worsening respiratory status and was re   intubated on  Noted documentation of Acute hypoxic resp failure post   extubation per cardiology and noted  documentation of ARDS  Treatment: re intubation mechanical ventilation, iv Lasix, Iv Cardizem   transition to oral, cardiology consult, ICU monitoring    Thank Andrew Lutz RN BSN CCDS  Email [email or web address]  Cell [phone]  office hours M-F 6am to 2:30p  Options provided:  -- Respiratory failure/ARDS  is due to underlying condition of A fib  and is   not due to the procedure  -- Respiratory failure/ARDS is not due to the procedure but was due to, Please   specify.   -- Acute Postoperative Pulmonary Insufficiency, Respiratory failure/ARDS  is   ruled out  -- Respiratory failure/ARDS is due to the procedure  -- Other - I will add my own diagnosis  -- Disagree - Not applicable / Not valid  -- Disagree - Clinically unable to determine / Unknown  -- Refer to Clinical Documentation Reviewer    PROVIDER RESPONSE TEXT:    Patient with respiratory failure/ARDS due to underlying

## 2023-12-21 NOTE — CARE COORDINATION
Transition Planning    Spoke with pt's daughter re insurance. States insurance is [employer] as documented in [address] [place], [place] [address]way [address]. Top choice for SNF is [facility] in [facility] [place]. Call to [address] from [address] and updated. She will re run the insurance verification. Requested updated clinicals and copy of insurance card faxed to [phone]. 0930 Pt's daughter requested additional referral to be sent to [address] [place] [address] and [facility]- done. [address] from [address] able to accept pending discharge needs. Can not accept pt with Zosyn. [address] [facility] Facility/Agency List     Provided child with the following list, the list includes the overall star ratings obtained from CMS per the Medicare Web site (www.Medicare.gov):     [x] Long Term Acute Care Facilities  [] Acute Inpatient Rehabilitation Facilities  [] Skilled Nursing Facilities  [] Home Care    Provided verbal instructions on how to utilize the QR Code to obtain additional detailed star ratings from www. Medicare. gov

## 2023-12-21 NOTE — PLAN OF CARE
Problem: Discharge Planning  Goal: Discharge to home or other facility with appropriate resources  Outcome: Progressing     Problem: Chronic Conditions and Co-morbidities  Goal: Patient's chronic conditions and co-morbidity symptoms are monitored and maintained or improved  Outcome: Progressing     Problem: Respiratory - Adult  Goal: Achieves optimal ventilation and oxygenation  12/21/2023 0100 by Tamiko Robertson RN  Outcome: Progressing  12/20/2023 2049 by Armida Mcfadden RCP  Outcome: Progressing  Flowsheets (Taken 12/20/2023 1140 by Naina Quiros)  Achieves optimal ventilation and oxygenation:   Assess for changes in respiratory status   Assess for changes in mentation and behavior   Position to facilitate oxygenation and minimize respiratory effort   Oxygen supplementation based on oxygen saturation or arterial blood gases   Encourage broncho-pulmonary hygiene including cough, deep breathe, incentive spirometry   Assess and instruct to report shortness of breath or any respiratory difficulty   Assess the need for suctioning and aspirate as needed   Respiratory therapy support as indicated     Problem: Safety - Adult  Goal: Free from fall injury  Outcome: Progressing     Problem: Skin/Tissue Integrity  Goal: Absence of new skin breakdown  Description: 1. Monitor for areas of redness and/or skin breakdown  2. Assess vascular access sites hourly  3. Every 4-6 hours minimum:  Change oxygen saturation probe site  4. Every 4-6 hours:  If on nasal continuous positive airway pressure, respiratory therapy assess nares and determine need for appliance change or resting period.   Outcome: Progressing     Problem: ABCDS Injury Assessment  Goal: Absence of physical injury  Outcome: Progressing     Problem: Pain  Goal: Verbalizes/displays adequate comfort level or baseline comfort level  Outcome: Progressing     Problem: Nutrition Deficit:  Goal: Optimize nutritional status  Outcome: Progressing

## 2023-12-22 ENCOUNTER — APPOINTMENT (OUTPATIENT)
Dept: GENERAL RADIOLOGY | Age: 58
DRG: 235 | End: 2023-12-22
Attending: THORACIC SURGERY (CARDIOTHORACIC VASCULAR SURGERY)
Payer: COMMERCIAL

## 2023-12-22 ENCOUNTER — APPOINTMENT (OUTPATIENT)
Age: 58
DRG: 235 | End: 2023-12-22
Attending: THORACIC SURGERY (CARDIOTHORACIC VASCULAR SURGERY)
Payer: COMMERCIAL

## 2023-12-22 LAB
ALLEN TEST: ABNORMAL
ALLEN TEST: ABNORMAL
ANION GAP SERPL CALCULATED.3IONS-SCNC: 10 MMOL/L (ref 9–17)
BUN SERPL-MCNC: 44 MG/DL (ref 6–20)
CA-I BLD-SCNC: 1.18 MMOL/L (ref 1.13–1.33)
CALCIUM SERPL-MCNC: 8.8 MG/DL (ref 8.6–10.4)
CHLORIDE SERPL-SCNC: 118 MMOL/L (ref 98–107)
CO2 SERPL-SCNC: 23 MMOL/L (ref 20–31)
CREAT SERPL-MCNC: 1.3 MG/DL (ref 0.5–0.9)
ECHO BSA: 2.04 M2
ERYTHROCYTE [DISTWIDTH] IN BLOOD BY AUTOMATED COUNT: 15 % (ref 11.8–14.4)
FIO2: 40
FIO2: 40
FIO2: 50
GFR SERPL CREATININE-BSD FRML MDRD: 48 ML/MIN/1.73M2
GLUCOSE BLD-MCNC: 126 MG/DL (ref 65–105)
GLUCOSE BLD-MCNC: 144 MG/DL (ref 65–105)
GLUCOSE BLD-MCNC: 155 MG/DL (ref 65–105)
GLUCOSE BLD-MCNC: 208 MG/DL (ref 65–105)
GLUCOSE BLD-MCNC: 229 MG/DL (ref 74–100)
GLUCOSE BLD-MCNC: 233 MG/DL (ref 65–105)
GLUCOSE SERPL-MCNC: 239 MG/DL (ref 70–99)
HCO3 VENOUS: 23.6 MMOL/L (ref 22–29)
HCT VFR BLD AUTO: 29.1 % (ref 36.3–47.1)
HGB BLD-MCNC: 8.7 G/DL (ref 11.9–15.1)
MAGNESIUM SERPL-MCNC: 2.9 MG/DL (ref 1.6–2.6)
MCH RBC QN AUTO: 28.8 PG (ref 25.2–33.5)
MCHC RBC AUTO-ENTMCNC: 29.9 G/DL (ref 28.4–34.8)
MCV RBC AUTO: 96.4 FL (ref 82.6–102.9)
MODE: ABNORMAL
NEGATIVE BASE EXCESS, ART: 1.2 MMOL/L (ref 0–2)
NEGATIVE BASE EXCESS, ART: 1.8 MMOL/L (ref 0–2)
NRBC BLD-RTO: 0 PER 100 WBC
O2 DELIVERY DEVICE: ABNORMAL
O2 SAT, VEN: 93.7 % (ref 60–85)
PATIENT TEMP: 38.4
PCO2, VEN: 30.1 MM HG (ref 41–51)
PH VENOUS: 7.5 (ref 7.32–7.43)
PLATELET # BLD AUTO: 281 K/UL (ref 138–453)
PMV BLD AUTO: 9.7 FL (ref 8.1–13.5)
PO2, VEN: 62 MM HG (ref 30–50)
POC HCO3: 22.7 MMOL/L (ref 21–28)
POC HCO3: 23.7 MMOL/L (ref 21–28)
POC LACTIC ACID: 0.7 MMOL/L (ref 0.56–1.39)
POC LACTIC ACID: 1.1 MMOL/L (ref 0.56–1.39)
POC O2 SATURATION: 92.4 % (ref 94–98)
POC O2 SATURATION: 94.2 % (ref 94–98)
POC PCO2 TEMP: 32 MM HG
POC PCO2: 36.3 MM HG (ref 35–48)
POC PCO2: 39.6 MM HG (ref 35–48)
POC PH TEMP: 7.48
POC PH: 7.38 (ref 7.35–7.45)
POC PH: 7.4 (ref 7.35–7.45)
POC PO2 TEMP: 68.3 MM HG
POC PO2: 65.8 MM HG (ref 83–108)
POC PO2: 70.8 MM HG (ref 83–108)
POSITIVE BASE EXCESS, VEN: 0.9 MMOL/L (ref 0–3)
POTASSIUM SERPL-SCNC: 4.8 MMOL/L (ref 3.7–5.3)
RBC # BLD AUTO: 3.02 M/UL (ref 3.95–5.11)
SAMPLE SITE: ABNORMAL
SODIUM SERPL-SCNC: 151 MMOL/L (ref 135–144)
WBC OTHER # BLD: 13.2 K/UL (ref 3.5–11.3)

## 2023-12-22 PROCEDURE — 2580000003 HC RX 258

## 2023-12-22 PROCEDURE — 5A0955A ASSISTANCE WITH RESPIRATORY VENTILATION, GREATER THAN 96 CONSECUTIVE HOURS, HIGH NASAL FLOW/VELOCITY: ICD-10-PCS | Performed by: THORACIC SURGERY (CARDIOTHORACIC VASCULAR SURGERY)

## 2023-12-22 PROCEDURE — 83605 ASSAY OF LACTIC ACID: CPT

## 2023-12-22 PROCEDURE — 6370000000 HC RX 637 (ALT 250 FOR IP): Performed by: NURSE PRACTITIONER

## 2023-12-22 PROCEDURE — 74018 RADEX ABDOMEN 1 VIEW: CPT

## 2023-12-22 PROCEDURE — 93308 TTE F-UP OR LMTD: CPT | Performed by: INTERNAL MEDICINE

## 2023-12-22 PROCEDURE — 2700000000 HC OXYGEN THERAPY PER DAY

## 2023-12-22 PROCEDURE — C9113 INJ PANTOPRAZOLE SODIUM, VIA: HCPCS | Performed by: SURGERY

## 2023-12-22 PROCEDURE — 71045 X-RAY EXAM CHEST 1 VIEW: CPT

## 2023-12-22 PROCEDURE — 93308 TTE F-UP OR LMTD: CPT

## 2023-12-22 PROCEDURE — 6370000000 HC RX 637 (ALT 250 FOR IP)

## 2023-12-22 PROCEDURE — 82803 BLOOD GASES ANY COMBINATION: CPT

## 2023-12-22 PROCEDURE — 94761 N-INVAS EAR/PLS OXIMETRY MLT: CPT

## 2023-12-22 PROCEDURE — 83735 ASSAY OF MAGNESIUM: CPT

## 2023-12-22 PROCEDURE — 94640 AIRWAY INHALATION TREATMENT: CPT

## 2023-12-22 PROCEDURE — 99233 SBSQ HOSP IP/OBS HIGH 50: CPT | Performed by: INTERNAL MEDICINE

## 2023-12-22 PROCEDURE — 6360000002 HC RX W HCPCS: Performed by: PHYSICIAN ASSISTANT

## 2023-12-22 PROCEDURE — 82947 ASSAY GLUCOSE BLOOD QUANT: CPT

## 2023-12-22 PROCEDURE — 80048 BASIC METABOLIC PNL TOTAL CA: CPT

## 2023-12-22 PROCEDURE — 85027 COMPLETE CBC AUTOMATED: CPT

## 2023-12-22 PROCEDURE — 6360000002 HC RX W HCPCS

## 2023-12-22 PROCEDURE — 37799 UNLISTED PX VASCULAR SURGERY: CPT

## 2023-12-22 PROCEDURE — 6370000000 HC RX 637 (ALT 250 FOR IP): Performed by: PHYSICIAN ASSISTANT

## 2023-12-22 PROCEDURE — 6370000000 HC RX 637 (ALT 250 FOR IP): Performed by: STUDENT IN AN ORGANIZED HEALTH CARE EDUCATION/TRAINING PROGRAM

## 2023-12-22 PROCEDURE — 94003 VENT MGMT INPAT SUBQ DAY: CPT

## 2023-12-22 PROCEDURE — 2500000003 HC RX 250 WO HCPCS: Performed by: NURSE PRACTITIONER

## 2023-12-22 PROCEDURE — 2500000003 HC RX 250 WO HCPCS

## 2023-12-22 PROCEDURE — 2580000003 HC RX 258: Performed by: SURGERY

## 2023-12-22 PROCEDURE — 93306 TTE W/DOPPLER COMPLETE: CPT

## 2023-12-22 PROCEDURE — 2100000001 HC CVICU R&B

## 2023-12-22 PROCEDURE — A4216 STERILE WATER/SALINE, 10 ML: HCPCS | Performed by: SURGERY

## 2023-12-22 PROCEDURE — 6360000002 HC RX W HCPCS: Performed by: SURGERY

## 2023-12-22 PROCEDURE — 82330 ASSAY OF CALCIUM: CPT

## 2023-12-22 PROCEDURE — 99233 SBSQ HOSP IP/OBS HIGH 50: CPT | Performed by: SURGERY

## 2023-12-22 PROCEDURE — 2580000003 HC RX 258: Performed by: PHYSICIAN ASSISTANT

## 2023-12-22 PROCEDURE — 99024 POSTOP FOLLOW-UP VISIT: CPT

## 2023-12-22 RX ORDER — LABETALOL HYDROCHLORIDE 5 MG/ML
5 INJECTION, SOLUTION INTRAVENOUS EVERY 6 HOURS PRN
Status: DISCONTINUED | OUTPATIENT
Start: 2023-12-22 | End: 2023-12-29 | Stop reason: HOSPADM

## 2023-12-22 RX ORDER — PROPRANOLOL HYDROCHLORIDE 20 MG/1
20 TABLET ORAL 2 TIMES DAILY
Status: DISCONTINUED | OUTPATIENT
Start: 2023-12-22 | End: 2023-12-25

## 2023-12-22 RX ORDER — LORAZEPAM 0.5 MG/1
0.5 TABLET ORAL EVERY 8 HOURS PRN
Status: DISCONTINUED | OUTPATIENT
Start: 2023-12-22 | End: 2023-12-29 | Stop reason: HOSPADM

## 2023-12-22 RX ORDER — QUETIAPINE FUMARATE 25 MG/1
25 TABLET, FILM COATED ORAL 2 TIMES DAILY
Status: DISCONTINUED | OUTPATIENT
Start: 2023-12-22 | End: 2023-12-29 | Stop reason: HOSPADM

## 2023-12-22 RX ORDER — HALOPERIDOL 5 MG/ML
2 INJECTION INTRAMUSCULAR EVERY 6 HOURS PRN
Status: DISCONTINUED | OUTPATIENT
Start: 2023-12-22 | End: 2023-12-29 | Stop reason: HOSPADM

## 2023-12-22 RX ORDER — HALOPERIDOL 5 MG/ML
INJECTION INTRAMUSCULAR
Status: DISPENSED
Start: 2023-12-22 | End: 2023-12-23

## 2023-12-22 RX ORDER — HALOPERIDOL 5 MG/ML
2 INJECTION INTRAMUSCULAR ONCE
Status: COMPLETED | OUTPATIENT
Start: 2023-12-22 | End: 2023-12-22

## 2023-12-22 RX ORDER — CHOLECALCIFEROL (VITAMIN D3) 125 MCG
5 CAPSULE ORAL NIGHTLY
Status: DISCONTINUED | OUTPATIENT
Start: 2023-12-22 | End: 2023-12-29 | Stop reason: HOSPADM

## 2023-12-22 RX ORDER — HALOPERIDOL 5 MG/ML
5 INJECTION INTRAMUSCULAR ONCE
Status: DISCONTINUED | OUTPATIENT
Start: 2023-12-22 | End: 2023-12-22

## 2023-12-22 RX ADMIN — DEXMEDETOMIDINE HYDROCHLORIDE 1 MCG/KG/HR: 400 INJECTION, SOLUTION INTRAVENOUS at 06:35

## 2023-12-22 RX ADMIN — HYDRALAZINE HYDROCHLORIDE 5 MG: 20 INJECTION INTRAMUSCULAR; INTRAVENOUS at 19:31

## 2023-12-22 RX ADMIN — SODIUM CHLORIDE, PRESERVATIVE FREE 10 ML: 5 INJECTION INTRAVENOUS at 07:53

## 2023-12-22 RX ADMIN — ACETAMINOPHEN 1000 MG: 650 SOLUTION ORAL at 14:54

## 2023-12-22 RX ADMIN — INSULIN LISPRO 4 UNITS: 100 INJECTION, SOLUTION INTRAVENOUS; SUBCUTANEOUS at 16:05

## 2023-12-22 RX ADMIN — QUETIAPINE FUMARATE 25 MG: 25 TABLET ORAL at 19:54

## 2023-12-22 RX ADMIN — IPRATROPIUM BROMIDE AND ALBUTEROL SULFATE 1 DOSE: 2.5; .5 SOLUTION RESPIRATORY (INHALATION) at 11:37

## 2023-12-22 RX ADMIN — Medication 125 MCG/HR: at 05:07

## 2023-12-22 RX ADMIN — Medication 5 MG: at 19:54

## 2023-12-22 RX ADMIN — HALOPERIDOL LACTATE 2 MG: 5 INJECTION, SOLUTION INTRAMUSCULAR at 13:12

## 2023-12-22 RX ADMIN — PIPERACILLIN AND TAZOBACTAM 3375 MG: 3; .375 INJECTION, POWDER, FOR SOLUTION INTRAVENOUS; PARENTERAL at 22:15

## 2023-12-22 RX ADMIN — IPRATROPIUM BROMIDE AND ALBUTEROL SULFATE 1 DOSE: 2.5; .5 SOLUTION RESPIRATORY (INHALATION) at 00:41

## 2023-12-22 RX ADMIN — METOPROLOL TARTRATE 25 MG: 25 TABLET, FILM COATED ORAL at 15:35

## 2023-12-22 RX ADMIN — DILTIAZEM HYDROCHLORIDE 30 MG: 30 TABLET, FILM COATED ORAL at 20:13

## 2023-12-22 RX ADMIN — IPRATROPIUM BROMIDE AND ALBUTEROL SULFATE 1 DOSE: 2.5; .5 SOLUTION RESPIRATORY (INHALATION) at 03:17

## 2023-12-22 RX ADMIN — SODIUM CHLORIDE, PRESERVATIVE FREE 40 MG: 5 INJECTION INTRAVENOUS at 07:52

## 2023-12-22 RX ADMIN — ACETAMINOPHEN 1000 MG: 650 SOLUTION ORAL at 20:13

## 2023-12-22 RX ADMIN — METOPROLOL TARTRATE 12.5 MG: 25 TABLET, FILM COATED ORAL at 07:52

## 2023-12-22 RX ADMIN — DILTIAZEM HYDROCHLORIDE 30 MG: 30 TABLET, FILM COATED ORAL at 16:02

## 2023-12-22 RX ADMIN — LORAZEPAM 0.5 MG: 0.5 TABLET ORAL at 15:34

## 2023-12-22 RX ADMIN — DEXMEDETOMIDINE HYDROCHLORIDE 1.2 MCG/KG/HR: 400 INJECTION, SOLUTION INTRAVENOUS at 15:12

## 2023-12-22 RX ADMIN — INSULIN LISPRO 4 UNITS: 100 INJECTION, SOLUTION INTRAVENOUS; SUBCUTANEOUS at 07:49

## 2023-12-22 RX ADMIN — QUETIAPINE FUMARATE 25 MG: 25 TABLET ORAL at 16:02

## 2023-12-22 RX ADMIN — ACETAMINOPHEN 1000 MG: 650 SOLUTION ORAL at 06:08

## 2023-12-22 RX ADMIN — LORAZEPAM 0.5 MG: 0.5 TABLET ORAL at 23:42

## 2023-12-22 RX ADMIN — PIPERACILLIN AND TAZOBACTAM 3375 MG: 3; .375 INJECTION, POWDER, FOR SOLUTION INTRAVENOUS; PARENTERAL at 06:10

## 2023-12-22 RX ADMIN — SODIUM CHLORIDE: 4.5 INJECTION, SOLUTION INTRAVENOUS at 22:12

## 2023-12-22 RX ADMIN — IPRATROPIUM BROMIDE AND ALBUTEROL SULFATE 1 DOSE: 2.5; .5 SOLUTION RESPIRATORY (INHALATION) at 20:58

## 2023-12-22 RX ADMIN — Medication 5 MG: at 17:32

## 2023-12-22 RX ADMIN — NYSTATIN 500000 UNITS: 100000 SUSPENSION ORAL at 19:54

## 2023-12-22 RX ADMIN — DILTIAZEM HYDROCHLORIDE 30 MG: 30 TABLET, FILM COATED ORAL at 06:08

## 2023-12-22 RX ADMIN — DEXMEDETOMIDINE HYDROCHLORIDE 1.2 MCG/KG/HR: 400 INJECTION, SOLUTION INTRAVENOUS at 17:35

## 2023-12-22 RX ADMIN — IPRATROPIUM BROMIDE AND ALBUTEROL SULFATE 1 DOSE: 2.5; .5 SOLUTION RESPIRATORY (INHALATION) at 15:47

## 2023-12-22 RX ADMIN — IPRATROPIUM BROMIDE AND ALBUTEROL SULFATE 1 DOSE: 2.5; .5 SOLUTION RESPIRATORY (INHALATION) at 08:10

## 2023-12-22 RX ADMIN — CLOPIDOGREL BISULFATE 75 MG: 75 TABLET ORAL at 07:52

## 2023-12-22 RX ADMIN — DEXMEDETOMIDINE HYDROCHLORIDE 1.2 MCG/KG/HR: 400 INJECTION, SOLUTION INTRAVENOUS at 22:00

## 2023-12-22 RX ADMIN — NYSTATIN 500000 UNITS: 100000 SUSPENSION ORAL at 14:54

## 2023-12-22 RX ADMIN — HALOPERIDOL LACTATE 2 MG: 5 INJECTION, SOLUTION INTRAMUSCULAR at 15:35

## 2023-12-22 RX ADMIN — PROPRANOLOL HYDROCHLORIDE 20 MG: 20 TABLET ORAL at 19:54

## 2023-12-22 RX ADMIN — NYSTATIN 500000 UNITS: 100000 SUSPENSION ORAL at 07:53

## 2023-12-22 RX ADMIN — PIPERACILLIN AND TAZOBACTAM 3375 MG: 3; .375 INJECTION, POWDER, FOR SOLUTION INTRAVENOUS; PARENTERAL at 15:58

## 2023-12-22 ASSESSMENT — PULMONARY FUNCTION TESTS
PIF_VALUE: 10
PIF_VALUE: 27
PIF_VALUE: 28

## 2023-12-22 NOTE — PROGRESS NOTES
12/22/23 0820   Vent Information   Vent Mode (S)  CPAP/PS   Ventilator Settings   Pressure Support (cm H2O) (S)  8 cm H2O   PEEP/CPAP (cmH2O) (S)  8     Patient on SBT per Trauma team at bedside. Decrease to 5/5 after 1 hour. ABG after 30 mins on 5/5. RN informed.

## 2023-12-22 NOTE — PROGRESS NOTES
ICU PROGRESS NOTE        PATIENT NAME: Lesli Kesslre  MEDICAL RECORD NO. [id number]  DATE: 2023    HD: # 7    ASSESSMENT :    Patient Active Problem List   Diagnosis    Tremor    Lumbar disc disease    Hyperlipidemia    Depression    Fibromyalgia    Sacroiliac joint dysfunction    Neuropathy    Allergic rhinitis    Former tobacco use    Abnormal ECG    Anxiety    Pure hypercholesterolemia    Hypertension, essential    Controlled type 2 diabetes mellitus without complication ([facility])    Class 1 obesity due to excess calories with serious comorbidity in adult    CAD in native artery    S/P CABG x 3       ACUTE DIAGNOSES/PLAN  Neuro:  Sedated with Fentanyl, precedex  Wean as tolerated     CV  Severe multivessel coronary artery disease, status post non-STEMI, CHF, ejection fraction 20%, volume overload  -12/15/23: Median sternotomy, LESLIE, endoscopic vein harvest, endoscopic left radial artery harvest, CABG x3 with skeletonized LIMA to LAD, left radial 2 obtuse marginal and reverse saphenous vein graft to distal RCA. -SBP: 106-161, HR 63-99    Pulm  Intubated and sedated with ARDS:   AB.385/39.3/65.8/23.7  Trial CPAP with 8/8 and transition to 5/5 if tolerating  ABG 30 min after change        4. Blood Gas stable     Heme  Hgb:8.7 from 9.2  WBC: 13.2     Family/dispo  Following in ICU for vent management    Lines  CVC, Art, PIV, ETT, Cox         Chief Complaint: Sedated and intubated    SUBJECTIVE    Cele Garvey  is a cardiothoracic surgery primary patient who consulted us for vent management. Patient seems to be in respiratory failure d/t ARDS. On exam, patient sedated with intubation. Midline wound with bandages. Family is in room and received updates. OBJECTIVE  VITALS:   Vitals:    23 0830   BP:    Pulse: 80   Resp: 14   Temp:    SpO2:        Physical Exam  Constitutional:       General: She is not in acute distress. Appearance: She is ill-appearing. She is not diaphoretic.

## 2023-12-22 NOTE — PROGRESS NOTES
[facility] Cardiothoracic Surgical   Progress Note    12/22/2023 10:57 AM  Surgeon:  Velma          POD#  6  S/P :  Coronary artery bypass with radial  EF: 30% on LESLIE with .375 of milrinone     Subjective:  Ms. Camarena CPAP, following commands     Vital Signs: BP (!) 166/80   Pulse 84   Temp 99.9 °F (37.7 °C)   Resp 20   Ht 1.651 m (5' 5\")   Wt 91.1 kg (200 lb 13.4 oz)   SpO2 95%   BMI 33.42 kg/m²  O2 Flow Rate (L/min): (S) 15 L/min   Admit Weight: Weight - Scale: 84.1 kg (185 lb 6.5 oz)    NEURO: follows commands   CV: no murmur noted, Normal S1, S2  Lungs: diminished  Abd: The abdomen is soft without tenderness, guarding, mass, rebound or organomegaly.Bowel    Lower Extremities: 1+ generalized edema    CXR : pending     Labs and Studies:  CBC:   Recent Labs     12/20/23  0444 12/21/23  0434 12/22/23  0509   WBC 16.1* 14.5* 13.2*   HGB 7.9* 9.2* 8.7*   HCT 25.7* 30.3* 29.1*   MCV 94.5 93.8 96.4    250 281       BMP:   Recent Labs     12/20/23  0444 12/21/23  0434 12/22/23  0509   * 151* 151*   K 4.5 4.0 4.8   * 115* 118*   CO2 24 25 23   BUN 33* 35* 44*   CREATININE 1.5* 1.4* 1.3*       PT/INR:   No results for input(s): \"PROTIME\", \"INR\" in the last 72 hours.    APTT:   No results for input(s): \"APTT\" in the last 72 hours.    I/O:  I/O last 3 completed shifts:  In: 3107.4 [I.V.:1610.5; NG/GT:1251; IV Piggyback:246]  Out: 3840 [Urine:3730; Chest Tube:110]    Scheduled Meds:    insulin lispro  0-16 Units SubCUTAneous TID WC    insulin lispro  0-4 Units SubCUTAneous Nightly    pantoprazole (PROTONIX) 40 mg in sodium chloride (PF) 0.9 % 10 mL injection  40 mg IntraVENous Daily    furosemide  20 mg IntraVENous Once    nystatin  5 mL Oral 4x Daily    acetaminophen  1,000 mg Oral 3 times per day    ipratropium 0.5 mg-albuterol 2.5 mg  1 Dose Inhalation Q4H    piperacillin-tazobactam  3,375 mg IntraVENous Q8H    dilTIAZem  30 mg Oral 3 times per day    sodium chloride flush  5-40 mL IntraVENous 2  low glycemic feed. Febrile, downtrending white count, on zosyn. 12/20/2023  Intubated and sedated today, working on weaning propofol, ok from CTS standpoint to use precedex if needed for agitation. Ectopy overnight lidocaine gtt started, no ectopy today however patient is tachycardic and hypertensive. On metoprolol 12.5 BID, will plan to uptitrate as BP stabilizes. sCr 1.6 today,  overnight, still net negative. Will hold off on diuresis for now. TF running, continue BM regimen. Hypernatremic this AM, will plan to minimize . 9NS intake, may need nutrition evaluation of TF to ensure low sodium. Febrile, on zosyn WBC trending down, with Bowden, CVC, and sylvester will attempt to de-line as patient stabilizes. 12/19/2023  Intubated sedated on 175mcg of fentanyl and 40 propofol. Less tachycardic today. Ectopy decreased with reduction of milrinone to .250, epinephrine added for additional inotropy support - improvement in status after, now afebrile, lactic down trended to 1.6. Levophed for pressor support. Holding diuresis today, LESLIE completed yesterday with good RV function, moderate reduction in LV function 30% EF on .375 of milrinone. Cardizem for AF and radial harvest. APRV settings managed by trauma service. sCr 1.6- 620ml overnight, will CTM today, if worsening KENRICK will consult nephrology. TF running, patient tolerating. Elytes per protocol. Hypernatremic today, change med line to . 45NACL to limit Na intake. 12-18-23  Pt status guarded. CI 3.8 with SVRs around 1400. Pt is on Vaso to assist with MARS >65. Concern for ARDS. ECMO team following is warranted. We would like to get a better Idea of cardiac Function today. We asked cardiology for LESLIE today to view the right heart and left heart Function. To assist with pressor and inotropic management. Pt is on zosyn for antibiotic coverage. Trauma services assisting with vent management. Cardizem as rate control for Afib.    Please replace elytes as

## 2023-12-22 NOTE — PROGRESS NOTES
Physician Progress Note      Ade Thurston  CSN #:                  [id number]  :                       1965  ADMIT DATE:       12/15/2023 7:30 AM  DISCH DATE:  RESPONDING  PROVIDER #:        Jose Duffy          QUERY TEXT:    Pt admitted with severe MV CAD s/p CABG on 12/15. Ann Marie Mccartney Pt noted to have developed   ARDS, acute systolic CHF and A fib with . Per medical record review SBP as low   as 83 and 93 with heart rate range . continues to be on iv Levophed. If   possible, please document in the progress notes and discharge summary if you   are evaluating and/or treating any of the following: The medical record reflects the following:  Risk Factors: age, surgery, ARDS, a fib, Acute CHF  Clinical Indicators: admitted with severe MV CAD s/p CABG on 12/15. Ann Marie Mccartney Pt noted   to have developed ARDS, acute systolic CHF and A fib with . Per medical record   review SBP as low as 83 and 93 with heart rate range . Treatment: iv levophed, icu monitoring,    Thank Dax Pardo RN BSN CCDS  Email [email or web address]  Cell [phone]  office hours M-F 6am to 2:30p  Options provided:  -- Cardiogenic Shock  -- Hypovolemic Shock  -- Hypovolemia without Shock  -- Hypotension without Shock  -- Other - I will add my own diagnosis  -- Disagree - Not applicable / Not valid  -- Disagree - Clinically unable to determine / Unknown  -- Refer to Clinical Documentation Reviewer    PROVIDER RESPONSE TEXT:    This patient is in cardiogenic shock. Query created by:  Jennifer Laws on 2023 6:49 AM      Electronically signed by:  Jose Duffy 2023 8:14 AM

## 2023-12-22 NOTE — PLAN OF CARE
Problem: Discharge Planning  Goal: Discharge to home or other facility with appropriate resources  12/22/2023 0043 by Lalita Huff RN  Outcome: Progressing  12/22/2023 0043 by Lalita Huff RN  Outcome: Progressing  12/22/2023 0042 by Lalita Huff RN  Outcome: Progressing  12/21/2023 1057 by Barrett Tirado RN  Outcome: Progressing     Problem: Chronic Conditions and Co-morbidities  Goal: Patient's chronic conditions and co-morbidity symptoms are monitored and maintained or improved  12/22/2023 0043 by Lalita Huff RN  Outcome: Progressing  12/22/2023 0043 by Lalita Huff RN  Outcome: Progressing  12/22/2023 0042 by Lalita Huff RN  Outcome: Progressing  12/21/2023 1057 by Barrett Tirado RN  Outcome: Progressing     Problem: Respiratory - Adult  Goal: Achieves optimal ventilation and oxygenation  12/22/2023 0043 by Lalita Huff RN  Outcome: Progressing  12/22/2023 0043 by Lalita Huff RN  Outcome: Progressing  12/22/2023 0042 by Lalita Huff RN  Outcome: Progressing  12/21/2023 2047 by Ashley Pradhan RCP  Outcome: Progressing  12/21/2023 1057 by Barrett Tirado RN  Outcome: Progressing     Problem: Safety - Medical Restraint  Goal: Remains free of injury from restraints (Restraint for Interference with Medical Device)  Description: INTERVENTIONS:  1. Determine that other, less restrictive measures have been tried or would not be effective before applying the restraint  2. Evaluate the patient's condition at the time of restraint application  3. Inform patient/family regarding the reason for restraint  4.  Q2H: Monitor safety, psychosocial status, comfort, nutrition and hydration  12/22/2023 0043 by Lalita Huff RN  Outcome: Progressing  12/22/2023 0043 by Lalita Huff RN  Outcome: Progressing  12/22/2023 0042 by Lalita Huff RN  Outcome: Progressing  12/21/2023 1057 by Barrett Tirado RN  Outcome: Progressing     Problem: Safety - Adult  Goal: Free from fall injury  12/22/2023 0043 by Lalita Huff action   Recommend, monitor, and adjust tube feedings and TPN/PPN based on assessed needs  12/21/2023 1057 by Abhishek Montero RN  Outcome: Progressing     Problem: Safety - Medical Restraint  Goal: Remains free of injury from restraints (Restraint for Interference with Medical Device)  Description: INTERVENTIONS:  1. Determine that other, less restrictive measures have been tried or would not be effective before applying the restraint  2. Evaluate the patient's condition at the time of restraint application  3. Inform patient/family regarding the reason for restraint  4. Q2H: Monitor safety, psychosocial status, comfort, nutrition and hydration  12/22/2023 0043 by [place] Ramon RN  Outcome: Progressing  12/22/2023 0043 by [place] Ramon RN  Outcome: Progressing  12/22/2023 0042 by [place] Ramon RN  Outcome: Progressing  12/21/2023 1057 by Abhishek Montero RN  Outcome: Progressing

## 2023-12-22 NOTE — PROGRESS NOTES
[facility]   Progress Note                   Date:   12/22/2023  Patient name: Cedric Aguirre  Date of admission:  12/15/2023  7:30 AM  MRN:   [id number]  YOB: 1965  PCP: AQUILES Booth CNP    Reason for Admission: status post CABG    Subjective:   Patient was seen and examined. He was extubated this morning. He was confused after extubation. She remains in sinus rhythm, was tachycardic after extubation. She was hypertensive after extubation as well. No more arrhythmias.     Medications:   Scheduled Meds:   haloperidol lactate        QUEtiapine  25 mg Oral BID    melatonin  5 mg Oral Nightly    propranolol  20 mg Oral BID    insulin lispro  0-16 Units SubCUTAneous TID WC    insulin lispro  0-4 Units SubCUTAneous Nightly    pantoprazole (PROTONIX) 40 mg in sodium chloride (PF) 0.9 % 10 mL injection  40 mg IntraVENous Daily    furosemide  20 mg IntraVENous Once    nystatin  5 mL Oral 4x Daily    acetaminophen  1,000 mg Oral 3 times per day    ipratropium 0.5 mg-albuterol 2.5 mg  1 Dose Inhalation Q4H    piperacillin-tazobactam  3,375 mg IntraVENous Q8H    dilTIAZem  30 mg Oral 3 times per day    sodium chloride flush  5-40 mL IntraVENous 2 times per day    clopidogrel  75 mg Oral Daily    polyethylene glycol  17 g Oral Daily    sennosides-docusate sodium  1 tablet Oral BID     Continuous Infusions:   dexmedetomidine 1.2 mcg/kg/hr (12/22/23 1512)    sodium chloride Stopped (12/22/23 0610)    EPINEPHrine Stopped (12/20/23 0806)    fentaNYL 50 mcg/mL Stopped (12/22/23 1000)    propofol Stopped (12/21/23 1455)    norepinephrine Stopped (12/21/23 1456)    sodium chloride Stopped (12/17/23 2323)    sodium chloride Stopped (12/18/23 1705)    dextrose      vasopressin 20 Units in sodium chloride 0.9 % 100 mL infusion Stopped (12/19/23 0541)    milrinone Stopped (12/21/23 0522)    insulin Stopped (12/21/23 1825)     CBC:   Recent Labs     12/20/23  8194 12/21/23  2013 and 90% distal stenosis.     The LV gram was performed in the PRICE 30 position.   LVEF: 45%. LV Wall Motion: Inferobasal akinesis     Conclusions:  Multivessel CAD  Mildly reduced LV systolic function     Recommendation:  CTS consult for CABG  Medical treatments.  Risk factors modifications..    Assessment:   Status post CABG x 3 (LIMA-LAD, radial-OM, SVG-RCA) on 12/15/2023  Ischemic cardiomyopathy with most recent EF 30 to 35% on LESLIE 12/18/2023  Pulmonary edema - resolved  Acute hypoxic respiratory failure - extubated but then required reintubation.  Extubated 12/21/2022  NSVT  New onset Afib with RVR, unstable s/p bedside CV x3 on 12/16  Hypertension  Hyperlipidemia  Type II DM  Former tobacco use  Obesity  Allergy to amiodarone    Plan:   Patient was extubated this morning and has since been confused.  She is tachycardic and hypertensive   No more episodes of arrhythmias.  Discontinue lidocaine drip  On propranolol per CT surgery  Recommend IV anticoagulation once okay with CT surgery  Continue oral Cardizem for radial graft harvesting  Continue with as needed Iv lopressor 2.5 for HR above 130.   Routine postop chest tube care as per CTS.  Maintain K> 4 and Mg> 2  We will follow      Please wait for final attestation from rounding attending     Franklyn Alcala MD  Fellow, cardiovascular diseases  [facility]     Attending Physician Statement  I have discussed the case of Cele Camarena including pertinent history and exam findings with the student/resident/fellow. I have seen and examined the patient and the key elements of the encounter have been performed by me. I agree with the assessment, plan and orders as documented by the resident With changes made to the note.     Electronically signed by Elena Morris MD on 12/22/2023 at 4:06 PM.    [facility]      [phone]

## 2023-12-22 NOTE — CARE COORDINATION
Transition Planning    Deidre from [facility]- no beds available. [address] from [facility] [place] able to accept pt if LTAC of Choice. Stefano De Los Santos from [facility] is able to accept pt if facility of choice.      1 VM left with admission [facility] to inquire about referral.

## 2023-12-22 NOTE — PROGRESS NOTES
12/22/23 1027   Vent Information   Ventilator Discontinue (S)  Yes       Order obtained for extubation. SpO2 of 97 on 40% FiO2. Patient extubated and placed on HFNC 30L, 50%  Post extubation SpO2 is 95% with HR  95 bpm and RR 24 breaths/min. Patient had strong cough that was productive of clear  sputum. Extubation Well tolerated by patient. .   Breath Sounds: clear throughout    [facility]   10:27 AM

## 2023-12-22 NOTE — PLAN OF CARE
Problem: Respiratory - Adult  Goal: Achieves optimal ventilation and oxygenation  12/22/2023 0908 by Valery Gonzalez RCP  Outcome: Progressing  Flowsheets (Taken 12/22/2023 0908)  Achieves optimal ventilation and oxygenation:   Assess for changes in respiratory status   Assess for changes in mentation and behavior   Position to facilitate oxygenation and minimize respiratory effort   Oxygen supplementation based on oxygen saturation or arterial blood gases   Encourage broncho-pulmonary hygiene including cough, deep breathe, incentive spirometry   Assess and instruct to report shortness of breath or any respiratory difficulty   Assess the need for suctioning and aspirate as needed   Respiratory therapy support as indicated

## 2023-12-22 NOTE — PLAN OF CARE
Problem: Safety - Medical Restraint  Goal: Remains free of injury from restraints (Restraint for Interference with Medical Device)  Description: INTERVENTIONS:  1. Determine that other, less restrictive measures have been tried or would not be effective before applying the restraint  2. Evaluate the patient's condition at the time of restraint application  3. Inform patient/family regarding the reason for restraint  4. Q2H: Monitor safety, psychosocial status, comfort, nutrition and hydration  Recent Flowsheet Documentation  Taken 12/22/2023 0800 by Burgess Kandace RN  Remains free of injury from restraints (restraint for interference with medical device):   Determine that other, less restrictive measures have been tried or would not be effective before applying the restraint   Evaluate the patient's condition at the time of restraint application     Problem: Safety - Medical Restraint  Goal: Remains free of injury from restraints (Restraint for Interference with Medical Device)  Description: INTERVENTIONS:  1. Determine that other, less restrictive measures have been tried or would not be effective before applying the restraint  2. Evaluate the patient's condition at the time of restraint application  3. Inform patient/family regarding the reason for restraint  4.  Q2H: Monitor safety, psychosocial status, comfort, nutrition and hydration  Recent Flowsheet Documentation  Taken 12/22/2023 0800 by Burgess Kandace RN  Remains free of injury from restraints (restraint for interference with medical device):   Determine that other, less restrictive measures have been tried or would not be effective before applying the restraint   Evaluate the patient's condition at the time of restraint application     Problem: Safety - Adult  Goal: Free from fall injury  12/22/2023 0837 by Burgess Kandace RN  Outcome: Progressing  12/22/2023 0043 by Bernard Solano RN  Outcome: Progressing  12/22/2023 0043 by Bernard Solano

## 2023-12-22 NOTE — PROGRESS NOTES
Trauma Attending    I wean the APRV off today and switched the patient to [facility]. Her latest gas is is acceptable. Will slowly wean peep down to 8 tomm and attempt a SBT. -Maintain current vent settings.  -Abg in AM.  -Minimize sedation if able.        Wayne Mtz MD

## 2023-12-23 ENCOUNTER — APPOINTMENT (OUTPATIENT)
Dept: GENERAL RADIOLOGY | Age: 58
DRG: 235 | End: 2023-12-23
Attending: THORACIC SURGERY (CARDIOTHORACIC VASCULAR SURGERY)
Payer: COMMERCIAL

## 2023-12-23 LAB
ANION GAP SERPL CALCULATED.3IONS-SCNC: 14 MMOL/L (ref 9–16)
BUN SERPL-MCNC: 41 MG/DL (ref 6–20)
CA-I BLD-SCNC: 1.2 MMOL/L (ref 1.13–1.33)
CALCIUM SERPL-MCNC: 8.8 MG/DL (ref 8.6–10.4)
CHLORIDE SERPL-SCNC: 119 MMOL/L (ref 98–107)
CO2 SERPL-SCNC: 20 MMOL/L (ref 20–31)
CREAT SERPL-MCNC: 1.5 MG/DL (ref 0.5–0.9)
ERYTHROCYTE [DISTWIDTH] IN BLOOD BY AUTOMATED COUNT: 15 % (ref 11.8–14.4)
FIO2: 50
GFR SERPL CREATININE-BSD FRML MDRD: 39 ML/MIN/1.73M2
GLUCOSE BLD-MCNC: 191 MG/DL (ref 74–100)
GLUCOSE BLD-MCNC: 208 MG/DL (ref 65–105)
GLUCOSE BLD-MCNC: 212 MG/DL (ref 65–105)
GLUCOSE BLD-MCNC: 214 MG/DL (ref 65–105)
GLUCOSE BLD-MCNC: 227 MG/DL (ref 65–105)
GLUCOSE SERPL-MCNC: 216 MG/DL (ref 74–99)
HCT VFR BLD AUTO: 31.9 % (ref 36.3–47.1)
HGB BLD-MCNC: 10.2 G/DL (ref 11.9–15.1)
MAGNESIUM SERPL-MCNC: 2.4 MG/DL (ref 1.6–2.6)
MCH RBC QN AUTO: 29.3 PG (ref 25.2–33.5)
MCHC RBC AUTO-ENTMCNC: 32 G/DL (ref 28.4–34.8)
MCV RBC AUTO: 91.7 FL (ref 82.6–102.9)
NRBC BLD-RTO: 0.2 PER 100 WBC
PATIENT TEMP: 37.7
PLATELET # BLD AUTO: 385 K/UL (ref 138–453)
PMV BLD AUTO: 9.7 FL (ref 8.1–13.5)
POC HCO3: 21.8 MMOL/L (ref 21–28)
POC LACTIC ACID: 0.7 MMOL/L (ref 0.56–1.39)
POC O2 SATURATION: 94.7 % (ref 94–98)
POC PCO2 TEMP: 26.4 MM HG
POC PCO2: 25.6 MM HG (ref 35–48)
POC PH TEMP: 7.53
POC PH: 7.54 (ref 7.35–7.45)
POC PO2 TEMP: 66 MM HG
POC PO2: 62.9 MM HG (ref 83–108)
POSITIVE BASE EXCESS, ART: 0.1 MMOL/L (ref 0–3)
POTASSIUM SERPL-SCNC: 4.1 MMOL/L (ref 3.7–5.3)
POTASSIUM SERPL-SCNC: 4.2 MMOL/L (ref 3.7–5.3)
POTASSIUM SERPL-SCNC: 4.7 MMOL/L (ref 3.7–5.3)
RBC # BLD AUTO: 3.48 M/UL (ref 3.95–5.11)
SAMPLE SITE: ABNORMAL
SODIUM SERPL-SCNC: 153 MMOL/L (ref 136–145)
WBC OTHER # BLD: 15.7 K/UL (ref 3.5–11.3)

## 2023-12-23 PROCEDURE — 6370000000 HC RX 637 (ALT 250 FOR IP): Performed by: NURSE PRACTITIONER

## 2023-12-23 PROCEDURE — 82803 BLOOD GASES ANY COMBINATION: CPT

## 2023-12-23 PROCEDURE — 37799 UNLISTED PX VASCULAR SURGERY: CPT

## 2023-12-23 PROCEDURE — 71045 X-RAY EXAM CHEST 1 VIEW: CPT

## 2023-12-23 PROCEDURE — 94761 N-INVAS EAR/PLS OXIMETRY MLT: CPT

## 2023-12-23 PROCEDURE — 6370000000 HC RX 637 (ALT 250 FOR IP): Performed by: PHYSICIAN ASSISTANT

## 2023-12-23 PROCEDURE — 84132 ASSAY OF SERUM POTASSIUM: CPT

## 2023-12-23 PROCEDURE — 6370000000 HC RX 637 (ALT 250 FOR IP)

## 2023-12-23 PROCEDURE — 97530 THERAPEUTIC ACTIVITIES: CPT

## 2023-12-23 PROCEDURE — 2700000000 HC OXYGEN THERAPY PER DAY

## 2023-12-23 PROCEDURE — 2580000003 HC RX 258: Performed by: PHYSICIAN ASSISTANT

## 2023-12-23 PROCEDURE — 97167 OT EVAL HIGH COMPLEX 60 MIN: CPT

## 2023-12-23 PROCEDURE — 6360000002 HC RX W HCPCS: Performed by: PHYSICIAN ASSISTANT

## 2023-12-23 PROCEDURE — 97535 SELF CARE MNGMENT TRAINING: CPT

## 2023-12-23 PROCEDURE — 83605 ASSAY OF LACTIC ACID: CPT

## 2023-12-23 PROCEDURE — 94640 AIRWAY INHALATION TREATMENT: CPT

## 2023-12-23 PROCEDURE — 6360000002 HC RX W HCPCS

## 2023-12-23 PROCEDURE — 83735 ASSAY OF MAGNESIUM: CPT

## 2023-12-23 PROCEDURE — 99232 SBSQ HOSP IP/OBS MODERATE 35: CPT | Performed by: SURGERY

## 2023-12-23 PROCEDURE — 82947 ASSAY GLUCOSE BLOOD QUANT: CPT

## 2023-12-23 PROCEDURE — C9113 INJ PANTOPRAZOLE SODIUM, VIA: HCPCS | Performed by: SURGERY

## 2023-12-23 PROCEDURE — 6360000002 HC RX W HCPCS: Performed by: THORACIC SURGERY (CARDIOTHORACIC VASCULAR SURGERY)

## 2023-12-23 PROCEDURE — 2500000003 HC RX 250 WO HCPCS

## 2023-12-23 PROCEDURE — 97163 PT EVAL HIGH COMPLEX 45 MIN: CPT

## 2023-12-23 PROCEDURE — 6360000002 HC RX W HCPCS: Performed by: SURGERY

## 2023-12-23 PROCEDURE — 99233 SBSQ HOSP IP/OBS HIGH 50: CPT | Performed by: INTERNAL MEDICINE

## 2023-12-23 PROCEDURE — 80048 BASIC METABOLIC PNL TOTAL CA: CPT

## 2023-12-23 PROCEDURE — 2580000003 HC RX 258: Performed by: SURGERY

## 2023-12-23 PROCEDURE — 82330 ASSAY OF CALCIUM: CPT

## 2023-12-23 PROCEDURE — 2100000001 HC CVICU R&B

## 2023-12-23 PROCEDURE — 85027 COMPLETE CBC AUTOMATED: CPT

## 2023-12-23 RX ORDER — FUROSEMIDE 10 MG/ML
20 INJECTION INTRAMUSCULAR; INTRAVENOUS 2 TIMES DAILY
Status: DISCONTINUED | OUTPATIENT
Start: 2023-12-23 | End: 2023-12-29 | Stop reason: HOSPADM

## 2023-12-23 RX ORDER — OXYCODONE HYDROCHLORIDE 5 MG/1
5 TABLET ORAL EVERY 4 HOURS PRN
Status: DISCONTINUED | OUTPATIENT
Start: 2023-12-23 | End: 2023-12-29 | Stop reason: HOSPADM

## 2023-12-23 RX ADMIN — OXYCODONE HYDROCHLORIDE 5 MG: 5 TABLET ORAL at 02:40

## 2023-12-23 RX ADMIN — DIPHENHYDRAMINE HYDROCHLORIDE 25 MG: 25 TABLET ORAL at 00:44

## 2023-12-23 RX ADMIN — SODIUM CHLORIDE, PRESERVATIVE FREE 10 ML: 5 INJECTION INTRAVENOUS at 09:36

## 2023-12-23 RX ADMIN — IPRATROPIUM BROMIDE AND ALBUTEROL SULFATE 1 DOSE: 2.5; .5 SOLUTION RESPIRATORY (INHALATION) at 15:40

## 2023-12-23 RX ADMIN — FUROSEMIDE 20 MG: 10 INJECTION, SOLUTION INTRAMUSCULAR; INTRAVENOUS at 11:24

## 2023-12-23 RX ADMIN — PIPERACILLIN AND TAZOBACTAM 3375 MG: 3; .375 INJECTION, POWDER, FOR SOLUTION INTRAVENOUS; PARENTERAL at 06:32

## 2023-12-23 RX ADMIN — NYSTATIN 500000 UNITS: 100000 SUSPENSION ORAL at 09:36

## 2023-12-23 RX ADMIN — DILTIAZEM HYDROCHLORIDE 30 MG: 30 TABLET, FILM COATED ORAL at 20:25

## 2023-12-23 RX ADMIN — HALOPERIDOL LACTATE 2 MG: 5 INJECTION, SOLUTION INTRAMUSCULAR at 22:17

## 2023-12-23 RX ADMIN — FUROSEMIDE 20 MG: 10 INJECTION, SOLUTION INTRAMUSCULAR; INTRAVENOUS at 17:59

## 2023-12-23 RX ADMIN — DEXMEDETOMIDINE HYDROCHLORIDE 1.5 MCG/KG/HR: 400 INJECTION, SOLUTION INTRAVENOUS at 06:33

## 2023-12-23 RX ADMIN — PIPERACILLIN AND TAZOBACTAM 3375 MG: 3; .375 INJECTION, POWDER, FOR SOLUTION INTRAVENOUS; PARENTERAL at 14:28

## 2023-12-23 RX ADMIN — APIXABAN 5 MG: 5 TABLET, FILM COATED ORAL at 11:23

## 2023-12-23 RX ADMIN — APIXABAN 5 MG: 5 TABLET, FILM COATED ORAL at 20:26

## 2023-12-23 RX ADMIN — QUETIAPINE FUMARATE 25 MG: 25 TABLET ORAL at 09:35

## 2023-12-23 RX ADMIN — DEXMEDETOMIDINE HYDROCHLORIDE 1.5 MCG/KG/HR: 400 INJECTION, SOLUTION INTRAVENOUS at 09:50

## 2023-12-23 RX ADMIN — ACETAMINOPHEN 1000 MG: 650 SOLUTION ORAL at 20:24

## 2023-12-23 RX ADMIN — IPRATROPIUM BROMIDE AND ALBUTEROL SULFATE 1 DOSE: 2.5; .5 SOLUTION RESPIRATORY (INHALATION) at 00:07

## 2023-12-23 RX ADMIN — INSULIN LISPRO 4 UNITS: 100 INJECTION, SOLUTION INTRAVENOUS; SUBCUTANEOUS at 09:27

## 2023-12-23 RX ADMIN — PIPERACILLIN AND TAZOBACTAM 3375 MG: 3; .375 INJECTION, POWDER, FOR SOLUTION INTRAVENOUS; PARENTERAL at 22:25

## 2023-12-23 RX ADMIN — NYSTATIN 500000 UNITS: 100000 SUSPENSION ORAL at 18:04

## 2023-12-23 RX ADMIN — INSULIN LISPRO 4 UNITS: 100 INJECTION, SOLUTION INTRAVENOUS; SUBCUTANEOUS at 17:59

## 2023-12-23 RX ADMIN — ACETAMINOPHEN 1000 MG: 650 SOLUTION ORAL at 04:13

## 2023-12-23 RX ADMIN — POTASSIUM CHLORIDE 20 MEQ: 29.8 INJECTION, SOLUTION INTRAVENOUS at 09:52

## 2023-12-23 RX ADMIN — DILTIAZEM HYDROCHLORIDE 30 MG: 30 TABLET, FILM COATED ORAL at 04:13

## 2023-12-23 RX ADMIN — HYDRALAZINE HYDROCHLORIDE 5 MG: 20 INJECTION INTRAMUSCULAR; INTRAVENOUS at 00:16

## 2023-12-23 RX ADMIN — SODIUM CHLORIDE, PRESERVATIVE FREE 40 MG: 5 INJECTION INTRAVENOUS at 09:24

## 2023-12-23 RX ADMIN — POLYETHYLENE GLYCOL 3350 17 G: 17 POWDER, FOR SOLUTION ORAL at 09:24

## 2023-12-23 RX ADMIN — DEXMEDETOMIDINE HYDROCHLORIDE 1.3 MCG/KG/HR: 400 INJECTION, SOLUTION INTRAVENOUS at 16:58

## 2023-12-23 RX ADMIN — HALOPERIDOL LACTATE 2 MG: 5 INJECTION, SOLUTION INTRAMUSCULAR at 00:41

## 2023-12-23 RX ADMIN — SODIUM CHLORIDE, PRESERVATIVE FREE 10 ML: 5 INJECTION INTRAVENOUS at 20:20

## 2023-12-23 RX ADMIN — PROPRANOLOL HYDROCHLORIDE 20 MG: 20 TABLET ORAL at 09:35

## 2023-12-23 RX ADMIN — Medication 5 MG: at 20:24

## 2023-12-23 RX ADMIN — NYSTATIN 500000 UNITS: 100000 SUSPENSION ORAL at 20:25

## 2023-12-23 RX ADMIN — DEXMEDETOMIDINE HYDROCHLORIDE 1.5 MCG/KG/HR: 400 INJECTION, SOLUTION INTRAVENOUS at 02:37

## 2023-12-23 RX ADMIN — INSULIN LISPRO 4 UNITS: 100 INJECTION, SOLUTION INTRAVENOUS; SUBCUTANEOUS at 11:21

## 2023-12-23 RX ADMIN — SENNOSIDES AND DOCUSATE SODIUM 1 TABLET: 50; 8.6 TABLET ORAL at 09:23

## 2023-12-23 RX ADMIN — DILTIAZEM HYDROCHLORIDE 30 MG: 30 TABLET, FILM COATED ORAL at 14:25

## 2023-12-23 RX ADMIN — HALOPERIDOL LACTATE 2 MG: 5 INJECTION, SOLUTION INTRAMUSCULAR at 11:27

## 2023-12-23 RX ADMIN — QUETIAPINE FUMARATE 25 MG: 25 TABLET ORAL at 20:24

## 2023-12-23 RX ADMIN — CLOPIDOGREL BISULFATE 75 MG: 75 TABLET ORAL at 09:23

## 2023-12-23 RX ADMIN — PROPRANOLOL HYDROCHLORIDE 20 MG: 20 TABLET ORAL at 20:25

## 2023-12-23 RX ADMIN — NYSTATIN 500000 UNITS: 100000 SUSPENSION ORAL at 11:21

## 2023-12-23 RX ADMIN — IPRATROPIUM BROMIDE AND ALBUTEROL SULFATE 1 DOSE: 2.5; .5 SOLUTION RESPIRATORY (INHALATION) at 12:20

## 2023-12-23 RX ADMIN — SENNOSIDES AND DOCUSATE SODIUM 1 TABLET: 50; 8.6 TABLET ORAL at 20:25

## 2023-12-23 RX ADMIN — ACETAMINOPHEN 1000 MG: 650 SOLUTION ORAL at 13:33

## 2023-12-23 RX ADMIN — DEXMEDETOMIDINE HYDROCHLORIDE 1.5 MCG/KG/HR: 400 INJECTION, SOLUTION INTRAVENOUS at 13:25

## 2023-12-23 RX ADMIN — IPRATROPIUM BROMIDE AND ALBUTEROL SULFATE 1 DOSE: 2.5; .5 SOLUTION RESPIRATORY (INHALATION) at 07:40

## 2023-12-23 RX ADMIN — LORAZEPAM 0.5 MG: 0.5 TABLET ORAL at 13:23

## 2023-12-23 RX ADMIN — IPRATROPIUM BROMIDE AND ALBUTEROL SULFATE 1 DOSE: 2.5; .5 SOLUTION RESPIRATORY (INHALATION) at 03:39

## 2023-12-23 RX ADMIN — POTASSIUM CHLORIDE 20 MEQ: 29.8 INJECTION, SOLUTION INTRAVENOUS at 13:32

## 2023-12-23 RX ADMIN — IPRATROPIUM BROMIDE AND ALBUTEROL SULFATE 1 DOSE: 2.5; .5 SOLUTION RESPIRATORY (INHALATION) at 19:47

## 2023-12-23 RX ADMIN — DEXMEDETOMIDINE HYDROCHLORIDE 1.3 MCG/KG/HR: 400 INJECTION, SOLUTION INTRAVENOUS at 20:57

## 2023-12-23 NOTE — PROGRESS NOTES
ICU PROGRESS NOTE        PATIENT NAME: Luz Nolan  MEDICAL RECORD NO. [id number]  DATE: 2023    HD: # 8    ASSESSMENT :    Patient Active Problem List   Diagnosis    Tremor    Lumbar disc disease    Hyperlipidemia    Depression    Fibromyalgia    Sacroiliac joint dysfunction    Neuropathy    Allergic rhinitis    Former tobacco use    Abnormal ECG    Anxiety    Pure hypercholesterolemia    Hypertension, essential    Controlled type 2 diabetes mellitus without complication ([facility])    Class 1 obesity due to excess calories with serious comorbidity in adult    CAD in native artery    S/P CABG x 3       ACUTE DIAGNOSES/PLAN  Neuro:  Sedated with precedex  Wean as tolerated     CV  Severe multivessel coronary artery disease, status post non-STEMI, CHF, ejection fraction 20%, volume overload  -12/15/23: Median sternotomy, LESLIE, endoscopic vein harvest, endoscopic left radial artery harvest, CABG x3 with skeletonized LIMA to LAD, left radial 2 obtuse marginal and reverse saphenous vein graft to distal RCA. -SBP: 106-161, HR 63-99    Pulm  Intubated and sedated with ARDS:   AB.53/26.4/66.0/21.8  On HFNC at 30 LPM, 50% FiO2  Wean as tolerated        4. Blood Gas stable     Heme  Hgb:10.2 from 8.7  WBC: 15.7    Family/dispo  Following in ICU for respiratory status management    Lines  CVC, Art, PIV, Cox         Chief Complaint: Sedated and intubated    SUBJECTIVE    Cele Garcia Cap  is a cardiothoracic surgery primary patient who consulted us for vent management. Patient seems to be in respiratory failure d/t ARDS. On exam, patient sedated with intubation. Midline wound with bandages. OBJECTIVE  VITALS:   Vitals:    23 0740   BP:    Pulse: (!) 105   Resp: 28   Temp:    SpO2: 96%       Physical Exam  Constitutional:       General: She is not in acute distress. Appearance: She is ill-appearing. She is not diaphoretic.       Comments: Sedated and intubated   HENT:      Head: Normocephalic and

## 2023-12-23 NOTE — PROGRESS NOTES
Occupational Therapy  Facility/Department: [facility] CAR 1- SICU  Occupational Therapy Initial Assessment    Name: Norma Guerra  : 1965  MRN: [id number]  Date of Service: 2023    S/p: CABG x3 on 12/15/2023    Discharge Recommendations:  Patient would benefit from continued therapy after discharge  OT Equipment Recommendations  Equipment Needed:  (Continue to assess)       Patient Diagnosis(es): The encounter diagnosis was S/P CABG x 3. Past Medical History:  has a past medical history of Allergic rhinitis, Arthritis, CAD (coronary artery disease), CKD (chronic kidney disease), stage III ([address]), Depression, Fibromyalgia, Gout, History of fractured vertebra, Hyperlipidemia, Neuropathy, OA (osteoarthritis), Peripheral vascular disease ([address]), Sacroiliac joint dysfunction, Tremor, Type II or unspecified type diabetes mellitus without mention of complication, not stated as uncontrolled, Under care of team, and Wellness examination. Past Surgical History:  has a past surgical history that includes other surgical history (); Cholecystectomy;  section; other surgical history (); knee surgery; back surgery; Cardiac procedure (N/A, 2023); joint replacement (Right); and Coronary artery bypass graft (N/A, 12/15/2023). Assessment   Performance deficits / Impairments: Decreased functional mobility ; Decreased ADL status; Decreased ROM; Decreased strength;Decreased safe awareness;Decreased cognition;Decreased endurance;Decreased balance;Decreased high-level IADLs;Decreased posture  Assessment: RN ok'd OT eval this date. Pt present s/p CABG x3 (12/15/2023). Pt with decreased cognition, following ~25% commands throughout session requiring max cues for redirection. Pt engaging in functional transfers with max A x2 for safety. Pt would continue to benefit from acute OT services to address deficits listed above and improve overall functional performance prior to discharge.   Prognosis:

## 2023-12-23 NOTE — PROGRESS NOTES
Physical Therapy  Facility/Department: [facility] CAR 1- SICU  Physical Therapy Initial Assessment    Name: Coco Butler  : 1965  MRN: [id number]  Date of Service: 2023  S/p: CABG x3 on 12/15  Discharge Recommendations:  Patient would benefit from continued therapy after discharge   PT Equipment Recommendations  Equipment Needed: No (CTA pending progress.)      Patient Diagnosis(es): The encounter diagnosis was S/P CABG x 3. Past Medical History:  has a past medical history of Allergic rhinitis, Arthritis, CAD (coronary artery disease), CKD (chronic kidney disease), stage III ([address]), Depression, Fibromyalgia, Gout, History of fractured vertebra, Hyperlipidemia, Neuropathy, OA (osteoarthritis), Peripheral vascular disease ([address]), Sacroiliac joint dysfunction, Tremor, Type II or unspecified type diabetes mellitus without mention of complication, not stated as uncontrolled, Under care of team, and Wellness examination. Past Surgical History:  has a past surgical history that includes other surgical history (); Cholecystectomy;  section; other surgical history (); knee surgery; back surgery; Cardiac procedure (N/A, 2023); joint replacement (Right); and Coronary artery bypass graft (N/A, 12/15/2023). Assessment   Body Structures, Functions, Activity Limitations Requiring Skilled Therapeutic Intervention: Decreased functional mobility ; Decreased endurance;Decreased balance;Decreased strength;Decreased cognition;Decreased safe awareness  Assessment: Pt with significant mobility deficits requiring max-A x2 to perform bed mobility sit<>stand transfer. Pt with significantly impaired cognition, only oriented to self currently and able to follow ~25% of simple commands. Pt able to maintain sternal precautions throughout todays session with verbal and tactile cues. Pt would benefit from additional intense PT upon discharge to assist in return to independent PLOF.   Pt will require 24 hour railing?: Total  AM-PAC Inpatient Mobility Raw Score : 9  AM-[facility] Inpatient T-Scale Score : 30.55  Mobility Inpatient CMS 0-100% Score: 81.38  Mobility Inpatient CMS G-Code Modifier : CM             Goals  Short Term Goals  Time Frame for Short Term Goals: 14 visits  Short Term Goal 1: Pt will perform sit<>stand transfer with min-A.  Short Term Goal 2: Pt will ambulate 75 ft with a RW and CGA.  Short Term Goal 3: Pt will demonstrate fair+ dynamic standing balance to decrease fall risk.  Short Term Goal 4: Pt will tolerate a 35 minute therapy session to promote increased endurance.  Short Term Goal 5: Pt will perform supine<>sit transfer with min-A while maintaining sternal precautions.       Education  Patient Education  Education Given To: Patient  Education Provided: Role of Therapy;Plan of Care;Transfer Training;Precautions  Education Provided Comments: educated on sternal precautions.  Education Method: Verbal  Barriers to Learning: Cognition  Education Outcome: Continued education needed      Therapy Time   Individual Concurrent Group Co-treatment   Time In 0818         Time Out 0913         Minutes 55         Timed Code Treatment Minutes: 38 Minutes       Moisés Salas PT

## 2023-12-23 NOTE — FLOWSHEET NOTE
12/22/23 2057   Treatment Team Notification   Reason for Communication Evaluate  (Patient is in Vent Trigeminy)   Name of Team Member Notified Dr. Yoselin Chavarria Provider   Method of Communication Secure Message   Response No new orders   Notification Time 2057

## 2023-12-23 NOTE — PROGRESS NOTES
0150Clifton Agustin NP notified on patients increasing agitation and restlessness regardless of Haldol, Ativan, Seroquel, and melatonin. Patient is tachypenic with RR 30-50 and is hypertensive. Writer was ordered to obtain blood gas and call back with results. 0216: Writer called Clifton Agustin in regards to blood gas. pH: 7.53, CO2: 25.6, and PO2: 62.9. PRN oxycodone ordered and given. Clifton Agustin requested for patient to try BiPAP. Patient refused and became more agitated. [facility] Isha Durant Electronically signed by Lalita Huff RN on 12/23/2023 at 5:39 AM

## 2023-12-23 NOTE — PLAN OF CARE
Problem: Discharge Planning  Goal: Discharge to home or other facility with appropriate resources  12/22/2023 2047 by Marietta Ramon RN  Outcome: Progressing  12/22/2023 0837 by Abhishek Montero RN  Outcome: Progressing     Problem: Chronic Conditions and Co-morbidities  Goal: Patient's chronic conditions and co-morbidity symptoms are monitored and maintained or improved  12/22/2023 2047 by Marietta Ramon RN  Outcome: Progressing  12/22/2023 0837 by Abhishek Montero RN  Outcome: Progressing     Problem: Respiratory - Adult  Goal: Achieves optimal ventilation and oxygenation  12/22/2023 2047 by Marietta Ramon RN  Outcome: Progressing  12/22/2023 0908 by Brenda Serrano RCP  Outcome: Progressing  Flowsheets (Taken 12/22/2023 0908)  Achieves optimal ventilation and oxygenation:   Assess for changes in respiratory status   Assess for changes in mentation and behavior   Position to facilitate oxygenation and minimize respiratory effort   Oxygen supplementation based on oxygen saturation or arterial blood gases   Encourage broncho-pulmonary hygiene including cough, deep breathe, incentive spirometry   Assess and instruct to report shortness of breath or any respiratory difficulty   Assess the need for suctioning and aspirate as needed   Respiratory therapy support as indicated  12/22/2023 0837 by Abhishek Montero RN  Outcome: Progressing     Problem: Safety - Adult  Goal: Free from fall injury  12/22/2023 2047 by Marietta Ramon RN  Outcome: Progressing  12/22/2023 0837 by Abhishek Montero RN  Outcome: Progressing     Problem: Skin/Tissue Integrity  Goal: Absence of new skin breakdown  Description: 1.  Monitor for areas of redness and/or skin breakdown  2.  Assess vascular access sites hourly  3.  Every 4-6 hours minimum:  Change oxygen saturation probe site  4.  Every 4-6 hours:  If on nasal continuous positive airway pressure, respiratory therapy assess nares and determine need for appliance change or resting  period. 12/22/2023 2047 by Keily Hernandez RN  Outcome: Progressing  12/22/2023 0837 by William Duran RN  Outcome: Progressing     Problem: ABCDS Injury Assessment  Goal: Absence of physical injury  12/22/2023 2047 by Keily Hernandez RN  Outcome: Progressing  12/22/2023 0837 by William Duran RN  Outcome: Progressing     Problem: Pain  Goal: Verbalizes/displays adequate comfort level or baseline comfort level  12/22/2023 2047 by Keily Hernandez RN  Outcome: Progressing  12/22/2023 0837 by William Duran RN  Outcome: Progressing     Problem: Nutrition Deficit:  Goal: Optimize nutritional status  12/22/2023 2047 by Keily Hernandez RN  Outcome: Progressing  12/22/2023 0837 by William Duran RN  Outcome: Progressing     Problem: Confusion  Goal: Confusion, delirium, dementia, or psychosis is improved or at baseline  Description: INTERVENTIONS:  1. Assess for possible contributors to thought disturbance, including medications, impaired vision or hearing, underlying metabolic abnormalities, dehydration, psychiatric diagnoses, and notify attending LIP  2. [place] high risk fall precautions, as indicated  3. Provide frequent short contacts to provide reality reorientation, refocusing and direction  4. Decrease environmental stimuli, including noise as appropriate  5. Monitor and intervene to maintain adequate nutrition, hydration, elimination, sleep and activity  6. If unable to ensure safety without constant attention obtain sitter and review sitter guidelines with assigned personnel  7.  Initiate Psychosocial CNS and Spiritual Care consult, as indicated  Outcome: Progressing

## 2023-12-23 NOTE — PLAN OF CARE
Problem: Respiratory - Adult  Goal: Achieves optimal ventilation and oxygenation  12/22/2023 2101 by Johny Lundberg RCP  Outcome: Progressing

## 2023-12-23 NOTE — PROGRESS NOTES
[facility]   Progress Note                   Date:   12/23/2023  Patient name: Lesli Kessler  Date of admission:  12/15/2023  7:30 AM  MRN:   [id number]  YOB: 1965  PCP: AQUILES Meyers CNP    Reason for Admission: status post CABG    Subjective:   Patient was seen and examined. She was extubated yesterday. She looks much better today. She is able to answer questions. She is on Precedex drip. Sinus tachycardia has improved. Blood pressure has improved.    Medications:   Scheduled Meds:   QUEtiapine  25 mg Oral BID    melatonin  5 mg Oral Nightly    propranolol  20 mg Oral BID    insulin lispro  0-16 Units SubCUTAneous TID WC    insulin lispro  0-4 Units SubCUTAneous Nightly    pantoprazole (PROTONIX) 40 mg in sodium chloride (PF) 0.9 % 10 mL injection  40 mg IntraVENous Daily    furosemide  20 mg IntraVENous Once    nystatin  5 mL Oral 4x Daily    acetaminophen  1,000 mg Oral 3 times per day    ipratropium 0.5 mg-albuterol 2.5 mg  1 Dose Inhalation Q4H    piperacillin-tazobactam  3,375 mg IntraVENous Q8H    dilTIAZem  30 mg Oral 3 times per day    sodium chloride flush  5-40 mL IntraVENous 2 times per day    clopidogrel  75 mg Oral Daily    polyethylene glycol  17 g Oral Daily    sennosides-docusate sodium  1 tablet Oral BID     Continuous Infusions:   dexmedetomidine 1.5 mcg/kg/hr (12/23/23 1210)    sodium chloride Stopped (12/23/23 7961)    EPINEPHrine Stopped (12/20/23 0806)    fentaNYL 50 mcg/mL Stopped (12/22/23 1000)    propofol Stopped (12/21/23 1455)    norepinephrine Stopped (12/21/23 1456)    sodium chloride Stopped (12/17/23 2323)    sodium chloride Stopped (12/18/23 1705)    dextrose      vasopressin 20 Units in sodium chloride 0.9 % 100 mL infusion Stopped (12/19/23 0541)    milrinone Stopped (12/21/23 0522)    insulin Stopped (12/21/23 1825)     CBC:   Recent Labs     12/21/23  0434 12/22/23  0509 12/23/23  0444   WBC 14.5* 13.2* 15.7*   HGB

## 2023-12-24 ENCOUNTER — APPOINTMENT (OUTPATIENT)
Dept: GENERAL RADIOLOGY | Age: 58
DRG: 235 | End: 2023-12-24
Attending: THORACIC SURGERY (CARDIOTHORACIC VASCULAR SURGERY)
Payer: COMMERCIAL

## 2023-12-24 LAB
ANION GAP SERPL CALCULATED.3IONS-SCNC: 12 MMOL/L (ref 9–16)
BILIRUB UR QL STRIP: NEGATIVE
BUN SERPL-MCNC: 51 MG/DL (ref 6–20)
CA-I BLD-SCNC: 1.18 MMOL/L (ref 1.13–1.33)
CALCIUM SERPL-MCNC: 8.6 MG/DL (ref 8.6–10.4)
CHLORIDE SERPL-SCNC: 120 MMOL/L (ref 98–107)
CLARITY UR: CLEAR
CO2 SERPL-SCNC: 20 MMOL/L (ref 20–31)
COLOR UR: YELLOW
COMMENT: NORMAL
CREAT SERPL-MCNC: 1.7 MG/DL (ref 0.5–0.9)
ERYTHROCYTE [DISTWIDTH] IN BLOOD BY AUTOMATED COUNT: 14.9 % (ref 11.8–14.4)
GFR SERPL CREATININE-BSD FRML MDRD: 35 ML/MIN/1.73M2
GLUCOSE BLD-MCNC: 154 MG/DL (ref 65–105)
GLUCOSE BLD-MCNC: 187 MG/DL (ref 65–105)
GLUCOSE BLD-MCNC: 203 MG/DL (ref 65–105)
GLUCOSE BLD-MCNC: 286 MG/DL (ref 65–105)
GLUCOSE SERPL-MCNC: 281 MG/DL (ref 74–99)
GLUCOSE UR STRIP-MCNC: NEGATIVE MG/DL
HCT VFR BLD AUTO: 31.6 % (ref 36.3–47.1)
HGB BLD-MCNC: 9.8 G/DL (ref 11.9–15.1)
HGB UR QL STRIP.AUTO: NEGATIVE
KETONES UR STRIP-MCNC: NEGATIVE MG/DL
LEUKOCYTE ESTERASE UR QL STRIP: NEGATIVE
MAGNESIUM SERPL-MCNC: 2.5 MG/DL (ref 1.6–2.6)
MCH RBC QN AUTO: 28.9 PG (ref 25.2–33.5)
MCHC RBC AUTO-ENTMCNC: 31 G/DL (ref 28.4–34.8)
MCV RBC AUTO: 93.2 FL (ref 82.6–102.9)
NITRITE UR QL STRIP: NEGATIVE
NRBC BLD-RTO: 0.1 PER 100 WBC
PH UR STRIP: 5.5 [PH] (ref 5–8)
PLATELET # BLD AUTO: 423 K/UL (ref 138–453)
PMV BLD AUTO: 9.7 FL (ref 8.1–13.5)
POTASSIUM SERPL-SCNC: 4.5 MMOL/L (ref 3.7–5.3)
PROT UR STRIP-MCNC: NEGATIVE MG/DL
RBC # BLD AUTO: 3.39 M/UL (ref 3.95–5.11)
SODIUM SERPL-SCNC: 152 MMOL/L (ref 136–145)
SP GR UR STRIP: 1.02 (ref 1–1.03)
UROBILINOGEN UR STRIP-ACNC: NORMAL EU/DL (ref 0–1)
WBC OTHER # BLD: 16.7 K/UL (ref 3.5–11.3)

## 2023-12-24 PROCEDURE — 6370000000 HC RX 637 (ALT 250 FOR IP): Performed by: NURSE PRACTITIONER

## 2023-12-24 PROCEDURE — 82330 ASSAY OF CALCIUM: CPT

## 2023-12-24 PROCEDURE — 6360000002 HC RX W HCPCS

## 2023-12-24 PROCEDURE — 97110 THERAPEUTIC EXERCISES: CPT

## 2023-12-24 PROCEDURE — 2580000003 HC RX 258: Performed by: PHYSICIAN ASSISTANT

## 2023-12-24 PROCEDURE — 6360000002 HC RX W HCPCS: Performed by: PHYSICIAN ASSISTANT

## 2023-12-24 PROCEDURE — 83735 ASSAY OF MAGNESIUM: CPT

## 2023-12-24 PROCEDURE — 97530 THERAPEUTIC ACTIVITIES: CPT

## 2023-12-24 PROCEDURE — 36415 COLL VENOUS BLD VENIPUNCTURE: CPT

## 2023-12-24 PROCEDURE — 87040 BLOOD CULTURE FOR BACTERIA: CPT

## 2023-12-24 PROCEDURE — 2580000003 HC RX 258: Performed by: SURGERY

## 2023-12-24 PROCEDURE — 89220 SPUTUM SPECIMEN COLLECTION: CPT

## 2023-12-24 PROCEDURE — 80048 BASIC METABOLIC PNL TOTAL CA: CPT

## 2023-12-24 PROCEDURE — 87205 SMEAR GRAM STAIN: CPT

## 2023-12-24 PROCEDURE — 87070 CULTURE OTHR SPECIMN AEROBIC: CPT

## 2023-12-24 PROCEDURE — 2500000003 HC RX 250 WO HCPCS

## 2023-12-24 PROCEDURE — 71045 X-RAY EXAM CHEST 1 VIEW: CPT

## 2023-12-24 PROCEDURE — 94761 N-INVAS EAR/PLS OXIMETRY MLT: CPT

## 2023-12-24 PROCEDURE — C9113 INJ PANTOPRAZOLE SODIUM, VIA: HCPCS | Performed by: SURGERY

## 2023-12-24 PROCEDURE — 87186 SC STD MICRODIL/AGAR DIL: CPT

## 2023-12-24 PROCEDURE — 2700000000 HC OXYGEN THERAPY PER DAY

## 2023-12-24 PROCEDURE — 6370000000 HC RX 637 (ALT 250 FOR IP): Performed by: PHYSICIAN ASSISTANT

## 2023-12-24 PROCEDURE — 99024 POSTOP FOLLOW-UP VISIT: CPT

## 2023-12-24 PROCEDURE — A4216 STERILE WATER/SALINE, 10 ML: HCPCS | Performed by: SURGERY

## 2023-12-24 PROCEDURE — 94640 AIRWAY INHALATION TREATMENT: CPT

## 2023-12-24 PROCEDURE — 99233 SBSQ HOSP IP/OBS HIGH 50: CPT | Performed by: INTERNAL MEDICINE

## 2023-12-24 PROCEDURE — 97535 SELF CARE MNGMENT TRAINING: CPT

## 2023-12-24 PROCEDURE — 6360000002 HC RX W HCPCS: Performed by: SURGERY

## 2023-12-24 PROCEDURE — 6370000000 HC RX 637 (ALT 250 FOR IP)

## 2023-12-24 PROCEDURE — 82947 ASSAY GLUCOSE BLOOD QUANT: CPT

## 2023-12-24 PROCEDURE — 87077 CULTURE AEROBIC IDENTIFY: CPT

## 2023-12-24 PROCEDURE — 2100000001 HC CVICU R&B

## 2023-12-24 PROCEDURE — 81003 URINALYSIS AUTO W/O SCOPE: CPT

## 2023-12-24 PROCEDURE — 85027 COMPLETE CBC AUTOMATED: CPT

## 2023-12-24 RX ADMIN — PROPRANOLOL HYDROCHLORIDE 20 MG: 20 TABLET ORAL at 08:23

## 2023-12-24 RX ADMIN — IPRATROPIUM BROMIDE AND ALBUTEROL SULFATE 1 DOSE: 2.5; .5 SOLUTION RESPIRATORY (INHALATION) at 02:10

## 2023-12-24 RX ADMIN — DEXMEDETOMIDINE HYDROCHLORIDE 1.3 MCG/KG/HR: 400 INJECTION, SOLUTION INTRAVENOUS at 01:08

## 2023-12-24 RX ADMIN — ACETAMINOPHEN 1000 MG: 650 SOLUTION ORAL at 13:55

## 2023-12-24 RX ADMIN — SODIUM CHLORIDE, PRESERVATIVE FREE 40 MG: 5 INJECTION INTRAVENOUS at 08:22

## 2023-12-24 RX ADMIN — NYSTATIN 500000 UNITS: 100000 SUSPENSION ORAL at 20:23

## 2023-12-24 RX ADMIN — APIXABAN 5 MG: 5 TABLET, FILM COATED ORAL at 08:23

## 2023-12-24 RX ADMIN — IPRATROPIUM BROMIDE AND ALBUTEROL SULFATE 1 DOSE: 2.5; .5 SOLUTION RESPIRATORY (INHALATION) at 20:32

## 2023-12-24 RX ADMIN — INSULIN LISPRO 4 UNITS: 100 INJECTION, SOLUTION INTRAVENOUS; SUBCUTANEOUS at 17:37

## 2023-12-24 RX ADMIN — OXYCODONE HYDROCHLORIDE 5 MG: 5 TABLET ORAL at 14:23

## 2023-12-24 RX ADMIN — SODIUM CHLORIDE, PRESERVATIVE FREE 10 ML: 5 INJECTION INTRAVENOUS at 09:46

## 2023-12-24 RX ADMIN — IPRATROPIUM BROMIDE AND ALBUTEROL SULFATE 1 DOSE: 2.5; .5 SOLUTION RESPIRATORY (INHALATION) at 15:08

## 2023-12-24 RX ADMIN — IPRATROPIUM BROMIDE AND ALBUTEROL SULFATE 1 DOSE: 2.5; .5 SOLUTION RESPIRATORY (INHALATION) at 09:11

## 2023-12-24 RX ADMIN — ACETAMINOPHEN 1000 MG: 650 SOLUTION ORAL at 05:45

## 2023-12-24 RX ADMIN — PROPRANOLOL HYDROCHLORIDE 20 MG: 20 TABLET ORAL at 20:23

## 2023-12-24 RX ADMIN — SODIUM CHLORIDE, PRESERVATIVE FREE 10 ML: 5 INJECTION INTRAVENOUS at 20:23

## 2023-12-24 RX ADMIN — LORAZEPAM 0.5 MG: 0.5 TABLET ORAL at 01:08

## 2023-12-24 RX ADMIN — Medication 5 MG: at 20:23

## 2023-12-24 RX ADMIN — CLOPIDOGREL BISULFATE 75 MG: 75 TABLET ORAL at 08:23

## 2023-12-24 RX ADMIN — NYSTATIN 500000 UNITS: 100000 SUSPENSION ORAL at 13:55

## 2023-12-24 RX ADMIN — IPRATROPIUM BROMIDE AND ALBUTEROL SULFATE 1 DOSE: 2.5; .5 SOLUTION RESPIRATORY (INHALATION) at 05:45

## 2023-12-24 RX ADMIN — HYDRALAZINE HYDROCHLORIDE 5 MG: 20 INJECTION INTRAMUSCULAR; INTRAVENOUS at 15:55

## 2023-12-24 RX ADMIN — NYSTATIN 500000 UNITS: 100000 SUSPENSION ORAL at 17:22

## 2023-12-24 RX ADMIN — QUETIAPINE FUMARATE 25 MG: 25 TABLET ORAL at 20:23

## 2023-12-24 RX ADMIN — INSULIN LISPRO 8 UNITS: 100 INJECTION, SOLUTION INTRAVENOUS; SUBCUTANEOUS at 08:20

## 2023-12-24 RX ADMIN — NYSTATIN 500000 UNITS: 100000 SUSPENSION ORAL at 10:14

## 2023-12-24 RX ADMIN — DIPHENHYDRAMINE HYDROCHLORIDE 25 MG: 25 TABLET ORAL at 01:08

## 2023-12-24 RX ADMIN — DEXMEDETOMIDINE HYDROCHLORIDE 1.1 MCG/KG/HR: 400 INJECTION, SOLUTION INTRAVENOUS at 09:09

## 2023-12-24 RX ADMIN — IPRATROPIUM BROMIDE AND ALBUTEROL SULFATE 1 DOSE: 2.5; .5 SOLUTION RESPIRATORY (INHALATION) at 13:13

## 2023-12-24 RX ADMIN — DEXMEDETOMIDINE HYDROCHLORIDE 1.3 MCG/KG/HR: 400 INJECTION, SOLUTION INTRAVENOUS at 05:47

## 2023-12-24 RX ADMIN — DILTIAZEM HYDROCHLORIDE 30 MG: 30 TABLET, FILM COATED ORAL at 05:45

## 2023-12-24 RX ADMIN — OXYCODONE HYDROCHLORIDE 5 MG: 5 TABLET ORAL at 23:42

## 2023-12-24 RX ADMIN — APIXABAN 5 MG: 5 TABLET, FILM COATED ORAL at 20:23

## 2023-12-24 RX ADMIN — DILTIAZEM HYDROCHLORIDE 30 MG: 30 TABLET, FILM COATED ORAL at 21:29

## 2023-12-24 RX ADMIN — HYDRALAZINE HYDROCHLORIDE 5 MG: 20 INJECTION INTRAMUSCULAR; INTRAVENOUS at 20:13

## 2023-12-24 RX ADMIN — QUETIAPINE FUMARATE 25 MG: 25 TABLET ORAL at 08:23

## 2023-12-24 RX ADMIN — DILTIAZEM HYDROCHLORIDE 30 MG: 30 TABLET, FILM COATED ORAL at 13:55

## 2023-12-24 RX ADMIN — ACETAMINOPHEN 1000 MG: 650 SOLUTION ORAL at 21:29

## 2023-12-24 RX ADMIN — Medication 5 MG: at 17:33

## 2023-12-24 RX ADMIN — PIPERACILLIN AND TAZOBACTAM 3375 MG: 3; .375 INJECTION, POWDER, FOR SOLUTION INTRAVENOUS; PARENTERAL at 05:49

## 2023-12-24 ASSESSMENT — PAIN SCALES - GENERAL
PAINLEVEL_OUTOF10: 2
PAINLEVEL_OUTOF10: 4
PAINLEVEL_OUTOF10: 5

## 2023-12-24 NOTE — PLAN OF CARE
BRONCHOSPASM/BRONCHOCONSTRICTION     [x]         IMPROVE AERATION/BREATH SOUNDS  [x]   ADMINISTER BRONCHODILATOR THERAPY AS APPROPRIATE  [x]   ASSESS BREATH SOUNDS  []   IMPLEMENT AEROSOL/MDI PROTOCOL  [x]   PATIENT EDUCATION AS NEEDED        PROVIDE ADEQUATE OXYGENATION WITH ACCEPTABLE SP02/ABG'S    [x]  IDENTIFY APPROPRIATE OXYGEN THERAPY  [x]   MONITOR SP02/ABG'S AS NEEDED   [x]   PATIENT EDUCATION AS NEEDED

## 2023-12-24 NOTE — PROGRESS NOTES
[facility]   Progress Note                   Date:   12/24/2023  Patient name: Erin Dela Cruz  Date of admission:  12/15/2023  7:30 AM  MRN:   [id number]  YOB: 1965  PCP: AQUILES Alonzo CNP    Reason for Admission: status post CABG    Subjective:   Patient was seen and examined. She remains on high flow nasal cannula, 50% FiO2. She remains on Precedex drip. She is in normal sinus rhythm. Blood pressure is fine.     Medications:   Scheduled Meds:   [Held by provider] furosemide  20 mg IntraVENous BID    apixaban  5 mg Oral BID    QUEtiapine  25 mg Oral BID    melatonin  5 mg Oral Nightly    propranolol  20 mg Oral BID    insulin lispro  0-16 Units SubCUTAneous TID WC    insulin lispro  0-4 Units SubCUTAneous Nightly    pantoprazole (PROTONIX) 40 mg in sodium chloride (PF) 0.9 % 10 mL injection  40 mg IntraVENous Daily    nystatin  5 mL Oral 4x Daily    acetaminophen  1,000 mg Oral 3 times per day    ipratropium 0.5 mg-albuterol 2.5 mg  1 Dose Inhalation Q4H    dilTIAZem  30 mg Oral 3 times per day    sodium chloride flush  5-40 mL IntraVENous 2 times per day    clopidogrel  75 mg Oral Daily    polyethylene glycol  17 g Oral Daily    sennosides-docusate sodium  1 tablet Oral BID     Continuous Infusions:   dexmedetomidine Stopped (12/24/23 0947)    sodium chloride 10 mL/hr at 12/24/23 1007    EPINEPHrine Stopped (12/20/23 0806)    fentaNYL 50 mcg/mL Stopped (12/22/23 1000)    norepinephrine Stopped (12/21/23 1456)    sodium chloride Stopped (12/17/23 2323)    sodium chloride Stopped (12/18/23 1705)    dextrose      vasopressin 20 Units in sodium chloride 0.9 % 100 mL infusion Stopped (12/19/23 0541)    milrinone Stopped (12/21/23 0522)    insulin Stopped (12/21/23 1825)     CBC:   Recent Labs     12/22/23  0509 12/23/23  0444 12/24/23  0410   WBC 13.2* 15.7* 16.7*   HGB 8.7* 10.2* 9.8*    [phone]       BMP:    Recent Labs     12/22/23  0509 12/23/23 0444

## 2023-12-24 NOTE — PROGRESS NOTES
person;Disoriented to time;Disoriented to situation;Oriented to place                  Education Given To: Patient; Family  Education Provided: Role of Therapy;Plan of Care;Precautions;Transfer Training  Education Provided Comments: Pt educated on OT role, POC, transfer training, safety awareness, and sternal precautions this date. Education Method: Demonstration;Verbal  Barriers to Learning: Cognition  Education Outcome: Continued education needed       AM-PAC - ADL  AM-PAC Daily Activity - Inpatient   How much help is needed for putting on and taking off regular lower body clothing?: A Lot  How much help is needed for bathing (which includes washing, rinsing, drying)?: A Lot  How much help is needed for toileting (which includes using toilet, bedpan, or urinal)?: Total  How much help is needed for putting on and taking off regular upper body clothing?: A Lot  How much help is needed for taking care of personal grooming?: A Lot  How much help for eating meals?: Total  AM-PAC Inpatient Daily Activity Raw Score: 10  AM-PAC Inpatient ADL T-Scale Score : 27.31  ADL Inpatient CMS 0-100% Score: 74.7  ADL Inpatient CMS G-Code Modifier : CL       Goals  Short Term Goals  Time Frame for Short Term Goals: By discharge, pt will:  Short Term Goal 1: demo grooming/UB ADLs with CGA, including heart-hugger. Short Term Goal 2: demo LB ADLs with min A, adaptive techniques PRN. Short Term Goal 3: demo safe functional transfers with mod A, in preparation for ADLs. Short Term Goal 4: demo dynamic sitting during functional activities with CGA for 8+ mins. Short Term Goal 5: follow 50% commands throughout entire session.   Short Term Goal 6: Notify OTR for updated goals as needed       Therapy Time   Individual Concurrent Group Co-treatment   Time In 0820         Time Out 0912         Minutes 52      23 min   Timed Code Treatment Minutes: 45 Minutes     Co- treatment with PT warranted secondary to decreased patient safety and

## 2023-12-24 NOTE — PROGRESS NOTES
12/24/23 1508   Airway Clearance   Sputum Method Obtained Nasal tracheal   Sputum Amount Small   Sputum Color/Odor Tan   Sputum Consistency Thick         Sputum sample sent to lab for culture.

## 2023-12-24 NOTE — PROGRESS NOTES
Physical Therapy  Facility/Department: [facility] CAR 1- SICU  Physical Therapy Daily Treatment Note    Name: Lesli Kessler  : 1965  MRN: [id number]  Date of Service: 2023    Discharge Recommendations:  Patient would benefit from continued therapy after discharge   PT Equipment Recommendations  Equipment Needed: No      Patient Diagnosis(es): The encounter diagnosis was S/P CABG x 3. Past Medical History:  has a past medical history of Allergic rhinitis, Arthritis, CAD (coronary artery disease), CKD (chronic kidney disease), stage III ([address]), Depression, Fibromyalgia, Gout, History of fractured vertebra, Hyperlipidemia, Neuropathy, OA (osteoarthritis), Peripheral vascular disease ([address]), Sacroiliac joint dysfunction, Tremor, Type II or unspecified type diabetes mellitus without mention of complication, not stated as uncontrolled, Under care of team, and Wellness examination. Past Surgical History:  has a past surgical history that includes other surgical history (); Cholecystectomy;  section; other surgical history (); knee surgery; back surgery; Cardiac procedure (N/A, 2023); joint replacement (Right); and Coronary artery bypass graft (N/A, 12/15/2023). Assessment   Body Structures, Functions, Activity Limitations Requiring Skilled Therapeutic Intervention: Decreased functional mobility ; Decreased endurance;Decreased balance;Decreased strength;Decreased cognition;Decreased safe awareness  Assessment: Pt with significant mobility deficits requiring maxA x2 to perform STS transfers with use of brigid stedy. Pt most limited by decreased strength, balance and cognition. She would benefit from additional intense PT upon discharge to assist in return to independent PLOF. Pt will require 24 hour supervision and assistance with mobility upon discharge.   Therapy Prognosis: Good  Requires PT Follow-Up: Yes  Activity Tolerance  Activity Tolerance: Patient limited by endurance;Treatment limited educated on sternal precautions, use of heart hugger brace.  Education Method: Verbal  Barriers to Learning: Cognition  Education Outcome: Continued education needed      Therapy Time   Individual Concurrent Group Co-treatment   Time In 0823         Time Out 0912         Minutes 49         Timed Code Treatment Minutes: 38 Minutes    Co- treatment with OT warranted secondary to decreased patient safety and independence with functional mobility requiring skilled physical assistance of two professionals to simultaneously address individualized discipline goals. PT is addressing sitting/standing balance , while OT is addressing their individualized functional mobility/self-care task.        Klaudia Bowen, PTA

## 2023-12-24 NOTE — PLAN OF CARE
Problem: Discharge Planning  Goal: Discharge to home or other facility with appropriate resources  12/23/2023 2142 by Helen Mares RN  Outcome: Progressing  12/23/2023 1024 by Andreina Olivera RN  Outcome: Progressing  Flowsheets (Taken 12/23/2023 0800 by Janet Carrillo RN)  Discharge to home or other facility with appropriate resources: Identify barriers to discharge with patient and caregiver     Problem: Chronic Conditions and Co-morbidities  Goal: Patient's chronic conditions and co-morbidity symptoms are monitored and maintained or improved  12/23/2023 2142 by Helen Mares RN  Outcome: Progressing  12/23/2023 1024 by Andreina Olivera RN  Outcome: Progressing  Flowsheets (Taken 12/23/2023 0800 by Janet Carrillo RN)  Care Plan - Patient's Chronic Conditions and Co-Morbidity Symptoms are Monitored and Maintained or Improved: Collaborate with multidisciplinary team to address chronic and comorbid conditions and prevent exacerbation or deterioration     Problem: Respiratory - Adult  Goal: Achieves optimal ventilation and oxygenation  12/23/2023 2142 by Helen Mares RN  Outcome: Progressing  12/23/2023 1024 by Andreina Olivera RN  Outcome: Progressing  Flowsheets (Taken 12/23/2023 0800 by Janet Carrillo RN)  Achieves optimal ventilation and oxygenation: Assess for changes in mentation and behavior     Problem: Safety - Adult  Goal: Free from fall injury  12/23/2023 2142 by Helen Mares RN  Outcome: Progressing  12/23/2023 1024 by Andreina Olivera RN  Outcome: Progressing  Flowsheets (Taken 12/23/2023 1000)  Free From Fall Injury: Based on caregiver fall risk screen, instruct family/caregiver to ask for assistance with transferring infant if caregiver noted to have fall risk factors     Problem: Skin/Tissue Integrity  Goal: Absence of new skin breakdown  Description: 1. Monitor for areas of redness and/or skin breakdown  2. Assess vascular access sites hourly  3.   Every 4-6 hours minimum: Change oxygen saturation probe site  4.  Every 4-6 hours:  If on nasal continuous positive airway pressure, respiratory therapy assess nares and determine need for appliance change or resting period.  12/23/2023 2142 by Maranda Silva RN  Outcome: Progressing  12/23/2023 1024 by Swetha Núñez RN  Outcome: Progressing     Problem: ABCDS Injury Assessment  Goal: Absence of physical injury  12/23/2023 2142 by Maranda Silva RN  Outcome: Progressing  12/23/2023 1024 by Swetha Núñez RN  Outcome: Progressing  Flowsheets (Taken 12/23/2023 1000)  Absence of Physical Injury: Implement safety measures based on patient assessment     Problem: Pain  Goal: Verbalizes/displays adequate comfort level or baseline comfort level  12/23/2023 2142 by Maranda Silva RN  Outcome: Progressing  12/23/2023 1024 by Swetha Núñez RN  Outcome: Progressing     Problem: Nutrition Deficit:  Goal: Optimize nutritional status  12/23/2023 2142 by Maranda Silva RN  Outcome: Progressing  12/23/2023 1024 by Swetha Núñez RN  Outcome: Progressing     Problem: Confusion  Goal: Confusion, delirium, dementia, or psychosis is improved or at baseline  Description: INTERVENTIONS:  1. Assess for possible contributors to thought disturbance, including medications, impaired vision or hearing, underlying metabolic abnormalities, dehydration, psychiatric diagnoses, and notify attending LIP  2. [place] high risk fall precautions, as indicated  3. Provide frequent short contacts to provide reality reorientation, refocusing and direction  4. Decrease environmental stimuli, including noise as appropriate  5. Monitor and intervene to maintain adequate nutrition, hydration, elimination, sleep and activity  6. If unable to ensure safety without constant attention obtain sitter and review sitter guidelines with assigned personnel  7. Initiate Psychosocial CNS and Spiritual Care consult, as indicated  12/23/2023 2142 by Maranda Silva RN  Outcome:

## 2023-12-25 ENCOUNTER — APPOINTMENT (OUTPATIENT)
Dept: GENERAL RADIOLOGY | Age: 58
DRG: 235 | End: 2023-12-25
Attending: THORACIC SURGERY (CARDIOTHORACIC VASCULAR SURGERY)
Payer: COMMERCIAL

## 2023-12-25 LAB
ANION GAP SERPL CALCULATED.3IONS-SCNC: 11 MMOL/L (ref 9–17)
BUN SERPL-MCNC: 50 MG/DL (ref 6–20)
CA-I BLD-SCNC: 1.23 MMOL/L (ref 1.13–1.33)
CALCIUM SERPL-MCNC: 8.9 MG/DL (ref 8.6–10.4)
CHLORIDE SERPL-SCNC: 122 MMOL/L (ref 98–107)
CO2 SERPL-SCNC: 21 MMOL/L (ref 20–31)
CREAT SERPL-MCNC: 1.6 MG/DL (ref 0.5–0.9)
ERYTHROCYTE [DISTWIDTH] IN BLOOD BY AUTOMATED COUNT: 15.3 % (ref 11.8–14.4)
GFR SERPL CREATININE-BSD FRML MDRD: 37 ML/MIN/1.73M2
GLUCOSE BLD-MCNC: 166 MG/DL (ref 65–105)
GLUCOSE BLD-MCNC: 261 MG/DL (ref 65–105)
GLUCOSE BLD-MCNC: 331 MG/DL (ref 65–105)
GLUCOSE SERPL-MCNC: 275 MG/DL (ref 70–99)
HCT VFR BLD AUTO: 34.2 % (ref 36.3–47.1)
HGB BLD-MCNC: 10.5 G/DL (ref 11.9–15.1)
MAGNESIUM SERPL-MCNC: 2.6 MG/DL (ref 1.6–2.6)
MCH RBC QN AUTO: 29.1 PG (ref 25.2–33.5)
MCHC RBC AUTO-ENTMCNC: 30.7 G/DL (ref 28.4–34.8)
MCV RBC AUTO: 94.7 FL (ref 82.6–102.9)
NRBC BLD-RTO: 0.1 PER 100 WBC
PLATELET # BLD AUTO: 559 K/UL (ref 138–453)
PMV BLD AUTO: 9.9 FL (ref 8.1–13.5)
POTASSIUM SERPL-SCNC: 4.5 MMOL/L (ref 3.7–5.3)
RBC # BLD AUTO: 3.61 M/UL (ref 3.95–5.11)
SODIUM SERPL-SCNC: 154 MMOL/L (ref 135–144)
WBC OTHER # BLD: 21.6 K/UL (ref 3.5–11.3)

## 2023-12-25 PROCEDURE — 2500000003 HC RX 250 WO HCPCS

## 2023-12-25 PROCEDURE — A4216 STERILE WATER/SALINE, 10 ML: HCPCS | Performed by: SURGERY

## 2023-12-25 PROCEDURE — 94640 AIRWAY INHALATION TREATMENT: CPT

## 2023-12-25 PROCEDURE — 80048 BASIC METABOLIC PNL TOTAL CA: CPT

## 2023-12-25 PROCEDURE — 6370000000 HC RX 637 (ALT 250 FOR IP): Performed by: NURSE PRACTITIONER

## 2023-12-25 PROCEDURE — 94761 N-INVAS EAR/PLS OXIMETRY MLT: CPT

## 2023-12-25 PROCEDURE — 6370000000 HC RX 637 (ALT 250 FOR IP): Performed by: STUDENT IN AN ORGANIZED HEALTH CARE EDUCATION/TRAINING PROGRAM

## 2023-12-25 PROCEDURE — 71045 X-RAY EXAM CHEST 1 VIEW: CPT

## 2023-12-25 PROCEDURE — 82330 ASSAY OF CALCIUM: CPT

## 2023-12-25 PROCEDURE — 2580000003 HC RX 258: Performed by: PHYSICIAN ASSISTANT

## 2023-12-25 PROCEDURE — 99024 POSTOP FOLLOW-UP VISIT: CPT

## 2023-12-25 PROCEDURE — 2700000000 HC OXYGEN THERAPY PER DAY

## 2023-12-25 PROCEDURE — A4216 STERILE WATER/SALINE, 10 ML: HCPCS | Performed by: PHYSICIAN ASSISTANT

## 2023-12-25 PROCEDURE — 97110 THERAPEUTIC EXERCISES: CPT

## 2023-12-25 PROCEDURE — 99223 1ST HOSP IP/OBS HIGH 75: CPT | Performed by: INTERNAL MEDICINE

## 2023-12-25 PROCEDURE — 83735 ASSAY OF MAGNESIUM: CPT

## 2023-12-25 PROCEDURE — 2100000001 HC CVICU R&B

## 2023-12-25 PROCEDURE — 82947 ASSAY GLUCOSE BLOOD QUANT: CPT

## 2023-12-25 PROCEDURE — 6360000002 HC RX W HCPCS: Performed by: SURGERY

## 2023-12-25 PROCEDURE — 2580000003 HC RX 258: Performed by: SURGERY

## 2023-12-25 PROCEDURE — 97530 THERAPEUTIC ACTIVITIES: CPT

## 2023-12-25 PROCEDURE — 85027 COMPLETE CBC AUTOMATED: CPT

## 2023-12-25 PROCEDURE — 6360000002 HC RX W HCPCS

## 2023-12-25 PROCEDURE — 6370000000 HC RX 637 (ALT 250 FOR IP)

## 2023-12-25 PROCEDURE — C9113 INJ PANTOPRAZOLE SODIUM, VIA: HCPCS | Performed by: SURGERY

## 2023-12-25 PROCEDURE — 6370000000 HC RX 637 (ALT 250 FOR IP): Performed by: PHYSICIAN ASSISTANT

## 2023-12-25 PROCEDURE — 97535 SELF CARE MNGMENT TRAINING: CPT

## 2023-12-25 PROCEDURE — 99233 SBSQ HOSP IP/OBS HIGH 50: CPT | Performed by: INTERNAL MEDICINE

## 2023-12-25 PROCEDURE — 6360000002 HC RX W HCPCS: Performed by: PHYSICIAN ASSISTANT

## 2023-12-25 RX ORDER — CARVEDILOL 12.5 MG/1
12.5 TABLET ORAL 2 TIMES DAILY WITH MEALS
Status: DISCONTINUED | OUTPATIENT
Start: 2023-12-25 | End: 2023-12-29 | Stop reason: HOSPADM

## 2023-12-25 RX ORDER — AMLODIPINE BESYLATE 5 MG/1
5 TABLET ORAL DAILY
Status: DISCONTINUED | OUTPATIENT
Start: 2023-12-25 | End: 2023-12-29 | Stop reason: HOSPADM

## 2023-12-25 RX ORDER — NICARDIPINE HYDROCHLORIDE 0.1 MG/ML
2.5-15 INJECTION INTRAVENOUS CONTINUOUS
Status: DISCONTINUED | OUTPATIENT
Start: 2023-12-25 | End: 2023-12-27

## 2023-12-25 RX ADMIN — CARVEDILOL 12.5 MG: 12.5 TABLET, FILM COATED ORAL at 09:58

## 2023-12-25 RX ADMIN — NYSTATIN 500000 UNITS: 100000 SUSPENSION ORAL at 14:00

## 2023-12-25 RX ADMIN — IPRATROPIUM BROMIDE AND ALBUTEROL SULFATE 1 DOSE: 2.5; .5 SOLUTION RESPIRATORY (INHALATION) at 08:07

## 2023-12-25 RX ADMIN — NICARDIPINE HYDROCHLORIDE 5 MG/HR: 0.1 INJECTION INTRAVENOUS at 08:00

## 2023-12-25 RX ADMIN — SODIUM CHLORIDE, PRESERVATIVE FREE 10 ML: 5 INJECTION INTRAVENOUS at 19:33

## 2023-12-25 RX ADMIN — ONDANSETRON 4 MG: 2 INJECTION INTRAMUSCULAR; INTRAVENOUS at 10:05

## 2023-12-25 RX ADMIN — NYSTATIN 500000 UNITS: 100000 SUSPENSION ORAL at 09:54

## 2023-12-25 RX ADMIN — Medication 5 MG: at 01:24

## 2023-12-25 RX ADMIN — INSULIN LISPRO 12 UNITS: 100 INJECTION, SOLUTION INTRAVENOUS; SUBCUTANEOUS at 09:00

## 2023-12-25 RX ADMIN — NYSTATIN 500000 UNITS: 100000 SUSPENSION ORAL at 19:32

## 2023-12-25 RX ADMIN — ACETAMINOPHEN 1000 MG: 650 SOLUTION ORAL at 21:15

## 2023-12-25 RX ADMIN — APIXABAN 5 MG: 5 TABLET, FILM COATED ORAL at 21:15

## 2023-12-25 RX ADMIN — DILTIAZEM HYDROCHLORIDE 30 MG: 30 TABLET, FILM COATED ORAL at 06:24

## 2023-12-25 RX ADMIN — POLYETHYLENE GLYCOL 3350 17 G: 17 POWDER, FOR SOLUTION ORAL at 09:45

## 2023-12-25 RX ADMIN — DILTIAZEM HYDROCHLORIDE 30 MG: 30 TABLET, FILM COATED ORAL at 21:15

## 2023-12-25 RX ADMIN — SENNOSIDES AND DOCUSATE SODIUM 1 TABLET: 50; 8.6 TABLET ORAL at 19:33

## 2023-12-25 RX ADMIN — INSULIN LISPRO 8 UNITS: 100 INJECTION, SOLUTION INTRAVENOUS; SUBCUTANEOUS at 18:49

## 2023-12-25 RX ADMIN — Medication 5 MG: at 19:32

## 2023-12-25 RX ADMIN — APIXABAN 5 MG: 5 TABLET, FILM COATED ORAL at 09:43

## 2023-12-25 RX ADMIN — AMLODIPINE BESYLATE 5 MG: 5 TABLET ORAL at 09:58

## 2023-12-25 RX ADMIN — CLOPIDOGREL BISULFATE 75 MG: 75 TABLET ORAL at 09:43

## 2023-12-25 RX ADMIN — IPRATROPIUM BROMIDE AND ALBUTEROL SULFATE 1 DOSE: 2.5; .5 SOLUTION RESPIRATORY (INHALATION) at 04:00

## 2023-12-25 RX ADMIN — QUETIAPINE FUMARATE 25 MG: 25 TABLET ORAL at 09:43

## 2023-12-25 RX ADMIN — IPRATROPIUM BROMIDE AND ALBUTEROL SULFATE 1 DOSE: 2.5; .5 SOLUTION RESPIRATORY (INHALATION) at 16:38

## 2023-12-25 RX ADMIN — ACETAMINOPHEN 1000 MG: 650 SOLUTION ORAL at 15:47

## 2023-12-25 RX ADMIN — QUETIAPINE FUMARATE 25 MG: 25 TABLET ORAL at 19:32

## 2023-12-25 RX ADMIN — IPRATROPIUM BROMIDE AND ALBUTEROL SULFATE 1 DOSE: 2.5; .5 SOLUTION RESPIRATORY (INHALATION) at 00:08

## 2023-12-25 RX ADMIN — NICARDIPINE HYDROCHLORIDE 5 MG/HR: 0.1 INJECTION INTRAVENOUS at 04:15

## 2023-12-25 RX ADMIN — SENNOSIDES AND DOCUSATE SODIUM 1 TABLET: 50; 8.6 TABLET ORAL at 09:45

## 2023-12-25 RX ADMIN — IPRATROPIUM BROMIDE AND ALBUTEROL SULFATE 1 DOSE: 2.5; .5 SOLUTION RESPIRATORY (INHALATION) at 12:26

## 2023-12-25 RX ADMIN — IPRATROPIUM BROMIDE AND ALBUTEROL SULFATE 1 DOSE: 2.5; .5 SOLUTION RESPIRATORY (INHALATION) at 21:31

## 2023-12-25 RX ADMIN — ACETAMINOPHEN 1000 MG: 650 SOLUTION ORAL at 06:24

## 2023-12-25 RX ADMIN — SODIUM CHLORIDE, PRESERVATIVE FREE 40 MG: 5 INJECTION INTRAVENOUS at 09:46

## 2023-12-25 RX ADMIN — SODIUM CHLORIDE, PRESERVATIVE FREE 10 ML: 5 INJECTION INTRAVENOUS at 09:46

## 2023-12-25 RX ADMIN — DILTIAZEM HYDROCHLORIDE 30 MG: 30 TABLET, FILM COATED ORAL at 14:00

## 2023-12-25 RX ADMIN — CARVEDILOL 12.5 MG: 12.5 TABLET, FILM COATED ORAL at 18:56

## 2023-12-25 RX ADMIN — NYSTATIN 500000 UNITS: 100000 SUSPENSION ORAL at 18:56

## 2023-12-25 ASSESSMENT — PAIN SCALES - GENERAL
PAINLEVEL_OUTOF10: 0
PAINLEVEL_OUTOF10: 2

## 2023-12-25 NOTE — PROGRESS NOTES
[facility]   Progress Note                   Date:   12/25/2023  Patient name: Rosalie Elizabeth  Date of admission:  12/15/2023  7:30 AM  MRN:   [id number]  YOB: 1965  PCP: AQUILES Perez - CNP    Reason for Admission: status post CABG    Subjective:   Patient was seen and examined. Clinically improving day by day. She remains on high flow nasal cannula oxygen. Remains in normal sinus rhythm.   Was hypertensive overnight and was started on Cardene drip    Medications:   Scheduled Meds:   [Held by provider] furosemide  20 mg IntraVENous BID    apixaban  5 mg Oral BID    QUEtiapine  25 mg Oral BID    melatonin  5 mg Oral Nightly    propranolol  20 mg Oral BID    insulin lispro  0-16 Units SubCUTAneous TID WC    insulin lispro  0-4 Units SubCUTAneous Nightly    pantoprazole (PROTONIX) 40 mg in sodium chloride (PF) 0.9 % 10 mL injection  40 mg IntraVENous Daily    nystatin  5 mL Oral 4x Daily    acetaminophen  1,000 mg Oral 3 times per day    ipratropium 0.5 mg-albuterol 2.5 mg  1 Dose Inhalation Q4H    dilTIAZem  30 mg Oral 3 times per day    sodium chloride flush  5-40 mL IntraVENous 2 times per day    clopidogrel  75 mg Oral Daily    polyethylene glycol  17 g Oral Daily    sennosides-docusate sodium  1 tablet Oral BID     Continuous Infusions:   niCARdipine 5 mg/hr (12/25/23 0800)    dexmedetomidine Stopped (12/24/23 0947)    sodium chloride Stopped (12/24/23 1040)    EPINEPHrine Stopped (12/20/23 0806)    fentaNYL 50 mcg/mL Stopped (12/22/23 1000)    norepinephrine Stopped (12/21/23 1456)    sodium chloride Stopped (12/17/23 2323)    sodium chloride Stopped (12/18/23 1705)    dextrose      vasopressin 20 Units in sodium chloride 0.9 % 100 mL infusion Stopped (12/19/23 0541)    milrinone Stopped (12/21/23 0522)    insulin Stopped (12/21/23 1825)     CBC:   Recent Labs     12/23/23  0444 12/24/23  0410   WBC 15.7* 16.7*   HGB 10.2* 9.8*    423       BMP:

## 2023-12-25 NOTE — PROGRESS NOTES
Occupational Therapy  Facility/Department: [facility] CAR 1- SICU  Occupational Daily Treatment Note    Name: Luz Nolan  : 1965  MRN: [id number]  Date of Service: 2023    Discharge Recommendations:  Patient would benefit from continued therapy after discharge  Patient Diagnosis(es): The encounter diagnosis was S/P CABG x 3. Past Medical History:  has a past medical history of Allergic rhinitis, Arthritis, CAD (coronary artery disease), CKD (chronic kidney disease), stage III ([address]), Depression, Fibromyalgia, Gout, History of fractured vertebra, Hyperlipidemia, Neuropathy, OA (osteoarthritis), Peripheral vascular disease ([address]), Sacroiliac joint dysfunction, Tremor, Type II or unspecified type diabetes mellitus without mention of complication, not stated as uncontrolled, Under care of team, and Wellness examination. Past Surgical History:  has a past surgical history that includes other surgical history (); Cholecystectomy;  section; other surgical history (); knee surgery; back surgery; Cardiac procedure (N/A, 2023); joint replacement (Right); and Coronary artery bypass graft (N/A, 12/15/2023). Treatment Diagnosis: CABG x3  Assessment   Performance deficits / Impairments: Decreased functional mobility ; Decreased ADL status; Decreased ROM; Decreased strength;Decreased safe awareness;Decreased cognition;Decreased endurance;Decreased balance;Decreased high-level IADLs;Decreased posture  Prognosis: Fair  Activity Tolerance  Activity Tolerance: Patient limited by fatigue;Treatment limited secondary to medical complications (free text); Treatment limited secondary to decreased cognition;Patient Tolerated treatment well  Activity Tolerance Comments: Nausea, dizziness.         Plan   Occupational Therapy Plan  Times Per Week: 4-5x/wk (CABG x3)  Current Treatment Recommendations: Strengthening, ROM, Balance training, Functional mobility training, Endurance training, Cognitive reorientation,

## 2023-12-25 NOTE — PROGRESS NOTES
Spoke to [facility] NP via phone call, explained sustained HTN >165. Verbal order for Cardene gtt to keep SBP <160. See order.

## 2023-12-25 NOTE — PLAN OF CARE
Problem: Discharge Planning  Goal: Discharge to home or other facility with appropriate resources  Outcome: Progressing  Flowsheets (Taken 12/24/2023 2000)  Discharge to home or other facility with appropriate resources:   Identify barriers to discharge with patient and caregiver   Arrange for needed discharge resources and transportation as appropriate   Identify discharge learning needs (meds, wound care, etc)   Arrange for interpreters to assist at discharge as needed   Refer to discharge planning if patient needs post-hospital services based on physician order or complex needs related to functional status, cognitive ability or social support system     Problem: Chronic Conditions and Co-morbidities  Goal: Patient's chronic conditions and co-morbidity symptoms are monitored and maintained or improved  Outcome: Progressing  Flowsheets (Taken 12/24/2023 2000)  Care Plan - Patient's Chronic Conditions and Co-Morbidity Symptoms are Monitored and Maintained or Improved:   Monitor and assess patient's chronic conditions and comorbid symptoms for stability, deterioration, or improvement   Collaborate with multidisciplinary team to address chronic and comorbid conditions and prevent exacerbation or deterioration   Update acute care plan with appropriate goals if chronic or comorbid symptoms are exacerbated and prevent overall improvement and discharge     Problem: Respiratory - Adult  Goal: Achieves optimal ventilation and oxygenation  Outcome: Progressing     Problem: Safety - Adult  Goal: Free from fall injury  Outcome: Progressing  Flowsheets (Taken 12/25/2023 0046)  Free From Fall Injury: Based on caregiver fall risk screen, instruct family/caregiver to ask for assistance with transferring infant if caregiver noted to have fall risk factors     Problem: Skin/Tissue Integrity  Goal: Absence of new skin breakdown  Description: 1. Monitor for areas of redness and/or skin breakdown  2.   Assess vascular access sites

## 2023-12-25 NOTE — PROGRESS NOTES
Physical Therapy  Facility/Department: [facility] CAR 1- SICU  Physical Therapy Daily Treatment Note    Name: Allison Yang  : 1965  MRN: [id number]  Date of Service: 2023    Discharge Recommendations:  Patient would benefit from continued therapy after discharge   PT Equipment Recommendations  Equipment Needed: No      Patient Diagnosis(es): The encounter diagnosis was S/P CABG x 3. Past Medical History:  has a past medical history of Allergic rhinitis, Arthritis, CAD (coronary artery disease), CKD (chronic kidney disease), stage III ([address]), Depression, Fibromyalgia, Gout, History of fractured vertebra, Hyperlipidemia, Neuropathy, OA (osteoarthritis), Peripheral vascular disease ([address]), Sacroiliac joint dysfunction, Tremor, Type II or unspecified type diabetes mellitus without mention of complication, not stated as uncontrolled, Under care of team, and Wellness examination. Past Surgical History:  has a past surgical history that includes other surgical history (); Cholecystectomy;  section; other surgical history (); knee surgery; back surgery; Cardiac procedure (N/A, 2023); joint replacement (Right); and Coronary artery bypass graft (N/A, 12/15/2023). Assessment   Body Structures, Functions, Activity Limitations Requiring Skilled Therapeutic Intervention: Decreased functional mobility ; Decreased endurance;Decreased balance;Decreased strength;Decreased cognition;Decreased safe awareness  Assessment: Pt required maxA x2 to perform bed mobility and maxA x2 to perform STS transfers in brigid stedy and with RW. Pt most limited by significant decreased strength balance, and endurance. Recommending continued PT to address deficits and maximize safety and independence with mobility.   Therapy Prognosis: Good  Requires PT Follow-Up: Yes  Activity Tolerance  Activity Tolerance: Patient limited by endurance;Treatment limited secondary to decreased cognition;Patient limited by fatigue  Activity Tolerance Comments: limited by nausea.     Plan   Physical Therapy Plan  General Plan: 6-7 times per week  Current Treatment Recommendations: Strengthening, Balance training, Functional mobility training, Transfer training, Gait training, Safety education & training, Home exercise program, Therapeutic activities, Patient/Caregiver education & training, Equipment evaluation, education, & procurement, Endurance training  Safety Devices  Type of Devices: Call light within reach, Nurse notified, Gait belt, All fall risk precautions in place, Patient at risk for falls, Chair alarm in place, Left in chair, Heels elevated for pressure relief  Restraints  Restraints Initially in Place: No     Restrictions  Restrictions/Precautions  Restrictions/Precautions: Up as Tolerated, Surgical Protocols, Cardiac, Fall Risk  Required Braces or Orthoses?: Yes  Required Braces or Orthoses  Other: Heart Hugger Brace  Position Activity Restriction  Sternal Precautions: No Pushing, No Pulling, 5# Lifting Restrictions  Other position/activity restrictions: up in chair, high flow, multiple lines, ART line, S/P CABG x3 (12/15/2023)     Subjective   General  Patient assessed for rehabilitation services?: Yes  Response To Previous Treatment: Patient with no complaints from previous session.  Family / Caregiver Present: No  Follows Commands: Impaired  General Comment  Comments: Pt retired to seated in chair at end of session with call light in reach.  Subjective  Subjective: Pt and RN agreeable to therapy this morning. Pt supine in bed upon arrival with no c/o pain. Pt pleasant and cooperative throughout session.         Cognition   Orientation  Overall Orientation Status: Within Functional Limits  Cognition  Overall Cognitive Status: Exceptions  Arousal/Alertness: Delayed responses to stimuli  Following Commands: Follows one step commands with increased time;Follows one step commands with repetition;Inconsistently follows commands  Attention

## 2023-12-25 NOTE — PLAN OF CARE
Problem: Discharge Planning  Goal: Discharge to home or other facility with appropriate resources  12/25/2023 1231 by Lindsey Cortez RN  Outcome: Progressing     Problem: Chronic Conditions and Co-morbidities  Goal: Patient's chronic conditions and co-morbidity symptoms are monitored and maintained or improved  12/25/2023 1231 by Lindsey Cortez RN  Outcome: Progressing     Problem: Respiratory - Adult  Goal: Achieves optimal ventilation and oxygenation  12/25/2023 1231 by Lindsey Cortez RN  Outcome: Progressing     Problem: Safety - Adult  Goal: Free from fall injury  12/25/2023 1231 by Lindsey Cortez RN  Outcome: Progressing     Problem: Skin/Tissue Integrity  Goal: Absence of new skin breakdown  Description: 1. Monitor for areas of redness and/or skin breakdown  2. Assess vascular access sites hourly  3. Every 4-6 hours minimum:  Change oxygen saturation probe site  4. Every 4-6 hours:  If on nasal continuous positive airway pressure, respiratory therapy assess nares and determine need for appliance change or resting period. 12/25/2023 1231 by Lindsey Cortez RN  Outcome: Progressing     Problem: Pain  Goal: Verbalizes/displays adequate comfort level or baseline comfort level  12/25/2023 1231 by Lindsey Cortez RN  Outcome: Progressing     Problem: Nutrition Deficit:  Goal: Optimize nutritional status  12/25/2023 1231 by Lindsey Cortez RN  Outcome: Progressing     Problem: Confusion  Goal: Confusion, delirium, dementia, or psychosis is improved or at baseline  Description: INTERVENTIONS:  1. Assess for possible contributors to thought disturbance, including medications, impaired vision or hearing, underlying metabolic abnormalities, dehydration, psychiatric diagnoses, and notify attending LIP  2. [place] high risk fall precautions, as indicated  3. Provide frequent short contacts to provide reality reorientation, refocusing and direction  4.  Decrease environmental stimuli, including noise as

## 2023-12-26 ENCOUNTER — APPOINTMENT (OUTPATIENT)
Dept: ULTRASOUND IMAGING | Age: 58
DRG: 235 | End: 2023-12-26
Attending: THORACIC SURGERY (CARDIOTHORACIC VASCULAR SURGERY)
Payer: COMMERCIAL

## 2023-12-26 ENCOUNTER — APPOINTMENT (OUTPATIENT)
Dept: GENERAL RADIOLOGY | Age: 58
DRG: 235 | End: 2023-12-26
Attending: THORACIC SURGERY (CARDIOTHORACIC VASCULAR SURGERY)
Payer: COMMERCIAL

## 2023-12-26 PROBLEM — I10 PRIMARY HYPERTENSION: Status: ACTIVE | Noted: 2023-12-26

## 2023-12-26 PROBLEM — N17.9 AKI (ACUTE KIDNEY INJURY) (HCC): Status: ACTIVE | Noted: 2023-12-26

## 2023-12-26 PROBLEM — N17.9 ACUTE KIDNEY INJURY SUPERIMPOSED ON CKD (HCC): Status: ACTIVE | Noted: 2023-12-26

## 2023-12-26 PROBLEM — N18.9 ACUTE KIDNEY INJURY SUPERIMPOSED ON CKD (HCC): Status: ACTIVE | Noted: 2023-12-26

## 2023-12-26 PROBLEM — J15.0 PNEUMONIA OF LEFT LOWER LOBE DUE TO KLEBSIELLA PNEUMONIAE (HCC): Status: ACTIVE | Noted: 2023-12-26

## 2023-12-26 PROBLEM — E87.0 HYPERNATREMIA: Status: ACTIVE | Noted: 2023-12-26

## 2023-12-26 PROBLEM — Z88.1 ALLERGY TO MULTIPLE ANTIBIOTICS: Status: ACTIVE | Noted: 2023-12-26

## 2023-12-26 PROBLEM — I50.82 BIVENTRICULAR CONGESTIVE HEART FAILURE (HCC): Status: ACTIVE | Noted: 2023-12-26

## 2023-12-26 LAB
ANION GAP SERPL CALCULATED.3IONS-SCNC: 11 MMOL/L (ref 9–17)
BUN SERPL-MCNC: 57 MG/DL (ref 6–20)
C3 SERPL-MCNC: 136 MG/DL (ref 90–180)
C4 SERPL-MCNC: 34 MG/DL (ref 10–40)
CA-I BLD-SCNC: 1.2 MMOL/L (ref 1.13–1.33)
CALCIUM SERPL-MCNC: 8.7 MG/DL (ref 8.6–10.4)
CHLORIDE SERPL-SCNC: 124 MMOL/L (ref 98–107)
CO2 SERPL-SCNC: 21 MMOL/L (ref 20–31)
CREAT SERPL-MCNC: 1.7 MG/DL (ref 0.5–0.9)
CREAT UR-MCNC: 50.8 MG/DL (ref 28–217)
ERYTHROCYTE [DISTWIDTH] IN BLOOD BY AUTOMATED COUNT: 15.7 % (ref 11.8–14.4)
GFR SERPL CREATININE-BSD FRML MDRD: 35 ML/MIN/1.73M2
GLUCOSE SERPL-MCNC: 341 MG/DL (ref 70–99)
HCT VFR BLD AUTO: 33.3 % (ref 36.3–47.1)
HGB BLD-MCNC: 10.1 G/DL (ref 11.9–15.1)
MAGNESIUM SERPL-MCNC: 2.7 MG/DL (ref 1.6–2.6)
MCH RBC QN AUTO: 29.4 PG (ref 25.2–33.5)
MCHC RBC AUTO-ENTMCNC: 30.3 G/DL (ref 28.4–34.8)
MCV RBC AUTO: 96.8 FL (ref 82.6–102.9)
MICROORGANISM SPEC CULT: ABNORMAL
MICROORGANISM SPEC CULT: ABNORMAL
MICROORGANISM/AGENT SPEC: ABNORMAL
NRBC BLD-RTO: 0.1 PER 100 WBC
PLATELET # BLD AUTO: 513 K/UL (ref 138–453)
PMV BLD AUTO: 10 FL (ref 8.1–13.5)
POTASSIUM SERPL-SCNC: 5.1 MMOL/L (ref 3.7–5.3)
RBC # BLD AUTO: 3.44 M/UL (ref 3.95–5.11)
SODIUM SERPL-SCNC: 156 MMOL/L (ref 135–144)
SODIUM UR-SCNC: 107 MMOL/L
SPECIMEN DESCRIPTION: ABNORMAL
TOTAL PROTEIN, URINE: 162 MG/DL
WBC OTHER # BLD: 21.7 K/UL (ref 3.5–11.3)

## 2023-12-26 PROCEDURE — 83935 ASSAY OF URINE OSMOLALITY: CPT

## 2023-12-26 PROCEDURE — 6360000002 HC RX W HCPCS: Performed by: SURGERY

## 2023-12-26 PROCEDURE — 85027 COMPLETE CBC AUTOMATED: CPT

## 2023-12-26 PROCEDURE — 2580000003 HC RX 258: Performed by: SURGERY

## 2023-12-26 PROCEDURE — 6370000000 HC RX 637 (ALT 250 FOR IP): Performed by: NURSE PRACTITIONER

## 2023-12-26 PROCEDURE — 6360000002 HC RX W HCPCS: Performed by: INTERNAL MEDICINE

## 2023-12-26 PROCEDURE — 83735 ASSAY OF MAGNESIUM: CPT

## 2023-12-26 PROCEDURE — 86160 COMPLEMENT ANTIGEN: CPT

## 2023-12-26 PROCEDURE — 84300 ASSAY OF URINE SODIUM: CPT

## 2023-12-26 PROCEDURE — 97110 THERAPEUTIC EXERCISES: CPT

## 2023-12-26 PROCEDURE — 6360000002 HC RX W HCPCS: Performed by: PHYSICIAN ASSISTANT

## 2023-12-26 PROCEDURE — 2100000001 HC CVICU R&B

## 2023-12-26 PROCEDURE — 99233 SBSQ HOSP IP/OBS HIGH 50: CPT | Performed by: INTERNAL MEDICINE

## 2023-12-26 PROCEDURE — 99222 1ST HOSP IP/OBS MODERATE 55: CPT | Performed by: PHYSICAL MEDICINE & REHABILITATION

## 2023-12-26 PROCEDURE — 2580000003 HC RX 258: Performed by: INTERNAL MEDICINE

## 2023-12-26 PROCEDURE — 82947 ASSAY GLUCOSE BLOOD QUANT: CPT

## 2023-12-26 PROCEDURE — 6370000000 HC RX 637 (ALT 250 FOR IP): Performed by: STUDENT IN AN ORGANIZED HEALTH CARE EDUCATION/TRAINING PROGRAM

## 2023-12-26 PROCEDURE — 94761 N-INVAS EAR/PLS OXIMETRY MLT: CPT

## 2023-12-26 PROCEDURE — 6370000000 HC RX 637 (ALT 250 FOR IP): Performed by: PHYSICIAN ASSISTANT

## 2023-12-26 PROCEDURE — 80048 BASIC METABOLIC PNL TOTAL CA: CPT

## 2023-12-26 PROCEDURE — 94640 AIRWAY INHALATION TREATMENT: CPT

## 2023-12-26 PROCEDURE — 71045 X-RAY EXAM CHEST 1 VIEW: CPT

## 2023-12-26 PROCEDURE — 6370000000 HC RX 637 (ALT 250 FOR IP): Performed by: INTERNAL MEDICINE

## 2023-12-26 PROCEDURE — 99222 1ST HOSP IP/OBS MODERATE 55: CPT | Performed by: INTERNAL MEDICINE

## 2023-12-26 PROCEDURE — 97535 SELF CARE MNGMENT TRAINING: CPT

## 2023-12-26 PROCEDURE — 76770 US EXAM ABDO BACK WALL COMP: CPT

## 2023-12-26 PROCEDURE — 82330 ASSAY OF CALCIUM: CPT

## 2023-12-26 PROCEDURE — 97530 THERAPEUTIC ACTIVITIES: CPT

## 2023-12-26 PROCEDURE — 99024 POSTOP FOLLOW-UP VISIT: CPT | Performed by: NURSE PRACTITIONER

## 2023-12-26 PROCEDURE — 2700000000 HC OXYGEN THERAPY PER DAY

## 2023-12-26 PROCEDURE — 84156 ASSAY OF PROTEIN URINE: CPT

## 2023-12-26 PROCEDURE — 6370000000 HC RX 637 (ALT 250 FOR IP)

## 2023-12-26 PROCEDURE — C9113 INJ PANTOPRAZOLE SODIUM, VIA: HCPCS | Performed by: SURGERY

## 2023-12-26 PROCEDURE — 82570 ASSAY OF URINE CREATININE: CPT

## 2023-12-26 PROCEDURE — A4216 STERILE WATER/SALINE, 10 ML: HCPCS | Performed by: SURGERY

## 2023-12-26 RX ORDER — CHLOROTHIAZIDE SODIUM 500 MG/1
250 INJECTION INTRAVENOUS ONCE
Status: COMPLETED | OUTPATIENT
Start: 2023-12-26 | End: 2023-12-26

## 2023-12-26 RX ORDER — INSULIN LISPRO 100 [IU]/ML
0-4 INJECTION, SOLUTION INTRAVENOUS; SUBCUTANEOUS NIGHTLY
Status: DISCONTINUED | OUTPATIENT
Start: 2023-12-26 | End: 2023-12-29 | Stop reason: HOSPADM

## 2023-12-26 RX ORDER — INSULIN LISPRO 100 [IU]/ML
0-16 INJECTION, SOLUTION INTRAVENOUS; SUBCUTANEOUS
Status: DISCONTINUED | OUTPATIENT
Start: 2023-12-26 | End: 2023-12-29 | Stop reason: HOSPADM

## 2023-12-26 RX ORDER — SODIUM CHLORIDE 450 MG/100ML
INJECTION, SOLUTION INTRAVENOUS ONCE
Status: COMPLETED | OUTPATIENT
Start: 2023-12-26 | End: 2023-12-26

## 2023-12-26 RX ORDER — MOXIFLOXACIN HYDROCHLORIDE 400 MG/1
400 TABLET ORAL DAILY
Status: DISCONTINUED | OUTPATIENT
Start: 2023-12-26 | End: 2023-12-29 | Stop reason: HOSPADM

## 2023-12-26 RX ADMIN — POLYETHYLENE GLYCOL 3350 17 G: 17 POWDER, FOR SOLUTION ORAL at 08:26

## 2023-12-26 RX ADMIN — SODIUM CHLORIDE: 4.5 INJECTION, SOLUTION INTRAVENOUS at 09:46

## 2023-12-26 RX ADMIN — ACETAMINOPHEN 1000 MG: 650 SOLUTION ORAL at 14:48

## 2023-12-26 RX ADMIN — IPRATROPIUM BROMIDE AND ALBUTEROL SULFATE 1 DOSE: 2.5; .5 SOLUTION RESPIRATORY (INHALATION) at 08:25

## 2023-12-26 RX ADMIN — IPRATROPIUM BROMIDE AND ALBUTEROL SULFATE 1 DOSE: 2.5; .5 SOLUTION RESPIRATORY (INHALATION) at 15:41

## 2023-12-26 RX ADMIN — QUETIAPINE FUMARATE 25 MG: 25 TABLET ORAL at 21:00

## 2023-12-26 RX ADMIN — IPRATROPIUM BROMIDE AND ALBUTEROL SULFATE 1 DOSE: 2.5; .5 SOLUTION RESPIRATORY (INHALATION) at 04:51

## 2023-12-26 RX ADMIN — CARVEDILOL 12.5 MG: 12.5 TABLET, FILM COATED ORAL at 15:01

## 2023-12-26 RX ADMIN — CARVEDILOL 12.5 MG: 12.5 TABLET, FILM COATED ORAL at 08:34

## 2023-12-26 RX ADMIN — ONDANSETRON 4 MG: 2 INJECTION INTRAMUSCULAR; INTRAVENOUS at 10:15

## 2023-12-26 RX ADMIN — IPRATROPIUM BROMIDE AND ALBUTEROL SULFATE 1 DOSE: 2.5; .5 SOLUTION RESPIRATORY (INHALATION) at 12:18

## 2023-12-26 RX ADMIN — Medication 5 MG: at 21:00

## 2023-12-26 RX ADMIN — QUETIAPINE FUMARATE 25 MG: 25 TABLET ORAL at 08:26

## 2023-12-26 RX ADMIN — DILTIAZEM HYDROCHLORIDE 30 MG: 30 TABLET, FILM COATED ORAL at 14:49

## 2023-12-26 RX ADMIN — ACETAMINOPHEN 1000 MG: 650 SOLUTION ORAL at 21:00

## 2023-12-26 RX ADMIN — DILTIAZEM HYDROCHLORIDE 30 MG: 30 TABLET, FILM COATED ORAL at 21:04

## 2023-12-26 RX ADMIN — APIXABAN 5 MG: 5 TABLET, FILM COATED ORAL at 08:26

## 2023-12-26 RX ADMIN — CHLOROTHIAZIDE SODIUM 250 MG: 500 INJECTION, POWDER, LYOPHILIZED, FOR SOLUTION INTRAVENOUS at 11:13

## 2023-12-26 RX ADMIN — SENNOSIDES AND DOCUSATE SODIUM 1 TABLET: 50; 8.6 TABLET ORAL at 08:26

## 2023-12-26 RX ADMIN — INSULIN LISPRO 12 UNITS: 100 INJECTION, SOLUTION INTRAVENOUS; SUBCUTANEOUS at 08:42

## 2023-12-26 RX ADMIN — AMLODIPINE BESYLATE 5 MG: 5 TABLET ORAL at 08:34

## 2023-12-26 RX ADMIN — DILTIAZEM HYDROCHLORIDE 30 MG: 30 TABLET, FILM COATED ORAL at 05:30

## 2023-12-26 RX ADMIN — ACETAMINOPHEN 1000 MG: 650 SOLUTION ORAL at 05:30

## 2023-12-26 RX ADMIN — SODIUM CHLORIDE, PRESERVATIVE FREE 40 MG: 5 INJECTION INTRAVENOUS at 08:26

## 2023-12-26 RX ADMIN — MOXIFLOXACIN HYDROCHLORIDE 400 MG: 400 TABLET, FILM COATED ORAL at 14:49

## 2023-12-26 RX ADMIN — APIXABAN 5 MG: 5 TABLET, FILM COATED ORAL at 21:00

## 2023-12-26 RX ADMIN — IPRATROPIUM BROMIDE AND ALBUTEROL SULFATE 1 DOSE: 2.5; .5 SOLUTION RESPIRATORY (INHALATION) at 20:42

## 2023-12-26 RX ADMIN — CLOPIDOGREL BISULFATE 75 MG: 75 TABLET ORAL at 08:26

## 2023-12-26 RX ADMIN — IPRATROPIUM BROMIDE AND ALBUTEROL SULFATE 1 DOSE: 2.5; .5 SOLUTION RESPIRATORY (INHALATION) at 00:27

## 2023-12-26 ASSESSMENT — PAIN SCALES - GENERAL
PAINLEVEL_OUTOF10: 0
PAINLEVEL_OUTOF10: 0

## 2023-12-26 NOTE — PLAN OF CARE
Problem: Discharge Planning  Goal: Discharge to home or other facility with appropriate resources  12/25/2023 2024 by Laura Padgett RN  Outcome: Progressing  Flowsheets (Taken 12/25/2023 2000)  Discharge to home or other facility with appropriate resources: Identify barriers to discharge with patient and caregiver  12/25/2023 1231 by Michi Batista RN  Outcome: Progressing     Problem: Chronic Conditions and Co-morbidities  Goal: Patient's chronic conditions and co-morbidity symptoms are monitored and maintained or improved  12/25/2023 2024 by Laura Padgett RN  Outcome: Progressing  Flowsheets (Taken 12/25/2023 2000)  Care Plan - Patient's Chronic Conditions and Co-Morbidity Symptoms are Monitored and Maintained or Improved: Monitor and assess patient's chronic conditions and comorbid symptoms for stability, deterioration, or improvement  12/25/2023 1231 by Michi Batista RN  Outcome: Progressing     Problem: Respiratory - Adult  Goal: Achieves optimal ventilation and oxygenation  12/25/2023 2024 by Laura Padgett RN  Outcome: Progressing  12/25/2023 1231 by Michi Batista RN  Outcome: Progressing     Problem: Safety - Adult  Goal: Free from fall injury  12/25/2023 2024 by Laura Padgett RN  Outcome: Progressing  12/25/2023 1231 by Michi Batista RN  Outcome: Progressing     Problem: Skin/Tissue Integrity  Goal: Absence of new skin breakdown  Description: 1. Monitor for areas of redness and/or skin breakdown  2. Assess vascular access sites hourly  3. Every 4-6 hours minimum:  Change oxygen saturation probe site  4. Every 4-6 hours:  If on nasal continuous positive airway pressure, respiratory therapy assess nares and determine need for appliance change or resting period.   12/25/2023 2024 by Laura Padgett RN  Outcome: Progressing  12/25/2023 1231 by Michi Batista RN  Outcome: Progressing     Problem: ABCDS Injury Assessment  Goal: Absence of physical injury  12/25/2023

## 2023-12-26 NOTE — PROGRESS NOTES
[facility] - Progress Note  Today's Date and Time: 12/26/2023, 2:00 PM    Impression :   Bandemia   Acute hypoxic respiratory failure  Concern with LLL atelectasis vs pleural effusion vs pneumonia  Klebsiella isolated from Sputum  KENRICK on CKD III  Hypernatremia  A fib with RVR  CAD, S/P elective CABG  DM type 2  Obesity    Recommendations:   Moxifloxacin 400 mg po q day x 7 days. Stop date 1-3-24  Monitor WBC    Medical Decision Making/Summary/Discussion:12/26/2023     CAD  S/P CABG on 12-15-23  Persistent Leucocytosis despite prior treatment for presumptive pneumonia with Zosyn  Concern for LLL changes: Atelectasis vs pleural effusion vs pneumonia  KENRICK on CKD  Multiple allergies  Sputum with Klebsiella  Plan: Moxifloxacin po 400 mg/day x 7 days to avoid issues with renal failure, allergies  CVS to exclude pleural effusion and explore feasibility of thoracentesis  Infection Control Recommendations   [place] Precautions    Antimicrobial Stewardship Recommendations     Simplification of therapy  Targeted therapy  IV to oral conversion    Coordination of Outpatient Care:   Estimated Length of IV antimicrobials:TBD  Patient will need Midline Catheter Insertion: TBD  Patient will need PICC line Insertion:TBD  Patient will need: Home IV , Infusion Center,  SNF,  LTAC: TBD  Patient will need outpatient wound care: Yes    Chief complaint/reason for consultation:   Concern with pneumonia      History of Present Illness:   Cele Camarena is a 58 y.o.-year-old  female who was initially admitted on 12/15/2023. Patient seen at the request of Dr. Carreon.    INITIAL HISTORY:    Patient suffers from DM 2, essential HTN, CKD stage III, osteoarthritis.     She was evaluated for a knee arthroplasty and found to have CAD. This led to CABG on 12-15-23.    Her immediate post op course was unremarkable. However, she subsequently developed the onset of A fib with RVR, acute hypoxic  infiltrates. Support tubes as described above. XR CHEST PORTABLE    Result Date: 12/19/2023  EXAMINATION: ONE XRAY VIEW OF THE CHEST 12/19/2023 4:34 am COMPARISON: 12/18/2023 HISTORY: ORDERING SYSTEM PROVIDED HISTORY: Post op open heart surgery TECHNOLOGIST PROVIDED HISTORY: Post op open heart surgery Reason for Exam: port supine FINDINGS: Cardiomediastinal silhouette is stable. Similar position of devices. Slightly improved aeration of the lungs. No pleural effusion or pneumothorax. No gross bony abnormality. Slightly improved aeration of the lungs. Medical Decision Omgziw-[id number]-Vbeqv:     Results       Procedure Component Value Units Date/Time    Culture, Respiratory [[id number]]  (Abnormal)  (Susceptibility) Collected: 12/24/23 1528    Order Status: Completed Specimen: Sputum Expectorated Updated: 12/26/23 1118     Specimen Description . EXPECTORATED SPUTUM     Direct Exam < 10 EPITHELIAL CELLS/LPF      <10 NEUTROPHILS/LPF      MIXED BACTERIAL MORPHOTYPES SEEN ON GRAM STAIN. Culture KLEBSIELLA PNEUMONIAE MODERATE GROWTH      NO NORMAL LAURI    Susceptibility        Klebsiella pneumoniae      BACTERIAL SUSCEPTIBILITY PANEL GABRIEL      ampicillin >=32  Resistant      ceFAZolin <=4  Sensitive      cefTRIAXone <=0.25  Sensitive      Confirmatory Extended Spectrum Beta-Lactamase NEGATIVE  Sensitive      gentamicin <=1  Sensitive      levofloxacin <=0.12  Sensitive      piperacillin-tazobactam >=128  Resistant      tobramycin <=1  Sensitive      trimethoprim-sulfamethoxazole <=20  Sensitive                           Culture, Blood 1 [[id number]] Collected: 12/24/23 1213    Order Status: Completed Specimen: Blood Updated: 12/26/23 1308     Specimen Description . BLOOD     Special Requests  RT H 3 ML     Culture NO GROWTH 2 DAYS    Culture, Blood 1 [[id number]]     Order Status: Canceled Specimen: Blood     COVID-19, Rapid [[id number]] Collected: 12/17/23 1012    Order Status: Completed Specimen:

## 2023-12-26 NOTE — CONSULTS
exposure  No history of hypotension  No history of NSAID/ACE/ARB/nephrotoxic agents  No history suggestive of obstruction  No history of nausea/vomiting/diarrhoea  No history of KENRICK in past  No history of recurrent UTI infection/surgeries to KUB        PAST MEDICAL HISTORY         Diagnosis Date    Allergic rhinitis     Arthritis     CAD (coronary artery disease)     multivessel. Dr. Grier [facility] [place] last visit 10/2023    CKD (chronic kidney disease), stage III (HCC)     Dr. Gann Nephrology last appt 10/10/2023    Depression     Fibromyalgia     Gout     Dr. Cyndie Cabrales [facility]    History of fractured vertebra 2019    L2 L3 L4    Hyperlipidemia     Neuropathy     OA (osteoarthritis)     Peripheral vascular disease (HCC)     Sacroiliac joint dysfunction     Tremor     Type II or unspecified type diabetes mellitus without mention of complication, not stated as uncontrolled     PCP    Under care of team     Dr. Kings Lugo last appt 2023    Wellness examination     Leena Murrieta CNP PCP last appt 10/2023       PAST SURGICAL HISTORY          Procedure Laterality Date    BACK SURGERY      CARDIAC PROCEDURE N/A 2023    paxton / Left heart cath / coronary angiography performed by Elena Morris MD at [facility] CARDIAC CATH LAB     SECTION      CHOLECYSTECTOMY      CORONARY ARTERY BYPASS GRAFT N/A 12/15/2023    CABG CORONARY ARTERY BYPASS X3, ON PUMP SWAN SHANTHI, LESLIE, RADIAL HARVEST performed by Deshawn Carreon MD at [facility] CVOR    JOINT REPLACEMENT Right     partial knee replacement    KNEE SURGERY      right    OTHER SURGICAL HISTORY      darrach procedure    OTHER SURGICAL HISTORY      darrach procedure revision       MEDICATIONS     Home Meds:                Medications Prior to Admission: metFORMIN (GLUCOPHAGE) 1000 MG tablet, take 1 tablet by mouth every morning and evening with meals  tiZANidine (ZANAFLEX) 4 MG tablet, Take 1 tablet by mouth every 8 hours as  the consultation. Please do not hesitate to call with questions. This note is created with the assistance of a speech-recognition program. While intending to generate a document that actually reflects the content of the visit, no guarantees can be provided that every mistake has been identified and corrected by editing.     Tristian Fong MD MD, [facility] Arnold Garay, FACP   12/26/2023 9:22 AM    [facility] [place]

## 2023-12-26 NOTE — PROGRESS NOTES
[facility]   Progress Note                   Date:   12/26/2023  Patient name: Suad Azul  Date of admission:  12/15/2023  7:30 AM  MRN:   [id number]  YOB: 1965  PCP: AQUILES Del Rosario CNP    Reason for Admission: status post CABG    Subjective:   Patient was seen and examined. Continues to improve. More awake and alert today. Remains in normal sinus rhythm. Hypertension better controlled.     Medications:   Scheduled Meds:   insulin lispro  0-16 Units SubCUTAneous TID WC    insulin lispro  0-4 Units SubCUTAneous Nightly    amLODIPine  5 mg Oral Daily    carvedilol  12.5 mg Oral BID WC    [Held by provider] furosemide  20 mg IntraVENous BID    apixaban  5 mg Oral BID    QUEtiapine  25 mg Oral BID    melatonin  5 mg Oral Nightly    insulin lispro  0-16 Units SubCUTAneous TID WC    insulin lispro  0-4 Units SubCUTAneous Nightly    pantoprazole (PROTONIX) 40 mg in sodium chloride (PF) 0.9 % 10 mL injection  40 mg IntraVENous Daily    nystatin  5 mL Oral 4x Daily    acetaminophen  1,000 mg Oral 3 times per day    ipratropium 0.5 mg-albuterol 2.5 mg  1 Dose Inhalation Q4H    dilTIAZem  30 mg Oral 3 times per day    sodium chloride flush  5-40 mL IntraVENous 2 times per day    clopidogrel  75 mg Oral Daily    polyethylene glycol  17 g Oral Daily    sennosides-docusate sodium  1 tablet Oral BID     Continuous Infusions:   sodium chloride 75 mL/hr at 12/26/23 0946    niCARdipine Stopped (12/25/23 1128)    dexmedetomidine Stopped (12/24/23 0947)    sodium chloride Stopped (12/24/23 1040)    EPINEPHrine Stopped (12/20/23 0806)    fentaNYL 50 mcg/mL Stopped (12/22/23 1000)    norepinephrine Stopped (12/21/23 1456)    sodium chloride Stopped (12/17/23 2323)    sodium chloride Stopped (12/18/23 1705)    dextrose      vasopressin 20 Units in sodium chloride 0.9 % 100 mL infusion Stopped (12/19/23 0541)    milrinone Stopped (12/21/23 0522)    insulin Stopped (12/21/23 5823)

## 2023-12-26 NOTE — PLAN OF CARE
Problem: Discharge Planning  Goal: Discharge to home or other facility with appropriate resources  12/26/2023 0742 by Chantelle Hanson RN  Outcome: Progressing     Problem: Chronic Conditions and Co-morbidities  Goal: Patient's chronic conditions and co-morbidity symptoms are monitored and maintained or improved  12/26/2023 0742 by Chantelle Hanson RN  Outcome: Progressing     Problem: Respiratory - Adult  Goal: Achieves optimal ventilation and oxygenation  12/26/2023 0742 by Chantelle Hanson RN  Outcome: Progressing     Problem: Safety - Adult  Goal: Free from fall injury  12/26/2023 0742 by Chantelle Hanson RN  Outcome: Progressing     Problem: Skin/Tissue Integrity  Goal: Absence of new skin breakdown  Description: 1. Monitor for areas of redness and/or skin breakdown  2. Assess vascular access sites hourly  3. Every 4-6 hours minimum:  Change oxygen saturation probe site  4. Every 4-6 hours:  If on nasal continuous positive airway pressure, respiratory therapy assess nares and determine need for appliance change or resting period. 12/26/2023 0742 by Chantelle Hanson RN  Outcome: Progressing     Problem: ABCDS Injury Assessment  Goal: Absence of physical injury  12/26/2023 0742 by Chantelle Hanson RN  Outcome: Progressing     Problem: Pain  Goal: Verbalizes/displays adequate comfort level or baseline comfort level  12/26/2023 0742 by Chantelle Hanson RN  Outcome: Progressing     Problem: Nutrition Deficit:  Goal: Optimize nutritional status  12/26/2023 0742 by Chantelle Hanson RN  Outcome: Progressing     Problem: Confusion  Goal: Confusion, delirium, dementia, or psychosis is improved or at baseline  Description: INTERVENTIONS:  1. Assess for possible contributors to thought disturbance, including medications, impaired vision or hearing, underlying metabolic abnormalities, dehydration, psychiatric diagnoses, and notify attending LIP  2. [place] high risk fall precautions, as indicated  3.  Provide frequent short contacts to

## 2023-12-26 NOTE — PROGRESS NOTES
Comprehensive Nutrition Assessment    Type and Reason for Visit:  Reassess    Nutrition Recommendations/Plan:   Modify TF formula to Renal (Nepro with Carb Steady/lower in Na+ content) goal 45 mL/hr = 1944 kcals, 87 gm protein per day. Monitor TF tolerance/adequacy of intakes - modify as needed. Continue NPO. Monitor results of swallow study and start of oral diet as able. Will monitor labs, weights, and plan of care. Malnutrition Assessment:  Malnutrition Status:  Insufficient data (12/26/23 1032)    Context:  Acute Illness       Nutrition Assessment:    Pt extubated on 12/22. NGT remains in place and receiving TF of Standard with Fiber at goal 35 mL/hr. Discussed with RN about modifying TF formula due to altered labs. Will switch TF formula to Renal goal rate 45 mL/hr. Receiving free water flushes of 250 mL q 4 hrs. Nephrology onboard. RN reports plans for swallow study today - will monitor for start of oral diet as able/appropriate. Weight fluctuations noted - may be due to fluids. Labs reviewed: Na 156 mmol/L, Cl 124 mmol/L, Mg 2.7 mg/dL, Glucose 341 mg/dL, BUN 57 mg/dL. Meds include: Humalog SS, Glycolax, Senokot. Nutrition Related Findings:    Labs/Meds reviewed. Last BM 12/25.    Wound Type: Multiple, Surgical Incision       Current Nutrition Intake & Therapies:    Average Meal Intake: NPO  Average Supplements Intake: NPO  ADULT TUBE FEEDING; Orogastric; Standard with Fiber; Continuous; 20; Yes; 10; Q 4 hours; 35; 250; Q 4 hours  Current Tube Feeding (TF) Orders:  Feeding Route: Nasogastric  Formula: Standard with Fiber  Schedule: Continuous  Feeding Regimen: 35 mL/hr  Additives/Modulars: None  Water Flushes: per MD: 250 mL q 4 hrs  Current TF & Flush Orders Provides: Standard with Fiber at 35 mL/hr = 1260 kcals, 54 gm protein  Goal TF & Flush Orders Provides: Renal at 45 mL/hr= 1944 kcals, 87 gm protein per day    Additional Calorie Sources:  None    Anthropometric Measures:  Height:

## 2023-12-26 NOTE — PROGRESS NOTES
[id number]  Speech Language Pathology    Date: 12/26/2023  Patient Name: John Reed  YOB: 1965   AGE: 63 y.o. MRN: [id number]        Patient Not Available for Speech Therapy     Due to:  [] Testing  [] Hemodialysis  [] Cancelled by RN  [] Surgery   [] Intubation/Sedation/Pain Medication  [] Medical instability  [x] Other: Attempted to complete bedside swallow study. Pt. With a decrease in oxygen saturations and was unable to recovery as quickly as usual, per RN. Pt. Not appropriate for evaluation at this time. Next scheduled treatment: 12/27  Completed by: Ray Bowen, SLP, M.A.  JOSEPH-SLP

## 2023-12-26 NOTE — PROGRESS NOTES
Occupational Therapy  Facility/Department: [facility] CAR 1- SICU  Occupational Therapy Daily Progress Note    Name: Cele Camarena  : 1965  MRN: [id number]  Date of Service: 2023    Discharge Recommendations:Further therapy recommended at discharge. Pt. Is a fall risk.  Patient would benefit from continued therapy after discharge  OT Equipment Recommendations  Equipment Needed:  (CTA as pt. progresses.)       Patient Diagnosis(es): The encounter diagnosis was S/P CABG x 3.  Past Medical History:  has a past medical history of Allergic rhinitis, Arthritis, CAD (coronary artery disease), CKD (chronic kidney disease), stage III (HCC), Depression, Fibromyalgia, Gout, History of fractured vertebra, Hyperlipidemia, Neuropathy, OA (osteoarthritis), Peripheral vascular disease (HCC), Sacroiliac joint dysfunction, Tremor, Type II or unspecified type diabetes mellitus without mention of complication, not stated as uncontrolled, Under care of team, and Wellness examination.  Past Surgical History:  has a past surgical history that includes other surgical history (); Cholecystectomy;  section; other surgical history (); knee surgery; back surgery; Cardiac procedure (N/A, 2023); joint replacement (Right); and Coronary artery bypass graft (N/A, 12/15/2023).    Treatment Diagnosis: CABG x3      Assessment   Performance deficits / Impairments: Decreased functional mobility ;Decreased ADL status;Decreased ROM;Decreased strength;Decreased safe awareness;Decreased cognition;Decreased endurance;Decreased balance;Decreased high-level IADLs;Decreased posture  Assessment: Pt would continue to benefit from acute OT services to address deficits listed above and improve overall functional performance prior to discharge.  Treatment Diagnosis: CABG x3  Prognosis: Fair  Decision Making: Low Complexity  REQUIRES OT FOLLOW-UP: Yes  Activity Tolerance  Activity Tolerance: Patient limited by fatigue;Treatment limited  Patient  Education Provided: Role of Therapy;Plan of Care;Precautions;Transfer Training  Education Provided Comments: OT POC, transfer/walker safety, importance of participation in therapy, purse lip breathing, sternal precautions, heart hugger with fair return. Education Method: Demonstration;Verbal  Barriers to Learning: Cognition  Education Outcome: Continued education needed                                         AM-PAC - ADL  AM-PAC Daily Activity - Inpatient   How much help is needed for putting on and taking off regular lower body clothing?: A Lot  How much help is needed for bathing (which includes washing, rinsing, drying)?: A Lot  How much help is needed for toileting (which includes using toilet, bedpan, or urinal)?: Total  How much help is needed for putting on and taking off regular upper body clothing?: A Lot  How much help is needed for taking care of personal grooming?: A Lot  How much help for eating meals?: Total  AM-PAC Inpatient Daily Activity Raw Score: 10  AM-PAC Inpatient ADL T-Scale Score : 27.31  ADL Inpatient CMS 0-100% Score: 74.7  ADL Inpatient CMS G-Code Modifier : CL         Goals  Short Term Goals  Time Frame for Short Term Goals: By discharge, pt will:  Short Term Goal 1: demo grooming/UB ADLs with CGA, including heart-hugger. Short Term Goal 2: demo LB ADLs with min A, adaptive techniques PRN. Short Term Goal 3: demo safe functional transfers with mod A, in preparation for ADLs. Short Term Goal 4: demo dynamic sitting during functional activities with CGA for 8+ mins. Short Term Goal 5: follow 50% commands throughout entire session.   Short Term Goal 6: Notify OTR for updated goals as needed       Therapy Time   Individual Concurrent Group Co-treatment   Time In 0948         Time Out 1030         Minutes 42         Timed Code Treatment Minutes: [address] [place]/JOVITA

## 2023-12-26 NOTE — PROGRESS NOTES
Physical Therapy  Facility/Department: [facility] CAR 1- SICU  Physical Therapy Daily Treatment Note    Name: Alberto Cast  : 1965  MRN: [id number]  Date of Service: 2023    Discharge Recommendations:  Patient would benefit from continued therapy after discharge Further therapy recommended at discharge. The patient should be able to tolerate at least 3 hours of therapy per day over 5 days or 15 hours over 7 days. This patient may benefit from a Physical Medicine and Rehab consult. PT Equipment Recommendations  Equipment Needed: No (continue to assess)      Patient Diagnosis(es): The encounter diagnosis was S/P CABG x 3. Past Medical History:  has a past medical history of Allergic rhinitis, Arthritis, CAD (coronary artery disease), CKD (chronic kidney disease), stage III ([address]), Depression, Fibromyalgia, Gout, History of fractured vertebra, Hyperlipidemia, Neuropathy, OA (osteoarthritis), Peripheral vascular disease ([address]), Sacroiliac joint dysfunction, Tremor, Type II or unspecified type diabetes mellitus without mention of complication, not stated as uncontrolled, Under care of team, and Wellness examination. Past Surgical History:  has a past surgical history that includes other surgical history (); Cholecystectomy;  section; other surgical history (); knee surgery; back surgery; Cardiac procedure (N/A, 2023); joint replacement (Right); and Coronary artery bypass graft (N/A, 12/15/2023). Assessment   Body Structures, Functions, Activity Limitations Requiring Skilled Therapeutic Intervention: Decreased functional mobility ; Decreased endurance;Decreased balance;Decreased strength;Decreased cognition;Decreased safe awareness  Assessment: Pt required modA x2 to complete sit<>stand transfer to RW and maintain ~45s static stand at RW. Pt required maxAx2 for sit->supine transfers and Jono to maintain sitting at [address] and EOB. Pt dependent with use of heart hugger with all transfers.

## 2023-12-26 NOTE — CONSULTS
[facility] - Initial Consult Note  Today's Date and Time: 12/25/2023, 10:19 PM    Impression :   Bandemia   Acute hypoxic respiratory failure  Concern with LLL atelectasis vs pleural effusion vs pneumonia  Klebsiella isolated from Sputum  KENRICK on CKD III  Hypernatremia  A fib with RVR  CAD, S/P elective CABG  DM type 2  Obesity    Recommendations:   Moxifloxacin 400 mg po q day x 7 days. Stop date 1-3-24  Monitor WBC    Medical Decision Making/Summary/Discussion:12/25/2023       Infection Control Recommendations   [place] Precautions    Antimicrobial Stewardship Recommendations     Simplification of therapy  Targeted therapy  IV to oral conversion    Coordination of Outpatient Care:   Estimated Length of IV antimicrobials:TBD  Patient will need Midline Catheter Insertion: TBD  Patient will need PICC line Insertion:TBD  Patient will need: Home IV , [address] [place],  SNF,  LTAC: TBD  Patient will need outpatient wound care: Yes    Chief complaint/reason for consultation:   Concern with pneumonia      History of Present Illness:   Coco Butler is a 63y.o.-year-old  female who was initially admitted on 12/15/2023. Patient seen at the request of Dr. Heidy Abrams. INITIAL HISTORY:    Patient suffers from DM 2, essential HTN, CKD stage III, osteoarthritis. She was evaluated for a knee arthroplasty and found to have CAD. This led to CABG on 12-15-23. Her immediate post op course was unremarkable. However, she subsequently developed the onset of A fib with RVR, acute hypoxic respiratory failure, KENRICK on CKD, fluid retention and hypernatremia. There was also concern that the patient may have developed pneumonia as a contributory factor of the respiratory failure. She received Zosyn but has persistent leucocytosis. Allergic to penicillin , cephalosporins, azithromycin    ID service asked to evaluate and advice on treatment.      CURRENT EVALUATION 12/25/2023  BP

## 2023-12-26 NOTE — CONSULTS
Physical Medicine & Rehabilitation  Consult Note      Admitting Physician: Jane Holguin MD    Primary Care Provider: AQUILES Landers CNP     Reason for Consult:  Acute Inpatient Rehabilitation    Chief Complaint: Plan for CABG prior to knee replacement    History of Present Illness:  Referring Provider is requesting an evaluation for appropriate placement upon discharge from acute care. Ms. Jesús Tavarez is a 63 y.o. female who was admitted to [facility] on 12/15/2023 with No chief complaint on file. 60-year-old female with history of osteoarthritis, hyperlipidemia, type 2 diabetes, depression, CKD stage III, peripheral vascular send fibromyalgia echo showed ejection fraction 4045% was planned for total replacement however workup revealed coronary artery disease    Cardiology status post CABG x 3 on 12/15/2023, acute ischemic cardiomyopathy ejection fraction 2035% on 12/18 uncontrolled hypertension new onset A-fib on 12/16 pulmonary edema resolved acute respiratory failure required intubation then extubated required reintubation and extubated on 12/21 has allergy to amiodarone-started on Cardene drip 12/24 medication being adjusted continue with Eliquis plan for Plavix and Eliquis on discharge    CT surgery 12/26 eval for thoracentesis by IR today or tomorrow, LifeVest evaluation ejection fraction 2035% nephrology consult for persistent hyponatremia continue rehab noted episodes of agitation    Radiology:  XR CHEST PORTABLE    Result Date: 12/26/2023  Stable exam since yesterday. XR CHEST PORTABLE    Result Date: 12/25/2023  1. Retrocardiac/left basilar airspace disease and small left-sided pleural effusion. No new focal airspace disease. 2. Stable cardiomegaly. 3. NG tube traverses the GE junction with distal tip excluded from the field of view. XR CHEST PORTABLE    Result Date: 12/24/2023  1.  Stable retrocardiac/left basilar airspace disease with small left-sided pleural 4 mg, 4 mg, IntraVENous, Q6H PRN  clopidogrel (PLAVIX) tablet 75 mg, 75 mg, Oral, Daily  hydrALAZINE (APRESOLINE) injection 5 mg, 5 mg, IntraVENous, Q5 Min PRN  diphenhydrAMINE (BENADRYL) tablet 25 mg, 25 mg, Oral, Nightly PRN  polyethylene glycol (GLYCOLAX) packet 17 g, 17 g, Oral, Daily  sennosides-docusate sodium (SENOKOT-S) 8.6-50 MG tablet 1 tablet, 1 tablet, Oral, BID  magnesium hydroxide (MILK OF MAGNESIA) 400 MG/5ML suspension 30 mL, 30 mL, Oral, Daily PRN  bisacodyl (DULCOLAX) suppository 10 mg, 10 mg, Rectal, Daily PRN  albumin human 5% IV solution 25 g, 25 g, IntraVENous, PRN  albumin human 25% IV solution 25 g, 25 g, IntraVENous, PRN  dextrose bolus 10% 125 mL, 125 mL, IntraVENous, PRN **OR** dextrose bolus 10% 250 mL, 250 mL, IntraVENous, PRN  glucagon (rDNA) injection 1 mg, 1 mg, IntraMUSCular, PRN  dextrose 10 % infusion, , IntraVENous, Continuous PRN  vasopressin 20 Units in sodium chloride 0.9 % 100 mL infusion, 0.01-0.04 Units/min, IntraVENous, Continuous  milrinone (PRIMACOR) 20 mg in dextrose 5 % 100 mL infusion, 0.0625-0.75 mcg/kg/min, IntraVENous, Continuous  insulin regular (HUMULIN R;NOVOLIN R) 100 Units in sodium chloride 0.9 % 100 mL infusion, 0.1-50 Units/hr, IntraVENous, Continuous    Social History:  Social History     Socioeconomic History    Marital status:      Spouse name: Not on file    Number of children: Not on file    Years of education: Not on file    Highest education level: Not on file   Occupational History    Not on file   Tobacco Use    Smoking status: Former     Current packs/day: 0.00     Average packs/day: 0.5 packs/day for 7.0 years (3.5 ttl pk-yrs)     Types: Cigarettes     Start date: 1993     Quit date: 2000     Years since quittin.6    Smokeless tobacco: Never   Vaping Use    Vaping Use: Never used   Substance and Sexual Activity    Alcohol use: No    Drug use: No    Sexual activity: Not on file   Other Topics Concern    Not on file   Social

## 2023-12-26 NOTE — PROGRESS NOTES
nystatin  5 mL Oral 4x Daily    acetaminophen  1,000 mg Oral 3 times per day    ipratropium 0.5 mg-albuterol 2.5 mg  1 Dose Inhalation Q4H    dilTIAZem  30 mg Oral 3 times per day    sodium chloride flush  5-40 mL IntraVENous 2 times per day    clopidogrel  75 mg Oral Daily    polyethylene glycol  17 g Oral Daily    sennosides-docusate sodium  1 tablet Oral BID     Continuous Infusions:    niCARdipine Stopped (12/25/23 1128)    dexmedetomidine Stopped (12/24/23 0947)    sodium chloride Stopped (12/24/23 1040)    EPINEPHrine Stopped (12/20/23 0806)    fentaNYL 50 mcg/mL Stopped (12/22/23 1000)    norepinephrine Stopped (12/21/23 1456)    sodium chloride Stopped (12/17/23 2323)    sodium chloride Stopped (12/18/23 1705)    dextrose      vasopressin 20 Units in sodium chloride 0.9 % 100 mL infusion Stopped (12/19/23 0541)    milrinone Stopped (12/21/23 0522)    insulin Stopped (12/21/23 0615)     Assessment/Plan:     12-26-23  Eval for thoracentesis on left by IR today or tomorrow to assist with optimizing lungs  ID consulted for Antibiotic therapy regarding Klebsiella  pneumonia  Swallow study today-if passed remove NG today  Lifevest eval-EF 30-35%  Noted persistent hypernatremia-contacted nephrology to assist with elyte therapy. Concerns for dehydration   Needs assistance with glucose management-increase to high dose insulin scale   Aggressive PT/OT-she will need SNF vs Rehab placement for prolonged vent time causing weakness  PMR placed for [facility] rehab to eval.   12-25-23  Less agitated today. HTN tachycardic, cardiology managing medications, currently on norvasc, coreg, and cardizem. Eliquis for AF. Sputum cx sent for persistent leukocytosis and intermittent fevers, poitive for klebsiella pneumoniae despite full course of zosyn, cephalosporin allergy. ID consult placed. TF still on, FWF increased for hypernatremia, plan for swallow eval tomorrow. 12/24/2023  Less agitated today still confused.  Having pauses today,

## 2023-12-26 NOTE — CARE COORDINATION
Transition Planning    Pt's daughter choice for LTAC is [address] Call to Jacob Kearney from [address]- able to accept.     1300 Pt's daughter given information re [employer] and [employer] wearable defibrillators.

## 2023-12-27 ENCOUNTER — APPOINTMENT (OUTPATIENT)
Dept: GENERAL RADIOLOGY | Age: 58
DRG: 235 | End: 2023-12-27
Attending: THORACIC SURGERY (CARDIOTHORACIC VASCULAR SURGERY)
Payer: COMMERCIAL

## 2023-12-27 PROBLEM — D72.825 BANDEMIA: Status: ACTIVE | Noted: 2023-12-27

## 2023-12-27 LAB
ANION GAP SERPL CALCULATED.3IONS-SCNC: 13 MMOL/L (ref 9–17)
BUN SERPL-MCNC: 56 MG/DL (ref 6–20)
CA-I BLD-SCNC: 1.18 MMOL/L (ref 1.13–1.33)
CALCIUM SERPL-MCNC: 8.5 MG/DL (ref 8.6–10.4)
CHLORIDE SERPL-SCNC: 120 MMOL/L (ref 98–107)
CO2 SERPL-SCNC: 20 MMOL/L (ref 20–31)
CREAT SERPL-MCNC: 1.7 MG/DL (ref 0.5–0.9)
EKG ATRIAL RATE: 97 BPM
EKG P AXIS: 60 DEGREES
EKG P-R INTERVAL: 134 MS
EKG Q-T INTERVAL: 336 MS
EKG QRS DURATION: 92 MS
EKG QTC CALCULATION (BAZETT): 426 MS
EKG R AXIS: 5 DEGREES
EKG T AXIS: 138 DEGREES
EKG VENTRICULAR RATE: 97 BPM
ERYTHROCYTE [DISTWIDTH] IN BLOOD BY AUTOMATED COUNT: 15.8 % (ref 11.8–14.4)
GFR SERPL CREATININE-BSD FRML MDRD: 35 ML/MIN/1.73M2
GLUCOSE BLD-MCNC: 180 MG/DL (ref 65–105)
GLUCOSE BLD-MCNC: 250 MG/DL (ref 65–105)
GLUCOSE BLD-MCNC: 261 MG/DL (ref 65–105)
GLUCOSE BLD-MCNC: 266 MG/DL (ref 65–105)
GLUCOSE BLD-MCNC: 317 MG/DL (ref 65–105)
GLUCOSE BLD-MCNC: 325 MG/DL (ref 65–105)
GLUCOSE SERPL-MCNC: 293 MG/DL (ref 70–99)
HCT VFR BLD AUTO: 34.2 % (ref 36.3–47.1)
HGB BLD-MCNC: 10 G/DL (ref 11.9–15.1)
MAGNESIUM SERPL-MCNC: 2.6 MG/DL (ref 1.6–2.6)
MCH RBC QN AUTO: 29.1 PG (ref 25.2–33.5)
MCHC RBC AUTO-ENTMCNC: 29.2 G/DL (ref 28.4–34.8)
MCV RBC AUTO: 99.4 FL (ref 82.6–102.9)
NRBC BLD-RTO: 0 PER 100 WBC
OSMOLALITY UR: 597 MOSM/KG (ref 80–1300)
PLATELET # BLD AUTO: 458 K/UL (ref 138–453)
PMV BLD AUTO: 10.1 FL (ref 8.1–13.5)
POTASSIUM SERPL-SCNC: 4.5 MMOL/L (ref 3.7–5.3)
RBC # BLD AUTO: 3.44 M/UL (ref 3.95–5.11)
SODIUM SERPL-SCNC: 153 MMOL/L (ref 135–144)
WBC OTHER # BLD: 21.1 K/UL (ref 3.5–11.3)

## 2023-12-27 PROCEDURE — 2580000003 HC RX 258: Performed by: PHYSICIAN ASSISTANT

## 2023-12-27 PROCEDURE — 6370000000 HC RX 637 (ALT 250 FOR IP)

## 2023-12-27 PROCEDURE — 6360000002 HC RX W HCPCS: Performed by: INTERNAL MEDICINE

## 2023-12-27 PROCEDURE — 2140000001 HC CVICU INTERMEDIATE R&B

## 2023-12-27 PROCEDURE — 71045 X-RAY EXAM CHEST 1 VIEW: CPT

## 2023-12-27 PROCEDURE — 6370000000 HC RX 637 (ALT 250 FOR IP): Performed by: PHYSICIAN ASSISTANT

## 2023-12-27 PROCEDURE — 6360000002 HC RX W HCPCS

## 2023-12-27 PROCEDURE — 6370000000 HC RX 637 (ALT 250 FOR IP): Performed by: NURSE PRACTITIONER

## 2023-12-27 PROCEDURE — 92610 EVALUATE SWALLOWING FUNCTION: CPT

## 2023-12-27 PROCEDURE — 6370000000 HC RX 637 (ALT 250 FOR IP): Performed by: STUDENT IN AN ORGANIZED HEALTH CARE EDUCATION/TRAINING PROGRAM

## 2023-12-27 PROCEDURE — 99024 POSTOP FOLLOW-UP VISIT: CPT

## 2023-12-27 PROCEDURE — C9113 INJ PANTOPRAZOLE SODIUM, VIA: HCPCS | Performed by: SURGERY

## 2023-12-27 PROCEDURE — 99232 SBSQ HOSP IP/OBS MODERATE 35: CPT | Performed by: INTERNAL MEDICINE

## 2023-12-27 PROCEDURE — 94761 N-INVAS EAR/PLS OXIMETRY MLT: CPT

## 2023-12-27 PROCEDURE — 6370000000 HC RX 637 (ALT 250 FOR IP): Performed by: INTERNAL MEDICINE

## 2023-12-27 PROCEDURE — 83735 ASSAY OF MAGNESIUM: CPT

## 2023-12-27 PROCEDURE — 92611 MOTION FLUOROSCOPY/SWALLOW: CPT

## 2023-12-27 PROCEDURE — 82330 ASSAY OF CALCIUM: CPT

## 2023-12-27 PROCEDURE — 97110 THERAPEUTIC EXERCISES: CPT

## 2023-12-27 PROCEDURE — 97530 THERAPEUTIC ACTIVITIES: CPT

## 2023-12-27 PROCEDURE — 80048 BASIC METABOLIC PNL TOTAL CA: CPT

## 2023-12-27 PROCEDURE — A4216 STERILE WATER/SALINE, 10 ML: HCPCS | Performed by: SURGERY

## 2023-12-27 PROCEDURE — 94640 AIRWAY INHALATION TREATMENT: CPT

## 2023-12-27 PROCEDURE — 85027 COMPLETE CBC AUTOMATED: CPT

## 2023-12-27 PROCEDURE — 6360000002 HC RX W HCPCS: Performed by: SURGERY

## 2023-12-27 PROCEDURE — 36415 COLL VENOUS BLD VENIPUNCTURE: CPT

## 2023-12-27 PROCEDURE — 74230 X-RAY XM SWLNG FUNCJ C+: CPT

## 2023-12-27 PROCEDURE — 2700000000 HC OXYGEN THERAPY PER DAY

## 2023-12-27 PROCEDURE — 2580000003 HC RX 258: Performed by: SURGERY

## 2023-12-27 RX ORDER — LANSOPRAZOLE 30 MG/1
30 TABLET, ORALLY DISINTEGRATING, DELAYED RELEASE ORAL
Status: DISCONTINUED | OUTPATIENT
Start: 2023-12-28 | End: 2023-12-29 | Stop reason: HOSPADM

## 2023-12-27 RX ORDER — CHLOROTHIAZIDE SODIUM 500 MG/1
250 INJECTION INTRAVENOUS ONCE
Status: COMPLETED | OUTPATIENT
Start: 2023-12-27 | End: 2023-12-27

## 2023-12-27 RX ADMIN — IPRATROPIUM BROMIDE AND ALBUTEROL SULFATE 1 DOSE: 2.5; .5 SOLUTION RESPIRATORY (INHALATION) at 09:10

## 2023-12-27 RX ADMIN — IPRATROPIUM BROMIDE AND ALBUTEROL SULFATE 1 DOSE: 2.5; .5 SOLUTION RESPIRATORY (INHALATION) at 20:21

## 2023-12-27 RX ADMIN — NYSTATIN 500000 UNITS: 100000 SUSPENSION ORAL at 22:55

## 2023-12-27 RX ADMIN — Medication 5 MG: at 22:15

## 2023-12-27 RX ADMIN — ACETAMINOPHEN 1000 MG: 650 SOLUTION ORAL at 05:27

## 2023-12-27 RX ADMIN — APIXABAN 5 MG: 5 TABLET, FILM COATED ORAL at 22:15

## 2023-12-27 RX ADMIN — SODIUM CHLORIDE, PRESERVATIVE FREE 10 ML: 5 INJECTION INTRAVENOUS at 07:39

## 2023-12-27 RX ADMIN — ACETAMINOPHEN 1000 MG: 650 SOLUTION ORAL at 13:40

## 2023-12-27 RX ADMIN — IPRATROPIUM BROMIDE AND ALBUTEROL SULFATE 1 DOSE: 2.5; .5 SOLUTION RESPIRATORY (INHALATION) at 03:54

## 2023-12-27 RX ADMIN — IPRATROPIUM BROMIDE AND ALBUTEROL SULFATE 1 DOSE: 2.5; .5 SOLUTION RESPIRATORY (INHALATION) at 23:32

## 2023-12-27 RX ADMIN — SODIUM CHLORIDE, PRESERVATIVE FREE 40 MG: 5 INJECTION INTRAVENOUS at 07:37

## 2023-12-27 RX ADMIN — INSULIN LISPRO 8 UNITS: 100 INJECTION, SOLUTION INTRAVENOUS; SUBCUTANEOUS at 15:54

## 2023-12-27 RX ADMIN — CLOPIDOGREL BISULFATE 75 MG: 75 TABLET ORAL at 07:37

## 2023-12-27 RX ADMIN — DILTIAZEM HYDROCHLORIDE 30 MG: 30 TABLET, FILM COATED ORAL at 13:40

## 2023-12-27 RX ADMIN — FUROSEMIDE 20 MG: 10 INJECTION, SOLUTION INTRAMUSCULAR; INTRAVENOUS at 07:37

## 2023-12-27 RX ADMIN — DILTIAZEM HYDROCHLORIDE 30 MG: 30 TABLET, FILM COATED ORAL at 22:12

## 2023-12-27 RX ADMIN — DIPHENHYDRAMINE HYDROCHLORIDE 25 MG: 25 TABLET ORAL at 01:33

## 2023-12-27 RX ADMIN — QUETIAPINE FUMARATE 25 MG: 25 TABLET ORAL at 07:36

## 2023-12-27 RX ADMIN — IPRATROPIUM BROMIDE AND ALBUTEROL SULFATE 1 DOSE: 2.5; .5 SOLUTION RESPIRATORY (INHALATION) at 16:03

## 2023-12-27 RX ADMIN — AMLODIPINE BESYLATE 5 MG: 5 TABLET ORAL at 07:37

## 2023-12-27 RX ADMIN — ACETAMINOPHEN 1000 MG: 650 SOLUTION ORAL at 22:15

## 2023-12-27 RX ADMIN — DILTIAZEM HYDROCHLORIDE 30 MG: 30 TABLET, FILM COATED ORAL at 05:28

## 2023-12-27 RX ADMIN — CHLOROTHIAZIDE SODIUM 250 MG: 500 INJECTION, POWDER, LYOPHILIZED, FOR SOLUTION INTRAVENOUS at 07:38

## 2023-12-27 RX ADMIN — IPRATROPIUM BROMIDE AND ALBUTEROL SULFATE 1 DOSE: 2.5; .5 SOLUTION RESPIRATORY (INHALATION) at 12:01

## 2023-12-27 RX ADMIN — CARVEDILOL 12.5 MG: 12.5 TABLET, FILM COATED ORAL at 15:54

## 2023-12-27 RX ADMIN — LORAZEPAM 0.5 MG: 0.5 TABLET ORAL at 01:33

## 2023-12-27 RX ADMIN — CARVEDILOL 12.5 MG: 12.5 TABLET, FILM COATED ORAL at 07:36

## 2023-12-27 RX ADMIN — MOXIFLOXACIN HYDROCHLORIDE 400 MG: 400 TABLET, FILM COATED ORAL at 15:54

## 2023-12-27 RX ADMIN — QUETIAPINE FUMARATE 25 MG: 25 TABLET ORAL at 22:11

## 2023-12-27 RX ADMIN — FUROSEMIDE 20 MG: 10 INJECTION, SOLUTION INTRAMUSCULAR; INTRAVENOUS at 22:15

## 2023-12-27 RX ADMIN — INSULIN LISPRO 12 UNITS: 100 INJECTION, SOLUTION INTRAVENOUS; SUBCUTANEOUS at 07:28

## 2023-12-27 RX ADMIN — SODIUM CHLORIDE, PRESERVATIVE FREE 10 ML: 5 INJECTION INTRAVENOUS at 22:16

## 2023-12-27 RX ADMIN — APIXABAN 5 MG: 5 TABLET, FILM COATED ORAL at 07:36

## 2023-12-27 ASSESSMENT — PAIN SCALES - GENERAL
PAINLEVEL_OUTOF10: 0

## 2023-12-27 NOTE — PROGRESS NOTES
Physical Therapy  Facility/Department: [facility] CAR 1- SICU  Physical Therapy Treatment Note    Name: Chantelle Ochoa  : 1965  MRN: [id number]  Date of Service: 2023    Discharge Recommendations:  Patient would benefit from continued therapy after discharge Further therapy recommended at discharge. The patient should be able to tolerate at least 3 hours of therapy per day over 5 days or 15 hours over 7 days. This patient may benefit from a Physical Medicine and Rehab consult. PT Equipment Recommendations  Equipment Needed: No      Patient Diagnosis(es): The encounter diagnosis was S/P CABG x 3. Past Medical History:  has a past medical history of Allergic rhinitis, Arthritis, CAD (coronary artery disease), CKD (chronic kidney disease), stage III ([address]), Depression, Fibromyalgia, Gout, History of fractured vertebra, Hyperlipidemia, Neuropathy, OA (osteoarthritis), Peripheral vascular disease ([address]), Sacroiliac joint dysfunction, Tremor, Type II or unspecified type diabetes mellitus without mention of complication, not stated as uncontrolled, Under care of team, and Wellness examination. Past Surgical History:  has a past surgical history that includes other surgical history (); Cholecystectomy;  section; other surgical history (); knee surgery; back surgery; Cardiac procedure (N/A, 2023); joint replacement (Right); and Coronary artery bypass graft (N/A, 12/15/2023). Assessment   Body Structures, Functions, Activity Limitations Requiring Skilled Therapeutic Intervention: Decreased functional mobility ; Decreased endurance;Decreased balance;Decreased strength;Decreased cognition;Decreased safe awareness  Assessment: Pt required Jono-modA x2 to complete sit<>stand transfer to  and maintain ~10s static stand at [facility] for 3 trials. Pt dependent with use of heart hugger with all transfers. Pt most limited by significant decreased strength balance, and endurance.  Recommending continued PT Total  How much help is needed walking in hospital room?: Total  How much help is needed climbing 3-5 steps with a railing?: Total  AM-PAC Inpatient Mobility Raw Score : 6  AM-PAC Inpatient T-Scale Score : 23.55  Mobility Inpatient CMS 0-100% Score: 100  Mobility Inpatient CMS G-Code Modifier : CN    Goals  Short Term Goals  Time Frame for Short Term Goals: 14 visits  Short Term Goal 1: Pt will perform sit<>stand transfer with min-A.  Short Term Goal 2: Pt will ambulate 75 ft with a RW and CGA.  Short Term Goal 3: Pt will demonstrate fair+ dynamic standing balance to decrease fall risk.  Short Term Goal 4: Pt will tolerate a 35 minute therapy session to promote increased endurance.  Short Term Goal 5: Pt will perform supine<>sit transfer with min-A while maintaining sternal precautions.       Education  Patient Education  Education Given To: Patient;Family  Education Provided: Role of Therapy;Plan of Care;Transfer Training;Precautions  Education Provided Comments: verbal and tactile cues for safety with all mobility, UE placement, use of heart hugger and sternal precautions  Education Method: Verbal  Barriers to Learning: Cognition  Education Outcome: Continued education needed  Co- treatment with OT warranted secondary to decreased patient safety and independence with functional mobility requiring skilled physical assistance of two professionals to simultaneously address individualized discipline goals. PT is addressing balance and transfers , while OT is addressing their individualized functional mobility/self-care task.         Therapy Time   Individual Concurrent Group Co-treatment   Time In 1029         Time Out 1105         Minutes 36         Timed Code Treatment Minutes: 25 Minutes       Cosmo Griggs PTA

## 2023-12-27 NOTE — PROGRESS NOTES
[facility]   Progress Note                   Date:   12/27/2023  Patient name: Cele Camarena  Date of admission:  12/15/2023  7:30 AM  MRN:   [id number]  YOB: 1965  PCP: Leena Murrieta APRN - CNP    Reason for Admission: status post CABG    Subjective:   Patient was seen and examined with family at bedside. Pt up to chair   Labs, vitals, and tele reviewed. No acute CV events overnight. Plans for Thoracentesis today     Failed swallow study yesterday     Medications:   Scheduled Meds:   insulin lispro  0-16 Units SubCUTAneous TID WC    insulin lispro  0-4 Units SubCUTAneous Nightly    moxifloxacin  400 mg Oral Daily    amLODIPine  5 mg Oral Daily    carvedilol  12.5 mg Oral BID WC    furosemide  20 mg IntraVENous BID    apixaban  5 mg Oral BID    QUEtiapine  25 mg Oral BID    melatonin  5 mg Oral Nightly    pantoprazole (PROTONIX) 40 mg in sodium chloride (PF) 0.9 % 10 mL injection  40 mg IntraVENous Daily    nystatin  5 mL Oral 4x Daily    acetaminophen  1,000 mg Oral 3 times per day    ipratropium 0.5 mg-albuterol 2.5 mg  1 Dose Inhalation Q4H    dilTIAZem  30 mg Oral 3 times per day    sodium chloride flush  5-40 mL IntraVENous 2 times per day    clopidogrel  75 mg Oral Daily     Continuous Infusions:   niCARdipine Stopped (12/25/23 1128)    dexmedetomidine Stopped (12/24/23 0947)    sodium chloride Stopped (12/24/23 1040)    EPINEPHrine Stopped (12/20/23 0806)    fentaNYL 50 mcg/mL Stopped (12/22/23 1000)    norepinephrine Stopped (12/21/23 1456)    sodium chloride Stopped (12/17/23 2323)    sodium chloride Stopped (12/18/23 1705)    dextrose      vasopressin 20 Units in sodium chloride 0.9 % 100 mL infusion Stopped (12/19/23 0541)    milrinone Stopped (12/21/23 0522)    insulin Stopped (12/21/23 1825)     CBC:   Recent Labs     12/25/23  1600 12/26/23  0517 12/27/23  0234   WBC 21.6* 21.7* 21.1*   HGB 10.5* 10.1* 10.0*   * 513* 458*       BMP:    Recent Labs  performed in the PRICE 30 position. LVEF: 45%. LV Wall Motion: Inferobasal akinesis     Conclusions:  Multivessel CAD  Mildly reduced LV systolic function     Recommendation:  CTS consult for CABG  Medical treatments. Risk factors modifications. .    Assessment:   Status post CABG x 3 (LIMA-LAD, radial-OM, SVG-RCA) on 12/15/2023  Ischemic cardiomyopathy with most recent EF 30 to 35% on LESLIE 12/18/2023  Hypertension  NSVT  New onset Afib with RVR, unstable s/p bedside CV x3 on 12/16  Pulmonary edema - resolved  Acute hypoxic respiratory failure - extubated but then required reintubation. Extubated 12/21/2022  Hyperlipidemia  Type II DM  Former tobacco use  Obesity  Allergy to amiodarone    Plan:   BP stable. Continue Coreg and amlodipine. Continue anticoagulation with Eliquis and Plavix   Continue oral Cardizem for radial graft  Routine postop chest tube care as per CTS. Maintain K> 4 and Mg> 2  Thoracentesis today d/t persistent WBC and O2 requirements .  ID following appreciate recommendations   Failed swallow study yesterday   Will need Lifevest prior to Discharge   Continue PT/OT         Evon Wray APRN-CNP  [facility]  [phone]

## 2023-12-27 NOTE — PROCEDURES
INSTRUMENTAL SWALLOW REPORT  MODIFIED BARIUM SWALLOW    NAME: John Reed   : 1965  MRN: [id number]       Date of Eval: 2023              Referring Diagnosis(es):      Past Medical History:  has a past medical history of Allergic rhinitis, Arthritis, CAD (coronary artery disease), CKD (chronic kidney disease), stage III ([address]), Depression, Fibromyalgia, Gout, History of fractured vertebra, Hyperlipidemia, Neuropathy, OA (osteoarthritis), Peripheral vascular disease ([address]), Sacroiliac joint dysfunction, Tremor, Type II or unspecified type diabetes mellitus without mention of complication, not stated as uncontrolled, Under care of team, and Wellness examination. Past Surgical History:  has a past surgical history that includes other surgical history (); Cholecystectomy;  section; other surgical history (); knee surgery; back surgery; Cardiac procedure (N/A, 2023); joint replacement (Right); and Coronary artery bypass graft (N/A, 12/15/2023). Type of Study: Initial MBS       Patient Complaints/Reason for Referral:  John Reed was referred for a MBS to assess the efficiency of his/her swallow function, assess for aspiration, and to make recommendations regarding safe dietary consistencies, effective compensatory strategies, and safe eating environment. Onset of problem:      Ms Mitchell Lima is a 57y.o. female who presents to CTS for possible CABG surgery. She discovered her heart disease during work up for knee replacement surgery. She has a history of OA, HLD, DMII, depression, CKDIII, PVD, and fibromyalgia. She denies CP, SOB, swelling in lower extremities, dizziness, syncope. She denies history of CVA, MI, AF, liver disease, or prior cancers. Echo shows non-valvular heart disease with EF 40-45%. LHC shows significant stenosis in LAD, Rca, and Lcx without LM disease. She is not currently on [address] She denies smoking, etoh use or illicit drug use.

## 2023-12-27 NOTE — PROGRESS NOTES
SLP ALL NOTES  Facility/Department: [facility] CAR 1- SICU   CLINICAL BEDSIDE SWALLOW EVALUATION    NAME: Norma Guerra  : 1965  MRN: [id number]    ADMISSION DATE: 12/15/2023  ADMITTING DIAGNOSIS: has Tremor; Lumbar disc disease; Hyperlipidemia; Depression; Fibromyalgia; Sacroiliac joint dysfunction; Neuropathy; Allergic rhinitis; Former tobacco use; Abnormal ECG; Anxiety; Pure hypercholesterolemia; Hypertension, essential; Controlled type 2 diabetes mellitus without complication ([address]); Class 1 obesity due to excess calories with serious comorbidity in adult; CAD in native artery; S/P CABG x 3; Hypernatremia; KENRICK (acute kidney injury) ([address]); Pneumonia of left lower lobe due to Klebsiella pneumoniae ([address]); Biventricular congestive heart failure ([address]); Allergy to multiple antibiotics; Primary hypertension; and Acute kidney injury superimposed on CKD [facility]) on their problem list.    Date of Eval: 2023  Evaluating Therapist: LAURIE Palm    Current Diet level:  Current Diet : NPO  Current Liquid Diet : NPO    Primary Complaint  Ms Adrian Tomlinson is a 57y.o. female who presents to CTS for possible CABG surgery. She discovered her heart disease during work up for knee replacement surgery. She has a history of OA, HLD, DMII, depression, CKDIII, PVD, and fibromyalgia. She denies CP, SOB, swelling in lower extremities, dizziness, syncope. She denies history of CVA, MI, AF, liver disease, or prior cancers. Echo shows non-valvular heart disease with EF 40-45%. LHC shows significant stenosis in LAD, Rca, and Lcx without LM disease. She is not currently on [address] She denies smoking, etoh use or illicit drug use.      Pain:  Pain Assessment  Pain Assessment: 0-10  Pain Level: 0    Reason for Referral  Cele Tomlinson was referred for a bedside swallow evaluation to assess the efficiency of her swallow function, identify signs and symptoms of aspiration and make recommendations regarding safe dietary consistencies, Patient;RN    Education  Patient Education: yes  Patient Education Response: Verbalizes understanding         Therapy Time  Time in: 845  Time out: 857  Total time: 13    ELIGIO BERGERON M.A. CCC-SLP  12/27/2023 10:21 AM

## 2023-12-27 NOTE — PROGRESS NOTES
[facility] Cardiothoracic Surgical   Progress Note    12/27/2023 3:02 PM  Surgeon:  Velma          POD#  9  S/P :  Coronary artery bypass with radial  EF: 30-35% on TTE     Subjective:  Ms. Camarena  following commands   Sitting up to chair. A&0x4. Has TF infusing. No acute distress.     Vital Signs: /74   Pulse 95   Temp 98.1 °F (36.7 °C) (Oral)   Resp 24   Ht 1.651 m (5' 5\")   Wt 82.8 kg (182 lb 8.7 oz)   SpO2 (!) 89%   BMI 30.38 kg/m²  O2 Flow Rate (L/min): 30 L/min   Admit Weight: Weight - Scale: 84.1 kg (185 lb 6.5 oz)    NEURO: follow commands. Mild confusion. No concerns for stroke  CV: no murmur noted, Normal S1, S2  Lungs: diminished  Abd: The abdomen is soft without tenderness, guarding, mass, rebound or organomegaly.Bowel    Lower Extremities: 1+ generalized edema    CXR :   FINDINGS:  The enteric tube courses off the field of view in the upper abdomen.The  cardiac and mediastinal contours appear unchanged. Left pleural effusion and  associated basilar opacity again demonstrated.  No new airspace disease  identified in the interval.  No evidence for pneumothorax.     IMPRESSION:  No significant interval change.    Labs and Studies:  CBC:   Recent Labs     12/25/23  1600 12/26/23  0517 12/27/23  0234   WBC 21.6* 21.7* 21.1*   HGB 10.5* 10.1* 10.0*   HCT 34.2* 33.3* 34.2*   MCV 94.7 96.8 99.4   * 513* 458*       BMP:   Recent Labs     12/25/23  1600 12/26/23  0517 12/27/23  0234   * 156* 153*   K 4.5 5.1 4.5   * 124* 120*   CO2 21 21 20   BUN 50* 57* 56*   CREATININE 1.6* 1.7* 1.7*       PT/INR:   No results for input(s): \"PROTIME\", \"INR\" in the last 72 hours.    APTT:   No results for input(s): \"APTT\" in the last 72 hours.    I/O:  I/O last 3 completed shifts:  In: 2490 [NG/GT:2490]  Out: 1025 [Urine:1025]    Scheduled Meds:    [START ON 12/28/2023] lansoprazole  30 mg Oral QAM AC    insulin lispro  0-16 Units SubCUTAneous TID WC    insulin lispro  0-4 Units SubCUTAneous  arrythmias for greater than 24hrs, on lidocaine gtt would like cardiology to remove lidocaine gtt today if feasible. Plan for repeat echo today, now off inotropes and pressors. sCr continues to improve. Remove sylvester today. Off insulin gtt on sliding scale. TF running with free water flushes for hypernatremia still at 151 today, CTM. Patient will likely need swallow evaluation given prolonged ventilation. BM regimen. Up with PT/OT today. Will evaluate CT output at that time and likely remove if no significant drainage after ambulation. 12/21/2023  Follwing commands today, sedation managed by trauma team, on precedex, fentanyl, propofol. Vent mgmt per trauma on APRV. CXR with siginifcant improvement. Some pulmoary edema, will plan to diurese once off pressors. No ectopy overnight, lidocaine gtt on low dose levo, off inotropes. sCr 1.5, Hypernatremic, increased free water. Hyperglycemic on 9.2unit/hr of insulin, would adele nutrition to switcfh TF to a low glycemic feed. Febrile, downtrending white count, on zosyn. 12/20/2023  Intubated and sedated today, working on weaning propofol, ok from CTS standpoint to use precedex if needed for agitation. Ectopy overnight lidocaine gtt started, no ectopy today however patient is tachycardic and hypertensive. On metoprolol 12.5 BID, will plan to uptitrate as BP stabilizes. sCr 1.6 today,  overnight, still net negative. Will hold off on diuresis for now. TF running, continue BM regimen. Hypernatremic this AM, will plan to minimize . 9NS intake, may need nutrition evaluation of TF to ensure low sodium. Febrile, on zosyn WBC trending down, with Sakina, CVC, and sylvester will attempt to de-line as patient stabilizes. 12/19/2023  Intubated sedated on 175mcg of fentanyl and 40 propofol. Less tachycardic today.  Ectopy decreased with reduction of milrinone to .250, epinephrine added for additional inotropy support - improvement in status after, now afebrile, lactic down

## 2023-12-27 NOTE — PROGRESS NOTES
Occupational Therapy  Facility/Department: [facility] CAR 1- SICU  Occupational Therapy Daily Progress Note    Name: Lesli Kessler  : 1965  MRN: [id number]  Date of Service: 2023    Discharge Recommendations:Further therapy recommended at discharge. Patient would benefit from continued therapy after discharge  OT Equipment Recommendations  Equipment Needed:  (CTA as pt. progresses.)       Patient Diagnosis(es): The encounter diagnosis was S/P CABG x 3. Past Medical History:  has a past medical history of Allergic rhinitis, Arthritis, CAD (coronary artery disease), CKD (chronic kidney disease), stage III ([address]), Depression, Fibromyalgia, Gout, History of fractured vertebra, Hyperlipidemia, Neuropathy, OA (osteoarthritis), Peripheral vascular disease ([address]), Sacroiliac joint dysfunction, Tremor, Type II or unspecified type diabetes mellitus without mention of complication, not stated as uncontrolled, Under care of team, and Wellness examination. Past Surgical History:  has a past surgical history that includes other surgical history (); Cholecystectomy;  section; other surgical history (); knee surgery; back surgery; Cardiac procedure (N/A, 2023); joint replacement (Right); and Coronary artery bypass graft (N/A, 12/15/2023). Treatment Diagnosis: CABG x3      Assessment   Performance deficits / Impairments: Decreased functional mobility ; Decreased ADL status; Decreased ROM; Decreased strength;Decreased safe awareness;Decreased cognition;Decreased endurance;Decreased balance;Decreased high-level IADLs;Decreased posture  Assessment: Pt would continue to benefit from acute OT services to address deficits listed above and improve overall functional performance prior to discharge.   Treatment Diagnosis: CABG x3  Prognosis: Fair  Decision Making: Low Complexity  REQUIRES OT FOLLOW-UP: Yes  Activity Tolerance  Activity Tolerance: Patient limited by fatigue;Patient Tolerated treatment well Yes  Overall Level of Assistance: Assist X2;Additional time;Adaptive equipment;Moderate assistance;Minimum assistance  Interventions: Tactile cues;Verbal cues;Safety awareness training;Weight shifting training/pressure relief (mirror placed in front of pt. for visual cues. Recliner<->stand 3 times, min Ax2- min-mod A x2 + max A of another person to hold heart hugger. pt. fatigues quick.)  Sit to Stand: Assist X2;Additional time;Adaptive equipment;Moderate assistance;Minimum assistance  Stand to Sit: Assist X2;Minimum assistance;Moderate assistance;Additional time  Gait  Overall Level of Assistance:  (N/T d/t P standing tolerance.)        ADL  Feeding: Dependent/Total  Feeding Skilled Clinical Factors: NG  Additional Comments: Pt. focusing on sitting/standing balance, this session. Pt. needing hand over hand/max A to hold suction to mouth as needed.     Activity Tolerance  Activity Tolerance: Patient limited by endurance;Treatment limited secondary to decreased cognition;Patient limited by fatigue           Cognition  Overall Cognitive Status: Exceptions  Arousal/Alertness: Delayed responses to stimuli  Following Commands: Follows one step commands with increased time;Follows one step commands with repetition  Attention Span: Attends with cues to redirect;Difficulty attending to directions  Safety Judgement: Decreased awareness of need for assistance;Decreased awareness of need for safety  Problem Solving: Assistance required to identify errors made;Assistance required to correct errors made  Insights: Decreased awareness of deficits  Initiation: Requires cues for some  Sequencing: Requires cues for some  Cognition Comment: Pt only following ~75% commands throughout session. Delayed processing with all mobility.  Orientation  Overall Orientation Status: Within Functional Limits  Orientation Level: Oriented X4                  Education Given To: Patient  Education Provided: Role of Therapy;Plan of

## 2023-12-27 NOTE — CARE COORDINATION
Transition Planning    Call to Holland Ireland from [facility]- will start precert. 1000 Pt's choice per Dr Rachel Hernandez is Marylin Parker from [facility]. Referrals faxed to [facility]. Delano from [facility] called.  requested pt to be fitted today.

## 2023-12-28 ENCOUNTER — APPOINTMENT (OUTPATIENT)
Dept: ULTRASOUND IMAGING | Age: 58
DRG: 235 | End: 2023-12-28
Attending: THORACIC SURGERY (CARDIOTHORACIC VASCULAR SURGERY)
Payer: COMMERCIAL

## 2023-12-28 ENCOUNTER — APPOINTMENT (OUTPATIENT)
Dept: GENERAL RADIOLOGY | Age: 58
DRG: 235 | End: 2023-12-28
Attending: THORACIC SURGERY (CARDIOTHORACIC VASCULAR SURGERY)
Payer: COMMERCIAL

## 2023-12-28 PROBLEM — N18.30 STAGE 3 CHRONIC KIDNEY DISEASE (HCC): Status: ACTIVE | Noted: 2023-12-28

## 2023-12-28 LAB
ANION GAP SERPL CALCULATED.3IONS-SCNC: 17 MMOL/L (ref 9–17)
BUN SERPL-MCNC: 59 MG/DL (ref 6–20)
CA-I BLD-SCNC: 1.26 MMOL/L (ref 1.13–1.33)
CALCIUM SERPL-MCNC: 9.1 MG/DL (ref 8.6–10.4)
CHLORIDE SERPL-SCNC: 113 MMOL/L (ref 98–107)
CO2 SERPL-SCNC: 19 MMOL/L (ref 20–31)
CREAT SERPL-MCNC: 1.7 MG/DL (ref 0.5–0.9)
CREAT UR-MCNC: 70.1 MG/DL (ref 28–217)
ERYTHROCYTE [DISTWIDTH] IN BLOOD BY AUTOMATED COUNT: 15.7 % (ref 11.8–14.4)
GFR SERPL CREATININE-BSD FRML MDRD: 35 ML/MIN/1.73M2
GLUCOSE BLD-MCNC: 173 MG/DL (ref 65–105)
GLUCOSE BLD-MCNC: 186 MG/DL (ref 65–105)
GLUCOSE BLD-MCNC: 190 MG/DL (ref 65–105)
GLUCOSE BLD-MCNC: 195 MG/DL (ref 65–105)
GLUCOSE BLD-MCNC: 268 MG/DL (ref 65–105)
GLUCOSE BLD-MCNC: 368 MG/DL (ref 65–105)
GLUCOSE SERPL-MCNC: 381 MG/DL (ref 70–99)
HCT VFR BLD AUTO: 35 % (ref 36.3–47.1)
HGB BLD-MCNC: 10.5 G/DL (ref 11.9–15.1)
MAGNESIUM SERPL-MCNC: 2.7 MG/DL (ref 1.6–2.6)
MCH RBC QN AUTO: 29.9 PG (ref 25.2–33.5)
MCHC RBC AUTO-ENTMCNC: 30 G/DL (ref 28.4–34.8)
MCV RBC AUTO: 99.7 FL (ref 82.6–102.9)
MICROORGANISM SPEC CULT: NORMAL
NRBC BLD-RTO: 0.1 PER 100 WBC
PLATELET # BLD AUTO: 477 K/UL (ref 138–453)
PMV BLD AUTO: 10.2 FL (ref 8.1–13.5)
POTASSIUM SERPL-SCNC: 4.2 MMOL/L (ref 3.7–5.3)
RBC # BLD AUTO: 3.51 M/UL (ref 3.95–5.11)
SODIUM SERPL-SCNC: 149 MMOL/L (ref 135–144)
SODIUM UR-SCNC: 75 MMOL/L
SPECIMEN DESCRIPTION: NORMAL
WBC OTHER # BLD: 20.5 K/UL (ref 3.5–11.3)

## 2023-12-28 PROCEDURE — 6370000000 HC RX 637 (ALT 250 FOR IP): Performed by: PHYSICIAN ASSISTANT

## 2023-12-28 PROCEDURE — 82570 ASSAY OF URINE CREATININE: CPT

## 2023-12-28 PROCEDURE — 6370000000 HC RX 637 (ALT 250 FOR IP): Performed by: STUDENT IN AN ORGANIZED HEALTH CARE EDUCATION/TRAINING PROGRAM

## 2023-12-28 PROCEDURE — 71045 X-RAY EXAM CHEST 1 VIEW: CPT

## 2023-12-28 PROCEDURE — 97530 THERAPEUTIC ACTIVITIES: CPT

## 2023-12-28 PROCEDURE — 2700000000 HC OXYGEN THERAPY PER DAY

## 2023-12-28 PROCEDURE — 87070 CULTURE OTHR SPECIMN AEROBIC: CPT

## 2023-12-28 PROCEDURE — 2580000003 HC RX 258: Performed by: PHYSICIAN ASSISTANT

## 2023-12-28 PROCEDURE — 87075 CULTR BACTERIA EXCEPT BLOOD: CPT

## 2023-12-28 PROCEDURE — 36415 COLL VENOUS BLD VENIPUNCTURE: CPT

## 2023-12-28 PROCEDURE — 84300 ASSAY OF URINE SODIUM: CPT

## 2023-12-28 PROCEDURE — 6370000000 HC RX 637 (ALT 250 FOR IP): Performed by: NURSE PRACTITIONER

## 2023-12-28 PROCEDURE — 82947 ASSAY GLUCOSE BLOOD QUANT: CPT

## 2023-12-28 PROCEDURE — 0W9B3ZZ DRAINAGE OF LEFT PLEURAL CAVITY, PERCUTANEOUS APPROACH: ICD-10-PCS | Performed by: RADIOLOGY

## 2023-12-28 PROCEDURE — 94761 N-INVAS EAR/PLS OXIMETRY MLT: CPT

## 2023-12-28 PROCEDURE — 99232 SBSQ HOSP IP/OBS MODERATE 35: CPT | Performed by: INTERNAL MEDICINE

## 2023-12-28 PROCEDURE — 2709999900 US THORACENTESIS

## 2023-12-28 PROCEDURE — 97535 SELF CARE MNGMENT TRAINING: CPT

## 2023-12-28 PROCEDURE — 2140000001 HC CVICU INTERMEDIATE R&B

## 2023-12-28 PROCEDURE — 6370000000 HC RX 637 (ALT 250 FOR IP): Performed by: INTERNAL MEDICINE

## 2023-12-28 PROCEDURE — 83735 ASSAY OF MAGNESIUM: CPT

## 2023-12-28 PROCEDURE — 87205 SMEAR GRAM STAIN: CPT

## 2023-12-28 PROCEDURE — 6360000002 HC RX W HCPCS

## 2023-12-28 PROCEDURE — 85027 COMPLETE CBC AUTOMATED: CPT

## 2023-12-28 PROCEDURE — 99024 POSTOP FOLLOW-UP VISIT: CPT

## 2023-12-28 PROCEDURE — 82330 ASSAY OF CALCIUM: CPT

## 2023-12-28 PROCEDURE — 6370000000 HC RX 637 (ALT 250 FOR IP): Performed by: THORACIC SURGERY (CARDIOTHORACIC VASCULAR SURGERY)

## 2023-12-28 PROCEDURE — 80048 BASIC METABOLIC PNL TOTAL CA: CPT

## 2023-12-28 PROCEDURE — 6370000000 HC RX 637 (ALT 250 FOR IP)

## 2023-12-28 PROCEDURE — 97110 THERAPEUTIC EXERCISES: CPT

## 2023-12-28 PROCEDURE — 94640 AIRWAY INHALATION TREATMENT: CPT

## 2023-12-28 RX ADMIN — IPRATROPIUM BROMIDE AND ALBUTEROL SULFATE 1 DOSE: 2.5; .5 SOLUTION RESPIRATORY (INHALATION) at 20:48

## 2023-12-28 RX ADMIN — QUETIAPINE FUMARATE 25 MG: 25 TABLET ORAL at 09:53

## 2023-12-28 RX ADMIN — IPRATROPIUM BROMIDE AND ALBUTEROL SULFATE 1 DOSE: 2.5; .5 SOLUTION RESPIRATORY (INHALATION) at 07:37

## 2023-12-28 RX ADMIN — IPRATROPIUM BROMIDE AND ALBUTEROL SULFATE 1 DOSE: 2.5; .5 SOLUTION RESPIRATORY (INHALATION) at 03:11

## 2023-12-28 RX ADMIN — AMLODIPINE BESYLATE 5 MG: 5 TABLET ORAL at 09:54

## 2023-12-28 RX ADMIN — ACETAMINOPHEN 1000 MG: 650 SOLUTION ORAL at 09:54

## 2023-12-28 RX ADMIN — ACETAMINOPHEN 1000 MG: 650 SOLUTION ORAL at 21:45

## 2023-12-28 RX ADMIN — INSULIN LISPRO 16 UNITS: 100 INJECTION, SOLUTION INTRAVENOUS; SUBCUTANEOUS at 09:54

## 2023-12-28 RX ADMIN — LANSOPRAZOLE 30 MG: 30 TABLET, ORALLY DISINTEGRATING, DELAYED RELEASE ORAL at 13:09

## 2023-12-28 RX ADMIN — NYSTATIN 500000 UNITS: 100000 SUSPENSION ORAL at 21:46

## 2023-12-28 RX ADMIN — DILTIAZEM HYDROCHLORIDE 30 MG: 30 TABLET, FILM COATED ORAL at 21:46

## 2023-12-28 RX ADMIN — NYSTATIN 500000 UNITS: 100000 SUSPENSION ORAL at 09:54

## 2023-12-28 RX ADMIN — Medication 5 MG: at 21:45

## 2023-12-28 RX ADMIN — SODIUM CHLORIDE, PRESERVATIVE FREE 10 ML: 5 INJECTION INTRAVENOUS at 10:17

## 2023-12-28 RX ADMIN — DILTIAZEM HYDROCHLORIDE 30 MG: 30 TABLET, FILM COATED ORAL at 15:40

## 2023-12-28 RX ADMIN — CARVEDILOL 12.5 MG: 12.5 TABLET, FILM COATED ORAL at 15:40

## 2023-12-28 RX ADMIN — SODIUM CHLORIDE, PRESERVATIVE FREE 10 ML: 5 INJECTION INTRAVENOUS at 21:45

## 2023-12-28 RX ADMIN — INSULIN LISPRO 8 UNITS: 100 INJECTION, SOLUTION INTRAVENOUS; SUBCUTANEOUS at 18:01

## 2023-12-28 RX ADMIN — QUETIAPINE FUMARATE 25 MG: 25 TABLET ORAL at 21:46

## 2023-12-28 RX ADMIN — NYSTATIN 500000 UNITS: 100000 SUSPENSION ORAL at 16:39

## 2023-12-28 RX ADMIN — CARVEDILOL 12.5 MG: 12.5 TABLET, FILM COATED ORAL at 09:54

## 2023-12-28 RX ADMIN — ACETAMINOPHEN 1000 MG: 650 SOLUTION ORAL at 15:39

## 2023-12-28 RX ADMIN — APIXABAN 5 MG: 5 TABLET, FILM COATED ORAL at 21:45

## 2023-12-28 RX ADMIN — MOXIFLOXACIN HYDROCHLORIDE 400 MG: 400 TABLET, FILM COATED ORAL at 16:47

## 2023-12-28 RX ADMIN — FUROSEMIDE 20 MG: 10 INJECTION, SOLUTION INTRAMUSCULAR; INTRAVENOUS at 09:59

## 2023-12-28 RX ADMIN — DILTIAZEM HYDROCHLORIDE 30 MG: 30 TABLET, FILM COATED ORAL at 09:53

## 2023-12-28 ASSESSMENT — PAIN DESCRIPTION - DESCRIPTORS
DESCRIPTORS: STABBING;SHARP
DESCRIPTORS: STABBING;TINGLING

## 2023-12-28 ASSESSMENT — PAIN DESCRIPTION - LOCATION
LOCATION: BACK
LOCATION: BACK

## 2023-12-28 ASSESSMENT — PAIN SCALES - GENERAL
PAINLEVEL_OUTOF10: 3
PAINLEVEL_OUTOF10: 0
PAINLEVEL_OUTOF10: 6

## 2023-12-28 ASSESSMENT — PAIN DESCRIPTION - ORIENTATION
ORIENTATION: LOWER
ORIENTATION: LOWER

## 2023-12-28 NOTE — PLAN OF CARE
Problem: Respiratory - Adult  Goal: Achieves optimal ventilation and oxygenation  12/28/2023 0739 by Marylene Screws, RCP  Outcome: Progressing   BRONCHOSPASM/BRONCHOCONSTRICTION     [x]         IMPROVE AERATION/BREATH SOUNDS  [x]   ADMINISTER BRONCHODILATOR THERAPY AS APPROPRIATE  [x]   ASSESS BREATH SOUNDS  []   IMPLEMENT AEROSOL/MDI PROTOCOL  [x]   PATIENT EDUCATION AS NEEDED  PROVIDE ADEQUATE OXYGENATION WITH ACCEPTABLE SP02/ABG'S    [x]  IDENTIFY APPROPRIATE OXYGEN THERAPY  [x]   MONITOR SP02/ABG'S AS NEEDED   [x]   PATIENT EDUCATION AS NEEDED

## 2023-12-28 NOTE — CONSULTS
injection 1 mg, PRN  dextrose 10 % infusion, Continuous PRN        Allergies:  Amiodarone, Iodine, Prednisone, Shellfish allergy, Adhesive tape, Cefuroxime axetil, E-mycin [erythromycin], Ezetimibe, Gabapentin, Morphine, Nsaids, Statins, Sulfa antibiotics, Pcn [penicillins], Percocet [oxycodone-acetaminophen], and Zithromax [azithromycin]    Social History:   Social History     Socioeconomic History    Marital status:      Spouse name: Not on file    Number of children: Not on file    Years of education: Not on file    Highest education level: Not on file   Occupational History    Not on file   Tobacco Use    Smoking status: Former     Current packs/day: 0.00     Average packs/day: 0.5 packs/day for 7.0 years (3.5 ttl pk-yrs)     Types: Cigarettes     Start date: 1993     Quit date: 2000     Years since quittin.6    Smokeless tobacco: Never   Vaping Use    Vaping Use: Never used   Substance and Sexual Activity    Alcohol use: No    Drug use: No    Sexual activity: Not on file   Other Topics Concern    Not on file   Social History Narrative    Not on file     Social Determinants of Health     Financial Resource Strain: Low Risk  (2023)    Overall Financial Resource Strain (CARDIA)     Difficulty of Paying Living Expenses: Not hard at all   Food Insecurity: Not on file (2023)   Transportation Needs: Unknown (2023)    PRAPARE - Transportation     Lack of Transportation (Medical): Not on file     Lack of Transportation (Non-Medical): No   Physical Activity: Not on file   Stress: Not on file   Social Connections: Not on file   Intimate Partner Violence: Not on file   Housing Stability: Unknown (2023)    Housing Stability Vital Sign     Unable to Pay for Housing in the Last Year: Not on file     Number of Places Lived in the Last Year: Not on file     Unstable Housing in the Last Year: No       Family History:   Family History   Problem Relation Age of Onset    Heart Attack  08:48 AM    KETUA NEGATIVE 12/24/2023 08:48 AM         Radiology:  Reviewed as available. Assessment:  1. Acute kidney injury most likely due to decrease in effective circulatory volume and prerenal azotemia. Creatinine is slowly increasing and up to 1.7 mg/dL  2. Hypernatremia due to free water deficit. Patient has significant dysphagia and on tube feeds  3. Chronic kidney disease stage III with a baseline creatinine 1.2 to 1.3 mg/dL  4. Diabetes patient is on metformin her creatinine is 1.7 now  5. Dysphagia  6. Status post coronary artery bypass complicated by difficult extubation    Plan:  1. Will repeat renal ultrasound  2. Will add free water through NG tube and 200 mL every 6 hours  3. Will send spot urine for sodium, creatinine and protein  4. We recommend to hold Lasix for next 48 hours  5. Will follow    Thank you for the consultation. Please do not hesitate to call with questions.     Electronically signed by Sharad Westbrook MD on 12/28/2023 at 12:32 PM

## 2023-12-28 NOTE — PROGRESS NOTES
[address]  Speech Language Pathology    Date: 12/28/2023  Patient Name: Jesús Tavarez  YOB: 1965   AGE: 63 y.o. MRN: [id number]        Patient Not Available for Speech Therapy     Due to:  [] Testing  [] Hemodialysis  [] Cancelled by RN  [] Surgery   [] Intubation/Sedation/Pain Medication  [] Medical instability  [x] Other: Pt with OT then leaving the floor for IR. Written list of OMEX for dysphagia provided to pt's family member with education.      Next scheduled treatment: 12/29/23  Completed by: Jeanette Arango, SLP, M.SKellen Roger

## 2023-12-28 NOTE — PLAN OF CARE
Problem: Discharge Planning  Goal: Discharge to home or other facility with appropriate resources  Outcome: Progressing  Flowsheets  Taken 12/27/2023 2030 by Lisseth Leahy RN  Discharge to home or other facility with appropriate resources:   Identify barriers to discharge with patient and caregiver   Arrange for needed discharge resources and transportation as appropriate   Identify discharge learning needs (meds, wound care, etc)  Taken 12/27/2023 1510 by Dean Manning RN  Discharge to home or other facility with appropriate resources: Identify barriers to discharge with patient and caregiver     Problem: Chronic Conditions and Co-morbidities  Goal: Patient's chronic conditions and co-morbidity symptoms are monitored and maintained or improved  Outcome: Progressing  Flowsheets  Taken 12/27/2023 2030 by Lisseth Tosin, [place] - Patient's Chronic Conditions and Co-Morbidity Symptoms are Monitored and Maintained or Improved:   Monitor and assess patient's chronic conditions and comorbid symptoms for stability, deterioration, or improvement   Collaborate with multidisciplinary team to address chronic and comorbid conditions and prevent exacerbation or deterioration  Taken 12/27/2023 1510 by Darron Solis - Patient's Chronic Conditions and Co-Morbidity Symptoms are Monitored and Maintained or Improved: Monitor and assess patient's chronic conditions and comorbid symptoms for stability, deterioration, or improvement     Problem: Respiratory - Adult  Goal: Achieves optimal ventilation and oxygenation  Outcome: Progressing  Flowsheets  Taken 12/27/2023 2030 by Lisseth Leahy RN  Achieves optimal ventilation and oxygenation:   Assess for changes in respiratory status   Assess for changes in mentation and behavior   Position to facilitate oxygenation and minimize respiratory effort  Taken 12/27/2023 1510 by Dean Manning RN  Achieves optimal ventilation and

## 2023-12-28 NOTE — BRIEF OP NOTE
Brief Postoperative Note for Thoracentesis    Rosalie Glenn  YOB: 1965  [id number]    Pre-operative Diagnosis: left Pleural effusion      Post-operative Diagnosis: Same    Procedure: Ultrasound guided Thoracentesis     Anesthesia: 1% Lidocaine     Surgeons/Assistants: Osvaldo Davis PA-C    Complications: none    EBL: Minimal    Specimens: were obtained    Ultrasound guided left thoracentesis performed. 100 ml red fluid obtained. Dressing applied.       Electronically signed by LUISA Neal on 12/28/2023 at 12:13 PM

## 2023-12-28 NOTE — PROGRESS NOTES
Pt arrives to room for left thora with ICU RN Wilman MORAN and IMAN RDMS to bedside  Pt unable to sit on side of bed  Rolled to right side  Site prepped and draped  Access obtained and draining red fluid  Specimen obtained for culture per order  Tolerated well   Return to floor with [facility] RN  Site covered with dsd  100ml removed

## 2023-12-28 NOTE — PROGRESS NOTES
using brigid cordero. Pt transferred to seated EOB. Sit/supine transfer into bed. Rolling R/L to change gown and pericare/bottom care/brief mgnt. Pt c/o difficulty breathing in supine position. Pt exiting room at end of sesion with RN for IR. Bed mobility  Sit to Supine: Moderate assistance;2 Person assistance  Scooting: Maximal assistance;2 Person assistance  Transfers  Sit to stand: Moderate assistance;2 Person assistance  Stand to sit: Moderate assistance;2 Person assistance  Transfer Comments: Valentino cordero. Verbal cues for hand placement with fair return. Cognition  Overall Cognitive Status: Exceptions  Arousal/Alertness: Delayed responses to stimuli  Following Commands: Follows one step commands with repetition; Follows one step commands with increased time  Attention Span: Attends with cues to redirect; Unable to maintain attention  Safety Judgement: Decreased awareness of need for assistance;Decreased awareness of need for safety  Problem Solving: Assistance required to identify errors made;Assistance required to correct errors made  Insights: Decreased awareness of deficits  Initiation: Requires cues for some  Sequencing: Requires cues for some  Cognition Comment: Delayed processing for all insructions  Orientation  Overall Orientation Status: Impaired  Orientation Level: Oriented to place;Oriented to situation;Oriented to person;Disoriented to time  Education Given To: Patient  Education Provided Comments: OT POC, transfer/walker safety, importance of participation in therapy, purse lip breathing, sternal precautions, heart hugger with poor return d/t cognition  Barriers to Learning: Cognition  AM-PAC - ADL  AM-PAC Daily Activity - Inpatient   How much help is needed for putting on and taking off regular lower body clothing?: A Lot  How much help is needed for bathing (which includes washing, rinsing, drying)?: A Lot  How much help is needed for toileting (which includes using toilet, bedpan, or urinal)?: A

## 2023-12-28 NOTE — PROGRESS NOTES
Physical Therapy  Facility/Department: [facility] CAR 2- STEPDOWN  Physical Therapy Treatment Note    Name: Cele Camarena  : 1965  MRN: [id number]  Date of Service: 2023    Discharge Recommendations:  Patient would benefit from continued therapy after discharge   PT Equipment Recommendations  Equipment Needed: No (continue to assess)      Patient Diagnosis(es): The encounter diagnosis was S/P CABG x 3.  Past Medical History:  has a past medical history of Allergic rhinitis, Arthritis, CAD (coronary artery disease), CKD (chronic kidney disease), stage III (HCC), Depression, Fibromyalgia, Gout, History of fractured vertebra, Hyperlipidemia, Neuropathy, OA (osteoarthritis), Peripheral vascular disease (HCC), Sacroiliac joint dysfunction, Tremor, Type II or unspecified type diabetes mellitus without mention of complication, not stated as uncontrolled, Under care of team, and Wellness examination.  Past Surgical History:  has a past surgical history that includes other surgical history (); Cholecystectomy;  section; other surgical history (); knee surgery; back surgery; Cardiac procedure (N/A, 2023); joint replacement (Right); and Coronary artery bypass graft (N/A, 12/15/2023).    Assessment   Body Structures, Functions, Activity Limitations Requiring Skilled Therapeutic Intervention: Decreased functional mobility ;Decreased endurance;Decreased balance;Decreased strength;Decreased cognition;Decreased safe awareness  Assessment: Pt required modA x2 to complete sit<>stand face to face transfer. Pt dependent with use of heart hugger with all transfers. Pt most limited by significant decreased strength balance, and endurance. Recommending continued PT to address deficits and maximize safety and independence with mobility.  Therapy Prognosis: Good  Activity Tolerance  Activity Tolerance: Patient limited by endurance;Treatment limited secondary to decreased cognition;Patient limited by fatigue  Plan   Physical Therapy Plan  General Plan: 6-7 times per week  Current Treatment Recommendations: Strengthening, Balance training, Functional mobility training, Transfer training, Gait training, Safety education & training, Home exercise program, Therapeutic activities, Patient/Caregiver education & training, Equipment evaluation, education, & procurement, Endurance training  Safety Devices  Type of Devices: Gait belt, Heels elevated for pressure relief, Call light within reach, Left in chair, Patient at risk for falls, All fall risk precautions in place (RN present at exit)  Restraints  Restraints Initially in Place: No     Restrictions  Restrictions/Precautions  Restrictions/Precautions: Up as Tolerated, Surgical Protocols, Cardiac, Fall Risk  Required Braces or Orthoses?: Yes  Required Braces or Orthoses  Other: Heart Hugger Brace  Position Activity Restriction  Sternal Precautions: No Pushing, No Pulling, 5# Lifting Restrictions  Other position/activity restrictions: up in chair, hi-froilan NC 40 Lm, multiple lines, NG, S/P CABG x3 (12/15/2023)     Subjective   General  Patient assessed for rehabilitation services?: Yes  Response To Previous Treatment: Patient with no complaints from previous session. Family / Caregiver Present: No  Follows Commands: Impaired  General Comment  Comments: Pt retired to recliner this session with call light within reach. Pt positioned for comfort with RN present. Subjective  Subjective: Pt and RN agreeable to therapy. Pt resting supine in bed. Pt with no c/o of pain. Pt pleasant and cooperative this session. Cognition   Orientation  Overall Orientation Status: Impaired  Orientation Level: Oriented to place;Oriented to situation;Oriented to person;Disoriented to time  Cognition  Overall Cognitive Status: Exceptions  Arousal/Alertness: Delayed responses to stimuli  Following Commands: Follows one step commands with repetition; Follows one step commands with increased

## 2023-12-29 ENCOUNTER — APPOINTMENT (OUTPATIENT)
Dept: GENERAL RADIOLOGY | Age: 58
DRG: 235 | End: 2023-12-29
Attending: THORACIC SURGERY (CARDIOTHORACIC VASCULAR SURGERY)
Payer: COMMERCIAL

## 2023-12-29 VITALS
HEART RATE: 91 BPM | TEMPERATURE: 97.9 F | HEIGHT: 65 IN | RESPIRATION RATE: 20 BRPM | WEIGHT: 183.2 LBS | OXYGEN SATURATION: 97 % | DIASTOLIC BLOOD PRESSURE: 67 MMHG | BODY MASS INDEX: 30.52 KG/M2 | SYSTOLIC BLOOD PRESSURE: 124 MMHG

## 2023-12-29 LAB
ANION GAP SERPL CALCULATED.3IONS-SCNC: 15 MMOL/L (ref 9–17)
BUN SERPL-MCNC: 58 MG/DL (ref 6–20)
CA-I BLD-SCNC: 1.24 MMOL/L (ref 1.13–1.33)
CALCIUM SERPL-MCNC: 9.3 MG/DL (ref 8.6–10.4)
CHLORIDE SERPL-SCNC: 115 MMOL/L (ref 98–107)
CO2 SERPL-SCNC: 20 MMOL/L (ref 20–31)
CREAT SERPL-MCNC: 1.5 MG/DL (ref 0.5–0.9)
ERYTHROCYTE [DISTWIDTH] IN BLOOD BY AUTOMATED COUNT: 15.7 % (ref 11.8–14.4)
GFR SERPL CREATININE-BSD FRML MDRD: 40 ML/MIN/1.73M2
GLUCOSE BLD-MCNC: 195 MG/DL (ref 65–105)
GLUCOSE BLD-MCNC: 272 MG/DL (ref 65–105)
GLUCOSE BLD-MCNC: 346 MG/DL (ref 65–105)
GLUCOSE BLD-MCNC: 373 MG/DL (ref 65–105)
GLUCOSE SERPL-MCNC: 314 MG/DL (ref 70–99)
HCT VFR BLD AUTO: 37.1 % (ref 36.3–47.1)
HGB BLD-MCNC: 11.5 G/DL (ref 11.9–15.1)
MAGNESIUM SERPL-MCNC: 2.5 MG/DL (ref 1.6–2.6)
MCH RBC QN AUTO: 29.7 PG (ref 25.2–33.5)
MCHC RBC AUTO-ENTMCNC: 31 G/DL (ref 28.4–34.8)
MCV RBC AUTO: 95.9 FL (ref 82.6–102.9)
MICROORGANISM SPEC CULT: NORMAL
NRBC BLD-RTO: 0 PER 100 WBC
PLATELET # BLD AUTO: 453 K/UL (ref 138–453)
PMV BLD AUTO: 10.3 FL (ref 8.1–13.5)
POTASSIUM SERPL-SCNC: 3.8 MMOL/L (ref 3.7–5.3)
RBC # BLD AUTO: 3.87 M/UL (ref 3.95–5.11)
SERVICE CMNT-IMP: NORMAL
SODIUM SERPL-SCNC: 150 MMOL/L (ref 135–144)
SPECIMEN DESCRIPTION: NORMAL
WBC OTHER # BLD: 18.3 K/UL (ref 3.5–11.3)

## 2023-12-29 PROCEDURE — 6370000000 HC RX 637 (ALT 250 FOR IP): Performed by: NURSE PRACTITIONER

## 2023-12-29 PROCEDURE — 71045 X-RAY EXAM CHEST 1 VIEW: CPT

## 2023-12-29 PROCEDURE — 6370000000 HC RX 637 (ALT 250 FOR IP): Performed by: THORACIC SURGERY (CARDIOTHORACIC VASCULAR SURGERY)

## 2023-12-29 PROCEDURE — 6370000000 HC RX 637 (ALT 250 FOR IP): Performed by: INTERNAL MEDICINE

## 2023-12-29 PROCEDURE — 82947 ASSAY GLUCOSE BLOOD QUANT: CPT

## 2023-12-29 PROCEDURE — 2700000000 HC OXYGEN THERAPY PER DAY

## 2023-12-29 PROCEDURE — 6370000000 HC RX 637 (ALT 250 FOR IP): Performed by: PHYSICIAN ASSISTANT

## 2023-12-29 PROCEDURE — 6370000000 HC RX 637 (ALT 250 FOR IP): Performed by: STUDENT IN AN ORGANIZED HEALTH CARE EDUCATION/TRAINING PROGRAM

## 2023-12-29 PROCEDURE — 6370000000 HC RX 637 (ALT 250 FOR IP)

## 2023-12-29 PROCEDURE — 99232 SBSQ HOSP IP/OBS MODERATE 35: CPT | Performed by: INTERNAL MEDICINE

## 2023-12-29 PROCEDURE — 80048 BASIC METABOLIC PNL TOTAL CA: CPT

## 2023-12-29 PROCEDURE — 83735 ASSAY OF MAGNESIUM: CPT

## 2023-12-29 PROCEDURE — 82330 ASSAY OF CALCIUM: CPT

## 2023-12-29 PROCEDURE — 94640 AIRWAY INHALATION TREATMENT: CPT

## 2023-12-29 PROCEDURE — 2580000003 HC RX 258: Performed by: PHYSICIAN ASSISTANT

## 2023-12-29 PROCEDURE — 6360000002 HC RX W HCPCS: Performed by: INTERNAL MEDICINE

## 2023-12-29 PROCEDURE — 99024 POSTOP FOLLOW-UP VISIT: CPT

## 2023-12-29 PROCEDURE — 94761 N-INVAS EAR/PLS OXIMETRY MLT: CPT

## 2023-12-29 PROCEDURE — 85027 COMPLETE CBC AUTOMATED: CPT

## 2023-12-29 PROCEDURE — 36415 COLL VENOUS BLD VENIPUNCTURE: CPT

## 2023-12-29 RX ORDER — CHOLECALCIFEROL (VITAMIN D3) 125 MCG
5 CAPSULE ORAL NIGHTLY
Qty: 30 TABLET | Refills: 0 | Status: SHIPPED | OUTPATIENT
Start: 2023-12-29

## 2023-12-29 RX ORDER — IPRATROPIUM BROMIDE AND ALBUTEROL SULFATE 2.5; .5 MG/3ML; MG/3ML
1 SOLUTION RESPIRATORY (INHALATION) EVERY 4 HOURS PRN
Status: DISCONTINUED | OUTPATIENT
Start: 2023-12-29 | End: 2023-12-29 | Stop reason: HOSPADM

## 2023-12-29 RX ORDER — AMLODIPINE BESYLATE 5 MG/1
5 TABLET ORAL DAILY
Qty: 30 TABLET | Refills: 3 | Status: SHIPPED | OUTPATIENT
Start: 2023-12-30

## 2023-12-29 RX ORDER — INSULIN LISPRO 100 [IU]/ML
0-4 INJECTION, SOLUTION INTRAVENOUS; SUBCUTANEOUS NIGHTLY
Qty: 10 ML | Refills: 0 | Status: SHIPPED | OUTPATIENT
Start: 2023-12-29

## 2023-12-29 RX ORDER — CARVEDILOL 12.5 MG/1
12.5 TABLET ORAL 2 TIMES DAILY WITH MEALS
Qty: 60 TABLET | Refills: 3 | Status: SHIPPED | OUTPATIENT
Start: 2023-12-29

## 2023-12-29 RX ORDER — QUETIAPINE FUMARATE 25 MG/1
25 TABLET, FILM COATED ORAL 2 TIMES DAILY
Qty: 60 TABLET | Refills: 3 | Status: SHIPPED | OUTPATIENT
Start: 2023-12-29

## 2023-12-29 RX ORDER — MOXIFLOXACIN HYDROCHLORIDE 400 MG/1
400 TABLET ORAL DAILY
Qty: 4 TABLET | Refills: 0 | Status: SHIPPED | OUTPATIENT
Start: 2023-12-29 | End: 2024-01-02

## 2023-12-29 RX ORDER — INSULIN LISPRO 100 [IU]/ML
0-16 INJECTION, SOLUTION INTRAVENOUS; SUBCUTANEOUS
Qty: 10 ML | Refills: 0 | Status: SHIPPED | OUTPATIENT
Start: 2023-12-29

## 2023-12-29 RX ORDER — CHLOROTHIAZIDE SODIUM 500 MG/1
500 INJECTION INTRAVENOUS ONCE
Status: COMPLETED | OUTPATIENT
Start: 2023-12-29 | End: 2023-12-29

## 2023-12-29 RX ADMIN — LANSOPRAZOLE 30 MG: 30 TABLET, ORALLY DISINTEGRATING, DELAYED RELEASE ORAL at 06:15

## 2023-12-29 RX ADMIN — CHLOROTHIAZIDE SODIUM 500 MG: 500 INJECTION, POWDER, LYOPHILIZED, FOR SOLUTION INTRAVENOUS at 15:09

## 2023-12-29 RX ADMIN — DILTIAZEM HYDROCHLORIDE 30 MG: 30 TABLET, FILM COATED ORAL at 06:13

## 2023-12-29 RX ADMIN — CLOPIDOGREL BISULFATE 75 MG: 75 TABLET ORAL at 08:14

## 2023-12-29 RX ADMIN — DILTIAZEM HYDROCHLORIDE 30 MG: 30 TABLET, FILM COATED ORAL at 15:09

## 2023-12-29 RX ADMIN — OXYCODONE HYDROCHLORIDE 5 MG: 5 TABLET ORAL at 17:31

## 2023-12-29 RX ADMIN — SODIUM CHLORIDE, PRESERVATIVE FREE 10 ML: 5 INJECTION INTRAVENOUS at 08:43

## 2023-12-29 RX ADMIN — INSULIN LISPRO 16 UNITS: 100 INJECTION, SOLUTION INTRAVENOUS; SUBCUTANEOUS at 08:14

## 2023-12-29 RX ADMIN — IPRATROPIUM BROMIDE AND ALBUTEROL SULFATE 1 DOSE: 2.5; .5 SOLUTION RESPIRATORY (INHALATION) at 03:24

## 2023-12-29 RX ADMIN — INSULIN LISPRO 12 UNITS: 100 INJECTION, SOLUTION INTRAVENOUS; SUBCUTANEOUS at 17:30

## 2023-12-29 RX ADMIN — CARVEDILOL 12.5 MG: 12.5 TABLET, FILM COATED ORAL at 17:31

## 2023-12-29 RX ADMIN — APIXABAN 5 MG: 5 TABLET, FILM COATED ORAL at 08:14

## 2023-12-29 RX ADMIN — NYSTATIN 500000 UNITS: 100000 SUSPENSION ORAL at 08:18

## 2023-12-29 RX ADMIN — QUETIAPINE FUMARATE 25 MG: 25 TABLET ORAL at 08:13

## 2023-12-29 RX ADMIN — IPRATROPIUM BROMIDE AND ALBUTEROL SULFATE 1 DOSE: 2.5; .5 SOLUTION RESPIRATORY (INHALATION) at 07:55

## 2023-12-29 RX ADMIN — ACETAMINOPHEN 1000 MG: 650 SOLUTION ORAL at 06:13

## 2023-12-29 RX ADMIN — MOXIFLOXACIN HYDROCHLORIDE 400 MG: 400 TABLET, FILM COATED ORAL at 15:09

## 2023-12-29 RX ADMIN — NYSTATIN 500000 UNITS: 100000 SUSPENSION ORAL at 15:09

## 2023-12-29 RX ADMIN — AMLODIPINE BESYLATE 5 MG: 5 TABLET ORAL at 08:14

## 2023-12-29 RX ADMIN — CARVEDILOL 12.5 MG: 12.5 TABLET, FILM COATED ORAL at 08:14

## 2023-12-29 RX ADMIN — ACETAMINOPHEN 1000 MG: 650 SOLUTION ORAL at 15:09

## 2023-12-29 ASSESSMENT — PAIN SCALES - GENERAL
PAINLEVEL_OUTOF10: 0
PAINLEVEL_OUTOF10: 6
PAINLEVEL_OUTOF10: 5
PAINLEVEL_OUTOF10: 0

## 2023-12-29 ASSESSMENT — PAIN DESCRIPTION - LOCATION
LOCATION: BACK
LOCATION: BACK

## 2023-12-29 ASSESSMENT — PAIN DESCRIPTION - ORIENTATION
ORIENTATION: LOWER
ORIENTATION: LOWER

## 2023-12-29 ASSESSMENT — PAIN DESCRIPTION - DESCRIPTORS
DESCRIPTORS: SQUEEZING;PRESSURE
DESCRIPTORS: SQUEEZING;TIGHTNESS

## 2023-12-29 NOTE — PROGRESS NOTES
remove NG today  Lifevest eval-EF 30-35%  Noted persistent hypernatremia-contacted nephrology to assist with elyte therapy. Concerns for dehydration   Needs assistance with glucose management-increase to high dose insulin scale   Aggressive PT/OT-she will need SNF vs Rehab placement for prolonged vent time causing weakness  PMR placed for [facility] rehab to eval.   12-25-23  Less agitated today. HTN tachycardic, cardiology managing medications, currently on norvasc, coreg, and cardizem. Eliquis for AF. Sputum cx sent for persistent leukocytosis and intermittent fevers, poitive for klebsiella pneumoniae despite full course of zosyn, cephalosporin allergy. ID consult placed. TF still on, FWF increased for hypernatremia, plan for swallow eval tomorrow. 12/24/2023  Less agitated today still confused. Having pauses today, will have cardiology evaluate AV blockers and adjust accordingly. On eliquis for AF, anio allergy. Still has high WBC today, zosyn completed with intermittent fevers despite removal of potential invasive sources, will plan to Cx today. Hypernatremic, increased FWF, hyperglycemic recommend changingto a lower glycemic concentration for TF. sCr with increase today, holding off on diuretics     12/24/2023  Less agitated today still confused. Having pauses today, will have cardiology evaluate AV blockers and adjust accordingly. On eliquis for AF, anio allergy. Still has high WBC today, zosyn completed with intermittent fevers despite removal of potential invasive sources, will plan to Cx today. Hypernatremic, increased FWF, hyperglycemic recommend changingto a lower glycemic concentration for TF. sCr with increase today, holding off on diuretics     12/23/2023  Agitated overnight, AxOx2, hallucinating. On seroquel, precedex, haldol, ativan, and propranolol. Zanaflex and percocet on for pain. Labetolol for break through hypertension. Reduced EF will need lifevest at discharge, start lasix 2xdaily today.  Previous ensure low sodium. Febrile, on zosyn WBC trending down, with [place], CVC, and sylvester will attempt to de-line as patient stabilizes.     12/19/2023  Intubated sedated on 175mcg of fentanyl and 40 propofol. Less tachycardic today. Ectopy decreased with reduction of milrinone to .250, epinephrine added for additional inotropy support - improvement in status after, now afebrile, lactic down trended to 1.6. Levophed for pressor support. Holding diuresis today, LESLIE completed yesterday with good RV function, moderate reduction in LV function 30% EF on .375 of milrinone. Cardizem for AF and radial harvest. APRV settings managed by trauma service. sCr 1.6- 620ml overnight, will CTM today, if worsening KENRICK will consult nephrology. TF running, patient tolerating. Elytes per protocol. Hypernatremic today, change med line to .45NACL to limit Na intake.     12-18-23  Pt status guarded. CI 3.8 with SVRs around 1400. Pt is on Vaso to assist with MARS >65.  Concern for ARDS. ECMO team following is warranted.   We would like to get a better Idea of cardiac Function today. We asked cardiology for LESLIE today to view the right heart and left heart Function. To assist with pressor and inotropic management. Pt is on zosyn for antibiotic coverage.   Trauma services assisting with vent management.   Cardizem as rate control for Afib.   Please replace elytes as appropriate   Monitor strict Is and Os.  ICU status    S/P CABG x 3 with radial artery harvest. Reintubated and sedated. Pulmonary consulted yesterday. Hemodynamically patient still on milrinone and epi .01.  Cardiac output is 8 .  Afib yesterday cardiology started digoxin patient has iodine allergy. Discussed with Dr. Nunez.  Chest x-ray still appears wet.  Patient is diuresing well with Lasix, increase as tolerated.  Would like to to DC epi use esmolol and hold Cardizem.  Will discuss with Dr. Carreon.     S/P CABG x 3 with radial artery harvest.  Temp 100.4.  Patient has a

## 2023-12-29 NOTE — PROGRESS NOTES
SLP ALL NOTES  [address]  Speech Language Pathology    Date: 12/29/2023  Patient Name: James Parks  YOB: 1965   AGE: 63 y.o. MRN: [id number]        Patient Not Available for Speech Therapy     Due to:  [] Testing  [] Hemodialysis  [] Cancelled by RN  [] Surgery   [] Intubation/Sedation/Pain Medication  [] Medical instability  [x] Other: Pt sleeping, unable to maintain alertness of swallow tx. Daughter has list of written exercises at bedside.     Next scheduled treatment: 1/2/23  Completed by: Hesham Torrez, SLP, M.S. [facility]

## 2023-12-29 NOTE — PROGRESS NOTES
morning  [DISCONTINUED] magnesium oxide (MAG-OX) 400 (240 Mg) MG tablet, Take 1 tablet by mouth daily  [DISCONTINUED] amitriptyline (ELAVIL) 25 MG tablet, Take 2 tablets by mouth nightly  [DISCONTINUED] vitamin B-12 (CYANOCOBALAMIN) 1000 MCG tablet, Take 1 tablet by mouth daily    INVESTIGATIONS     Last 3 CMP:    Recent Labs     12/27/23  0234 12/28/23  0546 12/29/23  0549   * 149* 150*   K 4.5 4.2 3.8   * 113* 115*   CO2 20 19* 20   BUN 56* 59* 58*   CREATININE 1.7* 1.7* 1.5*   CALCIUM 8.5* 9.1 9.3       Last 3 CBC:  Recent Labs     12/27/23  0234 12/28/23  0546 12/29/23  0549   WBC 21.1* 20.5* 18.3*   RBC 3.44* 3.51* 3.87*   HGB 10.0* 10.5* 11.5*   HCT 34.2* 35.0* 37.1   MCV 99.4 99.7 95.9   MCH 29.1 29.9 29.7   MCHC 29.2 30.0 31.0   RDW 15.8* 15.7* 15.7*   * 477* 453   MPV 10.1 10.2 10.3     ASSESSMENT     Acute kidney injury due to nonoliguric ATN further complicated by atrial fibrillation causing decreased perfusion and fluctuating volume status. Creatinine is now down to 1.5 mg/dL  Hypernatremia secondary to free water deficit /loop diuretic use. Her diuretics is on hold. Patient is receiving free water through her NG tube  Her last sodium is 150. Disease stage III secondary to nephrosclerosis with baseline creatinine 1-1.2 and follows up with Dr. Miguel Clancy  Status post elective CABG with ejection fraction 30-35%  New onset A-fib with RVR  Acute hypoxic respiratory failure rule out pneumonia  Type II  Obesity    PLAN     Increase free water to 350 mL every 4 hours through NG tube  Diuril 1 dose today  CBC and BMP in a.m.   Will follow with you  Please do not hesitate to call with questions    This note is created with the assistance of a speech-recognition program. While intending to generate a document that actually reflects the content of the visit, no guarantees can be provided that every mistake has been identified and corrected by editing    Carlos Oneill MD  12/29/2023 12:31 PM  [facility]

## 2023-12-29 NOTE — PROGRESS NOTES
Expectorated Updated: 12/26/23 1118     Specimen Description .EXPECTORATED SPUTUM     Direct Exam < 10 EPITHELIAL CELLS/LPF      <10 NEUTROPHILS/LPF      MIXED BACTERIAL MORPHOTYPES SEEN ON GRAM STAIN.     Culture KLEBSIELLA PNEUMONIAE MODERATE GROWTH      NO NORMAL LAURI    Susceptibility        Klebsiella pneumoniae      BACTERIAL SUSCEPTIBILITY PANEL GABRIEL      ampicillin >=32  Resistant      ceFAZolin <=4  Sensitive      cefTRIAXone <=0.25  Sensitive      Confirmatory Extended Spectrum Beta-Lactamase NEGATIVE  Sensitive      gentamicin <=1  Sensitive      levofloxacin <=0.12  Sensitive      piperacillin-tazobactam >=128  Resistant      tobramycin <=1  Sensitive      trimethoprim-sulfamethoxazole <=20  Sensitive                           Culture, Blood 1 [[id number]] Collected: 12/24/23 1213    Order Status: Completed Specimen: Blood Updated: 12/28/23 1308     Specimen Description .BLOOD     Special Requests  RT H 3 ML     Culture NO GROWTH 4 DAYS    Culture, Blood 1 [[id number]]     Order Status: Canceled Specimen: Blood     COVID-19, Rapid [[id number]] Collected: 12/17/23 1012    Order Status: Completed Specimen: Nasopharyngeal Swab Updated: 12/17/23 1051     Specimen Description .NASOPHARYNGEAL SWAB     SARS-CoV-2, Rapid Not Detected     Comment:       Rapid NAAT:  The specimen is NEGATIVE for SARS-CoV-2, the novel coronavirus associated with   COVID-19.        The ID NOW COVID-19 assay is designed to detect the virus that causes COVID-19 in patients   with signs and symptoms of infection who are suspected of COVID-19.  An individual without symptoms of COVID-19 and who is not shedding SARS-CoV-2 virus would   expect to have a negative (not detected) result in this assay.  Negative results should be treated as presumptive and, if inconsistent with clinical signs   and symptoms or necessary for patient management,  should be tested with an alternative molecular assay. Negative results do not preclude   SARS-CoV-2  infection and   should not be used as the sole basis for patient management decisions. Fact sheet for Healthcare Providers: http://www.melodie-luna.bisarah/  Fact sheet for Patients: http://www.melodie-luna.bisarah/        Methodology: Isothermal Nucleic Acid Amplification         COVID-19, Rapid [[id number]]  (Abnormal) Collected: 12/17/23 0926    Order Status: Completed Specimen: Nasopharyngeal Swab Updated: 12/17/23 1004     Specimen Description . NASOPHARYNGEAL SWAB     SARS-CoV-2, Rapid Indeterminate     Comment:       Rapid NAAT: The presence or absence of COVID-19 Viral RNA cannot be determined. If clinically indicated, please collect a new specimen. This test has been authorized by the FDA under an Emergency Use Authorization (EUA) for use   by authorized laboratories. The ID NOW COVID-19 assay is designed to detect the virus that causes COVID-19 in patients   with signs and symptoms of infection who are suspected of COVID-19. An individual without symptoms of COVID-19 and who is not shedding SARS-CoV-2 virus would   expect to have a negative (not detected) result in this assay.   Fact sheet for Healthcare Providers: http://www.melodie-luna.bisarah/  Fact sheet for Patients: http://www.melodie-luna.bisarah/        Methodology: Isothermal Nucleic Acid Amplification        Results reported to the appropriate Health Department                     Rod Clement MD  Pager: [phone] - Office: [phone]

## 2023-12-29 NOTE — PROGRESS NOTES
Comprehensive Nutrition Assessment    Type and Reason for Visit:  Reassess    Nutrition Recommendations/Plan:   Continue tube feeding as ordered  Monitor weight, labs and TF tolerance. Malnutrition Assessment:  Malnutrition Status:  Insufficient data (12/26/23 1032)    Context:  Acute Illness     Findings of the 6 clinical characteristics of malnutrition:  Energy Intake:  Mild decrease in energy intake (Comment)  Weight Loss:  Unable to assess (Weight fluctuations noted.)     Body Fat Loss:  No significant body fat loss     Muscle Mass Loss:  No significant muscle mass loss    Fluid Accumulation:  Mild Generalized, Extremities   Strength:  Not Performed    Nutrition Assessment:    Patient seen for follow up. MBSS completed 12/27, recommended patient remain NPO. Tube feed running at 45 mL/hr during visit. glucose 195-373, recommend reevaluate insulin regimen. Renal tube feed formula is carb controlled. Meds reviewed. BM 12/29. Nutrition Related Findings:    Labs/meds reviewed. Last BM 12/29 Wound Type: Multiple, Surgical Incision       Current Nutrition Intake & Therapies:    Average Meal Intake: NPO  Average Supplements Intake: NPO  Current Tube Feeding (TF) Orders:  Feeding Route: Nasogastric  Formula: Standard with Fiber  Schedule: Continuous  Goal TF & Flush Orders Provides: Renal at 45 mL/hr= 1944 kcals, 87 gm protein per day    Anthropometric Measures:  Height: 165.1 cm (5' 5\")  Ideal Body Weight (IBW): 125 lbs (57 kg)    Admission Body Weight: 84.1 kg (185 lb 6.5 oz)  Current Body Weight: 83.1 kg (183 lb 3.2 oz), 146.6 % IBW. Weight Source: Bed Scale  Current BMI (kg/m2): 30.5        Weight Adjustment For: No Adjustment                 BMI Categories: Obese Class 1 (BMI 30.0-34. 9)    Estimated Daily Nutrient Needs:  Energy Requirements Based On: Kcal/kg  Weight Used for Energy Requirements: Current  Energy (kcal/day): 6881-8443 kcals/day  Weight Used for Protein Requirements: Ideal  Protein

## 2023-12-29 NOTE — PLAN OF CARE
Problem: Discharge Planning  Goal: Discharge to home or other facility with appropriate resources  Outcome: Progressing     Problem: Chronic Conditions and Co-morbidities  Goal: Patient's chronic conditions and co-morbidity symptoms are monitored and maintained or improved  Outcome: Progressing     Problem: Respiratory - Adult  Goal: Achieves optimal ventilation and oxygenation  Outcome: Progressing     Problem: Safety - Adult  Goal: Free from fall injury  Outcome: Progressing     Problem: Skin/Tissue Integrity  Goal: Absence of new skin breakdown  Description: 1.  Monitor for areas of redness and/or skin breakdown  2.  Assess vascular access sites hourly  3.  Every 4-6 hours minimum:  Change oxygen saturation probe site  4.  Every 4-6 hours:  If on nasal continuous positive airway pressure, respiratory therapy assess nares and determine need for appliance change or resting period.  Outcome: Progressing     Problem: ABCDS Injury Assessment  Goal: Absence of physical injury  Outcome: Progressing     Problem: Pain  Goal: Verbalizes/displays adequate comfort level or baseline comfort level  Outcome: Progressing     Problem: Nutrition Deficit:  Goal: Optimize nutritional status  Outcome: Progressing     Problem: Confusion  Goal: Confusion, delirium, dementia, or psychosis is improved or at baseline  Description: INTERVENTIONS:  1. Assess for possible contributors to thought disturbance, including medications, impaired vision or hearing, underlying metabolic abnormalities, dehydration, psychiatric diagnoses, and notify attending LIP  2. [place] high risk fall precautions, as indicated  3. Provide frequent short contacts to provide reality reorientation, refocusing and direction  4. Decrease environmental stimuli, including noise as appropriate  5. Monitor and intervene to maintain adequate nutrition, hydration, elimination, sleep and activity  6. If unable to ensure safety without constant attention obtain sitter and  review sitter guidelines with assigned personnel  7.  Initiate Psychosocial CNS and Spiritual Care consult, as indicated  Outcome: Progressing

## 2023-12-29 NOTE — DISCHARGE INSTR - COC
Continuity of Care Form    Patient Name: Coco Butler   :  1965  MRN:  [id number]    Admit date:  12/15/2023  Discharge date:  2023    Code Status Order: Full Code   Advance Directives:     Admitting Physician:  Randy Calero MD  PCP: AQUILES Narayan - CNP    Discharging Nurse: Wolf Shetty [address] Unit/Room#:   Discharging Unit Phone Number: [phone]    Emergency Contact:   Extended Emergency Contact Information  Primary Emergency Contact: Mary Dhaliwal He of [address] [place] Phone: [phone]  Mobile Phone: [phone]  Relation: Parent  Secondary Emergency Contact: Lamb Mary Cid He of [address] [place] Phone: [phone]  Relation: Child    Past Surgical History:  Past Surgical History:   Procedure Laterality Date    BACK SURGERY      CARDIAC PROCEDURE N/A 2023    Griffin Sukhdev / Left heart cath / coronary angiography performed by Reyes Raphael MD at [address] [place] [address] N/A 12/15/2023    CABG CORONARY ARTERY BYPASS X3, ON PUMP SWAN SHANTHI, LESLIE, RADIAL HARVEST performed by Randy Calero MD at [facility] Right     partial knee replacement    KNEE SURGERY      right    OTHER SURGICAL HISTORY      darrach procedure    OTHER SURGICAL HISTORY      darrach procedure revision       Immunization History:   Immunization History   Administered Date(s) Administered    COVID-19, PFIZER PURPLE top, DILUTE for use, (age 15 y+), 30mcg/0.3mL 2021, 2021, 10/19/2021    COVID-19, PFIZER, ( formula), (age 12y+), IM, 30mcg/0.3mL 2023    Influenza A (R0P1-01) Vaccine PF IM 2009    Influenza A (F7n9-93),all Formulations 2009    Influenza, FLUARIX, FLULAVAL, FLUZONE (age 10 mo+) AND AFLURIA, (age 1 y+), PF, 0.5mL 2019, 02/15/2022    MMR, Edward Lazaro, M-M-R II, (age

## 2023-12-29 NOTE — CARE COORDINATION
Notified by Daisha Snider from [facility] that precert has been approved. They are able to accept today after 6pm. Transportation scheduled for 7 pm via [address]. Danielle updated. Patient's daughter, Hawk Conley, updated and agreeable.      Discharge [address] Case Management Department  Written by: Lora Romo RN    Patient Name: Tommie Carrillo  Attending Provider: Don Warren MD  Admit Date: 12/15/2023  7:30 AM  MRN: [id number]  Account: [de-identified]                     : 1965  Discharge Date: 2023      Disposition: Víctor Vargas RN

## 2023-12-29 NOTE — PLAN OF CARE
Problem: Respiratory - Adult  Goal: Achieves optimal ventilation and oxygenation  12/29/2023 0803 by Manuela Garcia RCP  Outcome: Progressing

## 2023-12-30 NOTE — PROGRESS NOTES
Patient left with [facility] transportation to facility. All belongings sent with patient and daughter. Patient disconnected from external catheter and brief intact. Previous RN called report to facility with all questions answered.

## 2023-12-30 NOTE — PLAN OF CARE
Problem: Discharge Planning  Goal: Discharge to home or other facility with appropriate resources  Outcome: Adequate for Discharge  Flowsheets (Taken 12/29/2023 0800 by Parris Arceo, RN)  Discharge to home or other facility with appropriate resources:   Identify barriers to discharge with patient and caregiver   Arrange for needed discharge resources and transportation as appropriate   Identify discharge learning needs (meds, wound care, etc)     Problem: Chronic Conditions and Co-morbidities  Goal: Patient's chronic conditions and co-morbidity symptoms are monitored and maintained or improved  Outcome: Adequate for Discharge  Flowsheets (Taken 12/29/2023 0800 by Parris Arceo, RN)  Care Plan - Patient's Chronic Conditions and Co-Morbidity Symptoms are Monitored and Maintained or Improved:   Monitor and assess patient's chronic conditions and comorbid symptoms for stability, deterioration, or improvement   Collaborate with multidisciplinary team to address chronic and comorbid conditions and prevent exacerbation or deterioration     Problem: Respiratory - Adult  Goal: Achieves optimal ventilation and oxygenation  12/29/2023 1956 by Shelli Díaz RN  Outcome: Adequate for Discharge  12/29/2023 0803 by Dary Moreno RCP  Outcome: Progressing  12/29/2023 0802 by Dary Moreno RCP  Outcome: Progressing     Problem: Safety - Adult  Goal: Free from fall injury  Outcome: Adequate for Discharge  Flowsheets (Taken 12/29/2023 0800 by Parris Arceo RN)  Free From Fall Injury: Instruct family/caregiver on patient safety     Problem: Skin/Tissue Integrity  Goal: Absence of new skin breakdown  Description: 1.  Monitor for areas of redness and/or skin breakdown  2.  Assess vascular access sites hourly  3.  Every 4-6 hours minimum:  Change oxygen saturation probe site  4.  Every 4-6 hours:  If on nasal continuous positive airway pressure, respiratory therapy assess nares and determine need for appliance  environmental stimuli, including noise as appropriate   Monitor and intervene to maintain adequate nutrition, hydration, elimination, sleep and activity

## 2023-12-31 LAB
MICROORGANISM SPEC CULT: NORMAL
MICROORGANISM/AGENT SPEC: NORMAL
MICROORGANISM/AGENT SPEC: NORMAL
SPECIMEN DESCRIPTION: NORMAL

## 2024-01-01 NOTE — TELEPHONE ENCOUNTER
From: Eleuterio Baron  To:  Todd Wilks  Sent: 7/18/2022 7:51 AM EDT  Subject: Duloxetine    Need Duloxetine called in to [address]    Thank you    [facility] [place] 0

## 2024-01-08 DIAGNOSIS — E11.22 CONTROLLED TYPE 2 DIABETES MELLITUS WITH STAGE 3 CHRONIC KIDNEY DISEASE, WITHOUT LONG-TERM CURRENT USE OF INSULIN (HCC): Primary | ICD-10-CM

## 2024-01-08 DIAGNOSIS — N18.30 CONTROLLED TYPE 2 DIABETES MELLITUS WITH STAGE 3 CHRONIC KIDNEY DISEASE, WITHOUT LONG-TERM CURRENT USE OF INSULIN (HCC): Primary | ICD-10-CM

## 2024-01-09 RX ORDER — PIOGLITAZONEHYDROCHLORIDE 15 MG/1
15 TABLET ORAL DAILY
Qty: 30 TABLET | Refills: 3 | OUTPATIENT
Start: 2024-01-09

## 2024-01-09 NOTE — TELEPHONE ENCOUNTER
Cele called requesting a refill of the below medication which has been pended for you:     Requested Prescriptions     Pending Prescriptions Disp Refills    pioglitazone (ACTOS) 15 MG tablet [Pharmacy Med Name: PIOGLITAZONE HCL 15 MG TABLET] 30 tablet 3     Sig: take 1 tablet by mouth once daily       Last Appointment Date: 12/5/2023  Next Appointment Date: Visit date not found    Allergies   Allergen Reactions    Amiodarone Shortness Of Breath and Dizziness or Vertigo    Iodine Rash and Swelling     Were in ER being treated when it happened spent time in hosp.    Prednisone Other (See Comments)     Trouble swallowing    Shellfish Allergy Anaphylaxis    Adhesive Tape Other (See Comments)     blisters    Cefuroxime Axetil Diarrhea, Hives and Nausea And Vomiting    E-Mycin [Erythromycin] Diarrhea     Rash, hives    Ezetimibe Other (See Comments)     Other reaction(s): Muscle Pain    Gabapentin Other (See Comments)     \"Un functional\"    Morphine Hives     Itching, burning     Nsaids Diarrhea, Hives and Nausea And Vomiting    Statins Other (See Comments)     myalgias    Sulfa Antibiotics Hives    Pcn [Penicillins] Rash    Percocet [Oxycodone-Acetaminophen] Other (See Comments)     Severe drowsiness     Zithromax [Azithromycin] Rash

## 2024-01-25 ENCOUNTER — TELEPHONE (OUTPATIENT)
Dept: FAMILY MEDICINE CLINIC | Age: 59
End: 2024-01-25

## 2024-01-25 NOTE — TELEPHONE ENCOUNTER
[facility] calling for a follow up/DOD 1-25/respiratory failure, please advise of appt when doing call.

## 2024-01-26 ENCOUNTER — TELEPHONE (OUTPATIENT)
Dept: CARDIOLOGY | Age: 59
End: 2024-01-26

## 2024-01-26 PROBLEM — M47.817 LUMBOSACRAL SPONDYLOSIS WITHOUT MYELOPATHY: Status: ACTIVE | Noted: 2019-05-23

## 2024-01-26 PROBLEM — J45.40 MODERATE PERSISTENT ASTHMA WITHOUT COMPLICATION: Status: ACTIVE | Noted: 2017-06-28

## 2024-01-26 PROBLEM — M51.26 LUMBAR DISC HERNIATION: Status: ACTIVE | Noted: 2019-04-25

## 2024-01-26 PROBLEM — E11.22 CONTROLLED TYPE 2 DIABETES MELLITUS WITH STAGE 3 CHRONIC KIDNEY DISEASE (HCC): Status: ACTIVE | Noted: 2017-06-28

## 2024-01-26 PROBLEM — N28.9 RENAL IMPAIRMENT: Status: ACTIVE | Noted: 2021-06-23

## 2024-01-26 PROBLEM — E78.2 MIXED HYPERLIPIDEMIA: Status: ACTIVE | Noted: 2017-06-28

## 2024-01-26 PROBLEM — M15.9 OSTEOARTHRITIS OF MULTIPLE JOINTS: Status: ACTIVE | Noted: 2017-06-28

## 2024-01-26 PROBLEM — G25.2 ACTION TREMOR: Status: ACTIVE | Noted: 2019-04-25

## 2024-01-26 PROBLEM — R60.9 DEPENDENT EDEMA: Status: ACTIVE | Noted: 2020-03-03

## 2024-01-26 PROBLEM — M19.90 INFLAMMATORY ARTHRITIS: Status: ACTIVE | Noted: 2023-01-01

## 2024-01-26 PROBLEM — J30.9 ALLERGIC RHINITIS: Status: ACTIVE | Noted: 2018-05-02

## 2024-01-26 PROBLEM — J32.9 CHRONIC SINUSITIS: Status: ACTIVE | Noted: 2017-06-28

## 2024-01-26 PROBLEM — M85.89 OSTEOPENIA OF MULTIPLE SITES: Status: ACTIVE | Noted: 2019-05-15

## 2024-01-26 PROBLEM — G89.29 CHRONIC BILATERAL LOW BACK PAIN: Status: ACTIVE | Noted: 2019-04-25

## 2024-01-26 PROBLEM — I10 BENIGN HYPERTENSION: Status: ACTIVE | Noted: 2019-05-14

## 2024-01-26 PROBLEM — N18.30 CONTROLLED TYPE 2 DIABETES MELLITUS WITH STAGE 3 CHRONIC KIDNEY DISEASE (HCC): Status: ACTIVE | Noted: 2017-06-28

## 2024-01-26 PROBLEM — G62.9 NEUROPATHY: Status: ACTIVE | Noted: 2019-12-03

## 2024-01-26 PROBLEM — M10.9 GOUT: Status: ACTIVE | Noted: 2023-07-12

## 2024-01-26 PROBLEM — M06.4 INFLAMMATORY POLYARTHROPATHY (HCC): Status: ACTIVE | Noted: 2017-06-28

## 2024-01-26 PROBLEM — E11.3292 MILD NONPROLIFERATIVE DIABETIC RETINOPATHY OF LEFT EYE WITHOUT MACULAR EDEMA ASSOCIATED WITH TYPE 2 DIABETES MELLITUS (HCC): Status: ACTIVE | Noted: 2019-08-20

## 2024-01-26 PROBLEM — M54.50 CHRONIC BILATERAL LOW BACK PAIN: Status: ACTIVE | Noted: 2019-04-25

## 2024-01-26 PROBLEM — M46.1 SACROILIITIS, NOT ELSEWHERE CLASSIFIED (HCC): Status: ACTIVE | Noted: 2021-02-01

## 2024-01-26 PROBLEM — F51.01 PRIMARY INSOMNIA: Status: ACTIVE | Noted: 2019-05-14

## 2024-01-26 NOTE — TELEPHONE ENCOUNTER
Pt returned call and is suppose to start the medication tonight. If she does not hear back she stated she is going to start the medication at bedtime.

## 2024-01-26 NOTE — TELEPHONE ENCOUNTER
Pt called to verify a couple of medications she is suppose to start taking to make sure it is OK to do so from a cardiac standpoint.    Allopurinol   Tizanidine  Tylenol-3    [phone]    Last Appt:  10/17/2023  Next Appt:   2/12/2024  Med verified in Epic

## 2024-01-29 NOTE — TELEPHONE ENCOUNTER
Cele RIBEIRO [facility] [place] [facility] Clinical Staff (supporting Blanca Grier DO)10 hours ago (9:13 PM)     SN  I called Friday asking whether I could start back on my medications for Gout, Arthritis, Neuropathy and Back and Knee Pain. The meds Tiazidine,Allopurinol and Tylenol 3 with Codiene. I told Sharon I was going to start back on them that night I just wanted Cardiac Input on the meds. FYI I did not take the Tiazidine or the Allopurinal. I did however take the Tylenol 3 with Codiene at Bedtime only. I have reached out to my Rheumatologist over the Allopurinol. Please advise regarding taking the Tiazidine.      Thank you     Cele

## 2024-01-29 NOTE — TELEPHONE ENCOUNTER
Dr Cabrales my Rheumatologist does not have my medication list therefore would like you to decide whethere I can take Alliprinolol.

## 2024-01-30 ENCOUNTER — OFFICE VISIT (OUTPATIENT)
Dept: FAMILY MEDICINE CLINIC | Age: 59
End: 2024-01-30

## 2024-01-30 VITALS
HEART RATE: 88 BPM | BODY MASS INDEX: 29.99 KG/M2 | DIASTOLIC BLOOD PRESSURE: 76 MMHG | WEIGHT: 180 LBS | SYSTOLIC BLOOD PRESSURE: 110 MMHG | HEIGHT: 65 IN | OXYGEN SATURATION: 97 %

## 2024-01-30 DIAGNOSIS — Z09 HOSPITAL DISCHARGE FOLLOW-UP: ICD-10-CM

## 2024-01-30 DIAGNOSIS — Z95.1 S/P CABG X 3: Primary | ICD-10-CM

## 2024-01-30 DIAGNOSIS — E83.42 HYPOMAGNESEMIA: ICD-10-CM

## 2024-01-30 DIAGNOSIS — J01.90 ACUTE NON-RECURRENT SINUSITIS, UNSPECIFIED LOCATION: ICD-10-CM

## 2024-01-30 DIAGNOSIS — F41.9 ANXIETY: ICD-10-CM

## 2024-01-30 RX ORDER — INSULIN GLARGINE 100 [IU]/ML
INJECTION, SOLUTION SUBCUTANEOUS
COMMUNITY
Start: 2024-01-25

## 2024-01-30 RX ORDER — MULTIVITAMIN WITH IRON
250 TABLET ORAL DAILY
Qty: 30 TABLET | Refills: 5 | Status: SHIPPED | OUTPATIENT
Start: 2024-01-30

## 2024-01-30 RX ORDER — ONDANSETRON 4 MG/1
TABLET, FILM COATED ORAL
COMMUNITY
Start: 2024-01-25

## 2024-01-30 RX ORDER — INSULIN ASPART 100 [IU]/ML
INJECTION, SOLUTION INTRAVENOUS; SUBCUTANEOUS
COMMUNITY
Start: 2024-01-26

## 2024-01-30 RX ORDER — BLOOD-GLUCOSE METER
KIT MISCELLANEOUS
COMMUNITY
Start: 2024-01-25

## 2024-01-30 RX ORDER — LEVOFLOXACIN 500 MG/1
500 TABLET, FILM COATED ORAL DAILY
Qty: 7 TABLET | Refills: 0 | Status: SHIPPED | OUTPATIENT
Start: 2024-01-30 | End: 2024-02-06

## 2024-01-30 RX ORDER — NITROGLYCERIN 0.4 MG/1
TABLET SUBLINGUAL
COMMUNITY
Start: 2024-01-25

## 2024-01-30 RX ORDER — NYSTATIN 100000 [USP'U]/G
POWDER TOPICAL 2 TIMES DAILY
COMMUNITY
Start: 2024-01-25

## 2024-01-30 RX ORDER — FUROSEMIDE 20 MG/1
20 TABLET ORAL DAILY
COMMUNITY
Start: 2024-01-25

## 2024-01-30 RX ORDER — LANCETS 28 GAUGE
EACH MISCELLANEOUS
COMMUNITY
Start: 2024-01-25

## 2024-01-30 RX ORDER — PEN NEEDLE, DIABETIC 32 GX5/16"
NEEDLE, DISPOSABLE MISCELLANEOUS
COMMUNITY
Start: 2024-01-26

## 2024-01-30 RX ORDER — CYANOCOBALAMIN 1000 UG/ML
INJECTION, SOLUTION INTRAMUSCULAR; SUBCUTANEOUS
COMMUNITY
Start: 2024-01-25

## 2024-01-30 RX ORDER — HYDROXYZINE HYDROCHLORIDE 10 MG/1
10 TABLET, FILM COATED ORAL 3 TIMES DAILY PRN
Qty: 30 TABLET | Refills: 1 | Status: SHIPPED | OUTPATIENT
Start: 2024-01-30

## 2024-01-30 NOTE — PROGRESS NOTES
Moderate persistent asthma without complication    Osteopenia of multiple sites    Primary insomnia    Renal impairment    Controlled type 2 diabetes mellitus with stage 3 chronic kidney disease (HCC)    Chronic bilateral low back pain    Mixed hyperlipidemia    Benign hypertension    Sacroiliitis, not elsewhere classified (HCC)    Action tremor       Medications listed as ordered at the time of discharge from hospital     Medication List            Accurate as of January 30, 2024 11:59 PM. If you have any questions, ask your nurse or doctor.                START taking these medications      hydrOXYzine HCl 10 MG tablet  Commonly known as: ATARAX  Take 1 tablet by mouth 3 times daily as needed for Anxiety  Started by: Bebeto Marmolejo DO     levoFLOXacin 500 MG tablet  Commonly known as: LEVAQUIN  Take 1 tablet by mouth daily for 7 days  Started by: Bebeto Marmolejo DO     magnesium 250 MG Tabs tablet  Commonly known as: MAGNESIUM-OXIDE  Take 1 tablet by mouth daily  Started by: Bebeto Marmolejo DO            CONTINUE taking these medications      amLODIPine 5 MG tablet  Commonly known as: NORVASC  Take 1 tablet by mouth daily     apixaban 5 MG Tabs tablet  Commonly known as: ELIQUIS  Take 1 tablet by mouth 2 times daily     carvedilol 12.5 MG tablet  Commonly known as: COREG  Take 1 tablet by mouth 2 times daily (with meals)     cyanocobalamin 1000 MCG/ML injection     dilTIAZem 30 MG tablet  Commonly known as: CARDIZEM  Take 1 tablet by mouth every 8 hours     Droplet Pen Needles 32G X 8 MM Misc  Generic drug: Insulin Pen Needle     FreeStyle Lancets Misc     FREESTYLE LITE strip  Generic drug: blood glucose test strips     FreeStyle Lite w/Device Kit     furosemide 20 MG tablet  Commonly known as: LASIX     insulin lispro 100 UNIT/ML Soln injection vial  Commonly known as: HUMALOG  Inject 0-16 Units into the skin 3 times daily (with meals)     Lantus SoloStar 100 UNIT/ML injection pen  Generic

## 2024-02-02 ENCOUNTER — PATIENT MESSAGE (OUTPATIENT)
Dept: FAMILY MEDICINE CLINIC | Age: 59
End: 2024-02-02

## 2024-02-05 NOTE — TELEPHONE ENCOUNTER
From: Cele Camarena  To: Dr. Bebeto Marmolejo  Sent: 2/2/2024 10:03 AM EST  Subject: Low blood pressure     The last couple days I have took my blood pressure and my blood pressure was just high enough to take Carvedilol. The problem is all day I am fighting feeling listless and dizzy. Ck my blood pressure and it’s usually under 90/55 or last night it dropped to 75/50. Of course a few hours after the med would be over 12 hours my blood pressure will go back to a normal reading. Please advise.     Thank you  Cele

## 2024-02-07 ASSESSMENT — ENCOUNTER SYMPTOMS
COUGH: 0
EYE PAIN: 0
SHORTNESS OF BREATH: 0
TROUBLE SWALLOWING: 0
ABDOMINAL PAIN: 0
VOMITING: 0
DIARRHEA: 0
SINUS PRESSURE: 1
SINUS PAIN: 1
BLOOD IN STOOL: 0
NAUSEA: 0
CONSTIPATION: 0

## 2024-02-08 ENCOUNTER — HOSPITAL ENCOUNTER (OUTPATIENT)
Dept: CARDIAC REHAB | Age: 59
Setting detail: THERAPIES SERIES
Discharge: HOME | End: 2024-02-08
Payer: COMMERCIAL

## 2024-02-08 VITALS — BODY MASS INDEX: 29.59 KG/M2 | WEIGHT: 177.8 LBS

## 2024-02-08 PROCEDURE — G0423 INTENS CARDIAC REHAB NO EXER: HCPCS

## 2024-02-08 PROCEDURE — G0422 INTENS CARDIAC REHAB W/EXERC: HCPCS

## 2024-02-08 ASSESSMENT — EXERCISE STRESS TEST
PEAK_BP: 112/64
PEAK_HR: 99
PEAK_METS: 1

## 2024-02-12 ENCOUNTER — OFFICE VISIT (OUTPATIENT)
Dept: CARDIOLOGY | Age: 59
End: 2024-02-12
Payer: COMMERCIAL

## 2024-02-12 VITALS
HEIGHT: 65 IN | SYSTOLIC BLOOD PRESSURE: 112 MMHG | DIASTOLIC BLOOD PRESSURE: 62 MMHG | BODY MASS INDEX: 29.72 KG/M2 | HEART RATE: 97 BPM | WEIGHT: 178.4 LBS

## 2024-02-12 DIAGNOSIS — I25.119 CORONARY ARTERY DISEASE INVOLVING NATIVE CORONARY ARTERY OF NATIVE HEART WITH ANGINA PECTORIS (HCC): Primary | ICD-10-CM

## 2024-02-12 PROCEDURE — 1036F TOBACCO NON-USER: CPT | Performed by: SURGERY

## 2024-02-12 PROCEDURE — 3074F SYST BP LT 130 MM HG: CPT | Performed by: SURGERY

## 2024-02-12 PROCEDURE — G8484 FLU IMMUNIZE NO ADMIN: HCPCS | Performed by: SURGERY

## 2024-02-12 PROCEDURE — 3078F DIAST BP <80 MM HG: CPT | Performed by: SURGERY

## 2024-02-12 PROCEDURE — G8417 CALC BMI ABV UP PARAM F/U: HCPCS | Performed by: SURGERY

## 2024-02-12 PROCEDURE — G8427 DOCREV CUR MEDS BY ELIG CLIN: HCPCS | Performed by: SURGERY

## 2024-02-12 PROCEDURE — 93000 ELECTROCARDIOGRAM COMPLETE: CPT | Performed by: SURGERY

## 2024-02-12 PROCEDURE — 99214 OFFICE O/P EST MOD 30 MIN: CPT | Performed by: SURGERY

## 2024-02-12 PROCEDURE — 3017F COLORECTAL CA SCREEN DOC REV: CPT | Performed by: SURGERY

## 2024-02-12 RX ORDER — CLOPIDOGREL BISULFATE 75 MG/1
75 TABLET ORAL DAILY
Qty: 90 TABLET | Refills: 1 | Status: SHIPPED | OUTPATIENT
Start: 2024-02-12

## 2024-02-12 RX ORDER — METOPROLOL SUCCINATE 25 MG/1
25 TABLET, EXTENDED RELEASE ORAL NIGHTLY
Qty: 30 TABLET | Refills: 3 | Status: SHIPPED | OUTPATIENT
Start: 2024-02-12

## 2024-02-12 NOTE — PROGRESS NOTES
Allergic rhinitis    Former tobacco use    Abnormal ECG    Anxiety    Pure hypercholesterolemia    Hypertension, essential    Controlled type 2 diabetes mellitus without complication ([facility])    Class 1 obesity due to excess calories with serious comorbidity in adult    CAD in native artery    S/P CABG x 3    Hypernatremia    KENRICK (acute kidney injury) ([facility])    Pneumonia of left lower lobe due to Klebsiella pneumoniae (HCC)    Biventricular congestive heart failure (HCC)    Allergy to multiple antibiotics    Primary hypertension    Acute kidney injury superimposed on CKD (HCC)    Bandemia    Stage 3 chronic kidney disease (HCC)    Chronic sinusitis    Dependent edema    Gout    Inflammatory arthritis    Inflammatory polyarthropathy (HCC)    Osteoarthritis of multiple joints    Lumbar disc herniation    Lumbosacral spondylosis without myelopathy    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  Reviewed today's ECG along serial ECGs    EKG:   No results found for this or any previous visit.    Echo:    No results found for this or any previous visit.    No results found for this or any previous visit.      No results found for this or any previous visit.    No results found for this or any previous visit.    No results found for this or any previous visit.    No valid procedures specified.  No results found for this or any previous visit.    No results found for this or any previous visit.    No results found for this or any previous

## 2024-02-13 ENCOUNTER — PATIENT MESSAGE (OUTPATIENT)
Dept: CARDIOLOGY | Age: 59
End: 2024-02-13

## 2024-02-13 ENCOUNTER — HOSPITAL ENCOUNTER (OUTPATIENT)
Age: 59
Discharge: HOME | End: 2024-02-13
Payer: COMMERCIAL

## 2024-02-13 ENCOUNTER — HOSPITAL ENCOUNTER (OUTPATIENT)
Dept: GENERAL RADIOLOGY | Age: 59
Discharge: HOME | End: 2024-02-15
Payer: COMMERCIAL

## 2024-02-13 ENCOUNTER — OFFICE VISIT (OUTPATIENT)
Dept: NEPHROLOGY | Age: 59
End: 2024-02-13
Payer: COMMERCIAL

## 2024-02-13 VITALS
HEART RATE: 104 BPM | WEIGHT: 179 LBS | DIASTOLIC BLOOD PRESSURE: 68 MMHG | SYSTOLIC BLOOD PRESSURE: 100 MMHG | BODY MASS INDEX: 29.82 KG/M2 | HEIGHT: 65 IN

## 2024-02-13 DIAGNOSIS — E87.0 HYPERNATREMIA: ICD-10-CM

## 2024-02-13 DIAGNOSIS — N18.32 STAGE 3B CHRONIC KIDNEY DISEASE (HCC): ICD-10-CM

## 2024-02-13 DIAGNOSIS — N18.31 STAGE 3A CHRONIC KIDNEY DISEASE (HCC): ICD-10-CM

## 2024-02-13 DIAGNOSIS — N18.32 STAGE 3B CHRONIC KIDNEY DISEASE (HCC): Primary | ICD-10-CM

## 2024-02-13 LAB
ALBUMIN SERPL-MCNC: 3.5 G/DL (ref 3.5–5.2)
ALBUMIN/GLOB SERPL: 1 {RATIO} (ref 1–2.5)
ALP SERPL-CCNC: 108 U/L (ref 35–104)
ALT SERPL-CCNC: 9 U/L (ref 5–33)
ANION GAP SERPL CALCULATED.3IONS-SCNC: 12 MMOL/L (ref 9–17)
ANION GAP SERPL CALCULATED.3IONS-SCNC: 12 MMOL/L (ref 9–17)
AST SERPL-CCNC: 17 U/L
BASOPHILS # BLD: 0.06 K/UL (ref 0–0.2)
BASOPHILS NFR BLD: 0 % (ref 0–2)
BILIRUB SERPL-MCNC: 0.2 MG/DL (ref 0.3–1.2)
BUN SERPL-MCNC: 12 MG/DL (ref 6–20)
BUN SERPL-MCNC: 12 MG/DL (ref 6–20)
BUN/CREAT SERPL: 9 (ref 9–20)
BUN/CREAT SERPL: 9 (ref 9–20)
CALCIUM SERPL-MCNC: 9.1 MG/DL (ref 8.6–10.4)
CALCIUM SERPL-MCNC: 9.2 MG/DL (ref 8.6–10.4)
CHLORIDE SERPL-SCNC: 97 MMOL/L (ref 98–107)
CO2 SERPL-SCNC: 27 MMOL/L (ref 20–31)
CO2 SERPL-SCNC: 27 MMOL/L (ref 20–31)
CREAT SERPL-MCNC: 1.3 MG/DL (ref 0.5–0.9)
CREAT SERPL-MCNC: 1.3 MG/DL (ref 0.5–0.9)
CRP SERPL HS-MCNC: 38.1 MG/L (ref 0–5)
EOSINOPHIL # BLD: 0.48 K/UL (ref 0–0.44)
EOSINOPHILS RELATIVE PERCENT: 3 % (ref 1–4)
ERYTHROCYTE [DISTWIDTH] IN BLOOD BY AUTOMATED COUNT: 13.7 % (ref 11.8–14.4)
ERYTHROCYTE [DISTWIDTH] IN BLOOD BY AUTOMATED COUNT: 13.9 % (ref 11.8–14.4)
ERYTHROCYTE [SEDIMENTATION RATE] IN BLOOD BY PHOTOMETRIC METHOD: 58 MM/HR (ref 0–30)
GFR SERPL CREATININE-BSD FRML MDRD: 48 ML/MIN/1.73M2
GFR SERPL CREATININE-BSD FRML MDRD: 48 ML/MIN/1.73M2
GLUCOSE SERPL-MCNC: 282 MG/DL (ref 70–99)
GLUCOSE SERPL-MCNC: 286 MG/DL (ref 70–99)
HCT VFR BLD AUTO: 37 % (ref 36.3–47.1)
HCT VFR BLD AUTO: 37.8 % (ref 36.3–47.1)
HGB BLD-MCNC: 11.8 G/DL (ref 11.9–15.1)
HGB BLD-MCNC: 11.9 G/DL (ref 11.9–15.1)
IMM GRANULOCYTES # BLD AUTO: 0.09 K/UL (ref 0–0.3)
IMM GRANULOCYTES NFR BLD: 1 %
IRON SATN MFR SERPL: 10 % (ref 20–55)
IRON SERPL-MCNC: 28 UG/DL (ref 37–145)
LYMPHOCYTES NFR BLD: 4.18 K/UL (ref 1.1–3.7)
LYMPHOCYTES RELATIVE PERCENT: 25 % (ref 24–43)
MAGNESIUM SERPL-MCNC: 2 MG/DL (ref 1.6–2.6)
MCH RBC QN AUTO: 26.9 PG (ref 25.2–33.5)
MCH RBC QN AUTO: 27.7 PG (ref 25.2–33.5)
MCHC RBC AUTO-ENTMCNC: 31.2 G/DL (ref 25.2–33.5)
MCHC RBC AUTO-ENTMCNC: 32.2 G/DL (ref 25.2–33.5)
MCV RBC AUTO: 86.2 FL (ref 82.6–102.9)
MCV RBC AUTO: 86.3 FL (ref 82.6–102.9)
MONOCYTES NFR BLD: 0.63 K/UL (ref 0.1–1.2)
MONOCYTES NFR BLD: 4 % (ref 3–12)
NEUTROPHILS NFR BLD: 67 % (ref 36–65)
NEUTS SEG NFR BLD: 11.56 K/UL (ref 1.5–8.1)
NRBC BLD-RTO: 0 PER 100 WBC
NRBC BLD-RTO: 0 PER 100 WBC
PLATELET # BLD AUTO: 358 K/UL (ref 138–453)
PLATELET # BLD AUTO: 358 K/UL (ref 138–453)
PMV BLD AUTO: 9 FL (ref 8.1–13.5)
PMV BLD AUTO: 9.2 FL (ref 8.1–13.5)
POTASSIUM SERPL-SCNC: 4.3 MMOL/L (ref 3.7–5.3)
POTASSIUM SERPL-SCNC: 4.4 MMOL/L (ref 3.7–5.3)
PROT SERPL-MCNC: 7 G/DL (ref 6.4–8.3)
RBC # BLD AUTO: 4.29 M/UL (ref 3.95–5.11)
RBC # BLD AUTO: 4.38 M/UL (ref 3.95–5.11)
SODIUM SERPL-SCNC: 136 MMOL/L (ref 135–144)
SODIUM SERPL-SCNC: 137 MMOL/L (ref 135–144)
TIBC SERPL-MCNC: 275 UG/DL (ref 250–450)
UNSATURATED IRON BINDING CAPACITY: 247 UG/DL (ref 112–347)
URATE SERPL-MCNC: 6.5 MG/DL (ref 2.4–5.7)
WBC OTHER # BLD: 16.7 K/UL (ref 3.5–11.3)
WBC OTHER # BLD: 17 K/UL (ref 3.5–11.3)

## 2024-02-13 PROCEDURE — 3074F SYST BP LT 130 MM HG: CPT | Performed by: INTERNAL MEDICINE

## 2024-02-13 PROCEDURE — 83550 IRON BINDING TEST: CPT

## 2024-02-13 PROCEDURE — 85027 COMPLETE CBC AUTOMATED: CPT

## 2024-02-13 PROCEDURE — 36415 COLL VENOUS BLD VENIPUNCTURE: CPT

## 2024-02-13 PROCEDURE — 1036F TOBACCO NON-USER: CPT | Performed by: INTERNAL MEDICINE

## 2024-02-13 PROCEDURE — 83540 ASSAY OF IRON: CPT

## 2024-02-13 PROCEDURE — G8417 CALC BMI ABV UP PARAM F/U: HCPCS | Performed by: INTERNAL MEDICINE

## 2024-02-13 PROCEDURE — 84550 ASSAY OF BLOOD/URIC ACID: CPT

## 2024-02-13 PROCEDURE — 3078F DIAST BP <80 MM HG: CPT | Performed by: INTERNAL MEDICINE

## 2024-02-13 PROCEDURE — 71045 X-RAY EXAM CHEST 1 VIEW: CPT

## 2024-02-13 PROCEDURE — 3017F COLORECTAL CA SCREEN DOC REV: CPT | Performed by: INTERNAL MEDICINE

## 2024-02-13 PROCEDURE — 99213 OFFICE O/P EST LOW 20 MIN: CPT | Performed by: INTERNAL MEDICINE

## 2024-02-13 PROCEDURE — 83735 ASSAY OF MAGNESIUM: CPT

## 2024-02-13 PROCEDURE — G8484 FLU IMMUNIZE NO ADMIN: HCPCS | Performed by: INTERNAL MEDICINE

## 2024-02-13 PROCEDURE — G8427 DOCREV CUR MEDS BY ELIG CLIN: HCPCS | Performed by: INTERNAL MEDICINE

## 2024-02-13 PROCEDURE — 85025 COMPLETE CBC W/AUTO DIFF WBC: CPT

## 2024-02-13 PROCEDURE — 80053 COMPREHEN METABOLIC PANEL: CPT

## 2024-02-13 PROCEDURE — 80048 BASIC METABOLIC PNL TOTAL CA: CPT

## 2024-02-13 PROCEDURE — 86140 C-REACTIVE PROTEIN: CPT

## 2024-02-13 PROCEDURE — 85652 RBC SED RATE AUTOMATED: CPT

## 2024-02-13 RX ORDER — METOPROLOL SUCCINATE 25 MG/1
25 TABLET, EXTENDED RELEASE ORAL NIGHTLY
Qty: 30 TABLET | Refills: 3 | Status: CANCELLED | OUTPATIENT
Start: 2024-02-13

## 2024-02-13 NOTE — PROGRESS NOTES
Nephrology Progress Note    Cele Leena Cleaning, APRN - CNP      SUBJECTIVE      Chief Complaint   Patient presents with    Chronic Kidney Disease     4mth follow up      Patient was in my clinic today for an ongoing nephrological evaluation.  Patient states that she is doing fairly well.  She denies any recent changes in her medication.  Labs that were ordered on initial evaluation were available.  Her most recent creatinine is 1.3 mg/dL with estimated GFR of 42.  Renal ultrasound shows right kidney 10.3 cm and left kidney 9.7 cm.  There was no increase in cortical echogenicity.  Denies any nausea vomiting or diarrhea  No chest pain PND orthopnea.    4/11/2023  Patient was in my clinic today for an ongoing nephrological evaluation.  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  She is taking Inderal with some help.  Patient denies any voiding difficulties i.e. urinary frequency urgency or hesitancy.  There is no history of consumption of over-the-counter herbal or natural medication.  Her blood sugars remain under good control and she takes metformin 1 g tablet with glimepiride.  Patient denies consumption of over-the-counter herbal or natural medication.  There is no history of nausea vomiting diarrhea hematemesis or melena.  7/11/23  Patient states that she was started on colchicine and allopurinol.  She is tolerating well.  She continues to have inflammation in her joints as well as in her knees.  She was seen by hematologist and she was told that increase in WBC count is due to inflammation.  Her blood pressure is under good control.  She takes propanolol for her tremor as well as for her blood pressure.  Patient states that her blood sugars are under reasonable control.  She does not take any Motrin Aleve or ibuprofen.  She denies any consumption of over-the-counter herbal or natural medication  Patient denies any

## 2024-02-13 NOTE — TELEPHONE ENCOUNTER
From: Cele Camarena  Sent: 2/13/2024 4:21 PM EST  To: [facility] Cardiology Clinical Staff  Subject: Metroprolol    Kk thank you. Sorry can you ck to see if there is something different I can take instead of Seroquel to take for antidepressant etc… in my opinion it is not helpful at all. No one wants to mess with it due to cardiology. I was put on it while in hospital. Before surgery I took Elavil and Duloxatine.     Thank You    Cele

## 2024-02-15 ENCOUNTER — OFFICE VISIT (OUTPATIENT)
Dept: PRIMARY CARE CLINIC | Age: 59
End: 2024-02-15
Payer: COMMERCIAL

## 2024-02-15 VITALS
TEMPERATURE: 97.5 F | DIASTOLIC BLOOD PRESSURE: 84 MMHG | HEART RATE: 121 BPM | WEIGHT: 176.2 LBS | BODY MASS INDEX: 29.32 KG/M2 | SYSTOLIC BLOOD PRESSURE: 122 MMHG | OXYGEN SATURATION: 95 %

## 2024-02-15 DIAGNOSIS — J01.41 ACUTE RECURRENT PANSINUSITIS: Primary | ICD-10-CM

## 2024-02-15 PROCEDURE — 3074F SYST BP LT 130 MM HG: CPT | Performed by: FAMILY MEDICINE

## 2024-02-15 PROCEDURE — 3079F DIAST BP 80-89 MM HG: CPT | Performed by: FAMILY MEDICINE

## 2024-02-15 PROCEDURE — G8427 DOCREV CUR MEDS BY ELIG CLIN: HCPCS | Performed by: FAMILY MEDICINE

## 2024-02-15 PROCEDURE — G8417 CALC BMI ABV UP PARAM F/U: HCPCS | Performed by: FAMILY MEDICINE

## 2024-02-15 PROCEDURE — G8484 FLU IMMUNIZE NO ADMIN: HCPCS | Performed by: FAMILY MEDICINE

## 2024-02-15 PROCEDURE — 99214 OFFICE O/P EST MOD 30 MIN: CPT | Performed by: FAMILY MEDICINE

## 2024-02-15 PROCEDURE — 3017F COLORECTAL CA SCREEN DOC REV: CPT | Performed by: FAMILY MEDICINE

## 2024-02-15 PROCEDURE — 1036F TOBACCO NON-USER: CPT | Performed by: FAMILY MEDICINE

## 2024-02-15 RX ORDER — LEVOFLOXACIN 500 MG/1
500 TABLET, FILM COATED ORAL DAILY
Qty: 7 TABLET | Refills: 0 | Status: SHIPPED | OUTPATIENT
Start: 2024-02-15

## 2024-02-15 NOTE — PROGRESS NOTES
[facility], [facility]  [address]  [facility] [address]  Dept: [phone]  Dept Fax: [phone]    Cele Camarenais a 58 y.o. female who presents today for her medical conditions/complaints as notedbelow.  Cele Camarena is c/o of   Chief Complaint   Patient presents with    URI     Was seen for a sinus infection recently and now feels like its all in her chest and is now having ear pain        HPI:     Here today for continued sinus pain.     Sinus Problem  This is a recurrent problem. Episode onset: treated for a sinus infection on 1/30. The problem has been gradually worsening since onset. There has been no fever. Associated symptoms include chills, congestion, coughing (tight, dry), diaphoresis, ear pain, headaches, shortness of breath, sinus pressure, sneezing and a sore throat. (She is wheezing some, ear drainage) Past treatments include antibiotics (took levaquin 1/30). The treatment provided mild relief.         Past Medical History:   Diagnosis Date    Allergic rhinitis     Arthritis     CAD (coronary artery disease)     multivessel. Dr. Grier [facility] [place] last visit 10/2023    CKD (chronic kidney disease), stage III (HCC)     Dr. Gann Nephrology last appt 10/10/2023    Depression     Fibromyalgia     Gout     Dr. Cyndie Cabrales Rhuematology [facility]    History of fractured vertebra 2019    L2 L3 L4    Hyperlipidemia     Neuropathy     OA (osteoarthritis)     Peripheral vascular disease (HCC)     Sacroiliac joint dysfunction     Tremor     Type II or unspecified type diabetes mellitus without mention of complication, not stated as uncontrolled     PCP    Under care of team     Dr. Kings Lugo last appt 9/2023    Wellness examination     Leena Murrieta CNP PCP last appt 10/2023          Social History     Tobacco Use    Smoking status: Former     Current

## 2024-02-15 NOTE — TELEPHONE ENCOUNTER
Patient would like you to restart her psych meds of elavil and duloxetine. (I saw her in UC today)

## 2024-02-16 ENCOUNTER — PATIENT MESSAGE (OUTPATIENT)
Dept: FAMILY MEDICINE CLINIC | Age: 59
End: 2024-02-16

## 2024-02-16 DIAGNOSIS — J30.9 ALLERGIC RHINITIS, UNSPECIFIED SEASONALITY, UNSPECIFIED TRIGGER: ICD-10-CM

## 2024-02-16 DIAGNOSIS — F41.9 ANXIETY: ICD-10-CM

## 2024-02-16 NOTE — TELEPHONE ENCOUNTER
From: Cele Camarena  To: Leena Murrieta  Sent: 2/16/2024 11:56 AM EST  Subject: Continue Medications    I need refills for medications sent into [facility] for continuation of meds after hospitalization. When it comes to the insulin only need long acting insulin refilled.     Thank you     Cele

## 2024-02-17 RX ORDER — MONTELUKAST SODIUM 10 MG/1
10 TABLET ORAL NIGHTLY
Qty: 90 TABLET | Refills: 0 | Status: SHIPPED | OUTPATIENT
Start: 2024-02-17

## 2024-02-17 RX ORDER — HYDROXYZINE HYDROCHLORIDE 10 MG/1
10 TABLET, FILM COATED ORAL 3 TIMES DAILY PRN
Qty: 90 TABLET | Refills: 1 | Status: SHIPPED | OUTPATIENT
Start: 2024-02-17

## 2024-02-19 ENCOUNTER — HOSPITAL ENCOUNTER (OUTPATIENT)
Dept: CARDIAC REHAB | Age: 59
Setting detail: THERAPIES SERIES
Discharge: HOME | End: 2024-02-19
Payer: COMMERCIAL

## 2024-02-19 VITALS — BODY MASS INDEX: 29.69 KG/M2 | WEIGHT: 178.4 LBS

## 2024-02-19 PROCEDURE — G0423 INTENS CARDIAC REHAB NO EXER: HCPCS

## 2024-02-19 PROCEDURE — G0422 INTENS CARDIAC REHAB W/EXERC: HCPCS

## 2024-02-19 ASSESSMENT — EXERCISE STRESS TEST
PEAK_HR: 124
PEAK_BP: 144/104
PEAK_METS: 1.9
PEAK_RPE: 12

## 2024-02-20 RX ORDER — FUROSEMIDE 20 MG/1
20 TABLET ORAL DAILY
Qty: 30 TABLET | Refills: 2 | Status: SHIPPED | OUTPATIENT
Start: 2024-02-20

## 2024-02-20 RX ORDER — DULOXETIN HYDROCHLORIDE 20 MG/1
20 CAPSULE, DELAYED RELEASE ORAL DAILY
Qty: 30 CAPSULE | Refills: 5 | Status: SHIPPED | OUTPATIENT
Start: 2024-02-20

## 2024-02-20 RX ORDER — INSULIN GLARGINE 100 [IU]/ML
INJECTION, SOLUTION SUBCUTANEOUS
Qty: 9 ML | Refills: 2 | Status: SHIPPED | OUTPATIENT
Start: 2024-02-20

## 2024-02-20 RX ORDER — CYANOCOBALAMIN 1000 UG/ML
INJECTION, SOLUTION INTRAMUSCULAR; SUBCUTANEOUS
Qty: 1 ML | Refills: 2 | Status: SHIPPED | OUTPATIENT
Start: 2024-02-20

## 2024-02-20 RX ORDER — ALBUTEROL SULFATE 2.5 MG/3ML
SOLUTION RESPIRATORY (INHALATION)
Qty: 225 ML | Refills: 2 | Status: SHIPPED | OUTPATIENT
Start: 2024-02-20

## 2024-02-20 RX ORDER — ONDANSETRON 4 MG/1
TABLET, FILM COATED ORAL
Qty: 30 TABLET | Refills: 2 | Status: SHIPPED | OUTPATIENT
Start: 2024-02-20

## 2024-02-20 RX ORDER — PIOGLITAZONEHYDROCHLORIDE 15 MG/1
15 TABLET ORAL DAILY
Qty: 30 TABLET | Refills: 2 | Status: SHIPPED | OUTPATIENT
Start: 2024-02-20

## 2024-02-20 NOTE — TELEPHONE ENCOUNTER
Cele called requesting a refill of the below medication which has been pended for you:     Requested Prescriptions     Pending Prescriptions Disp Refills    pioglitazone (ACTOS) 15 MG tablet [Pharmacy Med Name: PIOGLITAZONE HCL 15 MG TABLET] 30 tablet 2     Sig: take 1 tablet by mouth once daily    furosemide (LASIX) 20 MG tablet [Pharmacy Med Name: FUROSEMIDE 20 MG TABLET] 30 tablet 2     Sig: take 1 tablet by mouth once daily    cyanocobalamin 1000 MCG/ML injection [Pharmacy Med Name: CYANOCOBALAMIN 1,000 MCG/ML VL] 1 mL 2     Sig: inject 1 MILLILITER ( 1000 MCG ) intramuscularly Every Month    albuterol (PROVENTIL) (2.5 MG/3ML) 0.083% nebulizer solution [Pharmacy Med Name: ALBUTEROL SUL 2.5 MG/3 ML SOLN] 225 mL 2     Sig: inhale contents of 1 vial ( 3 MILLILITERS ) in nebulizer by mouth and INTO THE LUNGS every 6 hours if needed    ondansetron (ZOFRAN) 4 MG tablet [Pharmacy Med Name: ONDANSETRON HCL 4 MG TABLET] 30 tablet 2     Sig: take 1 tablet by mouth every 8 hours if needed for nausea and vomiting    LANTUS SOLOSTAR 100 UNIT/ML injection pen [Pharmacy Med Name: LANTUS SOLOSTAR 100 UNIT/ML] 9 mL 2     Sig: inject 30 units subcutaneously once daily       Last Appointment Date: 5/18/2023  Next Appointment Date: 03/04/2024    Allergies   Allergen Reactions    Amiodarone Shortness Of Breath and Dizziness or Vertigo    Iodine Rash and Swelling     Were in ER being treated when it happened spent time in hosp.    Prednisone Other (See Comments)     Trouble swallowing    Shellfish Allergy Anaphylaxis    Adhesive Tape Other (See Comments)     blisters    Cefuroxime Axetil Diarrhea, Hives and Nausea And Vomiting    E-Mycin [Erythromycin] Diarrhea     Rash, hives    Ezetimibe Other (See Comments)     Other reaction(s): Muscle Pain    Gabapentin Other (See Comments)     \"Un functional\"    Morphine Hives     Itching, burning     Nsaids Diarrhea, Hives and Nausea And Vomiting    Statins Other (See Comments)

## 2024-02-21 ENCOUNTER — HOSPITAL ENCOUNTER (OUTPATIENT)
Dept: CARDIAC REHAB | Age: 59
Setting detail: THERAPIES SERIES
Discharge: HOME | End: 2024-02-21
Payer: COMMERCIAL

## 2024-02-21 VITALS — BODY MASS INDEX: 29.12 KG/M2 | WEIGHT: 175 LBS

## 2024-02-21 PROCEDURE — G0422 INTENS CARDIAC REHAB W/EXERC: HCPCS

## 2024-02-21 ASSESSMENT — EXERCISE STRESS TEST
PEAK_BP: 110/76
PEAK_RPE: 12
PEAK_METS: 2
PEAK_HR: 131

## 2024-02-23 ENCOUNTER — HOSPITAL ENCOUNTER (OUTPATIENT)
Dept: CARDIAC REHAB | Age: 59
Setting detail: THERAPIES SERIES
Discharge: HOME | End: 2024-02-23
Payer: COMMERCIAL

## 2024-02-23 VITALS — BODY MASS INDEX: 29.49 KG/M2 | WEIGHT: 177.2 LBS

## 2024-02-23 PROCEDURE — G0422 INTENS CARDIAC REHAB W/EXERC: HCPCS

## 2024-02-23 ASSESSMENT — EXERCISE STRESS TEST
PEAK_HR: 122
PEAK_METS: 2
PEAK_RPE: 12
PEAK_BP: 126/68

## 2024-02-28 ENCOUNTER — HOSPITAL ENCOUNTER (OUTPATIENT)
Age: 59
Discharge: HOME | End: 2024-02-28
Payer: COMMERCIAL

## 2024-02-28 ENCOUNTER — HOSPITAL ENCOUNTER (OUTPATIENT)
Dept: CARDIAC REHAB | Age: 59
Setting detail: THERAPIES SERIES
Discharge: HOME | End: 2024-02-28
Payer: COMMERCIAL

## 2024-02-28 VITALS — BODY MASS INDEX: 29.92 KG/M2 | WEIGHT: 179.8 LBS

## 2024-02-28 DIAGNOSIS — N18.32 STAGE 3B CHRONIC KIDNEY DISEASE (HCC): ICD-10-CM

## 2024-02-28 DIAGNOSIS — N18.31 STAGE 3A CHRONIC KIDNEY DISEASE (HCC): ICD-10-CM

## 2024-02-28 LAB
ANION GAP SERPL CALCULATED.3IONS-SCNC: 14 MMOL/L (ref 9–17)
BUN SERPL-MCNC: 15 MG/DL (ref 6–20)
BUN/CREAT SERPL: 12 (ref 9–20)
CALCIUM SERPL-MCNC: 9.6 MG/DL (ref 8.6–10.4)
CHLORIDE SERPL-SCNC: 96 MMOL/L (ref 98–107)
CO2 SERPL-SCNC: 28 MMOL/L (ref 20–31)
CREAT SERPL-MCNC: 1.3 MG/DL (ref 0.5–0.9)
GFR SERPL CREATININE-BSD FRML MDRD: 48 ML/MIN/1.73M2
GLUCOSE SERPL-MCNC: 165 MG/DL (ref 70–99)
POTASSIUM SERPL-SCNC: 4.1 MMOL/L (ref 3.7–5.3)
SODIUM SERPL-SCNC: 138 MMOL/L (ref 135–144)

## 2024-02-28 PROCEDURE — 80048 BASIC METABOLIC PNL TOTAL CA: CPT

## 2024-02-28 PROCEDURE — G0422 INTENS CARDIAC REHAB W/EXERC: HCPCS

## 2024-02-28 PROCEDURE — 36415 COLL VENOUS BLD VENIPUNCTURE: CPT

## 2024-02-28 PROCEDURE — G0423 INTENS CARDIAC REHAB NO EXER: HCPCS

## 2024-02-28 ASSESSMENT — EXERCISE STRESS TEST
PEAK_BP: 114/80
PEAK_METS: 2.6
PEAK_RPE: 12
PEAK_HR: 123

## 2024-02-28 NOTE — PROGRESS NOTES
Cele FORMAN.:  1965  Acct Number: [id number]  MRN:  [id number]                  [facility] COOKING SCHOOL WORKSHOP             Date: 2024        Session # ____1____   Today’s class covered:    COOKING SCHOOL WORKSHOPS    [] Adding Flavor - Sodium-Free  [] Fast & Healthy Breakfasts    [] Powerhouse Plant-Based Proteins [] Satisfying Salads and Dressings    [x] Simple Sides & Sauces   [] Personalizing Your Plate    [] Delicious Desserts    [] Savory Soups    [] Efficiency Cooking - Meals in a Snap [] Tasty Appetizers & Snacks     [] Comforting Weekend Breakfasts  [] One-Pot Wonders    [] Fast Evening Meals   [] Easy Entertaining    []  International Cuisine Spotlight on the Blue Zone          Patients were shown how to choose, prep, and cook; substitutions and other options were given.  Samples were offered.  Recipes were given and questions answered.      The patient above was in the Cooking School Workshop for 45 minutes.      Electronically signed by Lorenzo Fofana RD on 2024 at 10:40 AM

## 2024-03-04 ENCOUNTER — OFFICE VISIT (OUTPATIENT)
Dept: FAMILY MEDICINE CLINIC | Age: 59
End: 2024-03-04
Payer: COMMERCIAL

## 2024-03-04 VITALS
SYSTOLIC BLOOD PRESSURE: 112 MMHG | OXYGEN SATURATION: 97 % | HEART RATE: 57 BPM | BODY MASS INDEX: 29.45 KG/M2 | WEIGHT: 177 LBS | DIASTOLIC BLOOD PRESSURE: 72 MMHG

## 2024-03-04 DIAGNOSIS — F41.9 ANXIETY: ICD-10-CM

## 2024-03-04 DIAGNOSIS — J06.9 VIRAL URI: Primary | ICD-10-CM

## 2024-03-04 PROCEDURE — G8427 DOCREV CUR MEDS BY ELIG CLIN: HCPCS | Performed by: STUDENT IN AN ORGANIZED HEALTH CARE EDUCATION/TRAINING PROGRAM

## 2024-03-04 PROCEDURE — 3074F SYST BP LT 130 MM HG: CPT | Performed by: STUDENT IN AN ORGANIZED HEALTH CARE EDUCATION/TRAINING PROGRAM

## 2024-03-04 PROCEDURE — G8484 FLU IMMUNIZE NO ADMIN: HCPCS | Performed by: STUDENT IN AN ORGANIZED HEALTH CARE EDUCATION/TRAINING PROGRAM

## 2024-03-04 PROCEDURE — 3017F COLORECTAL CA SCREEN DOC REV: CPT | Performed by: STUDENT IN AN ORGANIZED HEALTH CARE EDUCATION/TRAINING PROGRAM

## 2024-03-04 PROCEDURE — 3078F DIAST BP <80 MM HG: CPT | Performed by: STUDENT IN AN ORGANIZED HEALTH CARE EDUCATION/TRAINING PROGRAM

## 2024-03-04 PROCEDURE — 99214 OFFICE O/P EST MOD 30 MIN: CPT | Performed by: STUDENT IN AN ORGANIZED HEALTH CARE EDUCATION/TRAINING PROGRAM

## 2024-03-04 PROCEDURE — 1036F TOBACCO NON-USER: CPT | Performed by: STUDENT IN AN ORGANIZED HEALTH CARE EDUCATION/TRAINING PROGRAM

## 2024-03-04 PROCEDURE — G8417 CALC BMI ABV UP PARAM F/U: HCPCS | Performed by: STUDENT IN AN ORGANIZED HEALTH CARE EDUCATION/TRAINING PROGRAM

## 2024-03-04 ASSESSMENT — PATIENT HEALTH QUESTIONNAIRE - PHQ9
SUM OF ALL RESPONSES TO PHQ QUESTIONS 1-9: 0
SUM OF ALL RESPONSES TO PHQ9 QUESTIONS 1 & 2: 0
2. FEELING DOWN, DEPRESSED OR HOPELESS: 0
7. TROUBLE CONCENTRATING ON THINGS, SUCH AS READING THE NEWSPAPER OR WATCHING TELEVISION: 0
9. THOUGHTS THAT YOU WOULD BE BETTER OFF DEAD, OR OF HURTING YOURSELF: 0
8. MOVING OR SPEAKING SO SLOWLY THAT OTHER PEOPLE COULD HAVE NOTICED. OR THE OPPOSITE, BEING SO FIGETY OR RESTLESS THAT YOU HAVE BEEN MOVING AROUND A LOT MORE THAN USUAL: 0
SUM OF ALL RESPONSES TO PHQ QUESTIONS 1-9: 0
3. TROUBLE FALLING OR STAYING ASLEEP: 0
1. LITTLE INTEREST OR PLEASURE IN DOING THINGS: 0
10. IF YOU CHECKED OFF ANY PROBLEMS, HOW DIFFICULT HAVE THESE PROBLEMS MADE IT FOR YOU TO DO YOUR WORK, TAKE CARE OF THINGS AT HOME, OR GET ALONG WITH OTHER PEOPLE: 0
SUM OF ALL RESPONSES TO PHQ QUESTIONS 1-9: 0
4. FEELING TIRED OR HAVING LITTLE ENERGY: 0
5. POOR APPETITE OR OVEREATING: 0
SUM OF ALL RESPONSES TO PHQ QUESTIONS 1-9: 0
6. FEELING BAD ABOUT YOURSELF - OR THAT YOU ARE A FAILURE OR HAVE LET YOURSELF OR YOUR FAMILY DOWN: 0

## 2024-03-04 NOTE — PROGRESS NOTES
URI:  - Nausea, vomiting, fatigue, headache that just started this morning  - Unsure of fever  - Has sick contact with same symptoms on Thursday of last week, unknown cause  - Not sure if she needs anything, has taken \"osteon\" this morning    Anxiety:  - Anxiety is worse  - Feels like someone is sitting on her and drowning her at night, feeling of entire body being weighted down  - Fast breathing, feels like heart is beating faster  - Symptoms last 30-45 minutes  - Not really having symptoms during the day  - Denies racing thoughts at night currently, takes her a while to fall asleep, and will wake up anxious overnight  - Works on controlling her breathing, and calming herself down  - Atarax does nothing for her, she has tried taking it when she feels symptoms coming on  - Feels like she never had anxiety like this before the surgery    
[place] HOD   3/29/2024  2:30 PM [facility]   4/15/2024  9:45 AM Esha Dias, APRN - CNP [facility]   4/15/2024  2:20 PM Leena Murrieta [facility]   4/23/2024  2:10 PM Wilfredo Gann MD [facility]   5/20/2024 11:45 AM Yunier Grier DO [facility]   6/24/2024  1:00 PM Sergio Ac MD [facility]       Patient given educational materials - see patient instructions.  Discussed use, benefit, and side effects of prescribed medications.  All patient questions answered.  Pt voiced understanding. Reviewed health maintenance.  Instructed to continue current medications, diet and exercise.  Patient agreed with treatment plan.  Follow up as directed.     Electronically signed by Bebeto Marmolejo DO on 3/11/2024 at 10:42 PM

## 2024-03-06 ENCOUNTER — HOSPITAL ENCOUNTER (OUTPATIENT)
Dept: CARDIAC REHAB | Age: 59
Setting detail: THERAPIES SERIES
Discharge: HOME | End: 2024-03-06
Payer: COMMERCIAL

## 2024-03-06 VITALS — BODY MASS INDEX: 29.52 KG/M2 | WEIGHT: 177.4 LBS

## 2024-03-06 PROCEDURE — G0423 INTENS CARDIAC REHAB NO EXER: HCPCS

## 2024-03-06 PROCEDURE — G0422 INTENS CARDIAC REHAB W/EXERC: HCPCS

## 2024-03-06 ASSESSMENT — EXERCISE STRESS TEST
PEAK_BP: 120/76
PEAK_METS: 2.5
PEAK_HR: 122
PEAK_RPE: 12

## 2024-03-07 NOTE — PROGRESS NOTES
Cele FORMAN.:  1965  Acct Number: [id number]  MRN:  [id number]                  [facility] COOKING SCHOOL WORKSHOP             Date: 3/6/24        Session # ___2_____   Today’s class covered:    COOKING SCHOOL WORKSHOPS    [] Adding Flavor - Sodium-Free  [] Fast & Healthy Breakfasts    [] Powerhouse Plant-Based Proteins [x] Satisfying Salads and Dressings    [] Simple Sides & Sauces   [] Personalizing Your Plate    [] Delicious Desserts    [] Savory Soups    [] Efficiency Cooking - Meals in a Snap [] Tasty Appetizers & Snacks     [] Comforting Weekend Breakfasts  [] One-Pot Wonders    [] Fast Evening Meals   [] Easy Entertaining    []  International Cuisine Spotlight on the Blue Zone          Patients were shown how to choose, prep, and cook; substitutions and other options were given.  Samples were offered.  Recipes were given and questions answered.      The patient above was in the Cooking School Workshop for 37 minutes.      Electronically signed by Lorenzo Fofana RD on 3/7/2024 at 9:43 AM

## 2024-03-08 ENCOUNTER — HOSPITAL ENCOUNTER (OUTPATIENT)
Dept: CARDIAC REHAB | Age: 59
Setting detail: THERAPIES SERIES
Discharge: HOME | End: 2024-03-08
Payer: COMMERCIAL

## 2024-03-08 VITALS — BODY MASS INDEX: 29.49 KG/M2 | WEIGHT: 177.2 LBS

## 2024-03-08 PROCEDURE — G0422 INTENS CARDIAC REHAB W/EXERC: HCPCS

## 2024-03-08 PROCEDURE — G0423 INTENS CARDIAC REHAB NO EXER: HCPCS

## 2024-03-08 ASSESSMENT — EXERCISE STRESS TEST
PEAK_METS: 2.8
PEAK_RPE: 12
PEAK_BP: 120/72
PEAK_HR: 123

## 2024-03-11 ENCOUNTER — HOSPITAL ENCOUNTER (OUTPATIENT)
Dept: CARDIAC REHAB | Age: 59
Setting detail: THERAPIES SERIES
Discharge: HOME | End: 2024-03-11
Payer: COMMERCIAL

## 2024-03-11 VITALS — BODY MASS INDEX: 29.64 KG/M2 | WEIGHT: 178.1 LBS

## 2024-03-11 PROCEDURE — G0422 INTENS CARDIAC REHAB W/EXERC: HCPCS

## 2024-03-11 PROCEDURE — G0423 INTENS CARDIAC REHAB NO EXER: HCPCS

## 2024-03-11 ASSESSMENT — ENCOUNTER SYMPTOMS
NAUSEA: 1
TROUBLE SWALLOWING: 0
SINUS PRESSURE: 1
EYE PAIN: 0
CONSTIPATION: 0
SHORTNESS OF BREATH: 0
DIARRHEA: 1
COUGH: 0
ABDOMINAL PAIN: 0
BLOOD IN STOOL: 0
RHINORRHEA: 1
VOMITING: 0

## 2024-03-11 ASSESSMENT — EXERCISE STRESS TEST
PEAK_METS: 2.6
PEAK_HR: 129
PEAK_BP: 132/90
PEAK_RPE: 12

## 2024-03-13 ENCOUNTER — HOSPITAL ENCOUNTER (OUTPATIENT)
Age: 59
Discharge: HOME | End: 2024-03-15
Payer: COMMERCIAL

## 2024-03-13 ENCOUNTER — HOSPITAL ENCOUNTER (OUTPATIENT)
Dept: CARDIAC REHAB | Age: 59
Setting detail: THERAPIES SERIES
Discharge: HOME | End: 2024-03-13
Payer: COMMERCIAL

## 2024-03-13 ENCOUNTER — TELEPHONE (OUTPATIENT)
Dept: CARDIOLOGY | Age: 59
End: 2024-03-13

## 2024-03-13 VITALS
HEART RATE: 104 BPM | SYSTOLIC BLOOD PRESSURE: 130 MMHG | BODY MASS INDEX: 29.66 KG/M2 | DIASTOLIC BLOOD PRESSURE: 60 MMHG | HEIGHT: 65 IN | WEIGHT: 178 LBS

## 2024-03-13 VITALS — WEIGHT: 178.2 LBS | BODY MASS INDEX: 29.65 KG/M2

## 2024-03-13 DIAGNOSIS — I25.119 CORONARY ARTERY DISEASE INVOLVING NATIVE CORONARY ARTERY OF NATIVE HEART WITH ANGINA PECTORIS (HCC): ICD-10-CM

## 2024-03-13 LAB
ECHO AO ASC DIAM: 3.2 CM
ECHO AO ASCENDING AORTA INDEX: 1.7 CM/M2
ECHO AO ROOT DIAM: 3.1 CM
ECHO AO ROOT INDEX: 1.65 CM/M2
ECHO BSA: 1.92 M2
ECHO LA AREA 4C: 11.9 CM2
ECHO LA DIAMETER INDEX: 1.76 CM/M2
ECHO LA DIAMETER: 3.3 CM
ECHO LA MAJOR AXIS: 5.1 CM
ECHO LA TO AORTIC ROOT RATIO: 1.06
ECHO LA VOL MOD A4C: 22 ML (ref 22–52)
ECHO LA VOLUME INDEX MOD A4C: 12 ML/M2 (ref 16–34)
ECHO LV EDV A4C: 85 ML
ECHO LV EDV INDEX A4C: 45 ML/M2
ECHO LV EJECTION FRACTION A4C: 50 %
ECHO LV ESV A4C: 42 ML
ECHO LV ESV INDEX A4C: 22 ML/M2
ECHO LV FRACTIONAL SHORTENING: 27 % (ref 28–44)
ECHO LV INTERNAL DIMENSION DIASTOLE INDEX: 2.61 CM/M2
ECHO LV INTERNAL DIMENSION DIASTOLIC: 4.9 CM (ref 3.9–5.3)
ECHO LV INTERNAL DIMENSION SYSTOLIC INDEX: 1.91 CM/M2
ECHO LV INTERNAL DIMENSION SYSTOLIC: 3.6 CM
ECHO LV IVSD: 1.1 CM (ref 0.6–0.9)
ECHO LV MASS 2D: 213.3 G (ref 67–162)
ECHO LV MASS INDEX 2D: 113.4 G/M2 (ref 43–95)
ECHO LV POSTERIOR WALL DIASTOLIC: 1.2 CM (ref 0.6–0.9)
ECHO LV RELATIVE WALL THICKNESS RATIO: 0.49
ECHO LVOT AREA: 3.1 CM2
ECHO LVOT DIAM: 2 CM

## 2024-03-13 PROCEDURE — G0423 INTENS CARDIAC REHAB NO EXER: HCPCS

## 2024-03-13 PROCEDURE — G0422 INTENS CARDIAC REHAB W/EXERC: HCPCS

## 2024-03-13 PROCEDURE — 93308 TTE F-UP OR LMTD: CPT

## 2024-03-13 PROCEDURE — 93308 TTE F-UP OR LMTD: CPT | Performed by: INTERNAL MEDICINE

## 2024-03-13 ASSESSMENT — EXERCISE STRESS TEST
PEAK_RPE: 12
PEAK_METS: 2.5
PEAK_HR: 122
PEAK_BP: 108/82

## 2024-03-13 NOTE — TELEPHONE ENCOUNTER
Please review limited echo and advise.     Interpretation Summary         Left Ventricle: Reduced left ventricular systolic function with a visually estimated EF of 45 - 50%. Left ventricle size is normal. Increased wall thickness. See diagram for wall motion findings.    Image quality is suboptimal.     Echo Findings    Left Ventricle Reduced left ventricular systolic function with a visually estimated EF of 45 - 50%. Left ventricle size is normal. Increased wall thickness. See diagram for wall motion findings.   Left Atrium Left atrium size is normal.   Right Ventricle Right ventricle size is normal. Normal systolic function.   Right Atrium Right atrium size is normal.

## 2024-03-13 NOTE — PROGRESS NOTES
Cele FORMAN.:  1965  Acct Number: [id number]  MRN:  [id number]                  [facility] COOKING SCHOOL WORKSHOP             Date: 3/13/2024        Session # ____3____   Today’s class covered:    COOKING SCHOOL WORKSHOPS    [] Adding Flavor - Sodium-Free  [] Fast & Healthy Breakfasts    [] Powerhouse Plant-Based Proteins [] Satisfying Salads and Dressings    [] Simple Sides & Sauces   [] Personalizing Your Plate    [x] Delicious Desserts    [] Savory Soups    [] Efficiency Cooking - Meals in a Snap [] Tasty Appetizers & Snacks     [] Comforting Weekend Breakfasts  [] One-Pot Wonders    [] Fast Evening Meals   [] Easy Entertaining    []  International Cuisine Spotlight on the Blue Zone          Patients were shown how to choose, prep, and cook; substitutions and other options were given.  Samples were offered.  Recipes were given and questions answered.      The patient above was in the Cooking School Workshop for 49 minutes.      Electronically signed by Lorenzo Fofana RD on 3/13/2024 at 10:35 AM

## 2024-03-15 ENCOUNTER — TELEPHONE (OUTPATIENT)
Dept: CARDIOLOGY | Age: 59
End: 2024-03-15

## 2024-03-15 NOTE — TELEPHONE ENCOUNTER
Cele,  We will address this with Dr Grier when he is here next week. Until then please continue to wear the life vest.      Thank you  Cardiology                            From: Cele Camarena  To: Dr. Blanca Grier  Sent: 3/15/2024  1:02 PM EDT  Subject: Life Vest    I had the ultra sound to ck my ejection Fracture of the heart. It is now 45 to 50%. Am I able now to take off my Zoll Life Vest.    Thank You    Cele

## 2024-03-15 NOTE — TELEPHONE ENCOUNTER
Patient notified of echo results and of no need for lifevest per Miguelito. Pt verbalized understanding and had no further questions at the time.

## 2024-03-18 ENCOUNTER — PATIENT MESSAGE (OUTPATIENT)
Dept: PAIN MANAGEMENT | Age: 59
End: 2024-03-18

## 2024-03-18 ENCOUNTER — HOSPITAL ENCOUNTER (OUTPATIENT)
Dept: CARDIAC REHAB | Age: 59
Setting detail: THERAPIES SERIES
Discharge: HOME | End: 2024-03-18
Payer: COMMERCIAL

## 2024-03-18 VITALS — WEIGHT: 176 LBS | BODY MASS INDEX: 29.29 KG/M2

## 2024-03-18 DIAGNOSIS — G89.29 CHRONIC PAIN OF RIGHT KNEE: ICD-10-CM

## 2024-03-18 DIAGNOSIS — M25.561 CHRONIC PAIN OF RIGHT KNEE: ICD-10-CM

## 2024-03-18 DIAGNOSIS — M17.11 LOCALIZED OSTEOARTHRITIS OF RIGHT KNEE: ICD-10-CM

## 2024-03-18 PROCEDURE — G0422 INTENS CARDIAC REHAB W/EXERC: HCPCS

## 2024-03-18 PROCEDURE — G0423 INTENS CARDIAC REHAB NO EXER: HCPCS

## 2024-03-18 RX ORDER — ACETAMINOPHEN AND CODEINE PHOSPHATE 300; 30 MG/1; MG/1
1 TABLET ORAL EVERY 6 HOURS PRN
Qty: 45 TABLET | Refills: 0 | Status: SHIPPED | OUTPATIENT
Start: 2024-03-18 | End: 2024-03-29

## 2024-03-18 ASSESSMENT — EXERCISE STRESS TEST
PEAK_HR: 131
PEAK_RPE: 12
PEAK_METS: 2.4
PEAK_BP: 116/82

## 2024-03-18 NOTE — TELEPHONE ENCOUNTER
Cele called requesting a refill of the below medication which has been pended for you:     Requested Prescriptions     Pending Prescriptions Disp Refills    acetaminophen-codeine (TYLENOL #3) 300-30 MG per tablet 120 tablet 2     Sig: Take 1 tablet by mouth every 6 hours as needed for Pain for up to 90 days. Max Daily Amount: 4 tablets       Last Appointment Date: 10/27/2023  Next Appointment Date: 3/29/2024    Allergies   Allergen Reactions    Amiodarone Shortness Of Breath and Dizziness or Vertigo    Iodine Rash and Swelling     Were in ER being treated when it happened spent time in hosp.    Prednisone Other (See Comments)     Trouble swallowing    Shellfish Allergy Anaphylaxis    Adhesive Tape Other (See Comments)     blisters    Cefuroxime Axetil Diarrhea, Hives and Nausea And Vomiting    E-Mycin [Erythromycin] Diarrhea     Rash, hives    Ezetimibe Other (See Comments)     Other reaction(s): Muscle Pain    Gabapentin Other (See Comments)     \"Un functional\"    Morphine Hives     Itching, burning     Nsaids Diarrhea, Hives and Nausea And Vomiting    Statins Other (See Comments)     myalgias    Sulfa Antibiotics Hives    Pcn [Penicillins] Rash    Percocet [Oxycodone-Acetaminophen] Other (See Comments)     Severe drowsiness     Zithromax [Azithromycin] Rash       Pharmacy:  [facility] Report checked for [place], [place], and [place]:Tylenol #3 11/18/24 #120. Due NOW.      Has not been seen since October, sending in enough to get to appt.

## 2024-03-18 NOTE — TELEPHONE ENCOUNTER
From: Cele Camarena  To: Dr. Abhishek Roland  Sent: 3/18/2024 12:55 PM EDT  Subject: Refill Tylenol 3    I need a refill for Tylenol 3. Have a office visit 3/29 scheduled. [facility], [place].     Thank you    Cele

## 2024-03-18 NOTE — PROGRESS NOTES
Cele FORMAN.:  1965    Acct Number: [id number]   MRN:  [id number]                             [facility] NUTRITION WORKSHOP             Date: 3/18/2024        Session # ___4____    Today’s class covered:    []   Fueling a Healthy Body  []   Mindful Eating  []   Targeting Nutrition Priorities  [x]   Dining Out :  Making the Most of a Menu  []   Label Reading    Readiness to change:    []  Pre-contemplative   [x]  Contemplative - ambivalent about change    []  Action - ready to set action plan and implement   []  Maintenance - has made change and is trying, and or practicing different alternative         behaviors     Cele was in the Workshop with the Dietitian for 44 minutes.  The content was presented via Powerpoint, lecture, and patient participation based format.  Motivational interviewing was utilized when needed, to promote change.  Patient voiced understanding.    Electronically signed by Lorenzo Fofana RD on 3/18/2024 at 12:11 PM

## 2024-03-19 ASSESSMENT — EJECTION FRACTION: EF_VALUE: 45

## 2024-03-19 ASSESSMENT — EXERCISE STRESS TEST: PEAK_BP: 116/82

## 2024-03-20 ENCOUNTER — HOSPITAL ENCOUNTER (OUTPATIENT)
Dept: CARDIAC REHAB | Age: 59
Setting detail: THERAPIES SERIES
Discharge: HOME | End: 2024-03-20
Payer: COMMERCIAL

## 2024-03-20 VITALS — BODY MASS INDEX: 29.59 KG/M2 | WEIGHT: 177.8 LBS

## 2024-03-20 PROCEDURE — G0423 INTENS CARDIAC REHAB NO EXER: HCPCS

## 2024-03-20 PROCEDURE — G0422 INTENS CARDIAC REHAB W/EXERC: HCPCS

## 2024-03-20 ASSESSMENT — EXERCISE STRESS TEST
PEAK_METS: 2.8
PEAK_HR: 126
PEAK_BP: 128/78
PEAK_RPE: 12

## 2024-03-20 NOTE — PROGRESS NOTES
Cele FORMAN.:  1965  Acct Number: [id number]  MRN:  [id number]                  [facility] COOKING SCHOOL WORKSHOP             Date: 3/20/2024        Session # ____5____   Today’s class covered:    COOKING SCHOOL WORKSHOPS    [] Adding Flavor - Sodium-Free  [] Fast & Healthy Breakfasts    [] Powerhouse Plant-Based Proteins [] Satisfying Salads and Dressings    [] Simple Sides & Sauces   [] Personalizing Your Plate    [] Delicious Desserts    [x] Savory Soups    [] Efficiency Cooking - Meals in a Snap [] Tasty Appetizers & Snacks     [] Comforting Weekend Breakfasts  [] One-Pot Wonders    [] Fast Evening Meals   [] Easy Entertaining    []  International Cuisine Spotlight on the Blue Zone          Patients were shown how to choose, prep, and cook; substitutions and other options were given.  Samples were offered.  Recipes were given and questions answered.      The patient above was in the Cooking School Workshop for 44 minutes.      Electronically signed by Lorenzo Fofana RD on 3/20/2024 at 9:58 AM

## 2024-03-27 ENCOUNTER — HOSPITAL ENCOUNTER (OUTPATIENT)
Dept: CARDIAC REHAB | Age: 59
Setting detail: THERAPIES SERIES
Discharge: HOME | End: 2024-03-27
Payer: COMMERCIAL

## 2024-03-27 VITALS — BODY MASS INDEX: 29.45 KG/M2 | WEIGHT: 177 LBS

## 2024-03-27 PROCEDURE — G0422 INTENS CARDIAC REHAB W/EXERC: HCPCS

## 2024-03-27 PROCEDURE — G0423 INTENS CARDIAC REHAB NO EXER: HCPCS

## 2024-03-27 ASSESSMENT — EXERCISE STRESS TEST
PEAK_RPE: 12
PEAK_HR: 134
PEAK_BP: 130/80
PEAK_METS: 2.5

## 2024-03-27 NOTE — PROGRESS NOTES
Cele FORMAN.:  1965  Acct Number: [id number]  MRN:  [id number]                  [facility] COOKING SCHOOL WORKSHOP             Date: 3/27/2024        Session # ____6____   Today’s class covered:    COOKING SCHOOL WORKSHOPS    [] Adding Flavor - Sodium-Free  [] Fast & Healthy Breakfasts    [] Powerhouse Plant-Based Proteins [] Satisfying Salads and Dressings    [] Simple Sides & Sauces   [] Personalizing Your Plate    [] Delicious Desserts    [] Savory Soups    [x] Efficiency Cooking - Meals in a Snap [] Tasty Appetizers & Snacks     [] Comforting Weekend Breakfasts  [] One-Pot Wonders    [] Fast Evening Meals   [] Easy Entertaining    []  International Cuisine Spotlight on the Blue Zone          Patients were shown how to choose, prep, and cook; substitutions and other options were given.  Samples were offered.  Recipes were given and questions answered.      The patient above was in the Cooking School Workshop for 40 minutes.      Electronically signed by Lorenzo Ffoana RD on 3/27/2024 at 10:04 AM

## 2024-04-01 ENCOUNTER — HOSPITAL ENCOUNTER (OUTPATIENT)
Dept: CARDIAC REHAB | Age: 59
Setting detail: THERAPIES SERIES
Discharge: HOME | End: 2024-04-01
Payer: COMMERCIAL

## 2024-04-01 VITALS — WEIGHT: 180.4 LBS | BODY MASS INDEX: 30.02 KG/M2

## 2024-04-01 PROCEDURE — G0422 INTENS CARDIAC REHAB W/EXERC: HCPCS

## 2024-04-01 ASSESSMENT — EXERCISE STRESS TEST
PEAK_BP: 130/86
PEAK_METS: 2.6
PEAK_RPE: 12
PEAK_HR: 120

## 2024-04-03 ENCOUNTER — OFFICE VISIT (OUTPATIENT)
Dept: PRIMARY CARE CLINIC | Age: 59
End: 2024-04-03
Payer: COMMERCIAL

## 2024-04-03 VITALS
SYSTOLIC BLOOD PRESSURE: 138 MMHG | DIASTOLIC BLOOD PRESSURE: 88 MMHG | TEMPERATURE: 97.5 F | OXYGEN SATURATION: 96 % | HEART RATE: 112 BPM | BODY MASS INDEX: 28.45 KG/M2 | HEIGHT: 66 IN | WEIGHT: 177 LBS | RESPIRATION RATE: 28 BRPM

## 2024-04-03 DIAGNOSIS — R52 BODY ACHES: ICD-10-CM

## 2024-04-03 DIAGNOSIS — J01.40 ACUTE NON-RECURRENT PANSINUSITIS: Primary | ICD-10-CM

## 2024-04-03 DIAGNOSIS — J02.9 SORE THROAT: ICD-10-CM

## 2024-04-03 LAB
INFLUENZA A ANTIGEN, POC: NEGATIVE
INFLUENZA B ANTIGEN, POC: NEGATIVE
LOT EXPIRE DATE: NORMAL
LOT KIT NUMBER: NORMAL
S PYO AG THROAT QL: NORMAL
SARS-COV-2, POC: NORMAL
VALID INTERNAL CONTROL: NORMAL
VENDOR AND KIT NAME POC: NORMAL

## 2024-04-03 PROCEDURE — 3079F DIAST BP 80-89 MM HG: CPT

## 2024-04-03 PROCEDURE — G8417 CALC BMI ABV UP PARAM F/U: HCPCS

## 2024-04-03 PROCEDURE — 3017F COLORECTAL CA SCREEN DOC REV: CPT

## 2024-04-03 PROCEDURE — 99213 OFFICE O/P EST LOW 20 MIN: CPT

## 2024-04-03 PROCEDURE — G8427 DOCREV CUR MEDS BY ELIG CLIN: HCPCS

## 2024-04-03 PROCEDURE — 87428 SARSCOV & INF VIR A&B AG IA: CPT

## 2024-04-03 PROCEDURE — 1036F TOBACCO NON-USER: CPT

## 2024-04-03 PROCEDURE — 3075F SYST BP GE 130 - 139MM HG: CPT

## 2024-04-03 PROCEDURE — 87880 STREP A ASSAY W/OPTIC: CPT

## 2024-04-03 RX ORDER — LEVOFLOXACIN 500 MG/1
500 TABLET, FILM COATED ORAL DAILY
Qty: 7 TABLET | Refills: 0 | Status: SHIPPED | OUTPATIENT
Start: 2024-04-03 | End: 2024-04-10

## 2024-04-03 RX ORDER — ALLOPURINOL 100 MG/1
100 TABLET ORAL EVERY MORNING
COMMUNITY
Start: 2024-03-20

## 2024-04-03 ASSESSMENT — ENCOUNTER SYMPTOMS
COUGH: 0
NAUSEA: 0
SINUS PRESSURE: 1
SORE THROAT: 1
SHORTNESS OF BREATH: 0
SINUS PAIN: 1

## 2024-04-03 NOTE — PATIENT INSTRUCTIONS
Levaquin x 7 days  Recommended over the counter medications/treatments:  The use of an antihistamine (Claritin or Zyrtec) and a nasal steroid spray (Flonase or Nasacort) may help with sinus congestion and drainage.   Mucinex will also help thin secretions and improve congestion. Works best if drinking plenty of water.  Honey with or without Lemon may also help with coughing.  Probiotics daily may help boost immune system.  Alternating hot liquids with cold liquids helps to sooth sore throat  Use Acetaminophen (Tylenol) to help relieve fever, chills or body aches. If allowed, you may alternate using ibuprofen (Motrin) and Tylenol. Do not exceed 3,000mg of Tylenol in a 24 hour time period.    Make sure to stay well hydrated by drinking plenty of water.    Follow up with primary care provider in 1 to 2 days or as needed if no improvement or worsening of your symptoms.

## 2024-04-03 NOTE — PROGRESS NOTES
[facility] [place] [facility]  [address] [facility] [address]  Phone: [phone]  Fax: [phone]      Cele Camarena is a 58 y.o. female who presents to the [facility] today for her medical conditions/complaints as noted below. Cele Camarena is c/o of URI (Pt with sinus pressure, sore throat, body aches and congestion. )          HPI:     URI   This is a new problem. The current episode started 1 to 4 weeks ago (x2 days ago). The problem has been gradually worsening. The maximum temperature recorded prior to her arrival was 100.4 - 100.9 F. Associated symptoms include congestion, headaches, rhinorrhea, sinus pain and a sore throat. Pertinent negatives include no abdominal pain, chest pain, coughing, nausea or vomiting. Treatments tried: mucinex. The treatment provided mild relief.       Past Medical History:   Diagnosis Date    Allergic rhinitis     Arthritis     CAD (coronary artery disease)     multivessel. Dr. Grier [facility] [place] last visit 10/2023    CKD (chronic kidney disease), stage III ([facility])     Dr. Gann Nephrology last appt 10/10/2023    Depression     Fibromyalgia     Gout     Dr. Cyndie Cabrales Rhuematology [facility]    History of fractured vertebra 2019    L2 L3 L4    Hyperlipidemia     Neuropathy     OA (osteoarthritis)     Peripheral vascular disease (HCC)     Sacroiliac joint dysfunction     Tremor     Type II or unspecified type diabetes mellitus without mention of complication, not stated as uncontrolled     PCP    Under care of team     Dr. Kings [facility] last appt 9/2023    Wellness examination     Leena Murrieta CNP PCP last appt 10/2023        Allergies   Allergen Reactions    Amiodarone Shortness Of Breath and Dizziness or Vertigo    Iodine Rash and Swelling     Were in ER being treated when it happened spent time in hosp.    Prednisone Other (See Comments)     Trouble swallowing    Shellfish Allergy

## 2024-04-04 ENCOUNTER — OFFICE VISIT (OUTPATIENT)
Dept: PAIN MANAGEMENT | Age: 59
End: 2024-04-04
Payer: COMMERCIAL

## 2024-04-04 VITALS
BODY MASS INDEX: 27.64 KG/M2 | DIASTOLIC BLOOD PRESSURE: 60 MMHG | WEIGHT: 172 LBS | OXYGEN SATURATION: 95 % | HEART RATE: 110 BPM | SYSTOLIC BLOOD PRESSURE: 100 MMHG | HEIGHT: 66 IN | RESPIRATION RATE: 14 BRPM

## 2024-04-04 DIAGNOSIS — I25.10 CORONARY ARTERY ARTERIOSCLEROSIS: ICD-10-CM

## 2024-04-04 DIAGNOSIS — M15.9 PRIMARY OSTEOARTHRITIS INVOLVING MULTIPLE JOINTS: ICD-10-CM

## 2024-04-04 DIAGNOSIS — M25.561 RECURRENT PAIN OF RIGHT KNEE: Primary | ICD-10-CM

## 2024-04-04 PROCEDURE — 3074F SYST BP LT 130 MM HG: CPT | Performed by: PHYSICAL MEDICINE & REHABILITATION

## 2024-04-04 PROCEDURE — 99214 OFFICE O/P EST MOD 30 MIN: CPT | Performed by: PHYSICAL MEDICINE & REHABILITATION

## 2024-04-04 PROCEDURE — G8427 DOCREV CUR MEDS BY ELIG CLIN: HCPCS | Performed by: PHYSICAL MEDICINE & REHABILITATION

## 2024-04-04 PROCEDURE — 3078F DIAST BP <80 MM HG: CPT | Performed by: PHYSICAL MEDICINE & REHABILITATION

## 2024-04-04 PROCEDURE — G8417 CALC BMI ABV UP PARAM F/U: HCPCS | Performed by: PHYSICAL MEDICINE & REHABILITATION

## 2024-04-04 PROCEDURE — 3017F COLORECTAL CA SCREEN DOC REV: CPT | Performed by: PHYSICAL MEDICINE & REHABILITATION

## 2024-04-04 PROCEDURE — 1036F TOBACCO NON-USER: CPT | Performed by: PHYSICAL MEDICINE & REHABILITATION

## 2024-04-04 RX ORDER — BACLOFEN 20 MG
TABLET ORAL
COMMUNITY
Start: 2024-03-29

## 2024-04-04 RX ORDER — ACETAMINOPHEN AND CODEINE PHOSPHATE 300; 30 MG/1; MG/1
1 TABLET ORAL 2 TIMES DAILY
Qty: 60 TABLET | Refills: 0 | Status: SHIPPED | OUTPATIENT
Start: 2024-04-04 | End: 2024-05-04

## 2024-04-04 NOTE — PROGRESS NOTES
[facility]  [address] [place]. [address]    Patient Name: Cele Camarena  MRN: [id number]  Encounter Date: 4/4/2024     SUBJECTIVE:  Cele Camarena is a 58 y.o., female being seen today regarding   Chief Complaint   Patient presents with    Knee Pain     Chronic pain of the right knee- was going for a knee replacement and had a cardiac emergency    found  echo  heart  showed  small heart arteries had  few vessel  disease states TYlenol does not work  ne medication management.  R knee  pain  was to have  surgery  had  3 vessel by pass  Functionality Assessment & Goals:  On a scale of 0 (Does not Interfere) to 10 (Completely Interferes)  Which number describes how during the past week pain has interfered with the following:   A.  General Activity: 10    B.  Mood:  6   C.  Walking Ability:  10   D.  Normal Work (Includes both work outside the home and housework):  10   E.  Relations with Other People:  9   F.  Sleep:  10    G.  Enjoyment of Life:  9  2.  Patient prefers to Take their Pain Medications:   [x] On a regular basis   [] Only when necessary   [] Does not take pain medications  3.  What are the Patient's Goals/ Expectations for Visiting Pain Management?   [x] Learn about my pain   [] Physical Therapy   [] Receive Injections   [x] Deal with Anxiety and Stress   [x] Receive Medication   [] Treat Depression    [] Treat Sleep   [] Treat Opioid Dependence/ Addiction    Conservative Therapies:  Patient has tried the following conservative therapies:  [x] NSAIDS  [] Analgesics  [] Home Exercises  [x] Physical Therapy  [] Cognitive Therapy  [] Other: -    Recent Imaging since last appointment related to chief complaint:  10/10/23    Specifically, patient reports improvement in these areas noted after the procedure:  []General Activity  []Mood  []Walking Ability  []Climbing Stairs  []Ability to dress and undress  []Transferring & Mobility (standing from sitting position or

## 2024-04-08 ENCOUNTER — HOSPITAL ENCOUNTER (OUTPATIENT)
Dept: CARDIAC REHAB | Age: 59
Setting detail: THERAPIES SERIES
Discharge: HOME | End: 2024-04-08
Payer: COMMERCIAL

## 2024-04-08 VITALS — WEIGHT: 183.2 LBS | BODY MASS INDEX: 29.57 KG/M2

## 2024-04-08 PROCEDURE — G0423 INTENS CARDIAC REHAB NO EXER: HCPCS

## 2024-04-08 PROCEDURE — G0422 INTENS CARDIAC REHAB W/EXERC: HCPCS

## 2024-04-08 ASSESSMENT — EXERCISE STRESS TEST
PEAK_BP: 110/76
PEAK_HR: 107
PEAK_METS: 2.1
PEAK_RPE: 12

## 2024-04-10 ENCOUNTER — HOSPITAL ENCOUNTER (OUTPATIENT)
Dept: CARDIAC REHAB | Age: 59
Setting detail: THERAPIES SERIES
Discharge: HOME | End: 2024-04-10
Payer: COMMERCIAL

## 2024-04-10 VITALS — BODY MASS INDEX: 29.25 KG/M2 | WEIGHT: 181.2 LBS

## 2024-04-10 DIAGNOSIS — M15.9 PRIMARY OSTEOARTHRITIS INVOLVING MULTIPLE JOINTS: ICD-10-CM

## 2024-04-10 DIAGNOSIS — I25.10 CORONARY ARTERY ARTERIOSCLEROSIS: ICD-10-CM

## 2024-04-10 DIAGNOSIS — M25.561 RECURRENT PAIN OF RIGHT KNEE: ICD-10-CM

## 2024-04-10 PROCEDURE — G0422 INTENS CARDIAC REHAB W/EXERC: HCPCS

## 2024-04-10 RX ORDER — ACETAMINOPHEN AND CODEINE PHOSPHATE 300; 30 MG/1; MG/1
1 TABLET ORAL 2 TIMES DAILY
Qty: 60 TABLET | Refills: 0 | OUTPATIENT
Start: 2024-04-10 | End: 2024-05-10

## 2024-04-10 ASSESSMENT — EXERCISE STRESS TEST
PEAK_RPE: 12
PEAK_HR: 116
PEAK_METS: 2.4
PEAK_BP: 120/76

## 2024-04-10 NOTE — PROGRESS NOTES
Cele FORMAN.:  1965    Acct Number: [id number]   MRN:  [id number]                             [facility] NUTRITION WORKSHOP             Date: 4/10/2024        Session # ___7____    Today’s class covered:    []   Fueling a Healthy Body  []   Mindful Eating  []   Targeting Nutrition Priorities  []   Dining Out :  Making the Most of a Menu  [x]   Label Reading    Readiness to change:    []  Pre-contemplative   []  Contemplative - ambivalent about change    []  Action - ready to set action plan and implement   [x]  Maintenance - has made change and is trying, and or practicing different alternative         behaviors     Cele was in the Workshop with the Dietitian for 45 minutes.  The content was presented via Powerpoint, lecture, and patient participation based format.  Motivational interviewing was utilized when needed, to promote change.  Patient voiced understanding.    Electronically signed by SEFERINO TA RD on 4/10/2024 at 12:53 PM

## 2024-04-10 NOTE — PROGRESS NOTES
Cele FORMAN.:  1965  Acct Number: [id number]  MRN:  [id number]                  [facility] COOKING SCHOOL WORKSHOP             Date: 4/10/2024        Session # __8______   Today’s class covered:    COOKING SCHOOL WORKSHOPS    [] Adding Flavor - Sodium-Free  [] Fast & Healthy Breakfasts    [] Powerhouse Plant-Based Proteins [] Satisfying Salads and Dressings    [] Simple Sides & Sauces   [] Personalizing Your Plate    [] Delicious Desserts    [] Savory Soups    [] Efficiency Cooking - Meals in a Snap [] Tasty Appetizers & Snacks     [x] Comforting Weekend Breakfasts  [] One-Pot Wonders    [] Fast Evening Meals   [] Easy Entertaining    []  International Cuisine Spotlight on the Blue Zone          Patients were shown how to choose, prep, and cook; substitutions and other options were given.  Samples were offered.  Recipes were given and questions answered.      The patient above was in the Cooking School Workshop for 35 minutes.      Electronically signed by SEFERINO TA RD on 4/10/2024 at 12:16 PM

## 2024-04-12 ENCOUNTER — HOSPITAL ENCOUNTER (OUTPATIENT)
Dept: CARDIAC REHAB | Age: 59
Setting detail: THERAPIES SERIES
Discharge: HOME | End: 2024-04-12
Payer: COMMERCIAL

## 2024-04-12 VITALS — WEIGHT: 183.2 LBS | BODY MASS INDEX: 29.57 KG/M2

## 2024-04-12 PROCEDURE — G0423 INTENS CARDIAC REHAB NO EXER: HCPCS

## 2024-04-12 PROCEDURE — G0422 INTENS CARDIAC REHAB W/EXERC: HCPCS

## 2024-04-12 ASSESSMENT — EXERCISE STRESS TEST
PEAK_HR: 116
PEAK_RPE: 12
PEAK_BP: 108/70
PEAK_METS: 2.3

## 2024-04-15 ENCOUNTER — OFFICE VISIT (OUTPATIENT)
Dept: FAMILY MEDICINE CLINIC | Age: 59
End: 2024-04-15
Payer: COMMERCIAL

## 2024-04-15 ENCOUNTER — OFFICE VISIT (OUTPATIENT)
Dept: CARDIOLOGY | Age: 59
End: 2024-04-15
Payer: COMMERCIAL

## 2024-04-15 VITALS
DIASTOLIC BLOOD PRESSURE: 84 MMHG | OXYGEN SATURATION: 95 % | HEIGHT: 66 IN | HEART RATE: 104 BPM | SYSTOLIC BLOOD PRESSURE: 118 MMHG | WEIGHT: 184 LBS | BODY MASS INDEX: 29.57 KG/M2

## 2024-04-15 VITALS
HEART RATE: 107 BPM | SYSTOLIC BLOOD PRESSURE: 108 MMHG | WEIGHT: 184 LBS | DIASTOLIC BLOOD PRESSURE: 60 MMHG | BODY MASS INDEX: 29.7 KG/M2

## 2024-04-15 DIAGNOSIS — F41.9 ANXIETY: Primary | ICD-10-CM

## 2024-04-15 DIAGNOSIS — G89.29 CHRONIC PAIN OF RIGHT WRIST: ICD-10-CM

## 2024-04-15 DIAGNOSIS — J30.9 ALLERGIC RHINITIS, UNSPECIFIED SEASONALITY, UNSPECIFIED TRIGGER: ICD-10-CM

## 2024-04-15 DIAGNOSIS — M25.531 CHRONIC PAIN OF RIGHT WRIST: ICD-10-CM

## 2024-04-15 DIAGNOSIS — I25.119 CORONARY ARTERY DISEASE INVOLVING NATIVE CORONARY ARTERY OF NATIVE HEART WITH ANGINA PECTORIS (HCC): Primary | ICD-10-CM

## 2024-04-15 PROCEDURE — 3078F DIAST BP <80 MM HG: CPT | Performed by: NURSE PRACTITIONER

## 2024-04-15 PROCEDURE — 3079F DIAST BP 80-89 MM HG: CPT | Performed by: NURSE PRACTITIONER

## 2024-04-15 PROCEDURE — 3074F SYST BP LT 130 MM HG: CPT | Performed by: NURSE PRACTITIONER

## 2024-04-15 PROCEDURE — G8427 DOCREV CUR MEDS BY ELIG CLIN: HCPCS | Performed by: NURSE PRACTITIONER

## 2024-04-15 PROCEDURE — G8417 CALC BMI ABV UP PARAM F/U: HCPCS | Performed by: NURSE PRACTITIONER

## 2024-04-15 PROCEDURE — 93000 ELECTROCARDIOGRAM COMPLETE: CPT | Performed by: NURSE PRACTITIONER

## 2024-04-15 PROCEDURE — 3017F COLORECTAL CA SCREEN DOC REV: CPT | Performed by: NURSE PRACTITIONER

## 2024-04-15 PROCEDURE — 99214 OFFICE O/P EST MOD 30 MIN: CPT | Performed by: NURSE PRACTITIONER

## 2024-04-15 PROCEDURE — 1036F TOBACCO NON-USER: CPT | Performed by: NURSE PRACTITIONER

## 2024-04-15 RX ORDER — FEXOFENADINE HCL 180 MG/1
180 TABLET ORAL DAILY
Qty: 90 TABLET | Refills: 3 | Status: SHIPPED | OUTPATIENT
Start: 2024-04-15

## 2024-04-15 RX ORDER — METOPROLOL SUCCINATE 50 MG/1
25 TABLET, EXTENDED RELEASE ORAL NIGHTLY
Qty: 30 TABLET | Refills: 11 | Status: SHIPPED | OUTPATIENT
Start: 2024-04-15

## 2024-04-15 NOTE — PROGRESS NOTES
Cardiology Consultation  [facility]      04/15/24      CC & HPI:  Cele Camarena  is here for follow-up. She states she is having a lot of issues with nerve pain in her chest wall and in her hands, right > left. She denies any chest pain or pressure. States she has been having issues with severe anxiety since her surgery, yoly walking into a medical facility. She states she does notice her HR is up at home even when she is not \"anxious.\" Doing well with medications. Denies any blood in urine/stool. Does feel like she is still \"holding quiet a bit of fluid which is aggravating.\"       Past Medical History:   Diagnosis Date    Allergic rhinitis     Arthritis     CAD (coronary artery disease)     multivessel. Dr. [facility] last visit 10/2023    CKD (chronic kidney disease), stage III (HCC)     Dr. Gann Nephrology last appt 10/10/2023    Depression     Fibromyalgia     Gout     Dr. Cyndie Cabrales Rhuematology [facility]    History of fractured vertebra 2019    L2 L3 L4    Hyperlipidemia     Neuropathy     OA (osteoarthritis)     Peripheral vascular disease (HCC)     Sacroiliac joint dysfunction     Tremor     Type II or unspecified type diabetes mellitus without mention of complication, not stated as uncontrolled     PCP    Under care of team     Dr. Kings Lugo last appt 2023    Wellness examination     Leena Murrieta CNP PCP last appt 10/2023       Past Surgical History:   Procedure Laterality Date    BACK SURGERY      CARDIAC PROCEDURE N/A 2023    paxton / Left heart cath / coronary angiography performed by Elena Morris MD at [facility] CARDIAC CATH LAB     SECTION      CHOLECYSTECTOMY      CORONARY ARTERY BYPASS GRAFT N/A 12/15/2023    CABG CORONARY ARTERY BYPASS X3, ON PUMP SWAN SHANTHI, LESLIE, RADIAL HARVEST performed by Deshawn Carreon MD at [facility] CVOR    JOINT REPLACEMENT Right     partial knee replacement    KNEE SURGERY      right    OTHER SURGICAL HISTORY

## 2024-04-15 NOTE — PROGRESS NOTES
I have reviewed and agree with the above documentation. I have examined the patient and agree with the above documentation of the physical exam.     Cele Camarena (:  1965) is a 58 y.o. female,Established patient, here for evaluation of the following chief complaint(s):  Anxiety (Follow up) and Carpal Tunnel (Needs external orthopedic)         ASSESSMENT/PLAN:  1. Anxiety  - Continue cymbalta, start increased dose of metoprolol   2. Chronic pain of right wrist  -     External Referral To Orthopedic Surgery  Allergic rhinitis-restart allegra daily.   Pt to return in 3 months for follow up  Pt to return PRN     No follow-ups on file.         Subjective   SUBJECTIVE/OBJECTIVE:  Allergic Rhinitis   Presents for follow-up visit. She complains of congestion, itchy nose, plugged ear sensation and sneezing. She reports no cough, eye itching or rhinorrhea. The problem occurs every several days. Timing of symptoms is intermittent. Symptom severity has been moderate. The treatment provided moderate relief. Compliance with medications is %. There are no compliance problems.      Anxiety  Pt started experiencing panic and anxiety after being discharged from a 45 day hospital stay, where she was intubated, restrained, and had negative effects from the IV fentanyl. Her cymbalta dose was increased 6 weeks ago.  She originally took it for her arthritis. Her appetite is improving and she said she's gaining muscle with cardiac rehab. States her sleeping is \"not good\" due to her hand and knee pain. Her metoprolol dose was increased today at her cardiology appointment due to her increased heart rate. The cardiologist believes this may be influencing her anxiety sxs. Doesn't experience flashbacks currently. States she's not sleeping enough to experience nightmares or night terrors. Beeping causes her to panic because it reminds her of the hospital alarms. The number of panic attacks she has now has greatly decreased

## 2024-04-16 ASSESSMENT — ENCOUNTER SYMPTOMS
RHINORRHEA: 0
EYE ITCHING: 0
COUGH: 0

## 2024-04-17 ENCOUNTER — HOSPITAL ENCOUNTER (OUTPATIENT)
Dept: CARDIAC REHAB | Age: 59
Setting detail: THERAPIES SERIES
Discharge: HOME | End: 2024-04-17
Payer: COMMERCIAL

## 2024-04-17 VITALS — WEIGHT: 184 LBS | BODY MASS INDEX: 29.7 KG/M2

## 2024-04-17 PROCEDURE — G0422 INTENS CARDIAC REHAB W/EXERC: HCPCS

## 2024-04-17 ASSESSMENT — EXERCISE STRESS TEST
PEAK_HR: 121
PEAK_RPE: 12
PEAK_METS: 2.9
PEAK_BP: 100/64

## 2024-04-17 NOTE — PROGRESS NOTES
Cele FORMAN.:  1965  Acct Number:   MRN:  [id number]                  [facility] COOKING SCHOOL WORKSHOP             Date: 2024        Session # ________   Today’s class covered:    COOKING SCHOOL WORKSHOPS    [] Adding Flavor - Sodium-Free  [] Fast & Healthy Breakfasts    [] Powerhouse Plant-Based Proteins [] Satisfying Salads and Dressings    [] Simple Sides & Sauces   [] Personalizing Your Plate    [] Delicious Desserts    [] Savory Soups    [] Efficiency Cooking - Meals in a Snap [] Tasty Appetizers & Snacks     [] Comforting Weekend Breakfasts  [x] One-Pot Wonders    [] Fast Evening Meals   [] Easy Entertaining    []  International Cuisine Spotlight on the Blue Zone          Patients were shown how to choose, prep, and cook; substitutions and other options were given.  Samples were offered.  Recipes were given and questions answered.      The patient above was in the Cooking School Workshop for 35 minutes.      Electronically signed by SEFERINO TA RD on 2024 at 9:30 AM

## 2024-04-22 ENCOUNTER — HOSPITAL ENCOUNTER (OUTPATIENT)
Dept: CARDIAC REHAB | Age: 59
Setting detail: THERAPIES SERIES
Discharge: HOME | End: 2024-04-22
Payer: COMMERCIAL

## 2024-04-22 ENCOUNTER — TELEPHONE (OUTPATIENT)
Dept: FAMILY MEDICINE CLINIC | Age: 59
End: 2024-04-22

## 2024-04-22 ENCOUNTER — HOSPITAL ENCOUNTER (OUTPATIENT)
Age: 59
Discharge: HOME | End: 2024-04-22
Payer: COMMERCIAL

## 2024-04-22 VITALS — WEIGHT: 187 LBS | BODY MASS INDEX: 30.18 KG/M2

## 2024-04-22 DIAGNOSIS — N18.32 STAGE 3B CHRONIC KIDNEY DISEASE (HCC): ICD-10-CM

## 2024-04-22 DIAGNOSIS — E87.0 HYPERNATREMIA: ICD-10-CM

## 2024-04-22 LAB
ANION GAP SERPL CALCULATED.3IONS-SCNC: 11 MMOL/L (ref 9–17)
BUN SERPL-MCNC: 33 MG/DL (ref 6–20)
BUN/CREAT SERPL: 24 (ref 9–20)
CALCIUM SERPL-MCNC: 9.9 MG/DL (ref 8.6–10.4)
CHLORIDE SERPL-SCNC: 98 MMOL/L (ref 98–107)
CO2 SERPL-SCNC: 30 MMOL/L (ref 20–31)
CREAT SERPL-MCNC: 1.4 MG/DL (ref 0.5–0.9)
CREAT UR-MCNC: 33.5 MG/DL (ref 28–217)
ERYTHROCYTE [DISTWIDTH] IN BLOOD BY AUTOMATED COUNT: 15.4 % (ref 11.8–14.4)
GFR SERPL CREATININE-BSD FRML MDRD: 44 ML/MIN/1.73M2
GLUCOSE SERPL-MCNC: 95 MG/DL (ref 70–99)
HCT VFR BLD AUTO: 43.1 % (ref 36.3–47.1)
HGB BLD-MCNC: 13.4 G/DL (ref 11.9–15.1)
MCH RBC QN AUTO: 26.2 PG (ref 25.2–33.5)
MCHC RBC AUTO-ENTMCNC: 31.1 G/DL (ref 25.2–33.5)
MCV RBC AUTO: 84.2 FL (ref 82.6–102.9)
NRBC BLD-RTO: 0 PER 100 WBC
PLATELET # BLD AUTO: 274 K/UL (ref 138–453)
PMV BLD AUTO: 9.3 FL (ref 8.1–13.5)
POTASSIUM SERPL-SCNC: 4.4 MMOL/L (ref 3.7–5.3)
PTH-INTACT SERPL-MCNC: 41 PG/ML (ref 15–65)
RBC # BLD AUTO: 5.12 M/UL (ref 3.95–5.11)
SODIUM SERPL-SCNC: 139 MMOL/L (ref 135–144)
TOTAL PROTEIN, URINE: 4 MG/DL
URINE TOTAL PROTEIN CREATININE RATIO: 0.12 (ref 0–0.2)
WBC OTHER # BLD: 9.5 K/UL (ref 3.5–11.3)

## 2024-04-22 PROCEDURE — 36415 COLL VENOUS BLD VENIPUNCTURE: CPT

## 2024-04-22 PROCEDURE — G0422 INTENS CARDIAC REHAB W/EXERC: HCPCS

## 2024-04-22 PROCEDURE — G0423 INTENS CARDIAC REHAB NO EXER: HCPCS

## 2024-04-22 PROCEDURE — 85027 COMPLETE CBC AUTOMATED: CPT

## 2024-04-22 PROCEDURE — 84156 ASSAY OF PROTEIN URINE: CPT

## 2024-04-22 PROCEDURE — 80048 BASIC METABOLIC PNL TOTAL CA: CPT

## 2024-04-22 PROCEDURE — 82570 ASSAY OF URINE CREATININE: CPT

## 2024-04-22 PROCEDURE — 83970 ASSAY OF PARATHORMONE: CPT

## 2024-04-22 ASSESSMENT — EXERCISE STRESS TEST
PEAK_METS: 2.4
PEAK_RPE: 12
PEAK_BP: 130/80
PEAK_HR: 114

## 2024-04-22 NOTE — TELEPHONE ENCOUNTER
Pt contacted to see where to send Orthopedic referral. Pt stated that she would call today and see if provider at [facility] accepts insurance.

## 2024-04-23 ENCOUNTER — OFFICE VISIT (OUTPATIENT)
Dept: NEPHROLOGY | Age: 59
End: 2024-04-23
Payer: COMMERCIAL

## 2024-04-23 VITALS
OXYGEN SATURATION: 94 % | HEART RATE: 112 BPM | WEIGHT: 184.2 LBS | SYSTOLIC BLOOD PRESSURE: 112 MMHG | RESPIRATION RATE: 18 BRPM | DIASTOLIC BLOOD PRESSURE: 62 MMHG | HEIGHT: 66 IN | BODY MASS INDEX: 29.6 KG/M2

## 2024-04-23 DIAGNOSIS — N18.32 STAGE 3B CHRONIC KIDNEY DISEASE (HCC): Primary | ICD-10-CM

## 2024-04-23 PROCEDURE — 3017F COLORECTAL CA SCREEN DOC REV: CPT | Performed by: INTERNAL MEDICINE

## 2024-04-23 PROCEDURE — 3078F DIAST BP <80 MM HG: CPT | Performed by: INTERNAL MEDICINE

## 2024-04-23 PROCEDURE — 3074F SYST BP LT 130 MM HG: CPT | Performed by: INTERNAL MEDICINE

## 2024-04-23 PROCEDURE — 1036F TOBACCO NON-USER: CPT | Performed by: INTERNAL MEDICINE

## 2024-04-23 PROCEDURE — 99213 OFFICE O/P EST LOW 20 MIN: CPT | Performed by: INTERNAL MEDICINE

## 2024-04-23 PROCEDURE — G8427 DOCREV CUR MEDS BY ELIG CLIN: HCPCS | Performed by: INTERNAL MEDICINE

## 2024-04-23 PROCEDURE — G8417 CALC BMI ABV UP PARAM F/U: HCPCS | Performed by: INTERNAL MEDICINE

## 2024-04-23 RX ORDER — PEN NEEDLE, DIABETIC 32 GX5/16"
NEEDLE, DISPOSABLE MISCELLANEOUS
Qty: 100 EACH | Refills: 2 | Status: SHIPPED | OUTPATIENT
Start: 2024-04-23

## 2024-04-23 ASSESSMENT — EJECTION FRACTION: EF_VALUE: 45

## 2024-04-23 ASSESSMENT — EXERCISE STRESS TEST: PEAK_BP: 120/78

## 2024-04-23 NOTE — TELEPHONE ENCOUNTER
Cele called requesting a refill of the below medication which has been pended for you:     Requested Prescriptions     Pending Prescriptions Disp Refills    DROPLET PEN NEEDLES 32G X 8 MM MISC [Pharmacy Med Name: DROPLET PEN NEEDLE 32GX5/16\"] 100 each      Sig: use as directed WITH INSULIN five times a day       Last Appointment Date: 4/15/2024  Next Appointment Date: 7/16/2024    Allergies   Allergen Reactions    Amiodarone Shortness Of Breath and Dizziness or Vertigo    Fentanyl Other (See Comments)     Patient had terrible nightmares and hallucinations while hospitalized and sedated while on fentanyl     Iodine Rash and Swelling     Were in ER being treated when it happened spent time in hosp.    Prednisone Other (See Comments)     Trouble swallowing    Shellfish Allergy Anaphylaxis    Adhesive Tape Other (See Comments)     blisters    Cefuroxime Axetil Diarrhea, Hives and Nausea And Vomiting    E-Mycin [Erythromycin] Diarrhea     Rash, hives    Ezetimibe Other (See Comments)     Other reaction(s): Muscle Pain    Gabapentin Other (See Comments)     \"Un functional\"    Morphine Hives     Itching, burning     Nsaids Diarrhea, Hives and Nausea And Vomiting    Statins Other (See Comments)     myalgias    Sulfa Antibiotics Hives    Pcn [Penicillins] Rash    Percocet [Oxycodone-Acetaminophen] Other (See Comments)     Severe drowsiness     Zithromax [Azithromycin] Rash

## 2024-04-24 ENCOUNTER — HOSPITAL ENCOUNTER (OUTPATIENT)
Dept: CARDIAC REHAB | Age: 59
Setting detail: THERAPIES SERIES
Discharge: HOME | End: 2024-04-24
Payer: COMMERCIAL

## 2024-04-24 VITALS — BODY MASS INDEX: 29.83 KG/M2 | WEIGHT: 184.8 LBS

## 2024-04-24 PROCEDURE — G0422 INTENS CARDIAC REHAB W/EXERC: HCPCS

## 2024-04-24 PROCEDURE — G0423 INTENS CARDIAC REHAB NO EXER: HCPCS

## 2024-04-24 ASSESSMENT — EXERCISE STRESS TEST
PEAK_BP: 106/72
PEAK_HR: 116
PEAK_METS: 2.7
PEAK_RPE: 12

## 2024-04-24 NOTE — PROGRESS NOTES
Cele FORMAN.:  1965  Acct Number: [id number]  MRN:  [id number]                  [facility] COOKING SCHOOL WORKSHOP             Date: 2024        Session # ___10_____   Today’s class covered:    COOKING SCHOOL WORKSHOPS    [] Adding Flavor - Sodium-Free  [] Fast & Healthy Breakfasts    [] Powerhouse Plant-Based Proteins [] Satisfying Salads and Dressings    [] Simple Sides & Sauces   [] Personalizing Your Plate    [] Delicious Desserts    [] Savory Soups    [] Efficiency Cooking - Meals in a Snap [] Tasty Appetizers & Snacks     [] Comforting Weekend Breakfasts  [] One-Pot Wonders    [x] Fast Evening Meals   [] Easy Entertaining    []  International Cuisine Spotlight on the Blue Zone          Patients were shown how to choose, prep, and cook; substitutions and other options were given.  Samples were offered.  Recipes were given and questions answered.      The patient above was in the Cooking School Workshop for 35 minutes.      Electronically signed by SEFERINO TA RD on 2024 at 9:40 AM

## 2024-04-26 ENCOUNTER — HOSPITAL ENCOUNTER (OUTPATIENT)
Dept: CARDIAC REHAB | Age: 59
Setting detail: THERAPIES SERIES
Discharge: HOME | End: 2024-04-26
Payer: COMMERCIAL

## 2024-04-26 ENCOUNTER — TELEPHONE (OUTPATIENT)
Dept: FAMILY MEDICINE CLINIC | Age: 59
End: 2024-04-26

## 2024-04-26 VITALS — BODY MASS INDEX: 29.63 KG/M2 | WEIGHT: 183.6 LBS

## 2024-04-26 PROCEDURE — G0422 INTENS CARDIAC REHAB W/EXERC: HCPCS

## 2024-04-26 ASSESSMENT — EXERCISE STRESS TEST
PEAK_RPE: 12
PEAK_METS: 2.6
PEAK_BP: 114/62
PEAK_HR: 114

## 2024-05-01 ENCOUNTER — HOSPITAL ENCOUNTER (OUTPATIENT)
Dept: CARDIAC REHAB | Age: 59
Setting detail: THERAPIES SERIES
Discharge: HOME | End: 2024-05-01
Payer: COMMERCIAL

## 2024-05-01 VITALS — BODY MASS INDEX: 29.73 KG/M2 | WEIGHT: 184.2 LBS

## 2024-05-01 PROCEDURE — G0422 INTENS CARDIAC REHAB W/EXERC: HCPCS

## 2024-05-01 PROCEDURE — G0423 INTENS CARDIAC REHAB NO EXER: HCPCS

## 2024-05-01 ASSESSMENT — EXERCISE STRESS TEST
PEAK_METS: 2.8
PEAK_RPE: 12
PEAK_BP: 104/74
PEAK_HR: 103

## 2024-05-01 NOTE — PROGRESS NOTES
Cele FROMAN.:  1965  Acct Number: [id number]  MRN:  [id number]                  [facility] COOKING SCHOOL WORKSHOP             Date: 2024        Session # ___11_____   Today’s class covered:    COOKING SCHOOL WORKSHOPS    [] Adding Flavor - Sodium-Free  [] Fast & Healthy Breakfasts    [] Powerhouse Plant-Based Proteins [] Satisfying Salads and Dressings    [] Simple Sides & Sauces   [] Personalizing Your Plate    [] Delicious Desserts    [] Savory Soups    [] Efficiency Cooking - Meals in a Snap [] Tasty Appetizers & Snacks     [] Comforting Weekend Breakfasts  [] One-Pot Wonders    [] Fast Evening Meals   [x] Easy Entertaining    []  International Cuisine Spotlight on the Blue Zone          Patients were shown how to choose, prep, and cook; substitutions and other options were given.  Samples were offered.  Recipes were given and questions answered.      The patient above was in the Cooking School Workshop for 35 minutes.      Electronically signed by SEFERINO TA RD on 2024 at 10:14 AM

## 2024-05-03 ENCOUNTER — HOSPITAL ENCOUNTER (OUTPATIENT)
Dept: CARDIAC REHAB | Age: 59
Setting detail: THERAPIES SERIES
Discharge: HOME | End: 2024-05-03
Payer: COMMERCIAL

## 2024-05-03 ENCOUNTER — HOSPITAL ENCOUNTER (OUTPATIENT)
Age: 59
Setting detail: SPECIMEN
Discharge: HOME | End: 2024-05-03
Payer: COMMERCIAL

## 2024-05-03 ENCOUNTER — OFFICE VISIT (OUTPATIENT)
Dept: PAIN MANAGEMENT | Age: 59
End: 2024-05-03
Payer: COMMERCIAL

## 2024-05-03 VITALS
HEART RATE: 72 BPM | SYSTOLIC BLOOD PRESSURE: 134 MMHG | HEIGHT: 66 IN | RESPIRATION RATE: 17 BRPM | WEIGHT: 185 LBS | OXYGEN SATURATION: 94 % | DIASTOLIC BLOOD PRESSURE: 80 MMHG | BODY MASS INDEX: 29.73 KG/M2

## 2024-05-03 VITALS — WEIGHT: 184 LBS | BODY MASS INDEX: 29.7 KG/M2

## 2024-05-03 DIAGNOSIS — Z79.891 ENCOUNTER FOR LONG-TERM OPIATE ANALGESIC USE: ICD-10-CM

## 2024-05-03 DIAGNOSIS — Z02.83 ENCOUNTER FOR DRUG SCREENING: ICD-10-CM

## 2024-05-03 DIAGNOSIS — M47.817 LUMBOSACRAL SPONDYLOSIS WITHOUT MYELOPATHY: ICD-10-CM

## 2024-05-03 DIAGNOSIS — I25.10 CORONARY ARTERY ARTERIOSCLEROSIS: ICD-10-CM

## 2024-05-03 DIAGNOSIS — M15.9 PRIMARY OSTEOARTHRITIS INVOLVING MULTIPLE JOINTS: ICD-10-CM

## 2024-05-03 DIAGNOSIS — M25.561 CHRONIC PAIN OF RIGHT KNEE: ICD-10-CM

## 2024-05-03 DIAGNOSIS — M25.561 RECURRENT PAIN OF RIGHT KNEE: ICD-10-CM

## 2024-05-03 DIAGNOSIS — M25.561 CHRONIC PAIN OF RIGHT KNEE: Primary | ICD-10-CM

## 2024-05-03 DIAGNOSIS — G89.29 CHRONIC PAIN OF RIGHT KNEE: Primary | ICD-10-CM

## 2024-05-03 DIAGNOSIS — G89.29 CHRONIC PAIN OF RIGHT KNEE: ICD-10-CM

## 2024-05-03 DIAGNOSIS — G62.9 NEUROPATHY: ICD-10-CM

## 2024-05-03 PROCEDURE — G0481 DRUG TEST DEF 8-14 CLASSES: HCPCS

## 2024-05-03 PROCEDURE — 3079F DIAST BP 80-89 MM HG: CPT | Performed by: PHYSICAL MEDICINE & REHABILITATION

## 2024-05-03 PROCEDURE — G8417 CALC BMI ABV UP PARAM F/U: HCPCS | Performed by: PHYSICAL MEDICINE & REHABILITATION

## 2024-05-03 PROCEDURE — 3017F COLORECTAL CA SCREEN DOC REV: CPT | Performed by: PHYSICAL MEDICINE & REHABILITATION

## 2024-05-03 PROCEDURE — 1036F TOBACCO NON-USER: CPT | Performed by: PHYSICAL MEDICINE & REHABILITATION

## 2024-05-03 PROCEDURE — G8427 DOCREV CUR MEDS BY ELIG CLIN: HCPCS | Performed by: PHYSICAL MEDICINE & REHABILITATION

## 2024-05-03 PROCEDURE — 80307 DRUG TEST PRSMV CHEM ANLYZR: CPT

## 2024-05-03 PROCEDURE — G0422 INTENS CARDIAC REHAB W/EXERC: HCPCS

## 2024-05-03 PROCEDURE — 99214 OFFICE O/P EST MOD 30 MIN: CPT | Performed by: PHYSICAL MEDICINE & REHABILITATION

## 2024-05-03 PROCEDURE — 3075F SYST BP GE 130 - 139MM HG: CPT | Performed by: PHYSICAL MEDICINE & REHABILITATION

## 2024-05-03 RX ORDER — ACETAMINOPHEN AND CODEINE PHOSPHATE 300; 30 MG/1; MG/1
1 TABLET ORAL 2 TIMES DAILY
Qty: 60 TABLET | Refills: 0 | Status: SHIPPED | OUTPATIENT
Start: 2024-05-03 | End: 2024-06-02

## 2024-05-03 ASSESSMENT — EXERCISE STRESS TEST
PEAK_METS: 3.3
PEAK_HR: 115
PEAK_RPE: 12
PEAK_BP: 104/70

## 2024-05-03 ASSESSMENT — ENCOUNTER SYMPTOMS
CONSTIPATION: 0
EYES NEGATIVE: 1
RESPIRATORY NEGATIVE: 1
DIARRHEA: 0
BACK PAIN: 1
ALLERGIC/IMMUNOLOGIC NEGATIVE: 1

## 2024-05-03 NOTE — PROGRESS NOTES
Shortness Of Breath and Dizziness or Vertigo    Fentanyl Other (See Comments)     Patient had terrible nightmares and hallucinations while hospitalized and sedated while on fentanyl     Iodine Rash and Swelling     Were in ER being treated when it happened spent time in hosp.    Prednisone Other (See Comments)     Trouble swallowing    Shellfish Allergy Anaphylaxis    Adhesive Tape Other (See Comments)     blisters    Cefuroxime Axetil Diarrhea, Hives and Nausea And Vomiting    E-Mycin [Erythromycin] Diarrhea     Rash, hives    Ezetimibe Other (See Comments)     Other reaction(s): Muscle Pain    Gabapentin Other (See Comments)     \"Un functional\"    Morphine Hives     Itching, burning     Nsaids Diarrhea, Hives and Nausea And Vomiting    Statins Other (See Comments)     myalgias    Sulfa Antibiotics Hives    Pcn [Penicillins] Rash    Percocet [Oxycodone-Acetaminophen] Other (See Comments)     Severe drowsiness     Zithromax [Azithromycin] Rash       Review of Systems:   Review of Systems   Constitutional:  Positive for fatigue.   HENT: Negative.     Eyes: Negative.    Respiratory: Negative.     Cardiovascular: Negative.    Gastrointestinal:  Negative for constipation and diarrhea.   Endocrine: Negative.    Genitourinary:  Negative for difficulty urinating and flank pain.   Musculoskeletal:  Positive for back pain, joint swelling and myalgias.        Knee pain     Skin: Negative.    Allergic/Immunologic: Negative.    Neurological:  Positive for weakness and numbness.   Hematological: Negative.    Psychiatric/Behavioral:  Positive for agitation, decreased concentration and sleep disturbance. The patient is nervous/anxious.         OBJECTIVE:  Vitals:    05/03/24 1451   BP: 134/80   Pulse: 72   Resp: 17   SpO2: 94%       PHYSICAL EXAM  Physical Exam     ASSESSMENT:    ICD-10-CM    1. Chronic pain of right knee  M25.561 Pain Management Drug Screen    G89.29       2. Primary osteoarthritis involving multiple joints

## 2024-05-06 ENCOUNTER — HOSPITAL ENCOUNTER (OUTPATIENT)
Dept: CARDIAC REHAB | Age: 59
Setting detail: THERAPIES SERIES
Discharge: HOME | End: 2024-05-06
Payer: COMMERCIAL

## 2024-05-06 VITALS — WEIGHT: 185 LBS | BODY MASS INDEX: 29.86 KG/M2

## 2024-05-06 LAB
6-ACETYLMORPHINE, UR: NOT DETECTED
7-AMINOCLONAZEPAM, URINE: NOT DETECTED
ALPHA-OH-ALPRAZ, URINE: NOT DETECTED
ALPHA-OH-MIDAZOLAM, URINE: NOT DETECTED
ALPRAZOLAM, URINE: NOT DETECTED
AMPHETAMINES, URINE: NOT DETECTED
BARBITURATES, URINE: NEGATIVE
BENZOYLECGONINE, UR: NEGATIVE
BUPRENORPHINE URINE: NOT DETECTED
CARISOPRODOL, UR: NEGATIVE
CLONAZEPAM, URINE: NOT DETECTED
CODEINE, URINE: PRESENT
CREAT UR-MCNC: 27.8 MG/DL (ref 20–400)
DIAZEPAM, URINE: NOT DETECTED
EER PAIN MGT DRUG PANEL, HIGH RES/EMIT U: NORMAL
ETHYL GLUCURONIDE UR: NEGATIVE
FENTANYL URINE: NOT DETECTED
GABAPENTIN: NOT DETECTED
HYDROCODONE, URINE: PRESENT
HYDROMORPHONE, URINE: NOT DETECTED
LORAZEPAM, URINE: NOT DETECTED
MARIJUANA METAB, UR: NEGATIVE
MDA, UR: NOT DETECTED
MDEA, EVE, UR: NOT DETECTED
MDMA URINE: NOT DETECTED
MEPERIDINE METAB, UR: NOT DETECTED
METHADONE, URINE: NEGATIVE
METHAMPHETAMINE, URINE: NOT DETECTED
METHYLPHENIDATE: NOT DETECTED
MIDAZOLAM, URINE: NOT DETECTED
MORPHINE, OPI1M: PRESENT
NALOXONE URINE: NOT DETECTED
NORBUPRENORPHINE, URINE: NOT DETECTED
NORDIAZEPAM, URINE: NOT DETECTED
NORFENTANYL, URINE: NOT DETECTED
NORHYDROCODONE, URINE: NOT DETECTED
NOROXYCODONE, URINE: NOT DETECTED
NOROXYMORPHONE, URINE: NOT DETECTED
OXAZEPAM, URINE: NOT DETECTED
OXYCODONE URINE: NOT DETECTED
OXYMORPHONE, URINE: NOT DETECTED
PAIN MGT DRUG PANEL, HI RES, UR: NORMAL
PCP,URINE: NEGATIVE
PHENTERMINE, UR: NOT DETECTED
PREGABALIN: NOT DETECTED
TAPENTADOL, URINE: NOT DETECTED
TAPENTADOL-O-SULFATE, URINE: NOT DETECTED
TEMAZEPAM, URINE: NOT DETECTED
TRAMADOL, URINE: NEGATIVE
ZOLPIDEM METABOLITE (ZCA), URINE: NOT DETECTED
ZOLPIDEM, URINE: NOT DETECTED

## 2024-05-06 PROCEDURE — G0423 INTENS CARDIAC REHAB NO EXER: HCPCS

## 2024-05-06 PROCEDURE — G0422 INTENS CARDIAC REHAB W/EXERC: HCPCS

## 2024-05-06 ASSESSMENT — EXERCISE STRESS TEST
PEAK_BP: 120/74
PEAK_RPE: 12
PEAK_HR: 121
PEAK_METS: 3.4

## 2024-05-07 DIAGNOSIS — J30.9 ALLERGIC RHINITIS, UNSPECIFIED SEASONALITY, UNSPECIFIED TRIGGER: ICD-10-CM

## 2024-05-07 RX ORDER — MONTELUKAST SODIUM 10 MG/1
10 TABLET ORAL NIGHTLY
Qty: 30 TABLET | Refills: 2 | Status: SHIPPED | OUTPATIENT
Start: 2024-05-07

## 2024-05-07 RX ORDER — CYANOCOBALAMIN 1000 UG/ML
INJECTION, SOLUTION INTRAMUSCULAR; SUBCUTANEOUS
Qty: 1 ML | Refills: 2 | Status: SHIPPED | OUTPATIENT
Start: 2024-05-07

## 2024-05-07 RX ORDER — FUROSEMIDE 20 MG/1
20 TABLET ORAL DAILY
Qty: 30 TABLET | Refills: 2 | Status: SHIPPED | OUTPATIENT
Start: 2024-05-07

## 2024-05-07 RX ORDER — PIOGLITAZONEHYDROCHLORIDE 15 MG/1
15 TABLET ORAL DAILY
Qty: 30 TABLET | Refills: 2 | Status: SHIPPED | OUTPATIENT
Start: 2024-05-07

## 2024-05-07 NOTE — TELEPHONE ENCOUNTER
Cele called requesting a refill of the below medication which has been pended for you:     Requested Prescriptions     Pending Prescriptions Disp Refills    montelukast (SINGULAIR) 10 MG tablet [Pharmacy Med Name: MONTELUKAST SOD 10 MG TABLET] 90 tablet 0     Sig: take 1 tablet by mouth nightly    pioglitazone (ACTOS) 15 MG tablet [Pharmacy Med Name: PIOGLITAZONE HCL 15 MG TABLET] 30 tablet 2     Sig: take 1 tablet by mouth once daily    furosemide (LASIX) 20 MG tablet [Pharmacy Med Name: FUROSEMIDE 20 MG TABLET] 30 tablet 2     Sig: take 1 tablet by mouth once daily    cyanocobalamin 1000 MCG/ML injection [Pharmacy Med Name: CYANOCOBALAMIN 1,000 MCG/ML VL] 1 mL 2     Sig: inject 1 MILLILITER ( 1000 MCG ) intramuscularly Every Month       Last Appointment Date: 4/15/2024  Next Appointment Date: 7/16/2024    Allergies   Allergen Reactions    Amiodarone Shortness Of Breath and Dizziness or Vertigo    Fentanyl Other (See Comments)     Patient had terrible nightmares and hallucinations while hospitalized and sedated while on fentanyl     Iodine Rash and Swelling     Were in ER being treated when it happened spent time in hosp.    Prednisone Other (See Comments)     Trouble swallowing    Shellfish Allergy Anaphylaxis    Adhesive Tape Other (See Comments)     blisters    Cefuroxime Axetil Diarrhea, Hives and Nausea And Vomiting    E-Mycin [Erythromycin] Diarrhea     Rash, hives    Ezetimibe Other (See Comments)     Other reaction(s): Muscle Pain    Gabapentin Other (See Comments)     \"Un functional\"    Morphine Hives     Itching, burning     Nsaids Diarrhea, Hives and Nausea And Vomiting    Statins Other (See Comments)     myalgias    Sulfa Antibiotics Hives    Pcn [Penicillins] Rash    Percocet [Oxycodone-Acetaminophen] Other (See Comments)     Severe drowsiness     Zithromax [Azithromycin] Rash

## 2024-05-10 ENCOUNTER — TELEPHONE (OUTPATIENT)
Dept: PAIN MANAGEMENT | Age: 59
End: 2024-05-10

## 2024-05-10 ENCOUNTER — HOSPITAL ENCOUNTER (OUTPATIENT)
Dept: CARDIAC REHAB | Age: 59
Setting detail: THERAPIES SERIES
Discharge: HOME | End: 2024-05-10
Payer: COMMERCIAL

## 2024-05-10 VITALS — BODY MASS INDEX: 29.67 KG/M2 | WEIGHT: 183.8 LBS

## 2024-05-10 DIAGNOSIS — M79.7 FIBROMYALGIA: ICD-10-CM

## 2024-05-10 DIAGNOSIS — M25.50 MULTIPLE JOINT PAIN: ICD-10-CM

## 2024-05-10 PROCEDURE — G0423 INTENS CARDIAC REHAB NO EXER: HCPCS

## 2024-05-10 PROCEDURE — G0422 INTENS CARDIAC REHAB W/EXERC: HCPCS

## 2024-05-10 RX ORDER — TIZANIDINE 4 MG/1
4 TABLET ORAL EVERY 8 HOURS PRN
Qty: 90 TABLET | Refills: 1 | Status: SHIPPED | OUTPATIENT
Start: 2024-05-10

## 2024-05-10 ASSESSMENT — EXERCISE STRESS TEST
PEAK_BP: 118/84
PEAK_HR: 123
PEAK_RPE: 12
PEAK_METS: 3.8

## 2024-05-10 NOTE — TELEPHONE ENCOUNTER
Cele called requesting a refill of the below medication which has been pended for you:     Requested Prescriptions     Pending Prescriptions Disp Refills    tiZANidine (ZANAFLEX) 4 MG tablet 90 tablet 1     Sig: Take 1 tablet by mouth every 8 hours as needed (muscle spasm)       Last Appointment Date: 4/15/2024  Next Appointment Date: 7/16/2024    Allergies   Allergen Reactions    Amiodarone Shortness Of Breath and Dizziness or Vertigo    Fentanyl Other (See Comments)     Patient had terrible nightmares and hallucinations while hospitalized and sedated while on fentanyl     Iodine Rash and Swelling     Were in ER being treated when it happened spent time in hosp.    Prednisone Other (See Comments)     Trouble swallowing    Shellfish Allergy Anaphylaxis    Adhesive Tape Other (See Comments)     blisters    Cefuroxime Axetil Diarrhea, Hives and Nausea And Vomiting    E-Mycin [Erythromycin] Diarrhea     Rash, hives    Ezetimibe Other (See Comments)     Other reaction(s): Muscle Pain    Gabapentin Other (See Comments)     \"Un functional\"    Morphine Hives     Itching, burning     Nsaids Diarrhea, Hives and Nausea And Vomiting    Statins Other (See Comments)     myalgias    Sulfa Antibiotics Hives    Pcn [Penicillins] Rash    Percocet [Oxycodone-Acetaminophen] Other (See Comments)     Severe drowsiness     Zithromax [Azithromycin] Rash

## 2024-05-13 ENCOUNTER — HOSPITAL ENCOUNTER (OUTPATIENT)
Dept: CARDIAC REHAB | Age: 59
Setting detail: THERAPIES SERIES
Discharge: HOME | End: 2024-05-13
Payer: COMMERCIAL

## 2024-05-13 VITALS — BODY MASS INDEX: 30.05 KG/M2 | WEIGHT: 186.2 LBS

## 2024-05-13 PROCEDURE — G0422 INTENS CARDIAC REHAB W/EXERC: HCPCS

## 2024-05-13 ASSESSMENT — EXERCISE STRESS TEST
PEAK_HR: 123
PEAK_BP: 118/82
PEAK_RPE: 12
PEAK_METS: 3.6

## 2024-05-17 ENCOUNTER — HOSPITAL ENCOUNTER (OUTPATIENT)
Dept: CARDIAC REHAB | Age: 59
Setting detail: THERAPIES SERIES
Discharge: HOME | End: 2024-05-17
Payer: COMMERCIAL

## 2024-05-17 ENCOUNTER — HOSPITAL ENCOUNTER (OUTPATIENT)
Age: 59
Discharge: HOME | End: 2024-05-17
Payer: COMMERCIAL

## 2024-05-17 VITALS — WEIGHT: 185.3 LBS | BODY MASS INDEX: 29.91 KG/M2

## 2024-05-17 DIAGNOSIS — E78.5 HYPERLIPIDEMIA, UNSPECIFIED HYPERLIPIDEMIA TYPE: ICD-10-CM

## 2024-05-17 DIAGNOSIS — L65.9 HAIR THINNING: ICD-10-CM

## 2024-05-17 DIAGNOSIS — E11.9 TYPE 2 DIABETES MELLITUS WITHOUT COMPLICATION, WITHOUT LONG-TERM CURRENT USE OF INSULIN (HCC): ICD-10-CM

## 2024-05-17 LAB
CHOLEST SERPL-MCNC: 208 MG/DL (ref 0–199)
CHOLESTEROL/HDL RATIO: 5
EST. AVERAGE GLUCOSE BLD GHB EST-MCNC: 154 MG/DL
HBA1C MFR BLD: 7 % (ref 4–6)
HDLC SERPL-MCNC: 42 MG/DL
LDLC SERPL CALC-MCNC: 128 MG/DL (ref 0–100)
TRIGL SERPL-MCNC: 191 MG/DL
TSH SERPL DL<=0.05 MIU/L-ACNC: 2.32 UIU/ML (ref 0.3–5)
VLDLC SERPL CALC-MCNC: 38 MG/DL

## 2024-05-17 PROCEDURE — G0422 INTENS CARDIAC REHAB W/EXERC: HCPCS

## 2024-05-17 PROCEDURE — 80061 LIPID PANEL: CPT

## 2024-05-17 PROCEDURE — 36415 COLL VENOUS BLD VENIPUNCTURE: CPT

## 2024-05-17 PROCEDURE — 83036 HEMOGLOBIN GLYCOSYLATED A1C: CPT

## 2024-05-17 PROCEDURE — 84443 ASSAY THYROID STIM HORMONE: CPT

## 2024-05-17 ASSESSMENT — EXERCISE STRESS TEST
PEAK_HR: 127
PEAK_RPE: 12
PEAK_BP: 130/80
PEAK_METS: 4.1

## 2024-05-20 ENCOUNTER — OFFICE VISIT (OUTPATIENT)
Dept: CARDIOLOGY | Age: 59
End: 2024-05-20
Payer: COMMERCIAL

## 2024-05-20 VITALS
DIASTOLIC BLOOD PRESSURE: 70 MMHG | BODY MASS INDEX: 29.73 KG/M2 | HEART RATE: 84 BPM | HEIGHT: 66 IN | WEIGHT: 185 LBS | SYSTOLIC BLOOD PRESSURE: 110 MMHG

## 2024-05-20 DIAGNOSIS — I10 ESSENTIAL HYPERTENSION: ICD-10-CM

## 2024-05-20 DIAGNOSIS — I25.10 ATHEROSCLEROSIS OF NATIVE CORONARY ARTERY OF NATIVE HEART WITHOUT ANGINA PECTORIS: ICD-10-CM

## 2024-05-20 DIAGNOSIS — R94.31 ABNORMAL ECG: ICD-10-CM

## 2024-05-20 DIAGNOSIS — Z95.1 S/P CABG (CORONARY ARTERY BYPASS GRAFT): ICD-10-CM

## 2024-05-20 DIAGNOSIS — I95.9 HYPOTENSION, UNSPECIFIED HYPOTENSION TYPE: ICD-10-CM

## 2024-05-20 DIAGNOSIS — I25.119 CORONARY ARTERY DISEASE INVOLVING NATIVE CORONARY ARTERY OF NATIVE HEART WITH ANGINA PECTORIS (HCC): Primary | ICD-10-CM

## 2024-05-20 DIAGNOSIS — E78.2 MIXED HYPERLIPIDEMIA: ICD-10-CM

## 2024-05-20 PROCEDURE — 3078F DIAST BP <80 MM HG: CPT | Performed by: INTERNAL MEDICINE

## 2024-05-20 PROCEDURE — 99214 OFFICE O/P EST MOD 30 MIN: CPT | Performed by: INTERNAL MEDICINE

## 2024-05-20 PROCEDURE — 1036F TOBACCO NON-USER: CPT | Performed by: INTERNAL MEDICINE

## 2024-05-20 PROCEDURE — 93000 ELECTROCARDIOGRAM COMPLETE: CPT | Performed by: INTERNAL MEDICINE

## 2024-05-20 PROCEDURE — G8417 CALC BMI ABV UP PARAM F/U: HCPCS | Performed by: INTERNAL MEDICINE

## 2024-05-20 PROCEDURE — 3074F SYST BP LT 130 MM HG: CPT | Performed by: INTERNAL MEDICINE

## 2024-05-20 PROCEDURE — 3017F COLORECTAL CA SCREEN DOC REV: CPT | Performed by: INTERNAL MEDICINE

## 2024-05-20 PROCEDURE — G8427 DOCREV CUR MEDS BY ELIG CLIN: HCPCS | Performed by: INTERNAL MEDICINE

## 2024-05-20 RX ORDER — FUROSEMIDE 20 MG/1
20 TABLET ORAL DAILY PRN
Qty: 90 TABLET | Refills: 3 | Status: SHIPPED | OUTPATIENT
Start: 2024-05-20

## 2024-05-20 RX ORDER — METOPROLOL SUCCINATE 50 MG/1
50 TABLET, EXTENDED RELEASE ORAL NIGHTLY
Qty: 90 TABLET | Refills: 3 | Status: SHIPPED | OUTPATIENT
Start: 2024-05-20

## 2024-05-20 NOTE — PROGRESS NOTES
non-tender; bowel sounds normal; no masses,  no organomegaly  Extremities: extremities normal, atraumatic, no cyanosis. Trace generalized edema  Neurologic: Mental status: Alert, oriented, thought content appropriate  Skin: WNL for age and condition, no obvious rashes or leasions    LABS    Lab Results   Component Value Date    CHOL 208 (H) 05/17/2024    TRIG 191 (H) 05/17/2024    HDL 42 05/17/2024     (H) 05/17/2024    VLDL 38 05/17/2024    CHOLHDLRATIO 5.0 05/17/2024       Lab Results   Component Value Date     04/22/2024    K 4.4 04/22/2024    CL 98 04/22/2024    CO2 30 04/22/2024    BUN 33 (H) 04/22/2024    CREATININE 1.4 (H) 04/22/2024    GLUCOSE 95 04/22/2024    CALCIUM 9.9 04/22/2024    BILITOT 0.2 (L) 02/13/2024    ALKPHOS 108 (H) 02/13/2024    AST 17 02/13/2024    ALT 9 02/13/2024    LABGLOM 44 (L) 04/22/2024    GFRAA 51 (L) 05/24/2022    GFRAA 51 (L) 05/24/2022           EKG: Sinus Rhythm -occasional ectopic ventricular beat    -Left atrial enlargement.  -Poor R-wave progression -nonspecific -consider old anterior infarct.  -Nonspecific T-abnormality.    ECHO: 10/17/2023.  Left Ventricle: Reduced left ventricular systolic function with a visually estimated EF of 40 - 45%. Poor apical visualization and unable to perform Wright's EF measurement. Left ventricle size is normal. Mildly increased wall thickness. See diagram for wall motion findings. Abnormal diastolic function.    Echo 3/13/24    Left Ventricle: Reduced left ventricular systolic function with a visually estimated EF of 45 - 50%. Left ventricle size is normal. Increased wall thickness. See diagram for wall motion findings.    Image quality is suboptimal.     Cardiac Angiography: 11/8/2023  Findings:     Left main: Normal with 0% stenosis     LAD: 80% proximal, 90% mid  First diagonal 90% ostial     LCX: 90% mid stenosis  OM1 80% proximal stenosis     RCA: 70% proximal and 90% distal stenosis.     The LV gram was performed in the PR[facility]

## 2024-05-22 ENCOUNTER — HOSPITAL ENCOUNTER (OUTPATIENT)
Dept: CARDIAC REHAB | Age: 59
Setting detail: THERAPIES SERIES
Discharge: HOME | End: 2024-05-22
Payer: COMMERCIAL

## 2024-05-22 VITALS — WEIGHT: 188.8 LBS | BODY MASS INDEX: 30.47 KG/M2

## 2024-05-22 PROCEDURE — G0423 INTENS CARDIAC REHAB NO EXER: HCPCS

## 2024-05-22 PROCEDURE — G0422 INTENS CARDIAC REHAB W/EXERC: HCPCS

## 2024-05-22 ASSESSMENT — EXERCISE STRESS TEST
PEAK_METS: 4
PEAK_RPE: 12
PEAK_BP: 140/80
PEAK_HR: 122

## 2024-05-22 NOTE — PROGRESS NOTES
Cele FORMAN.:  1965  Acct Number: [id number]  MRN:  [id number]                  [facility] COOKING SCHOOL WORKSHOP             Date: 2024        Session # ___12_____   Today’s class covered:    COOKING SCHOOL WORKSHOPS    [] Adding Flavor - Sodium-Free  [x] Fast & Healthy Breakfasts    [] Powerhouse Plant-Based Proteins [] Satisfying Salads and Dressings    [] Simple Sides & Sauces   [] Personalizing Your Plate    [] Delicious Desserts    [] Savory Soups    [] Efficiency Cooking - Meals in a Snap [] Tasty Appetizers & Snacks     [] Comforting Weekend Breakfasts  [] One-Pot Wonders    [] Fast Evening Meals   [] Easy Entertaining    []  International Cuisine Spotlight on the Blue Zone          Patients were shown how to choose, prep, and cook; substitutions and other options were given.  Samples were offered.  Recipes were given and questions answered.      The patient above was in the Cooking School Workshop for 45 minutes.      Electronically signed by SEFERINO TA RD on 2024 at 9:40 AM

## 2024-05-23 ASSESSMENT — EJECTION FRACTION: EF_VALUE: 45

## 2024-05-23 ASSESSMENT — EXERCISE STRESS TEST: PEAK_BP: 140/80

## 2024-05-24 NOTE — TELEPHONE ENCOUNTER
Message left for pt to return call, inquiring about placement for Ortho referrral  
Message left for pt to return call, pt checking with insurance to see where to send referral for Orthopedic Surgery.   
Patient is going to call her [facility] and see if Dr Choudhury or Dr Griffith is in her network to be referred to.  
none
none

## 2024-05-29 ENCOUNTER — HOSPITAL ENCOUNTER (OUTPATIENT)
Dept: CARDIAC REHAB | Age: 59
Discharge: HOME | End: 2024-05-29

## 2024-05-29 VITALS — WEIGHT: 187 LBS | BODY MASS INDEX: 30.18 KG/M2

## 2024-05-29 PROCEDURE — G0422 INTENS CARDIAC REHAB W/EXERC: HCPCS

## 2024-05-29 ASSESSMENT — EXERCISE STRESS TEST
PEAK_HR: 120
PEAK_METS: 3.6
PEAK_BP: 118/80
PEAK_RPE: 12

## 2024-05-30 ENCOUNTER — TELEPHONE (OUTPATIENT)
Dept: FAMILY MEDICINE CLINIC | Age: 59
End: 2024-05-30

## 2024-05-30 NOTE — TELEPHONE ENCOUNTER
----- Message from AQUILES Del Angel CNP sent at 5/27/2024  5:27 PM EDT -----  Thyroid level are WNL. A1C has improved but still room to lower it more. Her cholesterol is a little worse this time. Encouraged work on low fat diet and increasing exercise as tolerated.

## 2024-05-30 NOTE — TELEPHONE ENCOUNTER
Spoke with patient and advised of lab results and recommendations.   Patient is concerned about her hair loss. Patient states that she is continuing to lose hair and wants to know what she can do for this. Is is because of the Iron? Please advise?

## 2024-05-31 ENCOUNTER — HOSPITAL ENCOUNTER (OUTPATIENT)
Dept: CARDIAC REHAB | Age: 59
Discharge: HOME | End: 2024-05-31

## 2024-05-31 VITALS — WEIGHT: 188.2 LBS | BODY MASS INDEX: 30.38 KG/M2

## 2024-05-31 PROCEDURE — G0422 INTENS CARDIAC REHAB W/EXERC: HCPCS

## 2024-05-31 ASSESSMENT — EXERCISE STRESS TEST
PEAK_BP: 128/74
PEAK_HR: 111
PEAK_RPE: 12
PEAK_METS: 3.9

## 2024-06-03 RX ORDER — METOPROLOL SUCCINATE 25 MG/1
25 TABLET, EXTENDED RELEASE ORAL NIGHTLY
Qty: 30 TABLET | Refills: 3 | Status: SHIPPED | OUTPATIENT
Start: 2024-06-03

## 2024-06-05 ENCOUNTER — HOSPITAL ENCOUNTER (OUTPATIENT)
Dept: CARDIAC REHAB | Age: 59
Setting detail: THERAPIES SERIES
Discharge: HOME | End: 2024-06-05
Payer: COMMERCIAL

## 2024-06-05 VITALS — WEIGHT: 188.8 LBS | BODY MASS INDEX: 30.47 KG/M2

## 2024-06-05 ASSESSMENT — EXERCISE STRESS TEST
PEAK_HR: 116
PEAK_RPE: 12
PEAK_BP: 116/70
PEAK_METS: 3.7

## 2024-06-07 ENCOUNTER — HOSPITAL ENCOUNTER (OUTPATIENT)
Dept: CARDIAC REHAB | Age: 59
Setting detail: THERAPIES SERIES
Discharge: HOME | End: 2024-06-07
Payer: COMMERCIAL

## 2024-06-07 VITALS — BODY MASS INDEX: 30.54 KG/M2 | WEIGHT: 189.2 LBS

## 2024-06-07 DIAGNOSIS — M25.561 RECURRENT PAIN OF RIGHT KNEE: ICD-10-CM

## 2024-06-07 DIAGNOSIS — M15.9 PRIMARY OSTEOARTHRITIS INVOLVING MULTIPLE JOINTS: ICD-10-CM

## 2024-06-07 DIAGNOSIS — I25.10 CORONARY ARTERY ARTERIOSCLEROSIS: ICD-10-CM

## 2024-06-07 PROCEDURE — G0422 INTENS CARDIAC REHAB W/EXERC: HCPCS

## 2024-06-07 RX ORDER — ACETAMINOPHEN AND CODEINE PHOSPHATE 300; 30 MG/1; MG/1
1 TABLET ORAL 2 TIMES DAILY
Qty: 60 TABLET | Refills: 0 | Status: SHIPPED | OUTPATIENT
Start: 2024-06-09 | End: 2024-07-09

## 2024-06-07 ASSESSMENT — EXERCISE STRESS TEST
PEAK_RPE: 12
PEAK_BP: 114/80
PEAK_METS: 4
PEAK_HR: 113

## 2024-06-07 NOTE — TELEPHONE ENCOUNTER
Tylenol #3   Ds11 5/3/24  [facility]   Last filled 5/10/24  Due 6/9/24  Last Appt:  5/3/2024  Next Appt:   7/1/2024  Med verified in Epic

## 2024-06-10 RX ORDER — INSULIN GLARGINE 100 [IU]/ML
INJECTION, SOLUTION SUBCUTANEOUS
Qty: 9 ML | Refills: 2 | Status: SHIPPED | OUTPATIENT
Start: 2024-06-10

## 2024-06-10 NOTE — TELEPHONE ENCOUNTER
Cele called requesting a refill of the below medication which has been pended for you:     Requested Prescriptions     Pending Prescriptions Disp Refills    LANTUS SOLOSTAR 100 UNIT/ML injection pen [Pharmacy Med Name: LANTUS SOLOSTAR 100 UNIT/ML] 9 mL 2     Sig: inject 30 units subcutaneously once daily       Last Appointment Date: 4/15/2024  Next Appointment Date: 7/25/2024    Allergies   Allergen Reactions    Amiodarone Shortness Of Breath and Dizziness or Vertigo    Fentanyl Other (See Comments)     Patient had terrible nightmares and hallucinations while hospitalized and sedated while on fentanyl     Iodine Rash and Swelling     Were in ER being treated when it happened spent time in hosp.    Prednisone Other (See Comments)     Trouble swallowing    Shellfish Allergy Anaphylaxis    Adhesive Tape Other (See Comments)     blisters    Cefuroxime Axetil Diarrhea, Hives and Nausea And Vomiting    E-Mycin [Erythromycin] Diarrhea     Rash, hives    Ezetimibe Other (See Comments)     Other reaction(s): Muscle Pain    Gabapentin Other (See Comments)     \"Un functional\"    Morphine Hives     Itching, burning     Nsaids Diarrhea, Hives and Nausea And Vomiting    Statins Other (See Comments)     myalgias    Sulfa Antibiotics Hives    Pcn [Penicillins] Rash    Percocet [Oxycodone-Acetaminophen] Other (See Comments)     Severe drowsiness     Zithromax [Azithromycin] Rash

## 2024-06-12 ENCOUNTER — HOSPITAL ENCOUNTER (OUTPATIENT)
Dept: CARDIAC REHAB | Age: 59
Discharge: HOME | End: 2024-06-12

## 2024-06-12 VITALS — BODY MASS INDEX: 30.12 KG/M2 | WEIGHT: 186.6 LBS

## 2024-06-12 PROCEDURE — G0422 INTENS CARDIAC REHAB W/EXERC: HCPCS

## 2024-06-12 ASSESSMENT — EXERCISE STRESS TEST
PEAK_HR: 119
PEAK_BP: 116/80
PEAK_RPE: 12
PEAK_METS: 4.2

## 2024-06-14 ENCOUNTER — PATIENT MESSAGE (OUTPATIENT)
Dept: CARDIOLOGY | Age: 59
End: 2024-06-14

## 2024-06-14 ENCOUNTER — HOSPITAL ENCOUNTER (OUTPATIENT)
Dept: CARDIAC REHAB | Age: 59
Setting detail: THERAPIES SERIES
Discharge: HOME | End: 2024-06-14
Payer: COMMERCIAL

## 2024-06-14 VITALS — BODY MASS INDEX: 30.47 KG/M2 | WEIGHT: 188.8 LBS

## 2024-06-14 PROCEDURE — G0422 INTENS CARDIAC REHAB W/EXERC: HCPCS

## 2024-06-14 ASSESSMENT — EXERCISE STRESS TEST
PEAK_BP: 140/72
PEAK_HR: 107
PEAK_METS: 4.2
PEAK_RPE: 12

## 2024-06-14 NOTE — TELEPHONE ENCOUNTER
From: Cele Camarena  To: Esha CATY Larissa  Sent: 6/14/2024 9:45 AM EDT  Subject: Hair loss and shortness of breath.    Esha,     I am having major hair loss now. I started hair loss when I was put on Metroprolol and Plavix. But it was much slower. Since it has been raised to 50mg it is increasing rapidly. It is not coming out in blotches. I had my NP check thyroid it was fine. Also I have had shortness of breath since being on Metroprolol and it has also gotten worse since the increase of dosage. My blood pressure and heart rate are good. I do believe it is a side effect of one or both of these meds. Is there anyway we can change these meds. I was on Propanalol prior to surgery twice a day and my BP. and heart rate were great with no side effects.     Thank you     Cele

## 2024-06-14 NOTE — TELEPHONE ENCOUNTER
Cele RIBEIRO [facility] Clinical Staff (supporting Esha Dias, APRN - CNP)10 minutes ago (9:48 AM)     SN  In regards to previous sent message my shortness of breath is not all the time. Only when walking at a faster pace.

## 2024-06-17 ENCOUNTER — HOSPITAL ENCOUNTER (OUTPATIENT)
Dept: CARDIAC REHAB | Age: 59
Setting detail: THERAPIES SERIES
Discharge: HOME | End: 2024-06-17
Payer: COMMERCIAL

## 2024-06-17 VITALS — WEIGHT: 185.6 LBS | BODY MASS INDEX: 29.96 KG/M2

## 2024-06-17 PROCEDURE — G0422 INTENS CARDIAC REHAB W/EXERC: HCPCS

## 2024-06-17 ASSESSMENT — EXERCISE STRESS TEST
PEAK_BP: 114/80
PEAK_RPE: 12
PEAK_METS: 3.8
PEAK_HR: 133

## 2024-06-20 ENCOUNTER — TELEPHONE (OUTPATIENT)
Dept: CARDIOLOGY | Age: 59
End: 2024-06-20

## 2024-06-20 NOTE — TELEPHONE ENCOUNTER
Copied from [employer] message.       Cele RIBEIRO [facility] Cardiology Clinical Staff (supporting Esha Dias, APRN - CNP)14 minutes ago (9:17 AM)     SN  Been on Propanolol this is how I am doing. Hair loss slowed way down. Shortness of breath almost gone. BP running 124/75 average and lower sometimes. Resting heart rate 65 to 70. Walking heart rate 115. The only issue is my sleeping heart rate per my Apple Watch is 52 to 55 average. Just was not sure if this was normal or still feeling effects of  Metroprolol. Also I have only taken the Propanolol once a day as blood pressure and heart rate were running on the lower side before bed.      Please advise if this is working okay or do we need to switch med again.

## 2024-06-21 ENCOUNTER — HOSPITAL ENCOUNTER (OUTPATIENT)
Dept: CARDIAC REHAB | Age: 59
Setting detail: THERAPIES SERIES
Discharge: HOME | End: 2024-06-21
Payer: COMMERCIAL

## 2024-06-21 VITALS — BODY MASS INDEX: 30.18 KG/M2 | WEIGHT: 187 LBS

## 2024-06-21 PROCEDURE — G0422 INTENS CARDIAC REHAB W/EXERC: HCPCS

## 2024-06-21 ASSESSMENT — EXERCISE STRESS TEST
PEAK_RPE: 12
PEAK_BP: 138/80
PEAK_METS: 4.4
PEAK_HR: 109

## 2024-06-24 ENCOUNTER — HOSPITAL ENCOUNTER (OUTPATIENT)
Dept: CARDIAC REHAB | Age: 59
Setting detail: THERAPIES SERIES
Discharge: HOME | End: 2024-06-24
Payer: COMMERCIAL

## 2024-06-24 VITALS — WEIGHT: 186.4 LBS | BODY MASS INDEX: 30.09 KG/M2

## 2024-06-24 PROCEDURE — G0422 INTENS CARDIAC REHAB W/EXERC: HCPCS

## 2024-06-24 ASSESSMENT — EXERCISE STRESS TEST
PEAK_RPE: 12
PEAK_BP: 120/74
PEAK_HR: 107
PEAK_METS: 4.2

## 2024-06-25 ASSESSMENT — EXERCISE STRESS TEST: PEAK_BP: 120/74

## 2024-06-25 ASSESSMENT — EJECTION FRACTION: EF_VALUE: 45

## 2024-07-01 ENCOUNTER — OFFICE VISIT (OUTPATIENT)
Dept: PAIN MANAGEMENT | Age: 59
End: 2024-07-01
Payer: COMMERCIAL

## 2024-07-01 VITALS
RESPIRATION RATE: 18 BRPM | WEIGHT: 192 LBS | HEIGHT: 66 IN | HEART RATE: 89 BPM | SYSTOLIC BLOOD PRESSURE: 157 MMHG | OXYGEN SATURATION: 94 % | DIASTOLIC BLOOD PRESSURE: 114 MMHG | BODY MASS INDEX: 30.86 KG/M2

## 2024-07-01 DIAGNOSIS — G25.2 ACTION TREMOR: ICD-10-CM

## 2024-07-01 DIAGNOSIS — M51.9 LUMBAR DISC DISEASE: ICD-10-CM

## 2024-07-01 DIAGNOSIS — M47.817 LUMBOSACRAL SPONDYLOSIS WITHOUT MYELOPATHY: ICD-10-CM

## 2024-07-01 DIAGNOSIS — M51.26 LUMBAR DISC HERNIATION: Primary | ICD-10-CM

## 2024-07-01 PROCEDURE — 3017F COLORECTAL CA SCREEN DOC REV: CPT | Performed by: PHYSICAL MEDICINE & REHABILITATION

## 2024-07-01 PROCEDURE — G8417 CALC BMI ABV UP PARAM F/U: HCPCS | Performed by: PHYSICAL MEDICINE & REHABILITATION

## 2024-07-01 PROCEDURE — 99214 OFFICE O/P EST MOD 30 MIN: CPT | Performed by: PHYSICAL MEDICINE & REHABILITATION

## 2024-07-01 PROCEDURE — 3080F DIAST BP >= 90 MM HG: CPT | Performed by: PHYSICAL MEDICINE & REHABILITATION

## 2024-07-01 PROCEDURE — 3077F SYST BP >= 140 MM HG: CPT | Performed by: PHYSICAL MEDICINE & REHABILITATION

## 2024-07-01 PROCEDURE — G8427 DOCREV CUR MEDS BY ELIG CLIN: HCPCS | Performed by: PHYSICAL MEDICINE & REHABILITATION

## 2024-07-01 PROCEDURE — 1036F TOBACCO NON-USER: CPT | Performed by: PHYSICAL MEDICINE & REHABILITATION

## 2024-07-01 ASSESSMENT — ENCOUNTER SYMPTOMS: BACK PAIN: 1

## 2024-07-01 NOTE — PROGRESS NOTES
[facility]  [address] [place]. [address]    Patient Name: Cele Camarena  MRN: [id number]  Encounter Date: 7/1/2024     SUBJECTIVE:  Cele Camarena is a 58 y.o., female being seen today regarding   Chief Complaint   Patient presents with    Back Pain     lower    Knee Pain     right    and routine medication management.  Pain in  lower back and knees    Feels  where tremor in legs  has pain   Functionality Assessment & Goals:  On a scale of 0 (Does not Interfere) to 10 (Completely Interferes)  Which number describes how during the past week pain has interfered with the following:   A.  General Activity: 9    B.  Mood:  4   C.  Walking Ability:  9   D.  Normal Work (Includes both work outside the home and housework):  9   E.  Relations with Other People:  1   F.  Sleep:  7    G.  Enjoyment of Life:  7  2.  Patient prefers to Take their Pain Medications:   [x] On a regular basis   [x] Only when necessary   [] Does not take pain medications  3.  What are the Patient's Goals/ Expectations for Visiting Pain Management?   [] Learn about my pain   [] Physical Therapy   [] Receive Injections   [] Deal with Anxiety and Stress   [] Receive Medication   [] Treat Depression    [] Treat Sleep   [] Treat Opioid Dependence/ Addiction        Most recent Oswestry Disability Questionnaire completed by patient on 7/1/24     Current Pain Assessment:         7/1/2024     1:03 PM   AMB PAIN ASSESSMENT   Location of Pain Back   Location Modifiers Posterior;Medial;Lateral;Right   Severity of Pain 6   Quality of Pain Throbbing;Sharp;Dull;Aching   Duration of Pain Persistent   Frequency of Pain Constant   Aggravating Factors Bending;Stretching;Straightening;Exercise;Kneeling;Squatting;Standing;Walking;Stairs   Limiting Behavior Yes   Relieving Factors Rest   Result of Injury No   Work-Related Injury No   Are there other pain locations you wish to document? No        Current Medications &

## 2024-07-06 ENCOUNTER — OFFICE VISIT (OUTPATIENT)
Dept: PRIMARY CARE CLINIC | Age: 59
End: 2024-07-06
Payer: COMMERCIAL

## 2024-07-06 VITALS
BODY MASS INDEX: 30.18 KG/M2 | TEMPERATURE: 98.2 F | SYSTOLIC BLOOD PRESSURE: 134 MMHG | WEIGHT: 187 LBS | DIASTOLIC BLOOD PRESSURE: 84 MMHG | HEART RATE: 94 BPM | OXYGEN SATURATION: 95 %

## 2024-07-06 DIAGNOSIS — J01.40 ACUTE PANSINUSITIS, RECURRENCE NOT SPECIFIED: Primary | ICD-10-CM

## 2024-07-06 PROCEDURE — 3017F COLORECTAL CA SCREEN DOC REV: CPT

## 2024-07-06 PROCEDURE — G8417 CALC BMI ABV UP PARAM F/U: HCPCS

## 2024-07-06 PROCEDURE — G8427 DOCREV CUR MEDS BY ELIG CLIN: HCPCS

## 2024-07-06 PROCEDURE — 3075F SYST BP GE 130 - 139MM HG: CPT

## 2024-07-06 PROCEDURE — 1036F TOBACCO NON-USER: CPT

## 2024-07-06 PROCEDURE — 99213 OFFICE O/P EST LOW 20 MIN: CPT

## 2024-07-06 PROCEDURE — 3079F DIAST BP 80-89 MM HG: CPT

## 2024-07-06 RX ORDER — LEVOFLOXACIN 500 MG/1
500 TABLET, FILM COATED ORAL DAILY
Qty: 7 TABLET | Refills: 0 | Status: SHIPPED | OUTPATIENT
Start: 2024-07-06 | End: 2024-07-13

## 2024-07-06 ASSESSMENT — PATIENT HEALTH QUESTIONNAIRE - PHQ9
SUM OF ALL RESPONSES TO PHQ QUESTIONS 1-9: 0
2. FEELING DOWN, DEPRESSED OR HOPELESS: NOT AT ALL
1. LITTLE INTEREST OR PLEASURE IN DOING THINGS: NOT AT ALL
SUM OF ALL RESPONSES TO PHQ QUESTIONS 1-9: 0
SUM OF ALL RESPONSES TO PHQ QUESTIONS 1-9: 0
SUM OF ALL RESPONSES TO PHQ9 QUESTIONS 1 & 2: 0
SUM OF ALL RESPONSES TO PHQ QUESTIONS 1-9: 0

## 2024-07-06 ASSESSMENT — ENCOUNTER SYMPTOMS
SINUS PRESSURE: 1
SORE THROAT: 1
EYE PAIN: 0
SINUS PAIN: 1
SHORTNESS OF BREATH: 1
RHINORRHEA: 0
SWOLLEN GLANDS: 0
SINUS COMPLAINT: 1
COUGH: 1

## 2024-07-06 NOTE — PATIENT INSTRUCTIONS
Continue with mucinex and nasal sprays  Complete full course of antibiotics  May use a destini pot or saline rinses as tolerated  May use tylenol for headaches  Increase water intake  If symptoms worsen follow up with PCP  Patient verbalized understanding and agrees with plan of care

## 2024-07-06 NOTE — PROGRESS NOTES
[facility] of [facility]  [address] [facility] [address]  Phone: [phone]  Fax: [phone]      Cele Camarena  1965  MRN: [id number]  Date of visit: 7/6/2024    Chief Complaint:     Cele Camarena is here for c/o of Sinus Problem (Low grade fever, facial pressure, )      HPI:     Cele Camarena is a 58 y.o. female who presents to the [facility] Care today for her medical conditions/complaints as noted below.    Sinus Problem  This is a new problem. Episode onset: 3 weeks. The problem has been gradually worsening since onset. Maximum temperature: . Her pain is at a severity of 9/10. Associated symptoms include chills, congestion, coughing, headaches, shortness of breath, sinus pressure and a sore throat. Pertinent negatives include no ear pain, neck pain or swollen glands. Past treatments include nothing.   No sick contacts    Past Medical History:   Diagnosis Date    Allergic rhinitis     Arthritis     CAD (coronary artery disease)     multivessel. [facility] last visit 10/2023    CKD (chronic kidney disease), stage III (HCC)     Dr. Gann [facility] last appt 10/10/2023    Depression     Fibromyalgia     Gout     Dr. Cyndie Cabrales [facility]    History of fractured vertebra 2019    L2 L3 L4    Hyperlipidemia     Neuropathy     OA (osteoarthritis)     Peripheral vascular disease (HCC)     Sacroiliac joint dysfunction     Tremor     Type II or unspecified type diabetes mellitus without mention of complication, not stated as uncontrolled     PCP    Under care of team     Dr. Kings Lugo last appt 9/2023    Wellness examination     Leena Murrieta CNP PCP last appt 10/2023        Allergies   Allergen Reactions    Amiodarone Shortness Of Breath and Dizziness or Vertigo    Fentanyl Other (See Comments)     Patient had terrible nightmares and hallucinations while hospitalized and sedated

## 2024-07-09 DIAGNOSIS — M15.9 PRIMARY OSTEOARTHRITIS INVOLVING MULTIPLE JOINTS: ICD-10-CM

## 2024-07-09 DIAGNOSIS — I25.10 CORONARY ARTERY ARTERIOSCLEROSIS: ICD-10-CM

## 2024-07-09 DIAGNOSIS — M25.561 RECURRENT PAIN OF RIGHT KNEE: ICD-10-CM

## 2024-07-09 RX ORDER — ACETAMINOPHEN AND CODEINE PHOSPHATE 300; 30 MG/1; MG/1
1 TABLET ORAL 2 TIMES DAILY
Qty: 60 TABLET | Refills: 0 | Status: SHIPPED | OUTPATIENT
Start: 2024-07-09 | End: 2024-08-08

## 2024-07-09 NOTE — TELEPHONE ENCOUNTER
Cele called requesting a refill of the below medication which has been pended for you:     Requested Prescriptions     Pending Prescriptions Disp Refills    acetaminophen-codeine (TYLENOL/CODEINE #3) 300-30 MG per tablet 60 tablet 0     Sig: Take 1 tablet by mouth 2 times daily at 0800 and 1400 for 30 days. Intended supply: 3 days. Take lowest dose possible to manage pain Max Daily Amount: 2 tablets       Last Appointment Date: 7/1/2024  Next Appointment Date: 8/2/2024    Allergies   Allergen Reactions    Amiodarone Shortness Of Breath and Dizziness or Vertigo    Fentanyl Other (See Comments)     Patient had terrible nightmares and hallucinations while hospitalized and sedated while on fentanyl     Iodine Rash and Swelling     Were in ER being treated when it happened spent time in hosp.    Prednisone Other (See Comments)     Trouble swallowing    Shellfish Allergy Anaphylaxis    Adhesive Tape Other (See Comments)     blisters    Cefuroxime Axetil Diarrhea, Hives and Nausea And Vomiting    E-Mycin [Erythromycin] Diarrhea     Rash, hives    Ezetimibe Other (See Comments)     Other reaction(s): Muscle Pain    Gabapentin Other (See Comments)     \"Un functional\"    Morphine Hives     Itching, burning     Nsaids Diarrhea, Hives and Nausea And Vomiting    Statins Other (See Comments)     myalgias    Sulfa Antibiotics Hives    Pcn [Penicillins] Rash    Percocet [Oxycodone-Acetaminophen] Other (See Comments)     Severe drowsiness     Zithromax [Azithromycin] Rash       Pharmacy:  [facility] Report checked for [place], [place], and [place]:Tylenol #3 6/9/24 #60. Due NOW.

## 2024-07-22 RX ORDER — CYANOCOBALAMIN 1000 UG/ML
INJECTION, SOLUTION INTRAMUSCULAR; SUBCUTANEOUS
Qty: 1 ML | OUTPATIENT
Start: 2024-07-22

## 2024-07-22 RX ORDER — ONDANSETRON 4 MG/1
TABLET, FILM COATED ORAL
Qty: 30 TABLET | OUTPATIENT
Start: 2024-07-22

## 2024-07-22 RX ORDER — INSULIN GLARGINE 100 [IU]/ML
INJECTION, SOLUTION SUBCUTANEOUS
Qty: 9 ML | OUTPATIENT
Start: 2024-07-22

## 2024-07-22 RX ORDER — ALBUTEROL SULFATE 2.5 MG/3ML
SOLUTION RESPIRATORY (INHALATION)
Qty: 225 ML | OUTPATIENT
Start: 2024-07-22

## 2024-07-22 RX ORDER — FUROSEMIDE 20 MG/1
20 TABLET ORAL DAILY
Qty: 30 TABLET | OUTPATIENT
Start: 2024-07-22

## 2024-07-24 RX ORDER — AMLODIPINE BESYLATE 5 MG/1
5 TABLET ORAL DAILY
Qty: 30 TABLET | Refills: 3 | OUTPATIENT
Start: 2024-07-24

## 2024-07-24 RX ORDER — APIXABAN 5 MG/1
5 TABLET, FILM COATED ORAL 2 TIMES DAILY
Qty: 60 TABLET | Refills: 3 | OUTPATIENT
Start: 2024-07-24

## 2024-07-24 RX ORDER — PEN NEEDLE, DIABETIC 32 GX5/16"
NEEDLE, DISPOSABLE MISCELLANEOUS
Qty: 100 EACH | OUTPATIENT
Start: 2024-07-24

## 2024-07-24 RX ORDER — QUETIAPINE FUMARATE 25 MG/1
25 TABLET, FILM COATED ORAL 2 TIMES DAILY
Qty: 60 TABLET | Refills: 3 | OUTPATIENT
Start: 2024-07-24

## 2024-07-25 ENCOUNTER — OFFICE VISIT (OUTPATIENT)
Dept: FAMILY MEDICINE CLINIC | Age: 59
End: 2024-07-25
Payer: COMMERCIAL

## 2024-07-25 VITALS
WEIGHT: 191 LBS | HEIGHT: 66 IN | BODY MASS INDEX: 30.7 KG/M2 | DIASTOLIC BLOOD PRESSURE: 84 MMHG | OXYGEN SATURATION: 95 % | HEART RATE: 90 BPM | SYSTOLIC BLOOD PRESSURE: 136 MMHG

## 2024-07-25 DIAGNOSIS — M25.50 MULTIPLE JOINT PAIN: ICD-10-CM

## 2024-07-25 DIAGNOSIS — E78.5 HYPERLIPIDEMIA, UNSPECIFIED HYPERLIPIDEMIA TYPE: ICD-10-CM

## 2024-07-25 DIAGNOSIS — F43.10 POST-TRAUMATIC STRESS DISORDER: ICD-10-CM

## 2024-07-25 DIAGNOSIS — I10 HYPERTENSION, ESSENTIAL: ICD-10-CM

## 2024-07-25 DIAGNOSIS — J01.40 ACUTE NON-RECURRENT PANSINUSITIS: ICD-10-CM

## 2024-07-25 DIAGNOSIS — F41.9 ANXIETY: ICD-10-CM

## 2024-07-25 DIAGNOSIS — M79.7 FIBROMYALGIA: ICD-10-CM

## 2024-07-25 DIAGNOSIS — E11.9 TYPE 2 DIABETES MELLITUS WITHOUT COMPLICATION, WITHOUT LONG-TERM CURRENT USE OF INSULIN (HCC): Primary | ICD-10-CM

## 2024-07-25 PROCEDURE — 3079F DIAST BP 80-89 MM HG: CPT | Performed by: NURSE PRACTITIONER

## 2024-07-25 PROCEDURE — 3075F SYST BP GE 130 - 139MM HG: CPT | Performed by: NURSE PRACTITIONER

## 2024-07-25 PROCEDURE — 2022F DILAT RTA XM EVC RTNOPTHY: CPT | Performed by: NURSE PRACTITIONER

## 2024-07-25 PROCEDURE — G8427 DOCREV CUR MEDS BY ELIG CLIN: HCPCS | Performed by: NURSE PRACTITIONER

## 2024-07-25 PROCEDURE — 3017F COLORECTAL CA SCREEN DOC REV: CPT | Performed by: NURSE PRACTITIONER

## 2024-07-25 PROCEDURE — 1036F TOBACCO NON-USER: CPT | Performed by: NURSE PRACTITIONER

## 2024-07-25 PROCEDURE — 99214 OFFICE O/P EST MOD 30 MIN: CPT | Performed by: NURSE PRACTITIONER

## 2024-07-25 PROCEDURE — G8417 CALC BMI ABV UP PARAM F/U: HCPCS | Performed by: NURSE PRACTITIONER

## 2024-07-25 PROCEDURE — 3051F HG A1C>EQUAL 7.0%<8.0%: CPT | Performed by: NURSE PRACTITIONER

## 2024-07-25 RX ORDER — DOXYCYCLINE HYCLATE 100 MG
100 TABLET ORAL 2 TIMES DAILY
Qty: 20 TABLET | Refills: 0 | Status: SHIPPED | OUTPATIENT
Start: 2024-07-25 | End: 2024-08-04

## 2024-07-25 SDOH — ECONOMIC STABILITY: INCOME INSECURITY: HOW HARD IS IT FOR YOU TO PAY FOR THE VERY BASICS LIKE FOOD, HOUSING, MEDICAL CARE, AND HEATING?: NOT HARD AT ALL

## 2024-07-25 SDOH — ECONOMIC STABILITY: FOOD INSECURITY: WITHIN THE PAST 12 MONTHS, THE FOOD YOU BOUGHT JUST DIDN'T LAST AND YOU DIDN'T HAVE MONEY TO GET MORE.: NEVER TRUE

## 2024-07-25 SDOH — ECONOMIC STABILITY: FOOD INSECURITY: WITHIN THE PAST 12 MONTHS, YOU WORRIED THAT YOUR FOOD WOULD RUN OUT BEFORE YOU GOT MONEY TO BUY MORE.: NEVER TRUE

## 2024-07-25 ASSESSMENT — ANXIETY QUESTIONNAIRES
GAD7 TOTAL SCORE: 7
7. FEELING AFRAID AS IF SOMETHING AWFUL MIGHT HAPPEN: SEVERAL DAYS
3. WORRYING TOO MUCH ABOUT DIFFERENT THINGS: SEVERAL DAYS
2. NOT BEING ABLE TO STOP OR CONTROL WORRYING: SEVERAL DAYS
5. BEING SO RESTLESS THAT IT IS HARD TO SIT STILL: SEVERAL DAYS
1. FEELING NERVOUS, ANXIOUS, OR ON EDGE: SEVERAL DAYS
4. TROUBLE RELAXING: SEVERAL DAYS
IF YOU CHECKED OFF ANY PROBLEMS ON THIS QUESTIONNAIRE, HOW DIFFICULT HAVE THESE PROBLEMS MADE IT FOR YOU TO DO YOUR WORK, TAKE CARE OF THINGS AT HOME, OR GET ALONG WITH OTHER PEOPLE: SOMEWHAT DIFFICULT
6. BECOMING EASILY ANNOYED OR IRRITABLE: SEVERAL DAYS

## 2024-07-25 ASSESSMENT — PATIENT HEALTH QUESTIONNAIRE - PHQ9
10. IF YOU CHECKED OFF ANY PROBLEMS, HOW DIFFICULT HAVE THESE PROBLEMS MADE IT FOR YOU TO DO YOUR WORK, TAKE CARE OF THINGS AT HOME, OR GET ALONG WITH OTHER PEOPLE: NOT DIFFICULT AT ALL
2. FEELING DOWN, DEPRESSED OR HOPELESS: NOT AT ALL
5. POOR APPETITE OR OVEREATING: NOT AT ALL
9. THOUGHTS THAT YOU WOULD BE BETTER OFF DEAD, OR OF HURTING YOURSELF: NOT AT ALL
8. MOVING OR SPEAKING SO SLOWLY THAT OTHER PEOPLE COULD HAVE NOTICED. OR THE OPPOSITE, BEING SO FIGETY OR RESTLESS THAT YOU HAVE BEEN MOVING AROUND A LOT MORE THAN USUAL: NOT AT ALL
6. FEELING BAD ABOUT YOURSELF - OR THAT YOU ARE A FAILURE OR HAVE LET YOURSELF OR YOUR FAMILY DOWN: NOT AT ALL
SUM OF ALL RESPONSES TO PHQ QUESTIONS 1-9: 5
4. FEELING TIRED OR HAVING LITTLE ENERGY: SEVERAL DAYS
SUM OF ALL RESPONSES TO PHQ QUESTIONS 1-9: 5
7. TROUBLE CONCENTRATING ON THINGS, SUCH AS READING THE NEWSPAPER OR WATCHING TELEVISION: SEVERAL DAYS
SUM OF ALL RESPONSES TO PHQ QUESTIONS 1-9: 5
SUM OF ALL RESPONSES TO PHQ QUESTIONS 1-9: 5
3. TROUBLE FALLING OR STAYING ASLEEP: NEARLY EVERY DAY

## 2024-07-25 NOTE — PROGRESS NOTES
Cele Camarena (:  1965) is a 59 y.o. female,Established patient, here for evaluation of the following chief complaint(s):  Anxiety and Sinus Problem (Just finished levaquin)      Assessment & Plan   1. Type 2 diabetes mellitus without complication, without long-term current use of insulin (HCC)-due for labs continue current medications  -     Comprehensive Metabolic Panel; Future  -     Microalbumin, Ur; Future  -     Hemoglobin A1C; Future  2. Hyperlipidemia, unspecified hyperlipidemia type-due for labs continue current medication   -     Lipid Panel; Future  3. Anxiety-improving continue current medication, continue counseling  4. Multiple joint pain-continue following with rheumatology   5. Hypertension, essential-controlled continue current medication, continue following with cardiology   6. Fibromyalgia-stable continue following with rheumatology   7. Post-traumatic stress disorder-improving continue with counseling  8. Acute non-recurrent pansinusitis-will start doxy 100mg 1 tablet BID for 10 days, supportive care, push fluids  -     doxycycline hyclate (VIBRA-TABS) 100 MG tablet; Take 1 tablet by mouth 2 times daily for 10 days, Disp-20 tablet, R-0Normal    Pt to return in 6 months for follow up  Pt to return PRN   No follow-ups on file.       Subjective   Pt presents to the clinic for a follow up of chronic medical conditions. Pt has a history of multiple joint pain and fibromyalgia. She is following with a rheumatologist. Pt states that things are stable at this time. She has a history of anxiety and PTSD due to recent hospitalizations. She is working through things with therapy. She is on zoloft which is working well for her. Denies thoughts of suicide or homicide. She is smiling and making good eye contact today.   Diabetes  She presents for her follow-up diabetic visit. She has type 2 diabetes mellitus. Disease course: due for labs. There are no hypoglycemic associated symptoms. Pertinent

## 2024-08-05 ASSESSMENT — ENCOUNTER SYMPTOMS
SORE THROAT: 1
SHORTNESS OF BREATH: 0
SINUS PRESSURE: 1
BLURRED VISION: 0
SINUS COMPLAINT: 1

## 2024-08-06 RX ORDER — CLOPIDOGREL BISULFATE 75 MG/1
75 TABLET ORAL DAILY
Qty: 90 TABLET | Refills: 3 | Status: SHIPPED | OUTPATIENT
Start: 2024-08-06

## 2024-08-08 DIAGNOSIS — I25.10 CORONARY ARTERY ARTERIOSCLEROSIS: ICD-10-CM

## 2024-08-08 DIAGNOSIS — M15.9 PRIMARY OSTEOARTHRITIS INVOLVING MULTIPLE JOINTS: ICD-10-CM

## 2024-08-08 DIAGNOSIS — M25.561 RECURRENT PAIN OF RIGHT KNEE: ICD-10-CM

## 2024-08-08 RX ORDER — ACETAMINOPHEN AND CODEINE PHOSPHATE 300; 30 MG/1; MG/1
1 TABLET ORAL 2 TIMES DAILY
Qty: 60 TABLET | Refills: 0 | Status: SHIPPED | OUTPATIENT
Start: 2024-08-08 | End: 2024-09-07

## 2024-08-08 NOTE — TELEPHONE ENCOUNTER
Cele called requesting a refill of the below medication which has been pended for you:     Requested Prescriptions     Pending Prescriptions Disp Refills    acetaminophen-codeine (TYLENOL/CODEINE #3) 300-30 MG per tablet 60 tablet 0     Sig: Take 1 tablet by mouth 2 times daily at 0800 and 1400 for 30 days. Intended supply: 3 days. Take lowest dose possible to manage pain Max Daily Amount: 2 tablets       Last Appointment Date: 7/1/2024  Next Appointment Date: 8/27/2024    Allergies   Allergen Reactions    Amiodarone Shortness Of Breath and Dizziness or Vertigo    Fentanyl Other (See Comments)     Patient had terrible nightmares and hallucinations while hospitalized and sedated while on fentanyl     Iodine Rash and Swelling     Were in ER being treated when it happened spent time in hosp.    Prednisone Other (See Comments)     Trouble swallowing    Shellfish Allergy Anaphylaxis    Adhesive Tape Other (See Comments)     blisters    Cefuroxime Axetil Diarrhea, Hives and Nausea And Vomiting    E-Mycin [Erythromycin] Diarrhea     Rash, hives    Ezetimibe Other (See Comments)     Other reaction(s): Muscle Pain    Gabapentin Other (See Comments)     \"Un functional\"    Morphine Hives     Itching, burning     Nsaids Diarrhea, Hives and Nausea And Vomiting    Statins Other (See Comments)     myalgias    Sulfa Antibiotics Hives    Pcn [Penicillins] Rash    Percocet [Oxycodone-Acetaminophen] Other (See Comments)     Severe drowsiness     Zithromax [Azithromycin] Rash       [facility]    [facility] Report checked for [place], [place], and [place]:Tylenol #3 7/9/24 #60. Due NOW.

## 2024-08-14 ENCOUNTER — COMMUNITY OUTREACH (OUTPATIENT)
Dept: FAMILY MEDICINE CLINIC | Age: 59
End: 2024-08-14

## 2024-08-15 DIAGNOSIS — M25.50 MULTIPLE JOINT PAIN: ICD-10-CM

## 2024-08-15 DIAGNOSIS — E78.5 HYPERLIPIDEMIA, UNSPECIFIED HYPERLIPIDEMIA TYPE: ICD-10-CM

## 2024-08-15 DIAGNOSIS — E11.9 TYPE 2 DIABETES MELLITUS WITHOUT COMPLICATION, WITHOUT LONG-TERM CURRENT USE OF INSULIN (HCC): Primary | ICD-10-CM

## 2024-08-15 RX ORDER — BACLOFEN 20 MG
TABLET ORAL
Qty: 45 TABLET | Refills: 1 | Status: SHIPPED | OUTPATIENT
Start: 2024-08-15

## 2024-08-15 RX ORDER — PIOGLITAZONEHYDROCHLORIDE 15 MG/1
15 TABLET ORAL DAILY
Qty: 30 TABLET | Refills: 5 | Status: SHIPPED | OUTPATIENT
Start: 2024-08-15

## 2024-08-15 RX ORDER — FUROSEMIDE 20 MG/1
20 TABLET ORAL DAILY
Qty: 30 TABLET | Refills: 5 | Status: SHIPPED | OUTPATIENT
Start: 2024-08-15

## 2024-08-15 NOTE — TELEPHONE ENCOUNTER
Cele called requesting a refill of the below medication which has been pended for you:     Requested Prescriptions     Pending Prescriptions Disp Refills    pioglitazone (ACTOS) 15 MG tablet [Pharmacy Med Name: Pioglitazone HCl 15 MG Oral Tablet] 30 tablet 0     Sig: Take 1 tablet by mouth once daily    furosemide (LASIX) 20 MG tablet [Pharmacy Med Name: Furosemide 20 MG Oral Tablet] 30 tablet 0     Sig: TAKE 1 TABLET BY MOUTH ONCE A DAY.       Last Appointment Date: 7/25/2024  Next Appointment Date: 1/27/2025    Allergies   Allergen Reactions    Amiodarone Shortness Of Breath and Dizziness or Vertigo    Fentanyl Other (See Comments)     Patient had terrible nightmares and hallucinations while hospitalized and sedated while on fentanyl     Iodine Rash and Swelling     Were in ER being treated when it happened spent time in hosp.    Prednisone Other (See Comments)     Trouble swallowing    Shellfish Allergy Anaphylaxis    Adhesive Tape Other (See Comments)     blisters    Cefuroxime Axetil Diarrhea, Hives and Nausea And Vomiting    E-Mycin [Erythromycin] Diarrhea     Rash, hives    Ezetimibe Other (See Comments)     Other reaction(s): Muscle Pain    Gabapentin Other (See Comments)     \"Un functional\"    Morphine Hives     Itching, burning     Nsaids Diarrhea, Hives and Nausea And Vomiting    Statins Other (See Comments)     myalgias    Sulfa Antibiotics Hives    Pcn [Penicillins] Rash    Percocet [Oxycodone-Acetaminophen] Other (See Comments)     Severe drowsiness     Zithromax [Azithromycin] Rash

## 2024-08-15 NOTE — TELEPHONE ENCOUNTER
Cele called requesting a refill of the below medication which has been pended for you:     Requested Prescriptions     Pending Prescriptions Disp Refills    magnesium Oxide 500 MG TABS [Pharmacy Med Name: Magnesium Oxide -Mg Supplement 500 MG Oral Tablet] 15 tablet 0     Sig: TAKE 1/2 A TABLET BY MOUTH ONCE A DAY.       Last Appointment Date: 3/4/2024  Next Appointment Date: 01/27/2025    Allergies   Allergen Reactions    Amiodarone Shortness Of Breath and Dizziness or Vertigo    Fentanyl Other (See Comments)     Patient had terrible nightmares and hallucinations while hospitalized and sedated while on fentanyl     Iodine Rash and Swelling     Were in ER being treated when it happened spent time in hosp.    Prednisone Other (See Comments)     Trouble swallowing    Shellfish Allergy Anaphylaxis    Adhesive Tape Other (See Comments)     blisters    Cefuroxime Axetil Diarrhea, Hives and Nausea And Vomiting    E-Mycin [Erythromycin] Diarrhea     Rash, hives    Ezetimibe Other (See Comments)     Other reaction(s): Muscle Pain    Gabapentin Other (See Comments)     \"Un functional\"    Morphine Hives     Itching, burning     Nsaids Diarrhea, Hives and Nausea And Vomiting    Statins Other (See Comments)     myalgias    Sulfa Antibiotics Hives    Pcn [Penicillins] Rash    Percocet [Oxycodone-Acetaminophen] Other (See Comments)     Severe drowsiness     Zithromax [Azithromycin] Rash

## 2024-08-27 ENCOUNTER — OFFICE VISIT (OUTPATIENT)
Dept: PAIN MANAGEMENT | Age: 59
End: 2024-08-27
Payer: COMMERCIAL

## 2024-08-27 VITALS
OXYGEN SATURATION: 93 % | SYSTOLIC BLOOD PRESSURE: 116 MMHG | WEIGHT: 190 LBS | DIASTOLIC BLOOD PRESSURE: 72 MMHG | HEART RATE: 80 BPM | BODY MASS INDEX: 30.67 KG/M2

## 2024-08-27 DIAGNOSIS — R25.1 TREMORS OF NERVOUS SYSTEM: ICD-10-CM

## 2024-08-27 DIAGNOSIS — M25.561 RECURRENT PAIN OF RIGHT KNEE: Primary | ICD-10-CM

## 2024-08-27 DIAGNOSIS — M10.00 ACUTE IDIOPATHIC GOUT, UNSPECIFIED SITE: ICD-10-CM

## 2024-08-27 DIAGNOSIS — M47.817 LUMBOSACRAL SPONDYLOSIS WITHOUT MYELOPATHY: ICD-10-CM

## 2024-08-27 PROCEDURE — G8427 DOCREV CUR MEDS BY ELIG CLIN: HCPCS | Performed by: PHYSICAL MEDICINE & REHABILITATION

## 2024-08-27 PROCEDURE — G8417 CALC BMI ABV UP PARAM F/U: HCPCS | Performed by: PHYSICAL MEDICINE & REHABILITATION

## 2024-08-27 PROCEDURE — 1036F TOBACCO NON-USER: CPT | Performed by: PHYSICAL MEDICINE & REHABILITATION

## 2024-08-27 PROCEDURE — 3017F COLORECTAL CA SCREEN DOC REV: CPT | Performed by: PHYSICAL MEDICINE & REHABILITATION

## 2024-08-27 PROCEDURE — 99214 OFFICE O/P EST MOD 30 MIN: CPT | Performed by: PHYSICAL MEDICINE & REHABILITATION

## 2024-08-27 PROCEDURE — 3078F DIAST BP <80 MM HG: CPT | Performed by: PHYSICAL MEDICINE & REHABILITATION

## 2024-08-27 PROCEDURE — 3074F SYST BP LT 130 MM HG: CPT | Performed by: PHYSICAL MEDICINE & REHABILITATION

## 2024-08-27 NOTE — PROGRESS NOTES
[facility]  [address] [place]. [address]    Patient Name: Cele Camarena  MRN: [id number]  Encounter Date: 8/27/2024     SUBJECTIVE:  Cele Camarena is a 59 y.o., female being seen today regarding   Chief Complaint   Patient presents with    Back Pain    and routine medication management.  Post  heart surgery  says still has weakness of B feet   Lower back pain      Functionality Assessment & Goals:  On a scale of 0 (Does not Interfere) to 10 (Completely Interferes)  Which number describes how during the past week pain has interfered with the following:   A.  General Activity: 9    B.  Mood:  5   C.  Walking Ability:  9   D.  Normal Work (Includes both work outside the home and housework):  8   E.  Relations with Other People:  9   F.  Sleep:  5    G.  Enjoyment of Life:  7  2.  Patient prefers to Take their Pain Medications:   [x] On a regular basis   [x] Only when necessary   [] Does not take pain medications  3.  What are the Patient's Goals/ Expectations for Visiting Pain Management?   [x] Learn about my pain   [] Physical Therapy   [x] Receive Injections   [] Deal with Anxiety and Stress   [x] Receive Medication   [] Treat Depression    [] Treat Sleep   [] Treat Opioid Dependence/ Addiction    Conservative Therapies:  Patient has tried the following conservative therapies:  [x] NSAIDS  [x] Analgesics  [x] Home Exercises  [x] Physical Therapy  [x] Cognitive Therapy  [x] Other:     Recent Imaging since last appointment related to chief complaint:    Recent Interventional Procedures since last appointment related to chief complaint:    Patient reports a  % improvement of pain and function after procedure that lasted  months.    Specifically, patient reports improvement in these areas noted after the procedure:  []General Activity  []Mood  []Walking Ability  []Climbing Stairs  []Ability to dress and undress  []Transferring & Mobility (standing from sitting position or

## 2024-09-06 DIAGNOSIS — M25.561 RECURRENT PAIN OF RIGHT KNEE: ICD-10-CM

## 2024-09-06 DIAGNOSIS — I25.10 CORONARY ARTERY ARTERIOSCLEROSIS: ICD-10-CM

## 2024-09-06 DIAGNOSIS — M15.9 PRIMARY OSTEOARTHRITIS INVOLVING MULTIPLE JOINTS: ICD-10-CM

## 2024-09-06 RX ORDER — ACETAMINOPHEN AND CODEINE PHOSPHATE 300; 30 MG/1; MG/1
1 TABLET ORAL 2 TIMES DAILY
Qty: 60 TABLET | Refills: 0 | Status: SHIPPED | OUTPATIENT
Start: 2024-09-07 | End: 2024-10-07

## 2024-09-06 NOTE — TELEPHONE ENCOUNTER
Tylenol #3   DS11 5/3/24  [facility]   Last filled 8/8/24  Due 9/7/24  Last Appt:  8/27/2024  Next Appt:   Visit date not found  Med verified in Epic

## 2024-09-11 DIAGNOSIS — J30.9 ALLERGIC RHINITIS, UNSPECIFIED SEASONALITY, UNSPECIFIED TRIGGER: ICD-10-CM

## 2024-09-11 RX ORDER — MONTELUKAST SODIUM 10 MG/1
10 TABLET ORAL NIGHTLY
Qty: 90 TABLET | Refills: 1 | Status: SHIPPED | OUTPATIENT
Start: 2024-09-11

## 2024-09-12 RX ORDER — INSULIN GLARGINE 100 [IU]/ML
30 INJECTION, SOLUTION SUBCUTANEOUS DAILY
Qty: 18 ML | Refills: 5 | Status: SHIPPED | OUTPATIENT
Start: 2024-09-12

## 2024-10-01 DIAGNOSIS — M25.50 MULTIPLE JOINT PAIN: ICD-10-CM

## 2024-10-01 DIAGNOSIS — E11.9 TYPE 2 DIABETES MELLITUS WITHOUT COMPLICATION, WITHOUT LONG-TERM CURRENT USE OF INSULIN (HCC): Primary | ICD-10-CM

## 2024-10-01 DIAGNOSIS — M79.7 FIBROMYALGIA: ICD-10-CM

## 2024-10-01 RX ORDER — PEN NEEDLE, DIABETIC 32 GX5/16"
4 NEEDLE, DISPOSABLE MISCELLANEOUS
Qty: 100 EACH | Refills: 2 | Status: SHIPPED | OUTPATIENT
Start: 2024-10-01

## 2024-10-01 NOTE — TELEPHONE ENCOUNTER
Cele called requesting a refill of the below medication which has been pended for you:     Requested Prescriptions     Pending Prescriptions Disp Refills    Insulin Pen Needle (DROPLET PEN NEEDLES) 32G X 8 MM MISC 100 each 2     Sig: Inject 4 each as directed 5 (five) times a day    tiZANidine (ZANAFLEX) 4 MG tablet 90 tablet 1     Sig: Take 1 tablet by mouth every 8 hours as needed (muscle spasm)       Last Appointment Date: 7/25/2024  Next Appointment Date: 1/27/2025    Allergies   Allergen Reactions    Amiodarone Shortness Of Breath and Dizziness or Vertigo    Fentanyl Other (See Comments)     Patient had terrible nightmares and hallucinations while hospitalized and sedated while on fentanyl     Iodine Rash and Swelling     Were in ER being treated when it happened spent time in hosp.    Prednisone Other (See Comments)     Trouble swallowing    Shellfish Allergy Anaphylaxis    Adhesive Tape Other (See Comments)     blisters    Cefuroxime Axetil Diarrhea, Hives and Nausea And Vomiting    E-Mycin [Erythromycin] Diarrhea     Rash, hives    Ezetimibe Other (See Comments)     Other reaction(s): Muscle Pain    Gabapentin Other (See Comments)     \"Un functional\"    Morphine Hives     Itching, burning     Nsaids Diarrhea, Hives and Nausea And Vomiting    Statins Other (See Comments)     myalgias    Sulfa Antibiotics Hives    Pcn [Penicillins] Rash    Percocet [Oxycodone-Acetaminophen] Other (See Comments)     Severe drowsiness     Zithromax [Azithromycin] Rash

## 2024-10-03 ENCOUNTER — HOSPITAL ENCOUNTER (OUTPATIENT)
Age: 59
Discharge: HOME | End: 2024-10-03
Payer: COMMERCIAL

## 2024-10-03 ENCOUNTER — OFFICE VISIT (OUTPATIENT)
Dept: PAIN MANAGEMENT | Age: 59
End: 2024-10-03
Payer: COMMERCIAL

## 2024-10-03 VITALS
HEART RATE: 80 BPM | SYSTOLIC BLOOD PRESSURE: 126 MMHG | DIASTOLIC BLOOD PRESSURE: 64 MMHG | WEIGHT: 197 LBS | BODY MASS INDEX: 30.92 KG/M2 | OXYGEN SATURATION: 97 % | HEIGHT: 67 IN | RESPIRATION RATE: 17 BRPM

## 2024-10-03 DIAGNOSIS — I25.10 CORONARY ARTERY ARTERIOSCLEROSIS: ICD-10-CM

## 2024-10-03 DIAGNOSIS — M25.561 RECURRENT PAIN OF RIGHT KNEE: Primary | ICD-10-CM

## 2024-10-03 DIAGNOSIS — M15.0 PRIMARY OSTEOARTHRITIS INVOLVING MULTIPLE JOINTS: ICD-10-CM

## 2024-10-03 DIAGNOSIS — N18.32 STAGE 3B CHRONIC KIDNEY DISEASE (HCC): ICD-10-CM

## 2024-10-03 DIAGNOSIS — M25.561 RECURRENT PAIN OF RIGHT KNEE: ICD-10-CM

## 2024-10-03 DIAGNOSIS — M47.817 LUMBOSACRAL SPONDYLOSIS WITHOUT MYELOPATHY: ICD-10-CM

## 2024-10-03 LAB
25(OH)D3 SERPL-MCNC: 16.4 NG/ML (ref 30–100)
ANION GAP SERPL CALCULATED.3IONS-SCNC: 12 MMOL/L (ref 9–17)
BASOPHILS # BLD: 0.08 K/UL (ref 0–0.2)
BASOPHILS NFR BLD: 1 % (ref 0–2)
BUN SERPL-MCNC: 30 MG/DL (ref 6–20)
BUN/CREAT SERPL: 21 (ref 9–20)
CALCIUM SERPL-MCNC: 9.4 MG/DL (ref 8.6–10.4)
CHLORIDE SERPL-SCNC: 101 MMOL/L (ref 98–107)
CO2 SERPL-SCNC: 28 MMOL/L (ref 20–31)
CREAT SERPL-MCNC: 1.4 MG/DL (ref 0.5–0.9)
CREAT UR-MCNC: 66.6 MG/DL (ref 28–217)
EOSINOPHIL # BLD: 0.35 K/UL (ref 0–0.44)
EOSINOPHILS RELATIVE PERCENT: 4 % (ref 1–4)
ERYTHROCYTE [DISTWIDTH] IN BLOOD BY AUTOMATED COUNT: 15.5 % (ref 11.8–14.4)
GFR, ESTIMATED: 44 ML/MIN/1.73M2
GLUCOSE SERPL-MCNC: 152 MG/DL (ref 70–99)
HCT VFR BLD AUTO: 42.6 % (ref 36.3–47.1)
HGB BLD-MCNC: 14.2 G/DL (ref 11.9–15.1)
IMM GRANULOCYTES # BLD AUTO: <0.03 K/UL (ref 0–0.3)
IMM GRANULOCYTES NFR BLD: 0 %
LYMPHOCYTES NFR BLD: 2.66 K/UL (ref 1.1–3.7)
LYMPHOCYTES RELATIVE PERCENT: 27 % (ref 24–43)
MAGNESIUM SERPL-MCNC: 1.9 MG/DL (ref 1.6–2.6)
MCH RBC QN AUTO: 28.5 PG (ref 25.2–33.5)
MCHC RBC AUTO-ENTMCNC: 33.3 G/DL (ref 25.2–33.5)
MCV RBC AUTO: 85.4 FL (ref 82.6–102.9)
MONOCYTES NFR BLD: 0.48 K/UL (ref 0.1–1.2)
MONOCYTES NFR BLD: 5 % (ref 3–12)
NEUTROPHILS NFR BLD: 63 % (ref 36–65)
NEUTS SEG NFR BLD: 6.17 K/UL (ref 1.5–8.1)
NRBC BLD-RTO: 0 PER 100 WBC
PLATELET # BLD AUTO: 243 K/UL (ref 138–453)
PMV BLD AUTO: 9.5 FL (ref 8.1–13.5)
POTASSIUM SERPL-SCNC: 4.2 MMOL/L (ref 3.7–5.3)
RBC # BLD AUTO: 4.99 M/UL (ref 3.95–5.11)
RBC # BLD: ABNORMAL 10*6/UL
SODIUM SERPL-SCNC: 141 MMOL/L (ref 135–144)
TOTAL PROTEIN, URINE: 7 MG/DL
URINE TOTAL PROTEIN CREATININE RATIO: 0.11 (ref 0–0.2)
WBC OTHER # BLD: 9.8 K/UL (ref 3.5–11.3)

## 2024-10-03 PROCEDURE — G8417 CALC BMI ABV UP PARAM F/U: HCPCS | Performed by: PHYSICAL MEDICINE & REHABILITATION

## 2024-10-03 PROCEDURE — 3074F SYST BP LT 130 MM HG: CPT | Performed by: PHYSICAL MEDICINE & REHABILITATION

## 2024-10-03 PROCEDURE — 3078F DIAST BP <80 MM HG: CPT | Performed by: PHYSICAL MEDICINE & REHABILITATION

## 2024-10-03 PROCEDURE — 99214 OFFICE O/P EST MOD 30 MIN: CPT | Performed by: PHYSICAL MEDICINE & REHABILITATION

## 2024-10-03 PROCEDURE — 85025 COMPLETE CBC W/AUTO DIFF WBC: CPT

## 2024-10-03 PROCEDURE — 82570 ASSAY OF URINE CREATININE: CPT

## 2024-10-03 PROCEDURE — 36415 COLL VENOUS BLD VENIPUNCTURE: CPT

## 2024-10-03 PROCEDURE — G8427 DOCREV CUR MEDS BY ELIG CLIN: HCPCS | Performed by: PHYSICAL MEDICINE & REHABILITATION

## 2024-10-03 PROCEDURE — 3017F COLORECTAL CA SCREEN DOC REV: CPT | Performed by: PHYSICAL MEDICINE & REHABILITATION

## 2024-10-03 PROCEDURE — 1036F TOBACCO NON-USER: CPT | Performed by: PHYSICAL MEDICINE & REHABILITATION

## 2024-10-03 PROCEDURE — 84156 ASSAY OF PROTEIN URINE: CPT

## 2024-10-03 PROCEDURE — 80048 BASIC METABOLIC PNL TOTAL CA: CPT

## 2024-10-03 PROCEDURE — 83735 ASSAY OF MAGNESIUM: CPT

## 2024-10-03 PROCEDURE — 82306 VITAMIN D 25 HYDROXY: CPT

## 2024-10-03 PROCEDURE — G8484 FLU IMMUNIZE NO ADMIN: HCPCS | Performed by: PHYSICAL MEDICINE & REHABILITATION

## 2024-10-03 RX ORDER — DICYCLOMINE HCL 20 MG
20 TABLET ORAL EVERY 6 HOURS
Qty: 120 TABLET | Refills: 0 | Status: SHIPPED | OUTPATIENT
Start: 2024-10-03 | End: 2024-10-03 | Stop reason: ALTCHOICE

## 2024-10-03 RX ORDER — ACETAMINOPHEN AND CODEINE PHOSPHATE 300; 30 MG/1; MG/1
1 TABLET ORAL 2 TIMES DAILY
Qty: 60 TABLET | Refills: 2 | Status: SHIPPED | OUTPATIENT
Start: 2024-10-03 | End: 2025-01-01

## 2024-10-03 RX ORDER — DICYCLOMINE HCL 20 MG
20 TABLET ORAL EVERY 6 HOURS
Qty: 120 TABLET | Refills: 0 | Status: SHIPPED | OUTPATIENT
Start: 2024-10-03 | End: 2024-10-04 | Stop reason: CLARIF

## 2024-10-03 RX ORDER — ACETAMINOPHEN AND CODEINE PHOSPHATE 300; 30 MG/1; MG/1
1 TABLET ORAL 2 TIMES DAILY
Qty: 60 TABLET | Refills: 0 | Status: SHIPPED | OUTPATIENT
Start: 2024-10-03 | End: 2024-10-03 | Stop reason: ALTCHOICE

## 2024-10-03 ASSESSMENT — ENCOUNTER SYMPTOMS
CONSTIPATION: 0
DIARRHEA: 0
BACK PAIN: 1
EYES NEGATIVE: 1
RESPIRATORY NEGATIVE: 1
ALLERGIC/IMMUNOLOGIC NEGATIVE: 1

## 2024-10-03 NOTE — PROGRESS NOTES
[facility]  [address] [place]. [address]    Patient Name: Cele Camarena  MRN: [id number]  Encounter Date: 10/3/2024     SUBJECTIVE:  Cele Camarena is a 59 y.o., female being seen today regarding   Chief Complaint   Patient presents with    Back Pain    Knee Pain     right    and routine medication management.  R knee pain and  limping  Affects lower back B  patient states   Functionality Assessment & Goals:  On a scale of 0 (Does not Interfere) to 10 (Completely Interferes)  Which number describes how during the past week pain has interfered with the following:   A.  General Activity: 10    B.  Mood:  10   C.  Walking Ability:  10   D.  Normal Work (Includes both work outside the home and housework):  10   E.  Relations with Other People:  5   F.  Sleep:  10    G.  Enjoyment of Life:  10  2.  Patient prefers to Take their Pain Medications:   [x] On a regular basis   [] Only when necessary   [] Does not take pain medications  3.  What are the Patient's Goals/ Expectations for Visiting Pain Management?   [] Learn about my pain   [] Physical Therapy   [] Receive Injections   [] Deal with Anxiety and Stress   [x] Receive Medication   [] Treat Depression    [] Treat Sleep   [] Treat Opioid Dependence/ Addiction    Most recent Oswestry Disability Questionnaire completed by patient on 7/1/24     Current Pain Assessment:         10/3/2024     1:12 PM   AMB PAIN ASSESSMENT   Location of Pain Back   Location Modifiers Posterior;Medial;Lateral;Right   Severity of Pain 8   Quality of Pain Throbbing;Sharp;Dull;Aching   Duration of Pain Persistent   Frequency of Pain Constant   Aggravating Factors Bending;Stretching;Straightening;Exercise;Kneeling;Squatting;Standing;Walking;Stairs   Limiting Behavior Yes   Relieving Factors --   Result of Injury No   Work-Related Injury No   Are there other pain locations you wish to document? No        Current Medications & Allergies:   Current

## 2024-10-07 DIAGNOSIS — M17.11 PRIMARY OSTEOARTHRITIS OF RIGHT KNEE: Primary | ICD-10-CM

## 2024-10-08 ENCOUNTER — OFFICE VISIT (OUTPATIENT)
Dept: NEPHROLOGY | Age: 59
End: 2024-10-08
Payer: COMMERCIAL

## 2024-10-08 VITALS
SYSTOLIC BLOOD PRESSURE: 118 MMHG | DIASTOLIC BLOOD PRESSURE: 70 MMHG | BODY MASS INDEX: 31.23 KG/M2 | WEIGHT: 199 LBS | HEIGHT: 67 IN | HEART RATE: 72 BPM

## 2024-10-08 DIAGNOSIS — E08.21 DIABETIC NEPHROPATHY ASSOCIATED WITH DIABETES MELLITUS DUE TO UNDERLYING CONDITION (HCC): ICD-10-CM

## 2024-10-08 DIAGNOSIS — N18.32 STAGE 3B CHRONIC KIDNEY DISEASE (HCC): Primary | ICD-10-CM

## 2024-10-08 PROCEDURE — 3017F COLORECTAL CA SCREEN DOC REV: CPT | Performed by: INTERNAL MEDICINE

## 2024-10-08 PROCEDURE — 3078F DIAST BP <80 MM HG: CPT | Performed by: INTERNAL MEDICINE

## 2024-10-08 PROCEDURE — G8484 FLU IMMUNIZE NO ADMIN: HCPCS | Performed by: INTERNAL MEDICINE

## 2024-10-08 PROCEDURE — G8417 CALC BMI ABV UP PARAM F/U: HCPCS | Performed by: INTERNAL MEDICINE

## 2024-10-08 PROCEDURE — 3074F SYST BP LT 130 MM HG: CPT | Performed by: INTERNAL MEDICINE

## 2024-10-08 PROCEDURE — G8427 DOCREV CUR MEDS BY ELIG CLIN: HCPCS | Performed by: INTERNAL MEDICINE

## 2024-10-08 PROCEDURE — 99214 OFFICE O/P EST MOD 30 MIN: CPT | Performed by: INTERNAL MEDICINE

## 2024-10-08 PROCEDURE — 1036F TOBACCO NON-USER: CPT | Performed by: INTERNAL MEDICINE

## 2024-10-08 RX ORDER — ERGOCALCIFEROL 1.25 MG/1
50000 CAPSULE, LIQUID FILLED ORAL WEEKLY
Qty: 12 CAPSULE | Refills: 1 | Status: SHIPPED | OUTPATIENT
Start: 2024-10-08

## 2024-10-08 NOTE — PROGRESS NOTES
every mistake has been identified and corrected by editing    Electronically signed by Wilfredo Gann MD on 10/8/2024 at 3:23 PM  [facility]

## 2024-10-15 ENCOUNTER — HOSPITAL ENCOUNTER (OUTPATIENT)
Dept: GENERAL RADIOLOGY | Age: 59
Discharge: HOME | End: 2024-10-17
Attending: ORTHOPAEDIC SURGERY
Payer: COMMERCIAL

## 2024-10-15 ENCOUNTER — OFFICE VISIT (OUTPATIENT)
Dept: ORTHOPEDIC SURGERY | Age: 59
End: 2024-10-15
Attending: ORTHOPAEDIC SURGERY
Payer: COMMERCIAL

## 2024-10-15 VITALS
DIASTOLIC BLOOD PRESSURE: 84 MMHG | HEART RATE: 78 BPM | BODY MASS INDEX: 30.92 KG/M2 | WEIGHT: 197 LBS | SYSTOLIC BLOOD PRESSURE: 132 MMHG | HEIGHT: 67 IN

## 2024-10-15 DIAGNOSIS — T84.84XA PAINFUL ORTHOPAEDIC HARDWARE (HCC): ICD-10-CM

## 2024-10-15 DIAGNOSIS — M17.11 PRIMARY OSTEOARTHRITIS OF RIGHT KNEE: Primary | ICD-10-CM

## 2024-10-15 DIAGNOSIS — M17.11 PRIMARY OSTEOARTHRITIS OF RIGHT KNEE: ICD-10-CM

## 2024-10-15 PROCEDURE — 3017F COLORECTAL CA SCREEN DOC REV: CPT | Performed by: PHYSICIAN ASSISTANT

## 2024-10-15 PROCEDURE — 1036F TOBACCO NON-USER: CPT | Performed by: PHYSICIAN ASSISTANT

## 2024-10-15 PROCEDURE — 3075F SYST BP GE 130 - 139MM HG: CPT | Performed by: PHYSICIAN ASSISTANT

## 2024-10-15 PROCEDURE — 3079F DIAST BP 80-89 MM HG: CPT | Performed by: PHYSICIAN ASSISTANT

## 2024-10-15 PROCEDURE — G8427 DOCREV CUR MEDS BY ELIG CLIN: HCPCS | Performed by: PHYSICIAN ASSISTANT

## 2024-10-15 PROCEDURE — 73562 X-RAY EXAM OF KNEE 3: CPT

## 2024-10-15 PROCEDURE — G8484 FLU IMMUNIZE NO ADMIN: HCPCS | Performed by: PHYSICIAN ASSISTANT

## 2024-10-15 PROCEDURE — 99213 OFFICE O/P EST LOW 20 MIN: CPT | Performed by: PHYSICIAN ASSISTANT

## 2024-10-15 PROCEDURE — G8417 CALC BMI ABV UP PARAM F/U: HCPCS | Performed by: PHYSICIAN ASSISTANT

## 2024-10-15 NOTE — PROGRESS NOTES
(HCC)              PLAN:  Proceed with cardiac clearance and follow-up with dentist prior to undergoing surgical intervention.  When she has received final clearance we will proceed with getting her set up for conversion to total knee arthroplasty    No orders of the defined types were placed in this encounter.       Procedure:        Electronically signed by Attila Zaidi PA-C on 10/15/2024 at 9:14 AM

## 2024-11-04 ENCOUNTER — OFFICE VISIT (OUTPATIENT)
Dept: PAIN MANAGEMENT | Age: 59
End: 2024-11-04
Payer: COMMERCIAL

## 2024-11-04 ENCOUNTER — HOSPITAL ENCOUNTER (OUTPATIENT)
Age: 59
Setting detail: SPECIMEN
Discharge: HOME | End: 2024-11-04
Payer: COMMERCIAL

## 2024-11-04 VITALS
HEIGHT: 67 IN | BODY MASS INDEX: 31.23 KG/M2 | WEIGHT: 199 LBS | HEART RATE: 72 BPM | DIASTOLIC BLOOD PRESSURE: 80 MMHG | RESPIRATION RATE: 18 BRPM | OXYGEN SATURATION: 97 % | SYSTOLIC BLOOD PRESSURE: 138 MMHG

## 2024-11-04 DIAGNOSIS — G89.29 CHRONIC PAIN OF RIGHT KNEE: ICD-10-CM

## 2024-11-04 DIAGNOSIS — M25.561 CHRONIC PAIN OF RIGHT KNEE: ICD-10-CM

## 2024-11-04 DIAGNOSIS — Z79.891 ENCOUNTER FOR LONG-TERM OPIATE ANALGESIC USE: ICD-10-CM

## 2024-11-04 DIAGNOSIS — Z02.83 ENCOUNTER FOR DRUG SCREENING: ICD-10-CM

## 2024-11-04 DIAGNOSIS — Z02.83 ENCOUNTER FOR DRUG SCREENING: Primary | ICD-10-CM

## 2024-11-04 PROCEDURE — G0481 DRUG TEST DEF 8-14 CLASSES: HCPCS

## 2024-11-04 PROCEDURE — 1036F TOBACCO NON-USER: CPT | Performed by: PHYSICAL MEDICINE & REHABILITATION

## 2024-11-04 PROCEDURE — G8484 FLU IMMUNIZE NO ADMIN: HCPCS | Performed by: PHYSICAL MEDICINE & REHABILITATION

## 2024-11-04 PROCEDURE — 3017F COLORECTAL CA SCREEN DOC REV: CPT | Performed by: PHYSICAL MEDICINE & REHABILITATION

## 2024-11-04 PROCEDURE — 99214 OFFICE O/P EST MOD 30 MIN: CPT | Performed by: PHYSICAL MEDICINE & REHABILITATION

## 2024-11-04 PROCEDURE — 3079F DIAST BP 80-89 MM HG: CPT | Performed by: PHYSICAL MEDICINE & REHABILITATION

## 2024-11-04 PROCEDURE — G8427 DOCREV CUR MEDS BY ELIG CLIN: HCPCS | Performed by: PHYSICAL MEDICINE & REHABILITATION

## 2024-11-04 PROCEDURE — 80307 DRUG TEST PRSMV CHEM ANLYZR: CPT

## 2024-11-04 PROCEDURE — G8417 CALC BMI ABV UP PARAM F/U: HCPCS | Performed by: PHYSICAL MEDICINE & REHABILITATION

## 2024-11-04 PROCEDURE — 3075F SYST BP GE 130 - 139MM HG: CPT | Performed by: PHYSICAL MEDICINE & REHABILITATION

## 2024-11-04 ASSESSMENT — ENCOUNTER SYMPTOMS
EYES NEGATIVE: 1
RESPIRATORY NEGATIVE: 1
ALLERGIC/IMMUNOLOGIC NEGATIVE: 1
CONSTIPATION: 0
DIARRHEA: 0
BACK PAIN: 1

## 2024-11-04 NOTE — PROGRESS NOTES
Allergies   Allergen Reactions   • Amiodarone Shortness Of Breath and Dizziness or Vertigo   • Fentanyl Other (See Comments)     Patient had terrible nightmares and hallucinations while hospitalized and sedated while on fentanyl    • Iodine Rash and Swelling     Were in ER being treated when it happened spent time in hosp.   • Prednisone Other (See Comments)     Trouble swallowing   • Shellfish Allergy Anaphylaxis   • Adhesive Tape Other (See Comments)     blisters   • Cefuroxime Axetil Diarrhea, Hives and Nausea And Vomiting   • E-Mycin [Erythromycin] Diarrhea     Rash, hives   • Ezetimibe Other (See Comments)     Other reaction(s): Muscle Pain   • Gabapentin Other (See Comments)     \"Un functional\"   • Morphine Hives     Itching, burning    • Nsaids Diarrhea, Hives and Nausea And Vomiting   • Statins Other (See Comments)     myalgias   • Sulfa Antibiotics Hives   • Pcn [Penicillins] Rash   • Percocet [Oxycodone-Acetaminophen] Other (See Comments)     Severe drowsiness    • Zithromax [Azithromycin] Rash       Review of Systems:   Review of Systems   Constitutional:  Positive for fatigue.   HENT: Negative.     Eyes: Negative.    Respiratory: Negative.     Cardiovascular: Negative.    Gastrointestinal:  Negative for constipation and diarrhea.   Endocrine: Negative.    Genitourinary:  Negative for difficulty urinating and flank pain.   Musculoskeletal:  Positive for back pain, gait problem and myalgias.        Right knee pain   Skin: Negative.    Allergic/Immunologic: Negative.    Neurological:  Positive for weakness and numbness.   Hematological: Negative.    Psychiatric/Behavioral:  Positive for agitation, decreased concentration and sleep disturbance. The patient is nervous/anxious.         OBJECTIVE:  Vitals:    11/04/24 1508   BP: 138/80   Pulse: 72   Resp: 18   SpO2: 97%       PHYSICAL EXAM  Physical Exam  Constitutional:       General: She is not in acute distress.     Appearance: Normal appearance. She

## 2024-11-07 LAB
6-ACETYLMORPHINE, UR: NOT DETECTED
7-AMINOCLONAZEPAM, URINE: NOT DETECTED
ALPHA-OH-ALPRAZ, URINE: NOT DETECTED
ALPHA-OH-MIDAZOLAM, URINE: NOT DETECTED
ALPRAZOLAM, URINE: NOT DETECTED
AMPHETAMINE, URINE: NOT DETECTED
BARBITURATES, URINE: NEGATIVE
BENZOYLECGONINE, UR: NEGATIVE
BUPRENORPHINE URINE: NOT DETECTED
CARISOPRODOL, UR: NEGATIVE
CLONAZEPAM, URINE: NOT DETECTED
CODEINE, URINE: PRESENT
CREAT UR-MCNC: 86.9 MG/DL (ref 20–400)
DIAZEPAM, URINE: NOT DETECTED
EER PAIN MGT DRUG PANEL, HIGH RES/EMIT U: NORMAL
ETHYL GLUCURONIDE UR: NEGATIVE
FENTANYL URINE: NOT DETECTED
GABAPENTIN: NOT DETECTED
HYDROCODONE, URINE: PRESENT
HYDROMORPHONE, URINE: NOT DETECTED
LORAZEPAM, URINE: NOT DETECTED
MARIJUANA METAB, UR: NEGATIVE
MDA, URINE: NOT DETECTED
MDEA, EVE, UR: NOT DETECTED
MDMA, URINE: NOT DETECTED
MEPERIDINE METAB, UR: NOT DETECTED
METHADONE, URINE: NEGATIVE
METHAMPHETAMINE, URINE: NOT DETECTED
METHYLPHENIDATE: NOT DETECTED
MIDAZOLAM, URINE: NOT DETECTED
MORPHINE, OPI1M: PRESENT
NALOXONE URINE: NOT DETECTED
NORBUPRENORPHINE, URINE: NOT DETECTED
NORDIAZEPAM, URINE: NOT DETECTED
NORFENTANYL, URINE: NOT DETECTED
NORHYDROCODONE, URINE: NOT DETECTED
NOROXYCODONE, URINE: NOT DETECTED
NOROXYMORPHONE, URINE: NOT DETECTED
OXAZEPAM, URINE: NOT DETECTED
OXYCODONE URINE: NOT DETECTED
OXYMORPHONE, URINE: NOT DETECTED
PAIN MGT DRUG PANEL, HI RES, UR: NORMAL
PCP,URINE: NEGATIVE
PHENTERMINE, UR: NOT DETECTED
PREGABALIN: NOT DETECTED
TAPENTADOL, URINE: NOT DETECTED
TAPENTADOL-O-SULFATE, URINE: NOT DETECTED
TEMAZEPAM, URINE: NOT DETECTED
TRAMADOL, URINE: NEGATIVE
ZOLPIDEM METABOLITE (ZCA), URINE: NOT DETECTED
ZOLPIDEM, URINE: NOT DETECTED

## 2024-12-06 ENCOUNTER — OFFICE VISIT (OUTPATIENT)
Dept: FAMILY MEDICINE CLINIC | Age: 59
End: 2024-12-06
Payer: COMMERCIAL

## 2024-12-06 ENCOUNTER — TELEPHONE (OUTPATIENT)
Dept: FAMILY MEDICINE CLINIC | Age: 59
End: 2024-12-06

## 2024-12-06 VITALS
BODY MASS INDEX: 31.79 KG/M2 | TEMPERATURE: 97.7 F | WEIGHT: 203 LBS | SYSTOLIC BLOOD PRESSURE: 110 MMHG | DIASTOLIC BLOOD PRESSURE: 62 MMHG | HEART RATE: 50 BPM

## 2024-12-06 DIAGNOSIS — J01.40 ACUTE NON-RECURRENT PANSINUSITIS: ICD-10-CM

## 2024-12-06 PROCEDURE — 99213 OFFICE O/P EST LOW 20 MIN: CPT | Performed by: NURSE PRACTITIONER

## 2024-12-06 PROCEDURE — G8427 DOCREV CUR MEDS BY ELIG CLIN: HCPCS | Performed by: NURSE PRACTITIONER

## 2024-12-06 PROCEDURE — G8417 CALC BMI ABV UP PARAM F/U: HCPCS | Performed by: NURSE PRACTITIONER

## 2024-12-06 PROCEDURE — 3078F DIAST BP <80 MM HG: CPT | Performed by: NURSE PRACTITIONER

## 2024-12-06 PROCEDURE — 3017F COLORECTAL CA SCREEN DOC REV: CPT | Performed by: NURSE PRACTITIONER

## 2024-12-06 PROCEDURE — 1036F TOBACCO NON-USER: CPT | Performed by: NURSE PRACTITIONER

## 2024-12-06 PROCEDURE — G8484 FLU IMMUNIZE NO ADMIN: HCPCS | Performed by: NURSE PRACTITIONER

## 2024-12-06 PROCEDURE — 3074F SYST BP LT 130 MM HG: CPT | Performed by: NURSE PRACTITIONER

## 2024-12-06 RX ORDER — CLARITHROMYCIN 500 MG/1
500 TABLET ORAL 2 TIMES DAILY
Qty: 20 TABLET | Refills: 0 | Status: SHIPPED | OUTPATIENT
Start: 2024-12-06 | End: 2024-12-16

## 2024-12-06 RX ORDER — BENZONATATE 100 MG/1
100 CAPSULE ORAL 3 TIMES DAILY PRN
Qty: 30 CAPSULE | Refills: 0 | Status: SHIPPED | OUTPATIENT
Start: 2024-12-06 | End: 2024-12-16

## 2024-12-06 ASSESSMENT — ENCOUNTER SYMPTOMS
SINUS PRESSURE: 1
COUGH: 1
SORE THROAT: 1
WHEEZING: 1
SHORTNESS OF BREATH: 1
RHINORRHEA: 0

## 2024-12-06 NOTE — PROGRESS NOTES
ensure the accuracy of this automated transcription, some errors in transcription may have occurred.     Electronically signed by AQUILES Del Angel CNP on 12/6/2024 at 12:57 PM

## 2024-12-06 NOTE — TELEPHONE ENCOUNTER
Pharmacy calling stating the clarithromycin that was sent it, pt is allergic to. Can we send something else. Please advise.

## 2024-12-09 ENCOUNTER — OFFICE VISIT (OUTPATIENT)
Dept: CARDIOLOGY | Age: 59
End: 2024-12-09
Payer: COMMERCIAL

## 2024-12-09 VITALS
SYSTOLIC BLOOD PRESSURE: 120 MMHG | WEIGHT: 203 LBS | DIASTOLIC BLOOD PRESSURE: 78 MMHG | HEIGHT: 67 IN | HEART RATE: 61 BPM | BODY MASS INDEX: 31.86 KG/M2

## 2024-12-09 DIAGNOSIS — Z95.1 S/P CABG (CORONARY ARTERY BYPASS GRAFT): ICD-10-CM

## 2024-12-09 DIAGNOSIS — R06.02 SHORTNESS OF BREATH: ICD-10-CM

## 2024-12-09 DIAGNOSIS — I25.119 CORONARY ARTERY DISEASE INVOLVING NATIVE CORONARY ARTERY OF NATIVE HEART WITH ANGINA PECTORIS (HCC): Primary | ICD-10-CM

## 2024-12-09 PROCEDURE — 93000 ELECTROCARDIOGRAM COMPLETE: CPT | Performed by: SURGERY

## 2024-12-09 PROCEDURE — 3017F COLORECTAL CA SCREEN DOC REV: CPT | Performed by: SURGERY

## 2024-12-09 PROCEDURE — G8417 CALC BMI ABV UP PARAM F/U: HCPCS | Performed by: SURGERY

## 2024-12-09 PROCEDURE — 3074F SYST BP LT 130 MM HG: CPT | Performed by: SURGERY

## 2024-12-09 PROCEDURE — 1036F TOBACCO NON-USER: CPT | Performed by: SURGERY

## 2024-12-09 PROCEDURE — 99214 OFFICE O/P EST MOD 30 MIN: CPT | Performed by: SURGERY

## 2024-12-09 PROCEDURE — G8427 DOCREV CUR MEDS BY ELIG CLIN: HCPCS | Performed by: SURGERY

## 2024-12-09 PROCEDURE — G8484 FLU IMMUNIZE NO ADMIN: HCPCS | Performed by: SURGERY

## 2024-12-09 PROCEDURE — 3078F DIAST BP <80 MM HG: CPT | Performed by: SURGERY

## 2024-12-09 RX ORDER — MAGNESIUM GLUCONATE 27 MG(500)
500 TABLET ORAL DAILY
COMMUNITY

## 2024-12-09 NOTE — PROGRESS NOTES
Cardiology Consultation  [facility]      24      CC & HPI:  Cele Camarena  is here for follow-up. denies  any chest pain or discomfort, does complain of shortness of breath with activity but nothing unusual , states is having surgery to her knee  next few month   Patient denies any problem with the blood pressure at home     Past Medical History:   Diagnosis Date    Allergic rhinitis     Arthritis     CAD (coronary artery disease)     multivessel. Dr. [facility] last visit 10/2023    CKD (chronic kidney disease), stage III (HCC)     Dr. Gann [facility] last appt 10/10/2023    Depression     Fibromyalgia     Gout     Dr. Cyndie Cabrales [facility]    History of fractured vertebra 2019    L2 L3 L4    Hyperlipidemia     Neuropathy     OA (osteoarthritis)     Peripheral vascular disease (HCC)     Sacroiliac joint dysfunction     Tremor     Type II or unspecified type diabetes mellitus without mention of complication, not stated as uncontrolled     PCP    Under care of team     Dr. Kings Lugo last appt 2023    Wellness examination     Leena Murrieta CNP PCP last appt 10/2023       Past Surgical History:   Procedure Laterality Date    BACK SURGERY      CARDIAC PROCEDURE N/A 2023    paxton / Left heart cath / coronary angiography performed by Elena Morris MD at [facility] CARDIAC CATH LAB     SECTION      CHOLECYSTECTOMY      CORONARY ARTERY BYPASS GRAFT N/A 12/15/2023    CABG CORONARY ARTERY BYPASS X3, ON PUMP SWAN SHANTHI, LESLIE, RADIAL HARVEST performed by Deshawn Carreon MD at [facility] CVOR    JOINT REPLACEMENT Right     partial knee replacement    KNEE SURGERY      right    OTHER SURGICAL HISTORY      darrach procedure    OTHER SURGICAL HISTORY      darrach procedure revision       Family History   Problem Relation Age of Onset    Heart Attack Mother     High Cholesterol Mother     Diabetes Mother     Hypertension Mother     Allergies

## 2024-12-16 ENCOUNTER — HOSPITAL ENCOUNTER (OUTPATIENT)
Age: 59
Discharge: HOME | End: 2024-12-18
Payer: COMMERCIAL

## 2024-12-16 ENCOUNTER — TELEPHONE (OUTPATIENT)
Dept: CARDIOLOGY | Age: 59
End: 2024-12-16

## 2024-12-16 VITALS
SYSTOLIC BLOOD PRESSURE: 140 MMHG | WEIGHT: 200 LBS | HEIGHT: 67 IN | DIASTOLIC BLOOD PRESSURE: 70 MMHG | BODY MASS INDEX: 31.39 KG/M2 | HEART RATE: 92 BPM

## 2024-12-16 DIAGNOSIS — I25.119 CORONARY ARTERY DISEASE INVOLVING NATIVE CORONARY ARTERY OF NATIVE HEART WITH ANGINA PECTORIS (HCC): ICD-10-CM

## 2024-12-16 DIAGNOSIS — R06.02 SHORTNESS OF BREATH: Primary | ICD-10-CM

## 2024-12-16 DIAGNOSIS — R06.02 SHORTNESS OF BREATH: ICD-10-CM

## 2024-12-16 LAB
ECHO AO ASC DIAM: 3.7 CM
ECHO AO ASCENDING AORTA INDEX: 1.83 CM/M2
ECHO AO ROOT DIAM: 3.2 CM
ECHO AO ROOT INDEX: 1.58 CM/M2
ECHO AV AREA PEAK VELOCITY: 1.3 CM2
ECHO AV AREA/BSA PEAK VELOCITY: 0.6 CM2/M2
ECHO AV PEAK GRADIENT: 9 MMHG
ECHO AV PEAK VELOCITY: 1.5 M/S
ECHO AV VELOCITY RATIO: 0.47
ECHO BSA: 2.07 M2
ECHO EST RA PRESSURE: 3 MMHG
ECHO LA AREA 4C: 13.3 CM2
ECHO LA DIAMETER INDEX: 1.78 CM/M2
ECHO LA DIAMETER: 3.6 CM
ECHO LA MAJOR AXIS: 4.6 CM
ECHO LA TO AORTIC ROOT RATIO: 1.13
ECHO LA VOL MOD A4C: 30 ML (ref 22–52)
ECHO LA VOLUME INDEX MOD A4C: 15 ML/M2 (ref 16–34)
ECHO LV E' LATERAL VELOCITY: 7.18 CM/S
ECHO LV E' SEPTAL VELOCITY: 5.11 CM/S
ECHO LV EF PHYSICIAN: 35 %
ECHO LV FRACTIONAL SHORTENING: 24 % (ref 28–44)
ECHO LV INTERNAL DIMENSION DIASTOLE INDEX: 2.52 CM/M2
ECHO LV INTERNAL DIMENSION DIASTOLIC: 5.1 CM (ref 3.9–5.3)
ECHO LV INTERNAL DIMENSION SYSTOLIC INDEX: 1.93 CM/M2
ECHO LV INTERNAL DIMENSION SYSTOLIC: 3.9 CM
ECHO LV ISOVOLUMETRIC RELAXATION TIME (IVRT): 102 MS
ECHO LV IVSD: 1 CM (ref 0.6–0.9)
ECHO LV MASS 2D: 200.8 G (ref 67–162)
ECHO LV MASS INDEX 2D: 99.4 G/M2 (ref 43–95)
ECHO LV POSTERIOR WALL DIASTOLIC: 1.1 CM (ref 0.6–0.9)
ECHO LV RELATIVE WALL THICKNESS RATIO: 0.43
ECHO LVOT AREA: 3.1 CM2
ECHO LVOT DIAM: 2 CM
ECHO LVOT PEAK GRADIENT: 2 MMHG
ECHO LVOT PEAK VELOCITY: 0.7 M/S
ECHO MV A VELOCITY: 0.92 M/S
ECHO MV E DECELERATION TIME (DT): 250 MS
ECHO MV E VELOCITY: 0.53 M/S
ECHO MV E/A RATIO: 0.58
ECHO MV E/E' LATERAL: 7.38
ECHO MV E/E' RATIO (AVERAGED): 8.88
ECHO MV E/E' SEPTAL: 10.37
ECHO MV MAX VELOCITY: 1 M/S
ECHO MV PEAK GRADIENT: 4 MMHG
ECHO PV MAX VELOCITY: 1 M/S
ECHO PV PEAK GRADIENT: 4 MMHG
ECHO TV PEAK GRADIENT: 1 MMHG

## 2024-12-16 PROCEDURE — 93306 TTE W/DOPPLER COMPLETE: CPT

## 2024-12-16 PROCEDURE — 93306 TTE W/DOPPLER COMPLETE: CPT | Performed by: INTERNAL MEDICINE

## 2024-12-16 NOTE — TELEPHONE ENCOUNTER
Miguelito Solis, APRN - NP  3 minutes ago (4:19 PM)       Recommend stress test if agreeable     LM for pt to call.

## 2024-12-16 NOTE — TELEPHONE ENCOUNTER
Interpretation Summary      Left Ventricle: Moderately reduced left ventricular systolic function with a visually estimated EF of 35 - 40%. Left ventricle size is normal. Posterior thickening. Moderate global hypokinesis present. Abnormal diastolic function.    Aortic Valve: Sclerosis of the aortic valve cusps.    Aorta: Normal sized aortic root. Borderline dilated ascending aorta. Ao ascending diameter is 3.7 cm.    Pericardium: There is evidence of epicardial fat. No pericardial effusion.    Image quality is suboptimal.     Echo Findings    Left Ventricle Moderately reduced left ventricular systolic function with a visually estimated EF of 35 - 40%. Left ventricle size is normal. Posterior thickening. Moderate global hypokinesis present. Abnormal diastolic function.   Left Atrium Left atrium size is normal.   Right Ventricle Right ventricle size is normal. Normal systolic function.   Right Atrium Right atrium size is normal.   Aortic Valve Sclerosis of the aortic valve cusps. No regurgitation. No stenosis.   Mitral Valve Valve structure is normal. No regurgitation. No stenosis noted.   Tricuspid Valve Valve structure is normal. No regurgitation. No stenosis noted.   Pulmonic Valve The pulmonic valve visualization is suboptimal but appears to be functioning normally. Trace regurgitation. No stenosis noted.   Aorta Normal sized aortic root. Borderline dilated ascending aorta. Ao ascending diameter is 3.7 cm.   Pericardium There is evidence of epicardial fat. No pericardial effusion.

## 2024-12-17 NOTE — TELEPHONE ENCOUNTER
Patient returned call to office. Pt notfied of echo results and recommendation per Miguelito for stress test. Pt verbalized understanding and stated she will do the stress as long as it is not on treadmill. Pt had no further questions at the time and was advised she will be called to schedule the stress.

## 2025-01-03 ENCOUNTER — OFFICE VISIT (OUTPATIENT)
Dept: PAIN MANAGEMENT | Age: 60
End: 2025-01-03

## 2025-01-03 VITALS
BODY MASS INDEX: 32.02 KG/M2 | DIASTOLIC BLOOD PRESSURE: 66 MMHG | HEIGHT: 67 IN | OXYGEN SATURATION: 96 % | WEIGHT: 204 LBS | SYSTOLIC BLOOD PRESSURE: 118 MMHG | HEART RATE: 79 BPM | RESPIRATION RATE: 17 BRPM

## 2025-01-03 DIAGNOSIS — M17.11 LOCALIZED OSTEOARTHRITIS OF RIGHT KNEE: ICD-10-CM

## 2025-01-03 DIAGNOSIS — G89.29 CHRONIC PAIN OF RIGHT KNEE: ICD-10-CM

## 2025-01-03 DIAGNOSIS — M25.561 CHRONIC PAIN OF RIGHT KNEE: ICD-10-CM

## 2025-01-03 RX ORDER — ACETAMINOPHEN AND CODEINE PHOSPHATE 300; 30 MG/1; MG/1
1 TABLET ORAL EVERY 8 HOURS PRN
Qty: 90 TABLET | Refills: 0 | Status: SHIPPED | OUTPATIENT
Start: 2025-01-03 | End: 2025-04-03

## 2025-01-03 ASSESSMENT — ENCOUNTER SYMPTOMS
DIARRHEA: 0
EYES NEGATIVE: 1
RESPIRATORY NEGATIVE: 1
BACK PAIN: 1
ALLERGIC/IMMUNOLOGIC NEGATIVE: 1
CONSTIPATION: 0

## 2025-01-03 NOTE — PROGRESS NOTES
[facility]  [address] [place]. [address]    Patient Name: Cele Camarena  MRN: [id number]  Encounter Date: 1/3/2025     SUBJECTIVE:  Cele Camarena is a 59 y.o., female being seen today regarding   Chief Complaint   Patient presents with    Knee Pain    Pain     Joint pain    Back Pain     lumbar   .R knee  pain    Has  unicompartment replacement  To have converted to  Total Knee  in January Feb.  Has  to have Cardiac  Stree test for  Surgery  Authorization      Functionality Assessment & Goals:  On a scale of 0 (Does not Interfere) to 10 (Completely Interferes)  Which number describes how during the past week pain has interfered with the following:   A.  General Activity: 8    B.  Mood:  4   C.  Walking Ability:  8   D.  Normal Work (Includes both work outside the home and housework):  8   E.  Relations with Other People:  4   F.  Sleep:  6    G.  Enjoyment of Life:  8  2.  Patient prefers to Take their Pain Medications:   [x] On a regular basis   [] Only when necessary   [] Does not take pain medications  3.  What are the Patient's Goals/ Expectations for Visiting Pain Management?   [] Learn about my pain   [] Physical Therapy   [] Receive Injections   [] Deal with Anxiety and Stress   [x] Receive Medication   [] Treat Depression    [] Treat Sleep   [] Treat Opioid Dependence/ Addiction    Conservative Therapies:  Patient has tried the following conservative therapies:  [] NSAIDS  [x] Analgesics  [x] Home Exercises  [x] Physical Therapy  [x] Cognitive Therapy  [] Other:     Oswestry Disability Questionnaire completed by patient on 7/1/24     Current Pain Assessment:         11/4/2024     3:09 PM   AMB PAIN ASSESSMENT   Location of Pain Knee   Location Modifiers Right   Severity of Pain 8   Quality of Pain Throbbing;Sharp;Dull;Aching;Locking;Grinding;Popping;Cracking;Buckling   Duration of Pain Persistent   Frequency of Pain Constant   Aggravating Factors

## 2025-01-07 ENCOUNTER — TELEPHONE (OUTPATIENT)
Dept: CARDIOLOGY | Age: 60
End: 2025-01-07

## 2025-01-07 ENCOUNTER — HOSPITAL ENCOUNTER (OUTPATIENT)
Dept: NUCLEAR MEDICINE | Age: 60
Discharge: HOME | End: 2025-01-09
Payer: COMMERCIAL

## 2025-01-07 ENCOUNTER — HOSPITAL ENCOUNTER (OUTPATIENT)
Age: 60
Discharge: HOME | End: 2025-01-09
Payer: COMMERCIAL

## 2025-01-07 DIAGNOSIS — I25.119 CORONARY ARTERY DISEASE INVOLVING NATIVE CORONARY ARTERY OF NATIVE HEART WITH ANGINA PECTORIS (HCC): ICD-10-CM

## 2025-01-07 DIAGNOSIS — R06.02 SHORTNESS OF BREATH: ICD-10-CM

## 2025-01-07 LAB
NUC STRESS EJECTION FRACTION: 35 %
STRESS BASELINE DIAS BP: 89 MMHG
STRESS BASELINE HR: 65 BPM
STRESS BASELINE SYS BP: 156 MMHG
STRESS ESTIMATED WORKLOAD: 1 METS
STRESS PEAK DIAS BP: 108 MMHG
STRESS PEAK SYS BP: 166 MMHG
STRESS PERCENT HR ACHIEVED: 60 %
STRESS POST PEAK HR: 98 BPM
STRESS RATE PRESSURE PRODUCT: NORMAL BPM*MMHG
STRESS TARGET HR: 162 BPM
TID: 1.09

## 2025-01-07 PROCEDURE — A9500 TC99M SESTAMIBI: HCPCS | Performed by: SURGERY

## 2025-01-07 PROCEDURE — 3430000000 HC RX DIAGNOSTIC RADIOPHARMACEUTICAL: Performed by: SURGERY

## 2025-01-07 PROCEDURE — 6360000002 HC RX W HCPCS: Performed by: INTERNAL MEDICINE

## 2025-01-07 PROCEDURE — 93017 CV STRESS TEST TRACING ONLY: CPT

## 2025-01-07 PROCEDURE — 78452 HT MUSCLE IMAGE SPECT MULT: CPT

## 2025-01-07 PROCEDURE — 78452 HT MUSCLE IMAGE SPECT MULT: CPT | Performed by: INTERNAL MEDICINE

## 2025-01-07 PROCEDURE — 93018 CV STRESS TEST I&R ONLY: CPT | Performed by: INTERNAL MEDICINE

## 2025-01-07 PROCEDURE — 93016 CV STRESS TEST SUPVJ ONLY: CPT | Performed by: INTERNAL MEDICINE

## 2025-01-07 RX ORDER — TETRAKIS(2-METHOXYISOBUTYLISOCYANIDE)COPPER(I) TETRAFLUOROBORATE 1 MG/ML
10 INJECTION, POWDER, LYOPHILIZED, FOR SOLUTION INTRAVENOUS
Status: COMPLETED | OUTPATIENT
Start: 2025-01-07 | End: 2025-01-07

## 2025-01-07 RX ORDER — REGADENOSON 0.08 MG/ML
0.4 INJECTION, SOLUTION INTRAVENOUS ONCE
Status: COMPLETED | OUTPATIENT
Start: 2025-01-07 | End: 2025-01-07

## 2025-01-07 RX ORDER — AMINOPHYLLINE 25 MG/ML
100 INJECTION, SOLUTION INTRAVENOUS ONCE
Status: COMPLETED | OUTPATIENT
Start: 2025-01-07 | End: 2025-01-07

## 2025-01-07 RX ORDER — TETRAKIS(2-METHOXYISOBUTYLISOCYANIDE)COPPER(I) TETRAFLUOROBORATE 1 MG/ML
30 INJECTION, POWDER, LYOPHILIZED, FOR SOLUTION INTRAVENOUS
Status: COMPLETED | OUTPATIENT
Start: 2025-01-07 | End: 2025-01-07

## 2025-01-07 RX ADMIN — Medication 30 MILLICURIE: at 09:00

## 2025-01-07 RX ADMIN — AMINOPHYLLINE 100 MG: 25 INJECTION, SOLUTION INTRAVENOUS at 09:03

## 2025-01-07 RX ADMIN — REGADENOSON 0.4 MG: 0.08 INJECTION, SOLUTION INTRAVENOUS at 08:59

## 2025-01-07 RX ADMIN — Medication 10 MILLICURIE: at 08:03

## 2025-01-07 NOTE — TELEPHONE ENCOUNTER
I spoke to pt by phone.  She is aware of stress test results.      Having sob, thinks it is the beta blocker.  HR \"extremely low at night\" (38-40).    Same thing happened with metoprolol.  Carvedilol not as bad.    /80.  Please advise.   If you are a smoker, it is important for your health to stop smoking. Please be aware that second hand smoke is also harmful.

## 2025-01-07 NOTE — TELEPHONE ENCOUNTER
Interpretation Summary  Show Result Comparison     Stress Combined Conclusion: The study is negative for myocardial ischemia. Findings suggest a low risk of cardiac events.    Perfusion Comments: Prone images were obtained. LV perfusion is normal. There is no evidence of inducible ischemia.    Perfusion Conclusion: TID ratio is 1.09.    ECG: The stress ECG was negative for ischemia.    Stress Test: A pharmacological stress test was performed using regadenoson (Lexiscan). 100 mg of aminophylline given as a reversal agent.

## 2025-01-08 NOTE — TELEPHONE ENCOUNTER
Pt notified to hold evening dose of inderal.  She states understanding and agreement.  She will call as needed or to update us.

## 2025-01-13 RX ORDER — APIXABAN 5 MG/1
5 TABLET, FILM COATED ORAL 2 TIMES DAILY
Qty: 60 TABLET | Refills: 5 | Status: SHIPPED | OUTPATIENT
Start: 2025-01-13

## 2025-01-14 ENCOUNTER — OFFICE VISIT (OUTPATIENT)
Dept: PRIMARY CARE CLINIC | Age: 60
End: 2025-01-14
Payer: COMMERCIAL

## 2025-01-14 VITALS
TEMPERATURE: 98 F | OXYGEN SATURATION: 95 % | WEIGHT: 204 LBS | SYSTOLIC BLOOD PRESSURE: 120 MMHG | BODY MASS INDEX: 31.95 KG/M2 | HEART RATE: 84 BPM | DIASTOLIC BLOOD PRESSURE: 74 MMHG

## 2025-01-14 DIAGNOSIS — J01.40 ACUTE NON-RECURRENT PANSINUSITIS: Primary | ICD-10-CM

## 2025-01-14 DIAGNOSIS — M79.7 FIBROMYALGIA: ICD-10-CM

## 2025-01-14 DIAGNOSIS — M25.50 MULTIPLE JOINT PAIN: ICD-10-CM

## 2025-01-14 PROCEDURE — 1036F TOBACCO NON-USER: CPT | Performed by: FAMILY MEDICINE

## 2025-01-14 PROCEDURE — 99213 OFFICE O/P EST LOW 20 MIN: CPT | Performed by: FAMILY MEDICINE

## 2025-01-14 PROCEDURE — G8427 DOCREV CUR MEDS BY ELIG CLIN: HCPCS | Performed by: FAMILY MEDICINE

## 2025-01-14 PROCEDURE — 3074F SYST BP LT 130 MM HG: CPT | Performed by: FAMILY MEDICINE

## 2025-01-14 PROCEDURE — 3078F DIAST BP <80 MM HG: CPT | Performed by: FAMILY MEDICINE

## 2025-01-14 PROCEDURE — G8417 CALC BMI ABV UP PARAM F/U: HCPCS | Performed by: FAMILY MEDICINE

## 2025-01-14 PROCEDURE — 3017F COLORECTAL CA SCREEN DOC REV: CPT | Performed by: FAMILY MEDICINE

## 2025-01-14 RX ORDER — DOXYCYCLINE HYCLATE 100 MG
100 TABLET ORAL 2 TIMES DAILY
Qty: 28 TABLET | Refills: 0 | Status: SHIPPED | OUTPATIENT
Start: 2025-01-14

## 2025-01-14 ASSESSMENT — PATIENT HEALTH QUESTIONNAIRE - PHQ9
2. FEELING DOWN, DEPRESSED OR HOPELESS: NOT AT ALL
SUM OF ALL RESPONSES TO PHQ QUESTIONS 1-9: 0
1. LITTLE INTEREST OR PLEASURE IN DOING THINGS: NOT AT ALL
SUM OF ALL RESPONSES TO PHQ9 QUESTIONS 1 & 2: 0
SUM OF ALL RESPONSES TO PHQ QUESTIONS 1-9: 0

## 2025-01-14 ASSESSMENT — ENCOUNTER SYMPTOMS
ABDOMINAL PAIN: 0
NAUSEA: 0
RHINORRHEA: 1
DIARRHEA: 0
SORE THROAT: 1
CONSTIPATION: 0
WHEEZING: 1
SINUS PAIN: 1
SINUS PRESSURE: 1
VOMITING: 0
EYE REDNESS: 0
TROUBLE SWALLOWING: 0
EYE DISCHARGE: 0
SHORTNESS OF BREATH: 0
SINUS COMPLAINT: 1
COUGH: 0

## 2025-01-14 NOTE — PROGRESS NOTES
cough.    Return  if no improvement in symptoms or if any further symptoms arise.      No follow-ups on file.    An electronic signature was used to authenticate this note.    --Jazzy Sanchez DO on 1/14/2025 at 3:34 PM

## 2025-01-14 NOTE — TELEPHONE ENCOUNTER
Cele called requesting a refill of the below medication which has been pended for you:     Requested Prescriptions     Pending Prescriptions Disp Refills    tiZANidine (ZANAFLEX) 4 MG tablet [Pharmacy Med Name: tiZANidine HCl 4 MG Oral Tablet] 90 tablet 0     Sig: TAKE 1 TABLET EVERY 8 HOURS AS NEEDED FOR MUSCLE SPASMS       Last Appointment Date: 12/6/2024  Next Appointment Date: 1/27/2025    Allergies   Allergen Reactions    Amiodarone Shortness Of Breath and Dizziness or Vertigo    Fentanyl Other (See Comments)     Patient had terrible nightmares and hallucinations while hospitalized and sedated while on fentanyl     Iodine Rash and Swelling     Were in ER being treated when it happened spent time in hosp.    Prednisone Other (See Comments)     Trouble swallowing    Shellfish Allergy Anaphylaxis    Adhesive Tape Other (See Comments)     blisters    Cefuroxime Axetil Diarrhea, Hives and Nausea And Vomiting    E-Mycin [Erythromycin] Diarrhea     Rash, hives    Ezetimibe Other (See Comments)     Other reaction(s): Muscle Pain    Gabapentin Other (See Comments)     \"Un functional\"    Morphine Hives     Itching, burning     Nsaids Diarrhea, Hives and Nausea And Vomiting    Statins Other (See Comments)     myalgias    Sulfa Antibiotics Hives    Pcn [Penicillins] Rash    Percocet [Oxycodone-Acetaminophen] Other (See Comments)     Severe drowsiness     Zithromax [Azithromycin] Rash

## 2025-01-24 ENCOUNTER — HOSPITAL ENCOUNTER (OUTPATIENT)
Age: 60
Discharge: HOME | End: 2025-01-24
Payer: COMMERCIAL

## 2025-01-24 DIAGNOSIS — N18.32 STAGE 3B CHRONIC KIDNEY DISEASE (HCC): ICD-10-CM

## 2025-01-24 DIAGNOSIS — E11.9 TYPE 2 DIABETES MELLITUS WITHOUT COMPLICATION, WITHOUT LONG-TERM CURRENT USE OF INSULIN (HCC): ICD-10-CM

## 2025-01-24 DIAGNOSIS — E78.5 HYPERLIPIDEMIA, UNSPECIFIED HYPERLIPIDEMIA TYPE: ICD-10-CM

## 2025-01-24 LAB
ALBUMIN SERPL-MCNC: 3.9 G/DL (ref 3.5–5.2)
ALBUMIN/GLOB SERPL: 1.3 {RATIO} (ref 1–2.5)
ALP SERPL-CCNC: 132 U/L (ref 35–104)
ALT SERPL-CCNC: 22 U/L (ref 5–33)
ANION GAP SERPL CALCULATED.3IONS-SCNC: 7 MMOL/L (ref 9–17)
ANION GAP SERPL CALCULATED.3IONS-SCNC: 7 MMOL/L (ref 9–17)
AST SERPL-CCNC: 25 U/L
BILIRUB SERPL-MCNC: 0.4 MG/DL (ref 0.3–1.2)
BUN SERPL-MCNC: 27 MG/DL (ref 6–20)
BUN SERPL-MCNC: 27 MG/DL (ref 6–20)
BUN/CREAT SERPL: 23 (ref 9–20)
BUN/CREAT SERPL: 23 (ref 9–20)
CA-I BLD-SCNC: 1.21 MMOL/L (ref 1.13–1.33)
CALCIUM SERPL-MCNC: 9.5 MG/DL (ref 8.6–10.4)
CALCIUM SERPL-MCNC: 9.5 MG/DL (ref 8.6–10.4)
CHLORIDE SERPL-SCNC: 102 MMOL/L (ref 98–107)
CHLORIDE SERPL-SCNC: 102 MMOL/L (ref 98–107)
CHOLEST SERPL-MCNC: 240 MG/DL (ref 0–199)
CHOLESTEROL/HDL RATIO: 6.2
CO2 SERPL-SCNC: 27 MMOL/L (ref 20–31)
CO2 SERPL-SCNC: 27 MMOL/L (ref 20–31)
CREAT SERPL-MCNC: 1.2 MG/DL (ref 0.5–0.9)
CREAT SERPL-MCNC: 1.2 MG/DL (ref 0.5–0.9)
CREAT UR-MCNC: 139.3 MG/DL (ref 28–217)
CREAT UR-MCNC: 144 MG/DL (ref 28–217)
ERYTHROCYTE [DISTWIDTH] IN BLOOD BY AUTOMATED COUNT: 14.4 % (ref 11.8–14.4)
EST. AVERAGE GLUCOSE BLD GHB EST-MCNC: 146 MG/DL
GFR, ESTIMATED: 52 ML/MIN/1.73M2
GFR, ESTIMATED: 52 ML/MIN/1.73M2
GLUCOSE SERPL-MCNC: 86 MG/DL (ref 70–99)
GLUCOSE SERPL-MCNC: 86 MG/DL (ref 70–99)
HBA1C MFR BLD: 6.7 % (ref 4–6)
HCT VFR BLD AUTO: 43.1 % (ref 36.3–47.1)
HDLC SERPL-MCNC: 39 MG/DL
HGB BLD-MCNC: 14.1 G/DL (ref 11.9–15.1)
LDLC SERPL CALC-MCNC: 149 MG/DL (ref 0–100)
MCH RBC QN AUTO: 29.2 PG (ref 25.2–33.5)
MCHC RBC AUTO-ENTMCNC: 32.7 G/DL (ref 25.2–33.5)
MCV RBC AUTO: 89.2 FL (ref 82.6–102.9)
MICROALBUMIN UR-MCNC: 26 MG/L (ref 0–20)
MICROALBUMIN/CREAT UR-RTO: 18 MCG/MG CREAT (ref 0–25)
NRBC BLD-RTO: 0 PER 100 WBC
PLATELET # BLD AUTO: 216 K/UL (ref 138–453)
PMV BLD AUTO: 9.8 FL (ref 8.1–13.5)
POTASSIUM SERPL-SCNC: 4.1 MMOL/L (ref 3.7–5.3)
POTASSIUM SERPL-SCNC: 4.1 MMOL/L (ref 3.7–5.3)
PROT SERPL-MCNC: 6.9 G/DL (ref 6.4–8.3)
PTH-INTACT SERPL-MCNC: 88 PG/ML (ref 15–65)
RBC # BLD AUTO: 4.83 M/UL (ref 3.95–5.11)
SODIUM SERPL-SCNC: 136 MMOL/L (ref 135–144)
SODIUM SERPL-SCNC: 136 MMOL/L (ref 135–144)
TOTAL PROTEIN, URINE: 27 MG/DL
TRIGL SERPL-MCNC: 260 MG/DL
URINE TOTAL PROTEIN CREATININE RATIO: 0.19 (ref 0–0.2)
VLDLC SERPL CALC-MCNC: 52 MG/DL (ref 1–30)
WBC OTHER # BLD: 9.5 K/UL (ref 3.5–11.3)

## 2025-01-24 PROCEDURE — 83970 ASSAY OF PARATHORMONE: CPT

## 2025-01-24 PROCEDURE — 83036 HEMOGLOBIN GLYCOSYLATED A1C: CPT

## 2025-01-24 PROCEDURE — 80061 LIPID PANEL: CPT

## 2025-01-24 PROCEDURE — 80048 BASIC METABOLIC PNL TOTAL CA: CPT

## 2025-01-24 PROCEDURE — 80053 COMPREHEN METABOLIC PANEL: CPT

## 2025-01-24 PROCEDURE — 85027 COMPLETE CBC AUTOMATED: CPT

## 2025-01-24 PROCEDURE — 36415 COLL VENOUS BLD VENIPUNCTURE: CPT

## 2025-01-24 PROCEDURE — 82570 ASSAY OF URINE CREATININE: CPT

## 2025-01-24 PROCEDURE — 82043 UR ALBUMIN QUANTITATIVE: CPT

## 2025-01-24 PROCEDURE — 84156 ASSAY OF PROTEIN URINE: CPT

## 2025-01-24 PROCEDURE — 82330 ASSAY OF CALCIUM: CPT

## 2025-01-24 SDOH — ECONOMIC STABILITY: FOOD INSECURITY: WITHIN THE PAST 12 MONTHS, THE FOOD YOU BOUGHT JUST DIDN'T LAST AND YOU DIDN'T HAVE MONEY TO GET MORE.: NEVER TRUE

## 2025-01-24 SDOH — ECONOMIC STABILITY: INCOME INSECURITY: IN THE LAST 12 MONTHS, WAS THERE A TIME WHEN YOU WERE NOT ABLE TO PAY THE MORTGAGE OR RENT ON TIME?: YES

## 2025-01-24 SDOH — ECONOMIC STABILITY: FOOD INSECURITY: WITHIN THE PAST 12 MONTHS, YOU WORRIED THAT YOUR FOOD WOULD RUN OUT BEFORE YOU GOT MONEY TO BUY MORE.: NEVER TRUE

## 2025-01-24 SDOH — ECONOMIC STABILITY: TRANSPORTATION INSECURITY
IN THE PAST 12 MONTHS, HAS LACK OF TRANSPORTATION KEPT YOU FROM MEETINGS, WORK, OR FROM GETTING THINGS NEEDED FOR DAILY LIVING?: NO

## 2025-01-24 SDOH — ECONOMIC STABILITY: TRANSPORTATION INSECURITY
IN THE PAST 12 MONTHS, HAS THE LACK OF TRANSPORTATION KEPT YOU FROM MEDICAL APPOINTMENTS OR FROM GETTING MEDICATIONS?: NO

## 2025-01-27 ENCOUNTER — OFFICE VISIT (OUTPATIENT)
Dept: FAMILY MEDICINE CLINIC | Age: 60
End: 2025-01-27
Payer: COMMERCIAL

## 2025-01-27 VITALS
HEIGHT: 67 IN | HEART RATE: 72 BPM | BODY MASS INDEX: 32.02 KG/M2 | SYSTOLIC BLOOD PRESSURE: 112 MMHG | DIASTOLIC BLOOD PRESSURE: 82 MMHG | OXYGEN SATURATION: 96 % | WEIGHT: 204 LBS

## 2025-01-27 DIAGNOSIS — E11.9 CONTROLLED TYPE 2 DIABETES MELLITUS WITHOUT COMPLICATION, UNSPECIFIED WHETHER LONG TERM INSULIN USE (HCC): ICD-10-CM

## 2025-01-27 DIAGNOSIS — B37.2 SKIN YEAST INFECTION: ICD-10-CM

## 2025-01-27 DIAGNOSIS — F41.9 ANXIETY: Primary | ICD-10-CM

## 2025-01-27 DIAGNOSIS — E78.00 PURE HYPERCHOLESTEROLEMIA: ICD-10-CM

## 2025-01-27 PROCEDURE — G8417 CALC BMI ABV UP PARAM F/U: HCPCS | Performed by: NURSE PRACTITIONER

## 2025-01-27 PROCEDURE — 3074F SYST BP LT 130 MM HG: CPT | Performed by: NURSE PRACTITIONER

## 2025-01-27 PROCEDURE — G8427 DOCREV CUR MEDS BY ELIG CLIN: HCPCS | Performed by: NURSE PRACTITIONER

## 2025-01-27 PROCEDURE — 99214 OFFICE O/P EST MOD 30 MIN: CPT | Performed by: NURSE PRACTITIONER

## 2025-01-27 PROCEDURE — 1036F TOBACCO NON-USER: CPT | Performed by: NURSE PRACTITIONER

## 2025-01-27 PROCEDURE — 3017F COLORECTAL CA SCREEN DOC REV: CPT | Performed by: NURSE PRACTITIONER

## 2025-01-27 PROCEDURE — 3044F HG A1C LEVEL LT 7.0%: CPT | Performed by: NURSE PRACTITIONER

## 2025-01-27 PROCEDURE — 2022F DILAT RTA XM EVC RTNOPTHY: CPT | Performed by: NURSE PRACTITIONER

## 2025-01-27 PROCEDURE — 3079F DIAST BP 80-89 MM HG: CPT | Performed by: NURSE PRACTITIONER

## 2025-01-27 RX ORDER — FENOFIBRATE 145 MG/1
145 TABLET, COATED ORAL DAILY
Qty: 90 TABLET | Refills: 1 | Status: SHIPPED | OUTPATIENT
Start: 2025-01-27

## 2025-01-27 RX ORDER — NYSTATIN 100000 [USP'U]/G
POWDER TOPICAL
Qty: 1 EACH | Refills: 3 | Status: SHIPPED | OUTPATIENT
Start: 2025-01-27

## 2025-01-27 NOTE — PROGRESS NOTES
(See Comments)     Patient had terrible nightmares and hallucinations while hospitalized and sedated while on fentanyl     Iodine Rash and Swelling     Were in ER being treated when it happened spent time in hosp.    Prednisone Other (See Comments)     Trouble swallowing    Shellfish Allergy Anaphylaxis    Adhesive Tape Other (See Comments)     blisters    Cefuroxime Axetil Diarrhea, Hives and Nausea And Vomiting    E-Mycin [Erythromycin] Diarrhea     Rash, hives    Ezetimibe Other (See Comments)     Other reaction(s): Muscle Pain    Gabapentin Other (See Comments)     \"Un functional\"    Morphine Hives     Itching, burning     Nsaids Diarrhea, Hives and Nausea And Vomiting    Statins Other (See Comments)     myalgias    Sulfa Antibiotics Hives    Pcn [Penicillins] Rash    Percocet [Oxycodone-Acetaminophen] Other (See Comments)     Severe drowsiness     Zithromax [Azithromycin] Rash       Health Maintenance   Topic Date Due    Diabetic retinal exam  Never done    Hepatitis B vaccine (1 of 3 - 19+ 3-dose series) Never done    DTaP/Tdap/Td vaccine (1 - Tdap) Never done    Cervical cancer screen  Never done    Breast cancer screen  08/09/2019    Diabetic foot exam  08/15/2023    Colorectal Cancer Screen  Never done    Shingles vaccine (2 of 2) 11/23/2023    Depression Monitoring  01/14/2026    A1C test (Diabetic or Prediabetic)  01/24/2026    Diabetic Alb to Cr ratio (uACR) test  01/24/2026    Lipids  01/24/2026    GFR test (Diabetes, CKD 3-4, OR last GFR 15-59)  01/24/2026    Pneumococcal 0-64 years Vaccine (3 of 3 - PPSV23 or PCV20) 07/23/2031    Flu vaccine  Completed    COVID-19 Vaccine  Completed    Hepatitis A vaccine  Aged Out    Hib vaccine  Aged Out    Polio vaccine  Aged Out    Meningococcal (ACWY) vaccine  Aged Out    Depression Screen  Discontinued    Diabetes screen  Discontinued    Hepatitis C screen  Discontinued    HIV screen  Discontinued       Subjective:     Review of Systems   Constitutional:

## 2025-01-30 ASSESSMENT — ENCOUNTER SYMPTOMS
VOMITING: 0
COUGH: 0
DIARRHEA: 0
NAUSEA: 0
SHORTNESS OF BREATH: 0
WHEEZING: 0

## 2025-02-09 DIAGNOSIS — I10 HYPERTENSION, ESSENTIAL: ICD-10-CM

## 2025-02-09 DIAGNOSIS — E11.9 TYPE 2 DIABETES MELLITUS WITHOUT COMPLICATION, WITHOUT LONG-TERM CURRENT USE OF INSULIN (HCC): Primary | ICD-10-CM

## 2025-02-10 NOTE — TELEPHONE ENCOUNTER
Cele called requesting a refill of the below medication which has been pended for you:     Requested Prescriptions     Pending Prescriptions Disp Refills    pioglitazone (ACTOS) 15 MG tablet [Pharmacy Med Name: Pioglitazone HCl 15 MG Oral Tablet] 30 tablet 0     Sig: Take 1 tablet by mouth once daily    furosemide (LASIX) 20 MG tablet [Pharmacy Med Name: Furosemide 20 MG Oral Tablet] 30 tablet 0     Sig: Take 1 tablet by mouth once daily       Last Appointment Date: 1/27/2025  Next Appointment Date: 7/29/2025    Allergies   Allergen Reactions    Amiodarone Shortness Of Breath and Dizziness or Vertigo    Fentanyl Other (See Comments)     Patient had terrible nightmares and hallucinations while hospitalized and sedated while on fentanyl     Iodine Rash and Swelling     Were in ER being treated when it happened spent time in hosp.    Prednisone Other (See Comments)     Trouble swallowing    Shellfish Allergy Anaphylaxis    Adhesive Tape Other (See Comments)     blisters    Cefuroxime Axetil Diarrhea, Hives and Nausea And Vomiting    E-Mycin [Erythromycin] Diarrhea     Rash, hives    Ezetimibe Other (See Comments)     Other reaction(s): Muscle Pain    Gabapentin Other (See Comments)     \"Un functional\"    Morphine Hives     Itching, burning     Nsaids Diarrhea, Hives and Nausea And Vomiting    Statins Other (See Comments)     myalgias    Sulfa Antibiotics Hives    Pcn [Penicillins] Rash    Percocet [Oxycodone-Acetaminophen] Other (See Comments)     Severe drowsiness     Zithromax [Azithromycin] Rash

## 2025-02-11 RX ORDER — FUROSEMIDE 20 MG/1
20 TABLET ORAL DAILY
Qty: 90 TABLET | Refills: 1 | Status: SHIPPED | OUTPATIENT
Start: 2025-02-11

## 2025-02-11 RX ORDER — PIOGLITAZONE 15 MG/1
15 TABLET ORAL DAILY
Qty: 90 TABLET | Refills: 1 | Status: SHIPPED | OUTPATIENT
Start: 2025-02-11

## 2025-02-13 DIAGNOSIS — G89.29 CHRONIC PAIN OF RIGHT KNEE: ICD-10-CM

## 2025-02-13 DIAGNOSIS — M17.11 LOCALIZED OSTEOARTHRITIS OF RIGHT KNEE: ICD-10-CM

## 2025-02-13 DIAGNOSIS — M25.561 CHRONIC PAIN OF RIGHT KNEE: ICD-10-CM

## 2025-02-13 RX ORDER — ACETAMINOPHEN AND CODEINE PHOSPHATE 300; 30 MG/1; MG/1
1 TABLET ORAL EVERY 8 HOURS PRN
Qty: 90 TABLET | Refills: 3 | Status: SHIPPED | OUTPATIENT
Start: 2025-02-13 | End: 2025-06-13

## 2025-02-13 NOTE — TELEPHONE ENCOUNTER
Tylenol #3  DS11 11/4/24  [facility]  [place]   Last filled 1/3/25  Due Now  Last Appt:  1/3/2025  Next Appt:   3/4/2025  Med verified in Epic

## 2025-02-25 ENCOUNTER — HOSPITAL ENCOUNTER (OUTPATIENT)
Dept: GENERAL RADIOLOGY | Age: 60
Discharge: HOME | End: 2025-02-27
Payer: COMMERCIAL

## 2025-02-25 ENCOUNTER — OFFICE VISIT (OUTPATIENT)
Dept: FAMILY MEDICINE CLINIC | Age: 60
End: 2025-02-25
Payer: COMMERCIAL

## 2025-02-25 VITALS
WEIGHT: 207 LBS | OXYGEN SATURATION: 94 % | BODY MASS INDEX: 32.49 KG/M2 | DIASTOLIC BLOOD PRESSURE: 62 MMHG | SYSTOLIC BLOOD PRESSURE: 102 MMHG | TEMPERATURE: 97.7 F | HEART RATE: 84 BPM | HEIGHT: 67 IN

## 2025-02-25 DIAGNOSIS — R06.2 WHEEZING: ICD-10-CM

## 2025-02-25 DIAGNOSIS — J45.40 MODERATE PERSISTENT ASTHMA WITHOUT COMPLICATION: ICD-10-CM

## 2025-02-25 DIAGNOSIS — R06.2 WHEEZING: Primary | ICD-10-CM

## 2025-02-25 PROBLEM — M19.91 PRIMARY OSTEOARTHRITIS: Status: ACTIVE | Noted: 2024-11-21

## 2025-02-25 PROBLEM — M35.3 POLYMYALGIA RHEUMATICA: Status: ACTIVE | Noted: 2024-02-14

## 2025-02-25 PROBLEM — D64.9 ANEMIA: Status: ACTIVE | Noted: 2024-02-14

## 2025-02-25 PROBLEM — M25.50 ARTHRALGIA: Status: ACTIVE | Noted: 2023-06-15

## 2025-02-25 PROBLEM — D64.9 ANEMIA, UNSPECIFIED: Status: ACTIVE | Noted: 2024-02-13

## 2025-02-25 PROBLEM — M10.9 GOUT, UNSPECIFIED: Status: ACTIVE | Noted: 2023-09-15

## 2025-02-25 PROBLEM — M81.0 AGE-RELATED OSTEOPOROSIS WITHOUT CURRENT PATHOLOGICAL FRACTURE: Status: ACTIVE | Noted: 2023-07-06

## 2025-02-25 PROBLEM — Z87.311 HISTORY OF PATHOLOGICAL FRACTURE OF VERTEBRA: Status: ACTIVE | Noted: 2019-05-15

## 2025-02-25 PROCEDURE — G8427 DOCREV CUR MEDS BY ELIG CLIN: HCPCS | Performed by: NURSE PRACTITIONER

## 2025-02-25 PROCEDURE — 1036F TOBACCO NON-USER: CPT | Performed by: NURSE PRACTITIONER

## 2025-02-25 PROCEDURE — G8417 CALC BMI ABV UP PARAM F/U: HCPCS | Performed by: NURSE PRACTITIONER

## 2025-02-25 PROCEDURE — 3078F DIAST BP <80 MM HG: CPT | Performed by: NURSE PRACTITIONER

## 2025-02-25 PROCEDURE — 3074F SYST BP LT 130 MM HG: CPT | Performed by: NURSE PRACTITIONER

## 2025-02-25 PROCEDURE — 3017F COLORECTAL CA SCREEN DOC REV: CPT | Performed by: NURSE PRACTITIONER

## 2025-02-25 PROCEDURE — 99213 OFFICE O/P EST LOW 20 MIN: CPT | Performed by: NURSE PRACTITIONER

## 2025-02-25 PROCEDURE — 71046 X-RAY EXAM CHEST 2 VIEWS: CPT

## 2025-02-25 RX ORDER — LEVALBUTEROL INHALATION SOLUTION 0.63 MG/3ML
0.63 SOLUTION RESPIRATORY (INHALATION) EVERY 4 HOURS PRN
Qty: 120 EACH | Refills: 3 | Status: SHIPPED | OUTPATIENT
Start: 2025-02-25

## 2025-02-25 RX ORDER — LEVALBUTEROL TARTRATE 45 UG/1
1-2 AEROSOL, METERED ORAL EVERY 4 HOURS PRN
Qty: 1 EACH | Refills: 3 | Status: SHIPPED | OUTPATIENT
Start: 2025-02-25

## 2025-02-25 ASSESSMENT — ENCOUNTER SYMPTOMS
SHORTNESS OF BREATH: 1
COUGH: 0
WHEEZING: 1

## 2025-02-25 NOTE — PROGRESS NOTES
[facility] OF [facility]  [address] [facility] [address]  Dept: [phone]  Dept Fax: [phone]  Loc: [phone]    Cele Camarena is a 59 y.o. female who presents today for her medical conditions/complaintsas noted below.  Cele Camarena is c/o of   Chief Complaint   Patient presents with    Wheezing     Sinus infection, ongoing, albuterol is not helping         HPI:     History of Present Illness  The patient is a 59-year-old female here for a recheck of wheezing.    She reports experiencing dyspnea, which she attributes to a persistent sinus infection. Despite the use of albuterol, her symptoms, including wheezing, have not improved. She describes a sensation of heaviness in her chest during ambulation, likening it to the exertion of running a marathon, although she notes that her heart rate does not significantly increase. She also reports the production of thick, rubbery, white nasal discharge, which she believes is causing obstruction in her bronchioles. She has not previously consulted a pulmonologist. She expresses concern about potential lung damage and is eager to regain her ability to be active. She is not currently using any maintenance inhalers and reports that albuterol has been ineffective, causing only tachycardia. She recalls a previous discussion about Xopenex, which she found beneficial during an asthma exacerbation, although she has not experienced asthma of this severity before. She expresses interest in trying Xopenex again. She reports shallow breathing and difficulty with activities such as climbing stairs. She has previously undergone cardiac rehabilitation. She has a history of recurrent sinus issues and has previously consulted an ENT specialist, who recommended surgical intervention. However, she was unable to proceed with the surgery due to respiratory distress. She has a history of hospitalization,

## 2025-02-26 ENCOUNTER — OFFICE VISIT (OUTPATIENT)
Dept: PULMONOLOGY | Age: 60
End: 2025-02-26
Payer: COMMERCIAL

## 2025-02-26 VITALS
TEMPERATURE: 97.8 F | DIASTOLIC BLOOD PRESSURE: 60 MMHG | WEIGHT: 205.8 LBS | RESPIRATION RATE: 22 BRPM | HEART RATE: 73 BPM | BODY MASS INDEX: 32.23 KG/M2 | SYSTOLIC BLOOD PRESSURE: 100 MMHG | OXYGEN SATURATION: 96 %

## 2025-02-26 DIAGNOSIS — J44.9 COPD, SEVERITY TO BE DETERMINED (HCC): ICD-10-CM

## 2025-02-26 DIAGNOSIS — M25.50 MULTIPLE JOINT PAIN: ICD-10-CM

## 2025-02-26 DIAGNOSIS — J98.4 PULMONARY SCARRING: Primary | ICD-10-CM

## 2025-02-26 DIAGNOSIS — Z87.891 PERSONAL HISTORY OF TOBACCO USE: ICD-10-CM

## 2025-02-26 DIAGNOSIS — Z95.1 S/P CABG X 3: ICD-10-CM

## 2025-02-26 DIAGNOSIS — Z87.09 HX OF PLEURAL EFFUSION: ICD-10-CM

## 2025-02-26 DIAGNOSIS — M79.7 FIBROMYALGIA: ICD-10-CM

## 2025-02-26 DIAGNOSIS — I50.22 CHRONIC SYSTOLIC CONGESTIVE HEART FAILURE (HCC): ICD-10-CM

## 2025-02-26 DIAGNOSIS — R93.89 ABNORMAL CHEST XRAY: ICD-10-CM

## 2025-02-26 PROCEDURE — G8417 CALC BMI ABV UP PARAM F/U: HCPCS | Performed by: INTERNAL MEDICINE

## 2025-02-26 PROCEDURE — 3078F DIAST BP <80 MM HG: CPT | Performed by: INTERNAL MEDICINE

## 2025-02-26 PROCEDURE — G0296 VISIT TO DETERM LDCT ELIG: HCPCS | Performed by: INTERNAL MEDICINE

## 2025-02-26 PROCEDURE — 3023F SPIROM DOC REV: CPT | Performed by: INTERNAL MEDICINE

## 2025-02-26 PROCEDURE — G8427 DOCREV CUR MEDS BY ELIG CLIN: HCPCS | Performed by: INTERNAL MEDICINE

## 2025-02-26 PROCEDURE — 99204 OFFICE O/P NEW MOD 45 MIN: CPT | Performed by: INTERNAL MEDICINE

## 2025-02-26 PROCEDURE — 3017F COLORECTAL CA SCREEN DOC REV: CPT | Performed by: INTERNAL MEDICINE

## 2025-02-26 PROCEDURE — 3074F SYST BP LT 130 MM HG: CPT | Performed by: INTERNAL MEDICINE

## 2025-02-26 PROCEDURE — 1036F TOBACCO NON-USER: CPT | Performed by: INTERNAL MEDICINE

## 2025-02-26 NOTE — PROGRESS NOTES
USPSTF guidelines released March 9, 2021 for screening for lung cancer.    For adults aged 50 to 80 years who have a 20 pack-year smoking history and currently smoke or have quit within the past 15 years the grade B recommendation is to:  Screen for lung cancer with low-dose computed tomography (LDCT) every year.  Stop screening once a person has not smoked for 15 years or has a health problem that limits life expectancy or the ability to have lung surgery.    The patient  reports that she quit smoking about 9 months ago. Her smoking use included cigarettes. She started smoking about 31 years ago. She has a 46.4 pack-year smoking history. She has been exposed to tobacco smoke. She has never used smokeless tobacco.. Discussed with patient the risks and benefits of screening, including over-diagnosis, false positive rate, and total radiation exposure.  The patient currently exhibits no signs or symptoms suggestive of lung cancer.  Discussed with patient the importance of compliance with yearly annual lung cancer screenings and willingness to undergo diagnosis and treatment if screening scan is positive.  In addition, the patient was counseled regarding the importance of remaining smoke free and/or total smoking cessation.    Also reviewed the following if the patient has Medicare that as of February 10, 2022, Medicare only covers LDCT screening in patients aged 50-77 with at least a 20 pack-year smoking history who currently smoke or have quit in the last 15 years

## 2025-02-27 NOTE — TELEPHONE ENCOUNTER
Cele called requesting a refill of the below medication which has been pended for you:     Requested Prescriptions     Pending Prescriptions Disp Refills    tiZANidine (ZANAFLEX) 4 MG tablet [Pharmacy Med Name: tiZANidine HCl 4 MG Oral Tablet] 90 tablet 0     Sig: TAKE 1 TABLET BY MOUTH EVERY 8 HOURS AS NEEDED FOR MUSCLE SPASM       Last Appointment Date: 2/25/2025  Next Appointment Date: 7/29/2025    Allergies   Allergen Reactions    Amiodarone Shortness Of Breath and Dizziness or Vertigo    Fentanyl Other (See Comments)     Patient had terrible nightmares and hallucinations while hospitalized and sedated while on fentanyl     Iodine Rash and Swelling     Were in ER being treated when it happened spent time in hosp.    Prednisone Other (See Comments)     Trouble swallowing    Shellfish Allergy Anaphylaxis    Adhesive Tape Other (See Comments)     blisters    Cefuroxime Axetil Diarrhea, Hives and Nausea And Vomiting    E-Mycin [Erythromycin] Diarrhea     Rash, hives    Ezetimibe Other (See Comments)     Other reaction(s): Muscle Pain    Gabapentin Other (See Comments)     \"Un functional\"    Morphine Hives     Itching, burning     Nsaids Diarrhea, Hives and Nausea And Vomiting    Statins Other (See Comments)     myalgias    Sulfa Antibiotics Hives    Pcn [Penicillins] Rash    Percocet [Oxycodone-Acetaminophen] Other (See Comments)     Severe drowsiness     Zithromax [Azithromycin] Rash

## 2025-03-04 ENCOUNTER — OFFICE VISIT (OUTPATIENT)
Dept: PAIN MANAGEMENT | Age: 60
End: 2025-03-04
Payer: COMMERCIAL

## 2025-03-04 VITALS
HEIGHT: 67 IN | HEART RATE: 89 BPM | BODY MASS INDEX: 32.65 KG/M2 | RESPIRATION RATE: 12 BRPM | OXYGEN SATURATION: 97 % | DIASTOLIC BLOOD PRESSURE: 64 MMHG | SYSTOLIC BLOOD PRESSURE: 132 MMHG | WEIGHT: 208 LBS

## 2025-03-04 DIAGNOSIS — M47.817 LUMBOSACRAL SPONDYLOSIS WITHOUT MYELOPATHY: ICD-10-CM

## 2025-03-04 DIAGNOSIS — M51.26 LUMBAR DISC HERNIATION: ICD-10-CM

## 2025-03-04 DIAGNOSIS — M17.11 LOCALIZED OSTEOARTHRITIS OF RIGHT KNEE: Primary | ICD-10-CM

## 2025-03-04 DIAGNOSIS — M15.0 PRIMARY OSTEOARTHRITIS INVOLVING MULTIPLE JOINTS: ICD-10-CM

## 2025-03-04 PROCEDURE — 3075F SYST BP GE 130 - 139MM HG: CPT | Performed by: PHYSICAL MEDICINE & REHABILITATION

## 2025-03-04 PROCEDURE — G8417 CALC BMI ABV UP PARAM F/U: HCPCS | Performed by: PHYSICAL MEDICINE & REHABILITATION

## 2025-03-04 PROCEDURE — 1036F TOBACCO NON-USER: CPT | Performed by: PHYSICAL MEDICINE & REHABILITATION

## 2025-03-04 PROCEDURE — 99214 OFFICE O/P EST MOD 30 MIN: CPT | Performed by: PHYSICAL MEDICINE & REHABILITATION

## 2025-03-04 PROCEDURE — 3078F DIAST BP <80 MM HG: CPT | Performed by: PHYSICAL MEDICINE & REHABILITATION

## 2025-03-04 PROCEDURE — 3017F COLORECTAL CA SCREEN DOC REV: CPT | Performed by: PHYSICAL MEDICINE & REHABILITATION

## 2025-03-04 PROCEDURE — G8427 DOCREV CUR MEDS BY ELIG CLIN: HCPCS | Performed by: PHYSICAL MEDICINE & REHABILITATION

## 2025-03-04 NOTE — PROGRESS NOTES
[facility]  [address] [place]. [address]    Patient Name: Cele Camarena  MRN: [id number]  Encounter Date: 3/13/2025     SUBJECTIVE:  Cele Camarena is a 59 y.o., female being seen today regarding   Chief Complaint   Patient presents with    Knee Pain     Right knee pain     History of Present Illness  R knee pain  R groin pain       Functionality Assessment & Goals:  On a scale of 0 (Does not Interfere) to 10 (Completely Interferes)  Which number describes how during the past week pain has interfered with the following:   A.  General Activity: 9    B.  Mood:  5   C.  Walking Ability:  9   D.  Normal Work (Includes both work outside the home and housework):  9   E.  Relations with Other People:  9   F.  Sleep:  7    G.  Enjoyment of Life:  9  2.  Patient prefers to Take their Pain Medications:   [x] On a regular basis   [] Only when necessary   [] Does not take pain medications  3.  What are the Patient's Goals/ Expectations for Visiting Pain Management?   [x] Learn about my pain   [] Physical Therapy   [] Receive Injections   [] Deal with Anxiety and Stress   [x] Receive Medication   [] Treat Depression    [] Treat Sleep   [] Treat Opioid Dependence/ Addiction    Conservative Therapies:  Patient has tried the following conservative therapies:  [x] NSAIDS                   [x] Analgesics      [x] Home Exercises     [x] Physical Therapy     [] Cognitive Therapy  [] Other: -  Have any of these conservative therapies help relieve the patients pain? [] YES [x] NO  Further comments -    Recent Imaging since last appointment related to chief complaint:  10/15/24      Current Pain Assessment:         3/4/2025    10:30 AM   AMB PAIN ASSESSMENT   Location of Pain Knee   Location Modifiers Right   Severity of Pain 7   Quality of Pain Throbbing;Sharp;Dull;Aching   Duration of Pain Persistent   Frequency of Pain Constant   Aggravating Factors

## 2025-03-06 ENCOUNTER — HOSPITAL ENCOUNTER (OUTPATIENT)
Dept: PULMONOLOGY | Age: 60
Discharge: HOME | End: 2025-03-06
Payer: COMMERCIAL

## 2025-03-06 ENCOUNTER — TELEPHONE (OUTPATIENT)
Dept: PULMONOLOGY | Age: 60
End: 2025-03-06

## 2025-03-06 ENCOUNTER — HOSPITAL ENCOUNTER (OUTPATIENT)
Dept: CT IMAGING | Age: 60
Discharge: HOME | End: 2025-03-08
Attending: INTERNAL MEDICINE
Payer: COMMERCIAL

## 2025-03-06 DIAGNOSIS — Z87.891 PERSONAL HISTORY OF TOBACCO USE: ICD-10-CM

## 2025-03-06 DIAGNOSIS — J45.40 MODERATE PERSISTENT ASTHMA WITHOUT COMPLICATION: ICD-10-CM

## 2025-03-06 LAB
DLCO %PRED: NORMAL
DLCO PRED: NORMAL
DLCO/VA %PRED: NORMAL
DLCO/VA PRED: NORMAL
DLCO/VA: NORMAL
DLCO: NORMAL
EXPIRATORY TIME-POST: NORMAL
EXPIRATORY TIME: NORMAL
FEF 25-75 %CHNG: NORMAL
FEF 25-75 POST %PRED: NORMAL
FEF 25-75% %PRED-PRE: NORMAL
FEF 25-75% PRED: NORMAL
FEF 25-75-POST: NORMAL
FEF 25-75-PRE: NORMAL
FEV1 %PRED-POST: NORMAL
FEV1 %PRED-PRE: NORMAL
FEV1 PRED: NORMAL
FEV1-POST: NORMAL
FEV1-PRE: NORMAL
FEV1/FVC %PRED-POST: NORMAL
FEV1/FVC %PRED-PRE: NORMAL
FEV1/FVC PRED: NORMAL
FEV1/FVC-POST: NORMAL
FEV1/FVC-PRE: NORMAL
FVC %PRED-POST: NORMAL
FVC %PRED-PRE: NORMAL
FVC PRED: NORMAL
FVC-POST: NORMAL
FVC-PRE: NORMAL
GAW %PRED: NORMAL
GAW PRED: NORMAL
GAW: NORMAL
IC PRE %PRED: NORMAL
IC PRED: NORMAL
IC: NORMAL
MEP: NORMAL
MIP: NORMAL
MVV %PRED-PRE: NORMAL
MVV PRED: NORMAL
MVV-PRE: NORMAL
PEF %PRED-POST: NORMAL
PEF %PRED-PRE: NORMAL
PEF PRED: NORMAL
PEF%CHNG: NORMAL
PEF-POST: NORMAL
PEF-PRE: NORMAL
RAW %PRED: NORMAL
RAW PRED: NORMAL
RAW: NORMAL
RV PRE %PRED: NORMAL
RV PRED: NORMAL
RV: NORMAL
SVC %PRED: NORMAL
SVC PRED: NORMAL
SVC: NORMAL
TLC PRE %PRED: NORMAL
TLC PRED: NORMAL
TLC: NORMAL
VA %PRED: NORMAL
VA PRED: NORMAL
VA: NORMAL
VTG %PRED: NORMAL
VTG PRED: NORMAL
VTG: NORMAL

## 2025-03-06 PROCEDURE — 94640 AIRWAY INHALATION TREATMENT: CPT

## 2025-03-06 PROCEDURE — 71271 CT THORAX LUNG CANCER SCR C-: CPT

## 2025-03-06 PROCEDURE — 6370000000 HC RX 637 (ALT 250 FOR IP): Performed by: INTERNAL MEDICINE

## 2025-03-06 PROCEDURE — 94060 EVALUATION OF WHEEZING: CPT

## 2025-03-06 PROCEDURE — 94726 PLETHYSMOGRAPHY LUNG VOLUMES: CPT

## 2025-03-06 PROCEDURE — 94729 DIFFUSING CAPACITY: CPT

## 2025-03-06 RX ORDER — ALBUTEROL SULFATE 90 UG/1
4 INHALANT RESPIRATORY (INHALATION) ONCE
Status: COMPLETED | OUTPATIENT
Start: 2025-03-06 | End: 2025-03-06

## 2025-03-06 RX ADMIN — ALBUTEROL SULFATE 4 PUFF: 90 AEROSOL, METERED RESPIRATORY (INHALATION) at 10:14

## 2025-03-06 NOTE — TELEPHONE ENCOUNTER
Patient had her CT and PFT done today. You had wanted me to message you when done. Thanks.      Last Appt:  2/26/2025  Next Appt:   4/16/2025  Med verified in Epic

## 2025-03-06 NOTE — RESULT ENCOUNTER NOTE
Ct chest - left diaphragm elevation with atelectasis - can be seen after cardiac surgery    There is a retained pacer wire  from her surgery - she needs to reach out to cardiac surgeon for evaluation    Follow up as scheduled with me

## 2025-03-07 NOTE — PROCEDURES
[place] [address]                           PULMONARY FUNCTION      PATIENT NAME: WALT DELAROSA             : 1965  MED REC NO: [id number]                         ROOM:   ACCOUNT NO: [id number]                       ADMIT DATE: 2025  PROVIDER: Jason Marrero MD      DATE OF PROCEDURE: 2025    SURGEON:  Jason Marrero MD    REFERRING PHYSICIAN:  GLEN RANDALL    Spirometry shows a restrictive flow volume loop.  FEV1 is 42% predicted, with 7% bronchodilator changed to 45% predicted.  FVC 39% predicted, with 6% bronchodilator changed to 41% predicted.  FEV1/FVC ratio 85, post bronchodilator 86.    Total lung capacity by body box 62% predicted, RV 86% predicted, and diffusion capacity uncorrected 51% predicted, corrected is 87% predicted.  Airway resistance increased.    FINAL IMPRESSION:  The study shows severe obstructive and restrictive ventilatory dysfunction with a bronchodilator response that does not meet ATS criteria.  Clinical correlation is advised.          JASON MARRERO MD      D:  2025 19:04:14     T:  2025 21:12:02     /MENA  Job #:  [id number]     Doc#:  [id number]

## 2025-03-10 ENCOUNTER — RESULTS FOLLOW-UP (OUTPATIENT)
Dept: PULMONOLOGY | Age: 60
End: 2025-03-10

## 2025-03-13 DIAGNOSIS — J30.9 ALLERGIC RHINITIS, UNSPECIFIED SEASONALITY, UNSPECIFIED TRIGGER: ICD-10-CM

## 2025-03-13 RX ORDER — MONTELUKAST SODIUM 10 MG/1
10 TABLET ORAL NIGHTLY
Qty: 90 TABLET | Refills: 1 | Status: SHIPPED | OUTPATIENT
Start: 2025-03-13

## 2025-03-13 NOTE — TELEPHONE ENCOUNTER
Cele called requesting a refill of the below medication which has been pended for you:     Requested Prescriptions     Pending Prescriptions Disp Refills    montelukast (SINGULAIR) 10 MG tablet [Pharmacy Med Name: Montelukast Sodium 10 MG Oral Tablet] 90 tablet 0     Sig: Take 1 tablet by mouth nightly       Last Appointment Date: 2/25/2025  Next Appointment Date: 7/29/2025    Allergies   Allergen Reactions    Amiodarone Shortness Of Breath and Dizziness or Vertigo    Fentanyl Other (See Comments)     Patient had terrible nightmares and hallucinations while hospitalized and sedated while on fentanyl     Iodine Rash and Swelling     Were in ER being treated when it happened spent time in hosp.    Prednisone Other (See Comments)     Trouble swallowing    Shellfish Allergy Anaphylaxis    Adhesive Tape Other (See Comments)     blisters    Cefuroxime Axetil Diarrhea, Hives and Nausea And Vomiting    E-Mycin [Erythromycin] Diarrhea     Rash, hives    Ezetimibe Other (See Comments)     Other reaction(s): Muscle Pain    Gabapentin Other (See Comments)     \"Un functional\"    Morphine Hives     Itching, burning     Nsaids Diarrhea, Hives and Nausea And Vomiting    Statins Other (See Comments)     myalgias    Sulfa Antibiotics Hives    Pcn [Penicillins] Rash    Percocet [Oxycodone-Acetaminophen] Other (See Comments)     Severe drowsiness     Zithromax [Azithromycin] Rash

## 2025-04-15 RX ORDER — FEXOFENADINE HCL 180 MG/1
180 TABLET ORAL DAILY
Qty: 270 TABLET | Refills: 0 | Status: SHIPPED | OUTPATIENT
Start: 2025-04-15

## 2025-04-16 ENCOUNTER — OFFICE VISIT (OUTPATIENT)
Dept: PULMONOLOGY | Age: 60
End: 2025-04-16

## 2025-04-16 VITALS
TEMPERATURE: 98.1 F | WEIGHT: 215.4 LBS | RESPIRATION RATE: 20 BRPM | HEART RATE: 96 BPM | SYSTOLIC BLOOD PRESSURE: 120 MMHG | HEIGHT: 67 IN | BODY MASS INDEX: 33.81 KG/M2 | DIASTOLIC BLOOD PRESSURE: 68 MMHG | OXYGEN SATURATION: 94 %

## 2025-04-16 DIAGNOSIS — Z95.1 S/P CABG X 3: ICD-10-CM

## 2025-04-16 DIAGNOSIS — G47.30 SLEEP-RELATED BREATHING DISORDER: ICD-10-CM

## 2025-04-16 DIAGNOSIS — J98.4 RESTRICTIVE LUNG DISEASE: Primary | ICD-10-CM

## 2025-04-16 DIAGNOSIS — J98.6 ACQUIRED ELEVATED DIAPHRAGM: ICD-10-CM

## 2025-04-16 DIAGNOSIS — J44.9 CHRONIC OBSTRUCTIVE PULMONARY DISEASE, UNSPECIFIED COPD TYPE (HCC): ICD-10-CM

## 2025-04-16 DIAGNOSIS — I50.22 CHRONIC SYSTOLIC CONGESTIVE HEART FAILURE (HCC): ICD-10-CM

## 2025-04-16 DIAGNOSIS — J43.9 MILD EMPHYSEMA (HCC): ICD-10-CM

## 2025-04-16 NOTE — PROGRESS NOTES
Cele Camarena  4/19/2025  Chief Complaint   Patient presents with    pulmonar scarring    COPD     Follow up post testing        Cele Camarena  59 y.o. female   History of Present Illness  The patient presents for evaluation of COPD, emphysema, and sleep apnea.    She was last seen a few months ago, accompanied by her daughter. During that visit, she underwent a CT scan and a pulmonary function test, which she believes was compromised due to an ill-fitting mouthpiece. She has been using levalbuterol (Xopenex), which she reports as being more effective than albuterol without causing tachycardia. She has also initiated Mucinex therapy due to the production of thick nasal mucus post-hospitalization, which she suspects is infiltrating her bronchial tubes. This treatment has significantly improved her condition within a few days. She has Flonase at home but uses it sparingly due to its propensity to induce nosebleeds, particularly as she is on anticoagulant therapy. She underwent surgery on 12/15/2023, which necessitated a 45-day hospital stay and 15 days on mechanical ventilation. She experienced difficulty with fentanyl, resulting in prolonged recovery and concerns about potential nursing home placement due to psychosis. She has discontinued smoking. She does not use oxygen currently and reports satisfactory results from a walking oxygen test prior to her discharge. Her oxygen saturation typically ranges from 94% to 96%, occasionally reaching 98%. She is allergic to steroids.    She does not use a CPAP machine for sleep apnea and has never undergone a sleep study. She reports snoring and possible apneic episodes at night, as observed by her late mother. She experiences poor sleep quality, often waking up after a few hours and requiring daytime naps. She does not experience excessive daytime sleepiness while driving but may doze off as a passenger or while watching TV or reading.    She is scheduled for knee

## 2025-04-25 ENCOUNTER — HOSPITAL ENCOUNTER (OUTPATIENT)
Dept: GENERAL RADIOLOGY | Age: 60
Discharge: HOME | End: 2025-04-27
Attending: INTERNAL MEDICINE
Payer: COMMERCIAL

## 2025-04-25 DIAGNOSIS — J98.6 ACQUIRED ELEVATED DIAPHRAGM: ICD-10-CM

## 2025-04-25 PROCEDURE — 76000 FLUOROSCOPY <1 HR PHYS/QHP: CPT

## 2025-05-05 ENCOUNTER — HOSPITAL ENCOUNTER (OUTPATIENT)
Age: 60
Discharge: HOME | End: 2025-05-05
Payer: COMMERCIAL

## 2025-05-05 ENCOUNTER — OFFICE VISIT (OUTPATIENT)
Dept: PAIN MANAGEMENT | Age: 60
End: 2025-05-05
Payer: COMMERCIAL

## 2025-05-05 VITALS
HEART RATE: 104 BPM | DIASTOLIC BLOOD PRESSURE: 68 MMHG | OXYGEN SATURATION: 95 % | HEIGHT: 67 IN | SYSTOLIC BLOOD PRESSURE: 128 MMHG | WEIGHT: 215 LBS | RESPIRATION RATE: 14 BRPM | BODY MASS INDEX: 33.74 KG/M2

## 2025-05-05 DIAGNOSIS — M47.817 LUMBOSACRAL SPONDYLOSIS WITHOUT MYELOPATHY: ICD-10-CM

## 2025-05-05 DIAGNOSIS — N18.32 STAGE 3B CHRONIC KIDNEY DISEASE (HCC): ICD-10-CM

## 2025-05-05 DIAGNOSIS — R25.1 TREMORS OF NERVOUS SYSTEM: ICD-10-CM

## 2025-05-05 DIAGNOSIS — M15.0 PRIMARY OSTEOARTHRITIS INVOLVING MULTIPLE JOINTS: Primary | ICD-10-CM

## 2025-05-05 LAB — 25(OH)D3 SERPL-MCNC: 40.4 NG/ML (ref 30–100)

## 2025-05-05 PROCEDURE — G8417 CALC BMI ABV UP PARAM F/U: HCPCS | Performed by: PHYSICAL MEDICINE & REHABILITATION

## 2025-05-05 PROCEDURE — 3017F COLORECTAL CA SCREEN DOC REV: CPT | Performed by: PHYSICAL MEDICINE & REHABILITATION

## 2025-05-05 PROCEDURE — G8427 DOCREV CUR MEDS BY ELIG CLIN: HCPCS | Performed by: PHYSICAL MEDICINE & REHABILITATION

## 2025-05-05 PROCEDURE — 3078F DIAST BP <80 MM HG: CPT | Performed by: PHYSICAL MEDICINE & REHABILITATION

## 2025-05-05 PROCEDURE — 1036F TOBACCO NON-USER: CPT | Performed by: PHYSICAL MEDICINE & REHABILITATION

## 2025-05-05 PROCEDURE — 99214 OFFICE O/P EST MOD 30 MIN: CPT | Performed by: PHYSICAL MEDICINE & REHABILITATION

## 2025-05-05 PROCEDURE — 36415 COLL VENOUS BLD VENIPUNCTURE: CPT

## 2025-05-05 PROCEDURE — 3074F SYST BP LT 130 MM HG: CPT | Performed by: PHYSICAL MEDICINE & REHABILITATION

## 2025-05-05 PROCEDURE — 82306 VITAMIN D 25 HYDROXY: CPT

## 2025-05-05 NOTE — PROGRESS NOTES
[facility]  [address] [place]. [address]    Patient Name: Cele Camarena  MRN: [id number]  Encounter Date: 5/5/2025     SUBJECTIVE:  Cele Camarena is a 59 y.o., female being seen today regarding   Chief Complaint   Patient presents with    Knee Pain     Right knee pain     History of Present Illness  The patient presents for evaluation of right knee osteoarthritis, lumbar spondylosis, lumbar radiculitis, lumbar facet syndrome, and left phrenic nerve injury with irregularity.    She is still planning on having a right knee replacement. 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 This was due to an open heart surgery, which caused some phrenic nerve damage on the left. She has been scheduled for a sleep study in 06/2025 to evaluate for potential sleep apnea. It is anticipated that following this evaluation, she will be cleared for her upcoming right knee surgery.    Her lower back pain remains consistent, with exacerbations noted during inclement weather, particularly rain. The pain radiates down her right lateral leg. She expresses interest in receiving an injection for her back pain, as it has been progressively worsening. However, she is unable to receive steroid injections. She also reports difficulty in prolonged standing due to her back pain.    Supplemental Information  She has not undergone any surgical procedures on her neck but does have a diagnosis of arthritis.       Functionality Assessment & Goals:  On a scale of 0 (Does not Interfere) to 10 (Completely Interferes)  Which number describes how during the past week pain has interfered with the following:   A.  General Activity: 10    B.  Mood:  7   C.  Walking Ability:  10   D.  Normal Work (Includes both work outside the home and housework):  10   E.  Relations with Other People:  7   F.  Sleep:  7    G.  Enjoyment of

## 2025-05-13 ENCOUNTER — OFFICE VISIT (OUTPATIENT)
Dept: NEPHROLOGY | Age: 60
End: 2025-05-13
Payer: COMMERCIAL

## 2025-05-13 ENCOUNTER — HOSPITAL ENCOUNTER (OUTPATIENT)
Age: 60
Discharge: HOME | End: 2025-05-13
Payer: COMMERCIAL

## 2025-05-13 VITALS
HEART RATE: 116 BPM | WEIGHT: 217 LBS | BODY MASS INDEX: 34.06 KG/M2 | SYSTOLIC BLOOD PRESSURE: 128 MMHG | DIASTOLIC BLOOD PRESSURE: 78 MMHG | HEIGHT: 67 IN | RESPIRATION RATE: 14 BRPM

## 2025-05-13 DIAGNOSIS — N18.31 STAGE 3A CHRONIC KIDNEY DISEASE (HCC): Primary | ICD-10-CM

## 2025-05-13 DIAGNOSIS — N18.31 STAGE 3A CHRONIC KIDNEY DISEASE (HCC): ICD-10-CM

## 2025-05-13 DIAGNOSIS — I10 PRIMARY HYPERTENSION: ICD-10-CM

## 2025-05-13 LAB
ANION GAP SERPL CALCULATED.3IONS-SCNC: 15 MMOL/L (ref 9–17)
BUN SERPL-MCNC: 29 MG/DL (ref 6–20)
BUN/CREAT SERPL: 19 (ref 9–20)
CALCIUM SERPL-MCNC: 9.8 MG/DL (ref 8.6–10.4)
CHLORIDE SERPL-SCNC: 99 MMOL/L (ref 98–107)
CO2 SERPL-SCNC: 26 MMOL/L (ref 20–31)
CREAT SERPL-MCNC: 1.5 MG/DL (ref 0.5–0.9)
GFR, ESTIMATED: 40 ML/MIN/1.73M2
GLUCOSE SERPL-MCNC: 219 MG/DL (ref 70–99)
POTASSIUM SERPL-SCNC: 4.2 MMOL/L (ref 3.7–5.3)
SODIUM SERPL-SCNC: 140 MMOL/L (ref 135–144)

## 2025-05-13 PROCEDURE — G8427 DOCREV CUR MEDS BY ELIG CLIN: HCPCS | Performed by: INTERNAL MEDICINE

## 2025-05-13 PROCEDURE — 1036F TOBACCO NON-USER: CPT | Performed by: INTERNAL MEDICINE

## 2025-05-13 PROCEDURE — 99213 OFFICE O/P EST LOW 20 MIN: CPT | Performed by: INTERNAL MEDICINE

## 2025-05-13 PROCEDURE — 80048 BASIC METABOLIC PNL TOTAL CA: CPT

## 2025-05-13 PROCEDURE — 3078F DIAST BP <80 MM HG: CPT | Performed by: INTERNAL MEDICINE

## 2025-05-13 PROCEDURE — 3017F COLORECTAL CA SCREEN DOC REV: CPT | Performed by: INTERNAL MEDICINE

## 2025-05-13 PROCEDURE — G8417 CALC BMI ABV UP PARAM F/U: HCPCS | Performed by: INTERNAL MEDICINE

## 2025-05-13 PROCEDURE — 36415 COLL VENOUS BLD VENIPUNCTURE: CPT

## 2025-05-13 PROCEDURE — 3074F SYST BP LT 130 MM HG: CPT | Performed by: INTERNAL MEDICINE

## 2025-05-13 RX ORDER — DULOXETIN HYDROCHLORIDE 20 MG/1
20 CAPSULE, DELAYED RELEASE ORAL DAILY
COMMUNITY
Start: 2025-05-07

## 2025-05-13 RX ORDER — SERTRALINE HYDROCHLORIDE 25 MG/1
25 TABLET, FILM COATED ORAL DAILY
COMMUNITY
Start: 2025-05-07

## 2025-05-13 NOTE — PROGRESS NOTES
Nephrology Progress Note    Cele Leena Cleaning, APRN - CNP      SUBJECTIVE      Chief Complaint   Patient presents with    Chronic Kidney Disease     Patient was in my clinic today for an ongoing nephrological evaluation.  Patient states that she is doing fairly well.  She denies any recent changes in her medication.  Labs that were ordered on initial evaluation were available.  Her most recent creatinine is 1.3 mg/dL with estimated GFR of 42.  Renal ultrasound shows right kidney 10.3 cm and left kidney 9.7 cm.  There was no increase in cortical echogenicity.  Denies any nausea vomiting or diarrhea  No chest pain PND orthopnea.    4/11/2023  Patient was in my clinic today for an ongoing nephrological evaluation.  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  She is taking Inderal with some help.  Patient denies any voiding difficulties i.e. urinary frequency urgency or hesitancy.  There is no history of consumption of over-the-counter herbal or natural medication.  Her blood sugars remain under good control and she takes metformin 1 g tablet with glimepiride.  Patient denies consumption of over-the-counter herbal or natural medication.  There is no history of nausea vomiting diarrhea hematemesis or melena.  7/11/23  Patient states that she was started on colchicine and allopurinol.  She is tolerating well.  She continues to have inflammation in her joints as well as in her knees.  She was seen by hematologist and she was told that increase in WBC count is due to inflammation.  Her blood pressure is under good control.  She takes propanolol for her tremor as well as for her blood pressure.  Patient states that her blood sugars are under reasonable control.  She does not take any Motrin Aleve or ibuprofen.  She denies any consumption of over-the-counter herbal or natural medication  Patient denies any chest pain PND

## 2025-05-26 DIAGNOSIS — M25.50 MULTIPLE JOINT PAIN: ICD-10-CM

## 2025-05-26 DIAGNOSIS — M79.7 FIBROMYALGIA: ICD-10-CM

## 2025-05-29 ENCOUNTER — OFFICE VISIT (OUTPATIENT)
Dept: PRIMARY CARE CLINIC | Age: 60
End: 2025-05-29
Payer: COMMERCIAL

## 2025-05-29 VITALS
SYSTOLIC BLOOD PRESSURE: 120 MMHG | HEIGHT: 67 IN | DIASTOLIC BLOOD PRESSURE: 78 MMHG | OXYGEN SATURATION: 96 % | HEART RATE: 91 BPM | BODY MASS INDEX: 34.37 KG/M2 | WEIGHT: 219 LBS | TEMPERATURE: 98.8 F

## 2025-05-29 DIAGNOSIS — J01.40 ACUTE NON-RECURRENT PANSINUSITIS: Primary | ICD-10-CM

## 2025-05-29 PROCEDURE — 99213 OFFICE O/P EST LOW 20 MIN: CPT | Performed by: NURSE PRACTITIONER

## 2025-05-29 PROCEDURE — 3078F DIAST BP <80 MM HG: CPT | Performed by: NURSE PRACTITIONER

## 2025-05-29 PROCEDURE — G8417 CALC BMI ABV UP PARAM F/U: HCPCS | Performed by: NURSE PRACTITIONER

## 2025-05-29 PROCEDURE — 1036F TOBACCO NON-USER: CPT | Performed by: NURSE PRACTITIONER

## 2025-05-29 PROCEDURE — 3074F SYST BP LT 130 MM HG: CPT | Performed by: NURSE PRACTITIONER

## 2025-05-29 PROCEDURE — 3017F COLORECTAL CA SCREEN DOC REV: CPT | Performed by: NURSE PRACTITIONER

## 2025-05-29 PROCEDURE — G8427 DOCREV CUR MEDS BY ELIG CLIN: HCPCS | Performed by: NURSE PRACTITIONER

## 2025-05-29 RX ORDER — DOXYCYCLINE HYCLATE 100 MG
100 TABLET ORAL 2 TIMES DAILY
Qty: 20 TABLET | Refills: 0 | Status: SHIPPED | OUTPATIENT
Start: 2025-05-29 | End: 2025-06-08

## 2025-05-29 RX ORDER — HYDROXYZINE PAMOATE 25 MG/1
CAPSULE ORAL
COMMUNITY
Start: 2025-05-07

## 2025-05-29 NOTE — PROGRESS NOTES
Subjective:      Patient ID: Cele Camarena is a 59 y.o. female coming in for   Chief Complaint   Patient presents with    Sinus Problem     Sx began 1 1/2 mo ago.         History of Present Illness  The patient is a 59-year-old female who presents to urgent care with complaints of sinus congestion. She is afebrile here today. Vital signs are within normal limits.    She has been experiencing severe sinus issues for approximately 1.5 months, which have escalated to the point of causing ocular spasms and sinus headaches. She reports a low-grade fever persisting for the past week, which temporarily subsides with Tylenol administration but recurs upon cessation. Her symptoms are predominantly localized in the upper respiratory tract. She has been self-medicating with Mucinex but notes that it exacerbates her pre-existing hypertension and tachycardia. She also mentions a lingering pulmonary issue post-surgery. She has a known allergy to multiple substances and typically responds well to clarithromycin, doxycycline, or Levaquin. However, she notes that her insurance does not cover doxycycline unless it is in tablet form.      Review of Systems     Objective:/78   Pulse 91   Temp 98.8 °F (37.1 °C) (Tympanic)   Ht 1.702 m (5' 7\")   Wt 99.3 kg (219 lb)   SpO2 96%   BMI 34.30 kg/m²      Physical Exam  Vitals and nursing note reviewed.   Constitutional:       General: She is not in acute distress.     Appearance: Normal appearance. She is not ill-appearing.   HENT:      Head: Normocephalic.      Nose: Congestion present.      Right Sinus: Maxillary sinus tenderness and frontal sinus tenderness present.      Left Sinus: Maxillary sinus tenderness and frontal sinus tenderness present.      Mouth/Throat:      Mouth: Mucous membranes are moist.      Pharynx: Oropharynx is clear. No oropharyngeal exudate or posterior oropharyngeal erythema.   Cardiovascular:      Rate and Rhythm: Normal rate and regular rhythm.

## 2025-06-04 ENCOUNTER — HOSPITAL ENCOUNTER (OUTPATIENT)
Dept: SLEEP CENTER | Age: 60
Discharge: HOME | End: 2025-06-06
Attending: INTERNAL MEDICINE

## 2025-06-04 DIAGNOSIS — G47.30 SLEEP-RELATED BREATHING DISORDER: ICD-10-CM

## 2025-06-13 ENCOUNTER — RESULTS FOLLOW-UP (OUTPATIENT)
Dept: PULMONOLOGY | Age: 60
End: 2025-06-13

## 2025-06-13 DIAGNOSIS — G47.33 OSA (OBSTRUCTIVE SLEEP APNEA): Primary | ICD-10-CM

## 2025-06-13 DIAGNOSIS — G47.30 SLEEP-RELATED BREATHING DISORDER: Primary | ICD-10-CM

## 2025-06-17 RX ORDER — DOXYCYCLINE 100 MG/1
TABLET ORAL
COMMUNITY
Start: 2025-05-29

## 2025-06-18 ENCOUNTER — TELEPHONE (OUTPATIENT)
Dept: FAMILY MEDICINE CLINIC | Age: 60
End: 2025-06-18

## 2025-06-18 NOTE — TELEPHONE ENCOUNTER
Our records indicate that Cele Camarena may benefit from medication optimization to meet the three pillars of CHF care (ACE/ARB/ARNI, Beta Blocker, and MRA therapy). The ask is to change the patient's medications below, as appropriate.     The measure definition, denominator, inclusions, and exclusions are below:

## 2025-06-19 NOTE — TELEPHONE ENCOUNTER
Pt follows with cardiology. I would suggest that they manage this. Also I can not enlarge the recommendations for the CHF medication.

## 2025-07-07 ENCOUNTER — OFFICE VISIT (OUTPATIENT)
Dept: CARDIOLOGY | Age: 60
End: 2025-07-07
Payer: COMMERCIAL

## 2025-07-07 VITALS
SYSTOLIC BLOOD PRESSURE: 130 MMHG | HEIGHT: 67 IN | HEART RATE: 81 BPM | OXYGEN SATURATION: 96 % | WEIGHT: 223 LBS | DIASTOLIC BLOOD PRESSURE: 64 MMHG | BODY MASS INDEX: 35 KG/M2

## 2025-07-07 DIAGNOSIS — Z95.1 S/P CABG (CORONARY ARTERY BYPASS GRAFT): ICD-10-CM

## 2025-07-07 DIAGNOSIS — R94.31 ABNORMAL ECG: ICD-10-CM

## 2025-07-07 DIAGNOSIS — R06.02 SHORTNESS OF BREATH: Primary | ICD-10-CM

## 2025-07-07 DIAGNOSIS — I25.119 CORONARY ARTERY DISEASE INVOLVING NATIVE CORONARY ARTERY OF NATIVE HEART WITH ANGINA PECTORIS: ICD-10-CM

## 2025-07-07 DIAGNOSIS — I50.22 CHRONIC SYSTOLIC HEART FAILURE (HCC): ICD-10-CM

## 2025-07-07 DIAGNOSIS — I25.10 ATHEROSCLEROSIS OF NATIVE CORONARY ARTERY OF NATIVE HEART WITHOUT ANGINA PECTORIS: ICD-10-CM

## 2025-07-07 DIAGNOSIS — I25.5 ISCHEMIC CARDIOMYOPATHY: ICD-10-CM

## 2025-07-07 DIAGNOSIS — I10 ESSENTIAL HYPERTENSION: ICD-10-CM

## 2025-07-07 PROCEDURE — 3078F DIAST BP <80 MM HG: CPT | Performed by: INTERNAL MEDICINE

## 2025-07-07 PROCEDURE — 99214 OFFICE O/P EST MOD 30 MIN: CPT | Performed by: INTERNAL MEDICINE

## 2025-07-07 PROCEDURE — G8427 DOCREV CUR MEDS BY ELIG CLIN: HCPCS | Performed by: INTERNAL MEDICINE

## 2025-07-07 PROCEDURE — 93000 ELECTROCARDIOGRAM COMPLETE: CPT | Performed by: INTERNAL MEDICINE

## 2025-07-07 PROCEDURE — 3075F SYST BP GE 130 - 139MM HG: CPT | Performed by: INTERNAL MEDICINE

## 2025-07-07 PROCEDURE — 1036F TOBACCO NON-USER: CPT | Performed by: INTERNAL MEDICINE

## 2025-07-07 PROCEDURE — 3017F COLORECTAL CA SCREEN DOC REV: CPT | Performed by: INTERNAL MEDICINE

## 2025-07-07 PROCEDURE — G8417 CALC BMI ABV UP PARAM F/U: HCPCS | Performed by: INTERNAL MEDICINE

## 2025-07-07 NOTE — PROGRESS NOTES
Cardiology Consultation  [facility]    Cele Camarena  [id number]   26     CC: follow up    HPI:  Is here for follow up.   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   Having SOB.   Having MEDRANO.   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   Needs CPAP.   No cp.   No le edema.   No syncope.     Past Medical History:   Diagnosis Date    Allergic rhinitis     Anxiety     Arthritis     Asthma     CAD (coronary artery disease)     multivessel. Dr. Grier [facility] [place] last visit 10/2023    CHF (congestive heart failure) ([facility])     Chronic back pain 2019    Chronic kidney disease     CKD (chronic kidney disease), stage III ([facility])     Dr. Gann Nephrology last appt 10/10/2023    Depression     Fibromyalgia     Gout     Dr. Cyndie Cabrales [facility]    Hearing loss     History of fractured vertebra     L2 L3 L4    Hyperlipidemia     Hypertension     Neuropathy     OA (osteoarthritis)     Peripheral vascular disease     Sacroiliac joint dysfunction     Tremor     Type 2 diabetes mellitus without complication ([facility])     Type II or unspecified type diabetes mellitus without mention of complication, not stated as uncontrolled     PCP    Under care of team     Dr. Kings Lugo last appt 2023    Wellness examination     Leena Murrieta CNP PCP last appt 10/2023       Past Surgical History:   Procedure Laterality Date    BACK SURGERY      CARDIAC PROCEDURE N/A 2023    paxton / Left heart cath / coronary angiography performed by Elena Morris MD at [facility] CARDIAC CATH LAB     SECTION      CHOLECYSTECTOMY      CORONARY ARTERY BYPASS GRAFT N/A 12/15/2023    CABG CORONARY ARTERY BYPASS X3, ON PUMP SWAN SHANTHI, LESLIE, RADIAL HARVEST performed by Deshawn Carreon MD at [facility] CVOR    JOINT REPLACEMENT Right     partial knee replacement    KNEE SURGERY      right    OTHER SURGICAL HISTORY      darrach procedure    OTHER SURGICAL HISTORY

## 2025-07-08 ENCOUNTER — HOSPITAL ENCOUNTER (OUTPATIENT)
Dept: SLEEP CENTER | Age: 60
Discharge: HOME | End: 2025-07-10
Payer: COMMERCIAL

## 2025-07-08 ENCOUNTER — OFFICE VISIT (OUTPATIENT)
Dept: PAIN MANAGEMENT | Age: 60
End: 2025-07-08
Payer: COMMERCIAL

## 2025-07-08 VITALS
DIASTOLIC BLOOD PRESSURE: 82 MMHG | HEART RATE: 74 BPM | OXYGEN SATURATION: 93 % | RESPIRATION RATE: 18 BRPM | HEIGHT: 67 IN | WEIGHT: 223 LBS | SYSTOLIC BLOOD PRESSURE: 153 MMHG | BODY MASS INDEX: 35 KG/M2

## 2025-07-08 DIAGNOSIS — G89.29 CHRONIC PAIN OF RIGHT KNEE: ICD-10-CM

## 2025-07-08 DIAGNOSIS — G47.30 SLEEP-RELATED BREATHING DISORDER: ICD-10-CM

## 2025-07-08 DIAGNOSIS — T14.8XXA INJURY OF PHRENIC NERVE: ICD-10-CM

## 2025-07-08 DIAGNOSIS — M47.817 LUMBOSACRAL SPONDYLOSIS WITHOUT MYELOPATHY: ICD-10-CM

## 2025-07-08 DIAGNOSIS — M54.16 LUMBAR RADICULITIS: ICD-10-CM

## 2025-07-08 DIAGNOSIS — M25.561 CHRONIC PAIN OF RIGHT KNEE: ICD-10-CM

## 2025-07-08 DIAGNOSIS — I25.10 CORONARY ARTERY ARTERIOSCLEROSIS: ICD-10-CM

## 2025-07-08 DIAGNOSIS — M15.0 PRIMARY OSTEOARTHRITIS INVOLVING MULTIPLE JOINTS: Primary | ICD-10-CM

## 2025-07-08 PROCEDURE — 1036F TOBACCO NON-USER: CPT | Performed by: PHYSICAL MEDICINE & REHABILITATION

## 2025-07-08 PROCEDURE — 3077F SYST BP >= 140 MM HG: CPT | Performed by: PHYSICAL MEDICINE & REHABILITATION

## 2025-07-08 PROCEDURE — 3079F DIAST BP 80-89 MM HG: CPT | Performed by: PHYSICAL MEDICINE & REHABILITATION

## 2025-07-08 PROCEDURE — 3017F COLORECTAL CA SCREEN DOC REV: CPT | Performed by: PHYSICAL MEDICINE & REHABILITATION

## 2025-07-08 PROCEDURE — G8417 CALC BMI ABV UP PARAM F/U: HCPCS | Performed by: PHYSICAL MEDICINE & REHABILITATION

## 2025-07-08 PROCEDURE — 99214 OFFICE O/P EST MOD 30 MIN: CPT | Performed by: PHYSICAL MEDICINE & REHABILITATION

## 2025-07-08 PROCEDURE — G8427 DOCREV CUR MEDS BY ELIG CLIN: HCPCS | Performed by: PHYSICAL MEDICINE & REHABILITATION

## 2025-07-08 PROCEDURE — 95811 POLYSOM 6/>YRS CPAP 4/> PARM: CPT

## 2025-07-08 RX ORDER — ACETAMINOPHEN AND CODEINE PHOSPHATE 300; 30 MG/1; MG/1
TABLET ORAL
COMMUNITY
Start: 2025-06-19

## 2025-07-08 ASSESSMENT — ENCOUNTER SYMPTOMS: BACK PAIN: 1

## 2025-07-08 NOTE — PROGRESS NOTES
Intelligence will be utilized during this visit to record, process the conversation to generate a clinical note, and support improvement of the AI technology. The patient (or guardian, if applicable) and other individuals in attendance at the appointment consented to the use of AI, including the recording.      Abhishek Roland MD  Interventional Pain Management/PM&R   [facility] [place]

## 2025-07-10 RX ORDER — APIXABAN 5 MG/1
5 TABLET, FILM COATED ORAL 2 TIMES DAILY
Qty: 180 TABLET | Refills: 3 | Status: SHIPPED | OUTPATIENT
Start: 2025-07-10

## 2025-07-16 ENCOUNTER — OFFICE VISIT (OUTPATIENT)
Dept: PULMONOLOGY | Age: 60
End: 2025-07-16
Payer: COMMERCIAL

## 2025-07-16 ENCOUNTER — PATIENT MESSAGE (OUTPATIENT)
Dept: FAMILY MEDICINE CLINIC | Age: 60
End: 2025-07-16

## 2025-07-16 ENCOUNTER — HOSPITAL ENCOUNTER (OUTPATIENT)
Age: 60
Discharge: HOME | End: 2025-07-16
Payer: COMMERCIAL

## 2025-07-16 VITALS
SYSTOLIC BLOOD PRESSURE: 130 MMHG | DIASTOLIC BLOOD PRESSURE: 58 MMHG | HEART RATE: 87 BPM | HEIGHT: 67 IN | OXYGEN SATURATION: 94 % | WEIGHT: 221.6 LBS | BODY MASS INDEX: 34.78 KG/M2

## 2025-07-16 DIAGNOSIS — J44.9 CHRONIC OBSTRUCTIVE PULMONARY DISEASE, UNSPECIFIED COPD TYPE (HCC): ICD-10-CM

## 2025-07-16 DIAGNOSIS — G47.33 OSA (OBSTRUCTIVE SLEEP APNEA): Primary | ICD-10-CM

## 2025-07-16 DIAGNOSIS — E11.9 CONTROLLED TYPE 2 DIABETES MELLITUS WITHOUT COMPLICATION, UNSPECIFIED WHETHER LONG TERM INSULIN USE (HCC): ICD-10-CM

## 2025-07-16 DIAGNOSIS — E78.00 PURE HYPERCHOLESTEROLEMIA: ICD-10-CM

## 2025-07-16 DIAGNOSIS — I50.22 CHRONIC SYSTOLIC CONGESTIVE HEART FAILURE (HCC): ICD-10-CM

## 2025-07-16 DIAGNOSIS — J98.4 PULMONARY SCARRING: ICD-10-CM

## 2025-07-16 DIAGNOSIS — J43.9 MILD EMPHYSEMA (HCC): ICD-10-CM

## 2025-07-16 DIAGNOSIS — Z95.1 S/P CABG X 3: ICD-10-CM

## 2025-07-16 DIAGNOSIS — J98.4 RESTRICTIVE LUNG DISEASE: ICD-10-CM

## 2025-07-16 DIAGNOSIS — J98.6 ACQUIRED ELEVATED DIAPHRAGM: ICD-10-CM

## 2025-07-16 DIAGNOSIS — Z87.09 HX OF PLEURAL EFFUSION: ICD-10-CM

## 2025-07-16 LAB
ALBUMIN SERPL-MCNC: 4.2 G/DL (ref 3.5–5.2)
ALBUMIN/GLOB SERPL: 1.3 {RATIO} (ref 1–2.5)
ALP SERPL-CCNC: 112 U/L (ref 35–104)
ALT SERPL-CCNC: 32 U/L (ref 10–35)
ANION GAP SERPL CALCULATED.3IONS-SCNC: 14 MMOL/L (ref 9–16)
AST SERPL-CCNC: 38 U/L (ref 10–35)
BILIRUB SERPL-MCNC: 0.4 MG/DL (ref 0–1.2)
BUN SERPL-MCNC: 28 MG/DL (ref 6–20)
BUN/CREAT SERPL: 19 (ref 9–20)
CALCIUM SERPL-MCNC: 9.7 MG/DL (ref 8.6–10.4)
CHLORIDE SERPL-SCNC: 98 MMOL/L (ref 98–107)
CHOLEST SERPL-MCNC: 227 MG/DL (ref 0–199)
CHOLESTEROL/HDL RATIO: 6.9
CO2 SERPL-SCNC: 27 MMOL/L (ref 20–31)
CREAT SERPL-MCNC: 1.5 MG/DL (ref 0.6–0.9)
EST. AVERAGE GLUCOSE BLD GHB EST-MCNC: 169 MG/DL
GFR, ESTIMATED: 40 ML/MIN/1.73M2
GLUCOSE SERPL-MCNC: 137 MG/DL (ref 74–99)
HBA1C MFR BLD: 7.5 % (ref 4–6)
HDLC SERPL-MCNC: 33 MG/DL
LDLC SERPL CALC-MCNC: 115 MG/DL (ref 0–100)
POTASSIUM SERPL-SCNC: 4.2 MMOL/L (ref 3.7–5.3)
PROT SERPL-MCNC: 7.5 G/DL (ref 6.6–8.7)
SODIUM SERPL-SCNC: 139 MMOL/L (ref 136–145)
TRIGL SERPL-MCNC: 397 MG/DL
VLDLC SERPL CALC-MCNC: 79 MG/DL (ref 1–30)

## 2025-07-16 PROCEDURE — 80053 COMPREHEN METABOLIC PANEL: CPT

## 2025-07-16 PROCEDURE — 3078F DIAST BP <80 MM HG: CPT | Performed by: INTERNAL MEDICINE

## 2025-07-16 PROCEDURE — 3023F SPIROM DOC REV: CPT | Performed by: INTERNAL MEDICINE

## 2025-07-16 PROCEDURE — 3017F COLORECTAL CA SCREEN DOC REV: CPT | Performed by: INTERNAL MEDICINE

## 2025-07-16 PROCEDURE — 36415 COLL VENOUS BLD VENIPUNCTURE: CPT

## 2025-07-16 PROCEDURE — 1036F TOBACCO NON-USER: CPT | Performed by: INTERNAL MEDICINE

## 2025-07-16 PROCEDURE — 83036 HEMOGLOBIN GLYCOSYLATED A1C: CPT

## 2025-07-16 PROCEDURE — G8427 DOCREV CUR MEDS BY ELIG CLIN: HCPCS | Performed by: INTERNAL MEDICINE

## 2025-07-16 PROCEDURE — 80061 LIPID PANEL: CPT

## 2025-07-16 PROCEDURE — 3075F SYST BP GE 130 - 139MM HG: CPT | Performed by: INTERNAL MEDICINE

## 2025-07-16 PROCEDURE — G8417 CALC BMI ABV UP PARAM F/U: HCPCS | Performed by: INTERNAL MEDICINE

## 2025-07-16 PROCEDURE — 99214 OFFICE O/P EST MOD 30 MIN: CPT | Performed by: INTERNAL MEDICINE

## 2025-07-16 NOTE — PROGRESS NOTES
Cele Camarena  7/16/2025  Chief Complaint   Patient presents with    Follow-up     Follow up COPD testing        Cele Camarena  59 y.o. female   History of Present Illness  The patient presents for evaluation of sleep apnea and left diaphragm elevation.    She reports that the Spiriva medication, taken in the morning, has been beneficial. Occasionally, she feels the need for an additional dose at night. She does not use an albuterol inhaler but uses Xopenex sparingly. She mentions that her trachea is clearer in the morning after using Spiriva, compared to the previous night when she experiences a buildup in her throat. She believes this could be due to drainage getting stuck.    A sleep study was conducted on 06/04/2025, which indicated severe sleep apnea with 71.4 episodes per hour of stopped breathing. She recalls that her breathing was significantly improved on the day of the sleep study, but it has since returned to its usual state. She is awaiting her BiPAP machine .               Review of Systems -  General ROS: negative for - chills, fatigue, fever or weight loss  ENT ROS: negative for - headaches, oral lesions or sore throat  Cardiovascular ROS: no chest pain , orthopnea or pnd   Gastrointestinal ROS: no abdominal pain, change in bowel habits, or black or bloody stools  Skin - no rash   Neuro - no blurry vision , no loc . No focal weakness   msk - no jt tenderness or swelling    Vascular - no claudication , rest completed and negative   Lymphatic - complete and negative   Hematology - oncology - complete and negative   Allergy immunology - complete and negative    no burning or hematuria       LUNG CANCER SCREENING     CRITERIA MET    []     CT ORDERED  []      CRITERIA NOT MET   []      REFUSED                    []        REASON CRITERIA NOT MET     SMOKING LESS THAN 30 PY  []      AGE LESS THAN 50 or GREATER 80 YEARS  []      QUIT SMOKING 15 YEARS OR GREATER   []      RECENT CT WITH IN 11 MONTHS

## 2025-07-23 ENCOUNTER — HOSPITAL ENCOUNTER (OUTPATIENT)
Dept: NUCLEAR MEDICINE | Age: 60
Discharge: HOME | End: 2025-07-25
Attending: INTERNAL MEDICINE
Payer: COMMERCIAL

## 2025-07-23 DIAGNOSIS — R94.31 ABNORMAL ECG: ICD-10-CM

## 2025-07-23 DIAGNOSIS — I10 ESSENTIAL HYPERTENSION: ICD-10-CM

## 2025-07-23 DIAGNOSIS — R06.02 SHORTNESS OF BREATH: ICD-10-CM

## 2025-07-23 DIAGNOSIS — Z95.1 S/P CABG (CORONARY ARTERY BYPASS GRAFT): ICD-10-CM

## 2025-07-23 DIAGNOSIS — I25.5 ISCHEMIC CARDIOMYOPATHY: ICD-10-CM

## 2025-07-23 DIAGNOSIS — I50.22 CHRONIC SYSTOLIC HEART FAILURE (HCC): ICD-10-CM

## 2025-07-23 DIAGNOSIS — I25.119 CORONARY ARTERY DISEASE INVOLVING NATIVE CORONARY ARTERY OF NATIVE HEART WITH ANGINA PECTORIS: ICD-10-CM

## 2025-07-23 DIAGNOSIS — I25.10 ATHEROSCLEROSIS OF NATIVE CORONARY ARTERY OF NATIVE HEART WITHOUT ANGINA PECTORIS: ICD-10-CM

## 2025-07-23 PROCEDURE — 78472 GATED HEART PLANAR SINGLE: CPT

## 2025-07-23 PROCEDURE — A9560 TC99M LABELED RBC: HCPCS | Performed by: INTERNAL MEDICINE

## 2025-07-23 PROCEDURE — 6360000002 HC RX W HCPCS

## 2025-07-23 PROCEDURE — 3430000000 HC RX DIAGNOSTIC RADIOPHARMACEUTICAL: Performed by: INTERNAL MEDICINE

## 2025-07-23 RX ADMIN — Medication 25 MILLICURIE: at 10:30

## 2025-07-24 ENCOUNTER — OFFICE VISIT (OUTPATIENT)
Dept: FAMILY MEDICINE CLINIC | Age: 60
End: 2025-07-24
Payer: COMMERCIAL

## 2025-07-24 ENCOUNTER — TELEPHONE (OUTPATIENT)
Dept: FAMILY MEDICINE CLINIC | Age: 60
End: 2025-07-24

## 2025-07-24 VITALS
BODY MASS INDEX: 35.16 KG/M2 | HEART RATE: 86 BPM | DIASTOLIC BLOOD PRESSURE: 78 MMHG | HEIGHT: 67 IN | SYSTOLIC BLOOD PRESSURE: 132 MMHG | OXYGEN SATURATION: 96 % | WEIGHT: 224 LBS

## 2025-07-24 DIAGNOSIS — J98.4 RESTRICTIVE LUNG DISEASE: ICD-10-CM

## 2025-07-24 DIAGNOSIS — G47.33 OSA (OBSTRUCTIVE SLEEP APNEA): ICD-10-CM

## 2025-07-24 DIAGNOSIS — J45.40 MODERATE PERSISTENT ASTHMA WITHOUT COMPLICATION: Primary | ICD-10-CM

## 2025-07-24 DIAGNOSIS — G89.29 CHRONIC PAIN OF RIGHT KNEE: ICD-10-CM

## 2025-07-24 DIAGNOSIS — M25.50 MULTIPLE JOINT PAIN: ICD-10-CM

## 2025-07-24 DIAGNOSIS — M25.561 CHRONIC PAIN OF RIGHT KNEE: ICD-10-CM

## 2025-07-24 DIAGNOSIS — E11.9 CONTROLLED TYPE 2 DIABETES MELLITUS WITHOUT COMPLICATION, UNSPECIFIED WHETHER LONG TERM INSULIN USE (HCC): ICD-10-CM

## 2025-07-24 DIAGNOSIS — E78.00 PURE HYPERCHOLESTEROLEMIA: ICD-10-CM

## 2025-07-24 DIAGNOSIS — M79.7 FIBROMYALGIA: ICD-10-CM

## 2025-07-24 LAB — STRESS TARGET HR: 161 BPM

## 2025-07-24 PROCEDURE — G8427 DOCREV CUR MEDS BY ELIG CLIN: HCPCS | Performed by: NURSE PRACTITIONER

## 2025-07-24 PROCEDURE — 3075F SYST BP GE 130 - 139MM HG: CPT | Performed by: NURSE PRACTITIONER

## 2025-07-24 PROCEDURE — 1036F TOBACCO NON-USER: CPT | Performed by: NURSE PRACTITIONER

## 2025-07-24 PROCEDURE — 3051F HG A1C>EQUAL 7.0%<8.0%: CPT | Performed by: NURSE PRACTITIONER

## 2025-07-24 PROCEDURE — 3078F DIAST BP <80 MM HG: CPT | Performed by: NURSE PRACTITIONER

## 2025-07-24 PROCEDURE — 99214 OFFICE O/P EST MOD 30 MIN: CPT | Performed by: NURSE PRACTITIONER

## 2025-07-24 PROCEDURE — 3017F COLORECTAL CA SCREEN DOC REV: CPT | Performed by: NURSE PRACTITIONER

## 2025-07-24 PROCEDURE — 2022F DILAT RTA XM EVC RTNOPTHY: CPT | Performed by: NURSE PRACTITIONER

## 2025-07-24 PROCEDURE — G8417 CALC BMI ABV UP PARAM F/U: HCPCS | Performed by: NURSE PRACTITIONER

## 2025-07-25 DIAGNOSIS — M25.50 MULTIPLE JOINT PAIN: ICD-10-CM

## 2025-07-25 DIAGNOSIS — M79.7 FIBROMYALGIA: ICD-10-CM

## 2025-07-28 NOTE — TELEPHONE ENCOUNTER
Cele called requesting a refill of the below medication which has been pended for you:     Requested Prescriptions     Pending Prescriptions Disp Refills    tiZANidine (ZANAFLEX) 4 MG tablet 90 tablet 0       Last Appointment Date: 7/24/2025  Next Appointment Date: 10/27/2025    Allergies   Allergen Reactions    Amiodarone Shortness Of Breath and Dizziness or Vertigo    Fentanyl Other (See Comments)     Patient had terrible nightmares and hallucinations while hospitalized and sedated while on fentanyl     Iodine Rash and Swelling     Were in ER being treated when it happened spent time in hosp.    Prednisone Other (See Comments)     Trouble swallowing    Shellfish Allergy Anaphylaxis    Adhesive Tape Other (See Comments)     blisters    Cefuroxime Axetil Diarrhea, Hives and Nausea And Vomiting    E-Mycin [Erythromycin] Diarrhea     Rash, hives    Ezetimibe Other (See Comments)     Other reaction(s): Muscle Pain    Gabapentin Other (See Comments)     \"Un functional\"    Morphine Hives     Itching, burning     Nsaids Diarrhea, Hives and Nausea And Vomiting    Statins Other (See Comments)     myalgias    Sulfa Antibiotics Hives    Pcn [Penicillins] Rash    Percocet [Oxycodone-Acetaminophen] Other (See Comments)     Severe drowsiness     Zithromax [Azithromycin] Rash

## 2025-07-28 NOTE — PROGRESS NOTES
walking.  - Anticipated improvement in symptoms with CPAP machine.  - Monitoring oxygen levels and daytime fatigue.    3. Restrictive lung disease-continue following with pulmonology     4. Fibromyalgia-handicap placard ordered, will refer to PT for functional capacity      5. Multiple joint pain-handicap placard ordered, will refer to PT for functional capacity     6. Chronic knee pain -handicap placard    7. Elevated Cholesterol.  - Has not tolerated statins well, experiencing significant side effects.  - Alternative medications such as fenofibrate discussed but not tolerated.  - Advised to increase water intake and engage in physical activity as tolerated.  - Follow-up lipid panel to be ordered in 3 months.    8.type 2 diabetes  - Blood sugar and A1c levels are elevated, likely due to dietary factors.  - Advised to follow up with endocrinologist in October.  - Repeat A1c test to be conducted in 3 months.  - Monitoring dietary habits and blood sugar levels.     Disability Paperwork.  - Physical capacity exam to be conducted through physical therapy to assess functional limitations.  - This will aid in completing disability paperwork and determining the need for a handicap placard.  - Referral to physical therapy for assessment.  - Monitoring functional limitations and progress in physical therapy.    Follow-up: The patient will follow up in 3 months.    No follow-ups on file.    Orders Placed This Encounter   Procedures    Hemoglobin A1C     Standing Status:   Future     Expected Date:   10/24/2025     Expiration Date:   7/25/2026    [facility] [place]     Referral Priority:   Routine     Referral Type:   Physical Therapy     Referral Reason:   Specialty Services Required     Requested Specialty:   Physical Therapy     Number of Visits Requested:   1     Orders Placed This Encounter   Medications    Handicap Placard MISC     Sig: by Does not apply route Expires 07/24/2030     Dispense:  1 each

## 2025-07-29 DIAGNOSIS — G89.29 CHRONIC PAIN OF RIGHT KNEE: ICD-10-CM

## 2025-07-29 DIAGNOSIS — M25.561 CHRONIC PAIN OF RIGHT KNEE: ICD-10-CM

## 2025-07-29 DIAGNOSIS — M17.11 LOCALIZED OSTEOARTHRITIS OF RIGHT KNEE: ICD-10-CM

## 2025-07-29 DIAGNOSIS — I10 HYPERTENSION, ESSENTIAL: ICD-10-CM

## 2025-07-29 RX ORDER — ACETAMINOPHEN AND CODEINE PHOSPHATE 300; 30 MG/1; MG/1
1 TABLET ORAL EVERY 8 HOURS PRN
Qty: 90 TABLET | Refills: 3 | Status: SHIPPED | OUTPATIENT
Start: 2025-07-29 | End: 2025-11-26

## 2025-07-29 RX ORDER — FUROSEMIDE 20 MG/1
20 TABLET ORAL DAILY
Qty: 90 TABLET | Refills: 1 | Status: SHIPPED | OUTPATIENT
Start: 2025-07-29

## 2025-07-29 ASSESSMENT — ENCOUNTER SYMPTOMS
WHEEZING: 1
SHORTNESS OF BREATH: 1
COUGH: 1

## 2025-07-29 NOTE — TELEPHONE ENCOUNTER
Cele called requesting a refill of the below medication which has been pended for you:     Requested Prescriptions     Pending Prescriptions Disp Refills    furosemide (LASIX) 20 MG tablet [Pharmacy Med Name: Furosemide 20 MG Oral Tablet] 90 tablet 0     Sig: Take 1 tablet by mouth once daily       Last Appointment Date: 7/24/2025  Next Appointment Date: 10/27/2025    Allergies   Allergen Reactions    Amiodarone Shortness Of Breath and Dizziness or Vertigo    Fentanyl Other (See Comments)     Patient had terrible nightmares and hallucinations while hospitalized and sedated while on fentanyl     Iodine Rash and Swelling     Were in ER being treated when it happened spent time in hosp.    Prednisone Other (See Comments)     Trouble swallowing    Shellfish Allergy Anaphylaxis    Adhesive Tape Other (See Comments)     blisters    Cefuroxime Axetil Diarrhea, Hives and Nausea And Vomiting    E-Mycin [Erythromycin] Diarrhea     Rash, hives    Ezetimibe Other (See Comments)     Other reaction(s): Muscle Pain    Gabapentin Other (See Comments)     \"Un functional\"    Morphine Hives     Itching, burning     Nsaids Diarrhea, Hives and Nausea And Vomiting    Statins Other (See Comments)     myalgias    Sulfa Antibiotics Hives    Pcn [Penicillins] Rash    Percocet [Oxycodone-Acetaminophen] Other (See Comments)     Severe drowsiness     Zithromax [Azithromycin] Rash

## 2025-07-29 NOTE — TELEPHONE ENCOUNTER
Patient called for refill on tylenol 3    7/8/2025 9/5/2025    [place] for [place], [place], and [place]: Tylenol 3 6/19/25 #90. Due NOW.

## 2025-08-08 ENCOUNTER — HOSPITAL ENCOUNTER (OUTPATIENT)
Dept: PHYSICAL THERAPY | Age: 60
Setting detail: THERAPIES SERIES
End: 2025-08-08
Payer: COMMERCIAL

## 2025-08-10 DIAGNOSIS — E11.9 TYPE 2 DIABETES MELLITUS WITHOUT COMPLICATION, WITHOUT LONG-TERM CURRENT USE OF INSULIN (HCC): ICD-10-CM

## 2025-08-11 RX ORDER — PIOGLITAZONE 15 MG/1
15 TABLET ORAL DAILY
Qty: 90 TABLET | Refills: 1 | Status: SHIPPED | OUTPATIENT
Start: 2025-08-11

## 2025-08-14 ENCOUNTER — HOSPITAL ENCOUNTER (OUTPATIENT)
Dept: PHYSICAL THERAPY | Age: 60
Setting detail: THERAPIES SERIES
Discharge: HOME | End: 2025-08-14
Payer: COMMERCIAL

## 2025-08-14 PROCEDURE — 97750 PHYSICAL PERFORMANCE TEST: CPT | Performed by: PHYSICAL THERAPIST

## (undated) DEVICE — GLOVE SURG SZ 8 L11.77IN FNGR THK9.8MIL STRW LTX POLYMER

## (undated) DEVICE — 3M™ STERI-DRAPE™ INCISE DRAPE, XL 1051: Brand: STERI-DRAPE™

## (undated) DEVICE — SUTURE SILK PERMAHAND PRECUT 6 X 30 IN SZ 1 BLK BRAID A307H

## (undated) DEVICE — POSITIONER,HEAD,MULTIRING,36CS: Brand: MEDLINE

## (undated) DEVICE — SUTURE PERMAHAND SZ 0 L30IN NONABSORBABLE BLK L26MM SH 1/2 K834H

## (undated) DEVICE — DRAPE SLUSH DISC W44XL66IN ST FOR RND BSIN HUSH SLUSH SYS

## (undated) DEVICE — GLOVE SURG SZ 65 L12IN FNGR THK79MIL GRN LTX FREE

## (undated) DEVICE — PADDING CAST W6INXL4YD COT LO LINTING WYTEX

## (undated) DEVICE — SUTURE PROL SZ 4-0 L36IN NONABSORBABLE BLU L26MM SH 1/2 CIR 8521H

## (undated) DEVICE — DRAIN,WOUND,ROUND,24FR,5/16",FULL-FLUTED: Brand: MEDLINE

## (undated) DEVICE — PACK PROCEDURE SURG OPN HRT ADD ON

## (undated) DEVICE — GLIDESHEATH SLENDER STAINLESS STEEL KIT: Brand: GLIDESHEATH SLENDER

## (undated) DEVICE — SURGICAL PROCEDURE TRAY CRD CATH SVMMC

## (undated) DEVICE — DECANTER BAG 9": Brand: MEDLINE INDUSTRIES, INC.

## (undated) DEVICE — PACK PROCEDURE SURG OPN HRT

## (undated) DEVICE — SS SUTURE, 3 PER SLEEVE: Brand: MYO/WIRE II

## (undated) DEVICE — Device: Brand: VIRTUOSAPH PLUS WITH RADIAL INDICATION

## (undated) DEVICE — SUTURE ETHIB EXCL BR GRN TAPR PT 2-0 30 X563H X563H

## (undated) DEVICE — CATHETER,URETHRAL,REDRUBBER,STRL,18FR: Brand: MEDLINE

## (undated) DEVICE — 60-7070-104 TRNQT,DPSB,PLC GREEN: Brand: MEDLINE RENEWAL

## (undated) DEVICE — CONNECTOR TBNG Y 6IN 1 PLAS LTWT

## (undated) DEVICE — GEL PLATELET ANGEL PREP

## (undated) DEVICE — CANNULA PERF L2IN BLNT TIP 2MM VES CLR RADPQ BODY FEM LUER

## (undated) DEVICE — BLADE OPHTH ORNG GRINDLESS SMALLER ALTERNATIVE TO NO15 GEN

## (undated) DEVICE — BLADE OPHTH D5MM 15DEG GRN W/ RND KNURLED HNDL MICRO-SHARP

## (undated) DEVICE — PROTECTOR ULN NRV PUR FOAM HK LOOP STRP ANATOMICALLY

## (undated) DEVICE — SUTURE PDS II SZ 0 L27IN ABSRB VLT L36MM CT-1 1/2 CIR Z340H

## (undated) DEVICE — RETRACTOR SURG INSRT SUT HLD OCTOBASE

## (undated) DEVICE — SUTURE VCRL + SZ 4-0 L27IN ABSRB UD L26MM SH 1/2 CIR VCP415H

## (undated) DEVICE — TOWEL,OR,DSP,ST,BLUE,DLX,XR,4/PK,20PK/CS: Brand: MEDLINE

## (undated) DEVICE — COVER,LIGHT HANDLE,FLX,2/PK: Brand: MEDLINE INDUSTRIES, INC.

## (undated) DEVICE — 48" PROBE COVER W/GEL, ULTRASOUND, STERILE: Brand: SITE-RITE

## (undated) DEVICE — PREMIUM DRY TRAY LF: Brand: MEDLINE INDUSTRIES, INC.

## (undated) DEVICE — SMOKE EVACUATION TUBING WITH 8 IN INTEGRAL WAND AND SPONGE GUARD: Brand: BUFFALO FILTER

## (undated) DEVICE — Device: Brand: DYNASAFETY WATERPROOF ADHESIVE TAPE 1" X 5YDS

## (undated) DEVICE — SUTURE MCRYL SZ 4-0 L18IN ABSRB UD L19MM PS-2 3/8 CIR PRIM Y496G

## (undated) DEVICE — SUTURE PERMA-HAND SZ 0 L18IN NONABSORBABLE BLK CT-2 L26MM C027D

## (undated) DEVICE — 1LYRTR 16FR10ML100%SILTMPS SNP: Brand: MEDLINE INDUSTRIES, INC.

## (undated) DEVICE — APPLICATOR MEDICATED 26 CC SOLUTION HI LT ORNG CHLORAPREP

## (undated) DEVICE — PENCIL ES L3M BTTN SWCH HOLSTER W/ BLDE ELECTRD EDGE

## (undated) DEVICE — TUBING, SUCTION, 9/32" X 20', STRAIGHT: Brand: MEDLINE INDUSTRIES, INC.

## (undated) DEVICE — APPLICATOR MEDICATED 10.5 CC SOLUTION HI LT ORNG CHLORAPREP

## (undated) DEVICE — PLEDGET SURG W3.5XL7MM THK1.5MM WHT PTFE RECT FIRM TFE

## (undated) DEVICE — FOGARTY - HYDRAGRIP SURGICAL - CLAMP INSERTS: Brand: FOGARTY HYDRAJAW

## (undated) DEVICE — GLOVE SURG SZ 65 CRM LTX FREE POLYISOPRENE POLYMER BEAD ANTI

## (undated) DEVICE — STRAP ARMBRD W1.5XL32IN FOAM STR YET SFT W/ HK AND LOOP

## (undated) DEVICE — BLADE,CARBON-STEEL,15,STRL,DISPOSABLE,TB: Brand: MEDLINE

## (undated) DEVICE — WAX SURG 2.5GM HEMSTAT BNE BEESWAX PARAFFIN ISO PALMITATE

## (undated) DEVICE — Device: Brand: ZDRIVE™

## (undated) DEVICE — STERNUM BLADE, OFFSET (32.0 X 0.8 X 6.3MM)

## (undated) DEVICE — AGENT HEMSTAT W2XL4IN OXIDIZED REGENERATED CELOS ABSRB SFT

## (undated) DEVICE — CANNULA PERFUSION 5.5IN 9FR AORTIC ROOT

## (undated) DEVICE — DRAPE,UTILITY,XL,4/PK,STERILE: Brand: MEDLINE

## (undated) DEVICE — BNDG,ELSTC,MATRIX,STRL,6"X5YD,LF,HOOK&LP: Brand: MEDLINE

## (undated) DEVICE — GOWN,SIRUS,NONRNF,SETINSLV,2XL,18/CS: Brand: MEDLINE

## (undated) DEVICE — ANGIOGRAPHIC CATHETER: Brand: EXPO™

## (undated) DEVICE — TUBE CARDIAC SUCTION 6FR SOFT TIP 10FR

## (undated) DEVICE — COVER,MAYO STAND,STERILE: Brand: MEDLINE

## (undated) DEVICE — DRAPE,TOWEL,LARGE,INVISISHIELD: Brand: MEDLINE

## (undated) DEVICE — GLOVE SURG SZ 7 L12IN FNGR THK79MIL GRN LTX FREE

## (undated) DEVICE — GOWN,AURORA,NONREINFORCED,LARGE: Brand: MEDLINE

## (undated) DEVICE — SUTURE PROL SZ 6-0 L18IN NONABSORBABLE BLU RB-2 L13MM 1/2 8714H

## (undated) DEVICE — .: Brand: PERFECTCUT AORTOTOMY SYSTEM

## (undated) DEVICE — SPONGE LAP W18XL18IN WHT COT 4 PLY FLD STRUNG RADPQ DISP ST 2 PER PACK

## (undated) DEVICE — GLOVE SURG SZ 7 CRM LTX FREE POLYISOPRENE POLYMER BEAD ANTI

## (undated) DEVICE — WIRE PACING BIPOLAR VENTRICULAR 60CM ULTRA THIN

## (undated) DEVICE — SUTURE PROL SZ 7-0 L24IN NONABSORBABLE BLU L8MM BV175-6 3/8 8735H

## (undated) DEVICE — LIQUIBAND RAPID ADHESIVE 36/CS 0.8ML: Brand: MEDLINE